# Patient Record
Sex: FEMALE | Race: WHITE | NOT HISPANIC OR LATINO
[De-identification: names, ages, dates, MRNs, and addresses within clinical notes are randomized per-mention and may not be internally consistent; named-entity substitution may affect disease eponyms.]

---

## 2017-01-17 ENCOUNTER — FORM ENCOUNTER (OUTPATIENT)
Age: 82
End: 2017-01-17

## 2017-01-18 ENCOUNTER — OUTPATIENT (OUTPATIENT)
Dept: OUTPATIENT SERVICES | Facility: HOSPITAL | Age: 82
LOS: 1 days | End: 2017-01-18
Payer: MEDICARE

## 2017-01-18 ENCOUNTER — APPOINTMENT (OUTPATIENT)
Dept: PULMONOLOGY | Facility: CLINIC | Age: 82
End: 2017-01-18

## 2017-01-18 ENCOUNTER — APPOINTMENT (OUTPATIENT)
Dept: CT IMAGING | Facility: CLINIC | Age: 82
End: 2017-01-18

## 2017-01-18 VITALS
HEART RATE: 94 BPM | DIASTOLIC BLOOD PRESSURE: 80 MMHG | SYSTOLIC BLOOD PRESSURE: 130 MMHG | HEIGHT: 64 IN | WEIGHT: 172 LBS | BODY MASS INDEX: 29.37 KG/M2

## 2017-01-18 DIAGNOSIS — Z85.3 PERSONAL HISTORY OF MALIGNANT NEOPLASM OF BREAST: ICD-10-CM

## 2017-01-18 DIAGNOSIS — Z85.038 PERSONAL HISTORY OF OTHER MALIGNANT NEOPLASM OF LARGE INTESTINE: ICD-10-CM

## 2017-01-18 DIAGNOSIS — Z87.891 PERSONAL HISTORY OF NICOTINE DEPENDENCE: ICD-10-CM

## 2017-01-18 DIAGNOSIS — R93.8 ABNORMAL FINDINGS ON DIAGNOSTIC IMAGING OF OTHER SPECIFIED BODY STRUCTURES: ICD-10-CM

## 2017-01-18 DIAGNOSIS — Z86.79 PERSONAL HISTORY OF OTHER DISEASES OF THE CIRCULATORY SYSTEM: ICD-10-CM

## 2017-01-18 DIAGNOSIS — Z82.49 FAMILY HISTORY OF ISCHEMIC HEART DISEASE AND OTHER DISEASES OF THE CIRCULATORY SYSTEM: ICD-10-CM

## 2017-01-18 PROCEDURE — 71250 CT THORAX DX C-: CPT

## 2017-01-18 PROCEDURE — 71250 CT THORAX DX C-: CPT | Mod: 26

## 2017-01-18 RX ORDER — CHOLECALCIFEROL (VITAMIN D3) 125 MCG
TABLET ORAL
Refills: 0 | Status: ACTIVE | COMMUNITY

## 2017-01-23 ENCOUNTER — APPOINTMENT (OUTPATIENT)
Dept: THORACIC SURGERY | Facility: CLINIC | Age: 82
End: 2017-01-23

## 2017-01-23 VITALS
WEIGHT: 172 LBS | BODY MASS INDEX: 29.37 KG/M2 | RESPIRATION RATE: 16 BRPM | DIASTOLIC BLOOD PRESSURE: 91 MMHG | HEART RATE: 68 BPM | HEIGHT: 64 IN | SYSTOLIC BLOOD PRESSURE: 162 MMHG | OXYGEN SATURATION: 94 %

## 2017-01-23 DIAGNOSIS — R07.9 CHEST PAIN, UNSPECIFIED: ICD-10-CM

## 2017-01-23 DIAGNOSIS — M79.89 OTHER SPECIFIED SOFT TISSUE DISORDERS: ICD-10-CM

## 2017-01-23 DIAGNOSIS — N18.9 CHRONIC KIDNEY DISEASE, UNSPECIFIED: ICD-10-CM

## 2017-01-31 ENCOUNTER — RESULT REVIEW (OUTPATIENT)
Age: 82
End: 2017-01-31

## 2017-01-31 ENCOUNTER — OUTPATIENT (OUTPATIENT)
Dept: OUTPATIENT SERVICES | Facility: HOSPITAL | Age: 82
LOS: 1 days | End: 2017-01-31
Payer: MEDICARE

## 2017-01-31 VITALS
WEIGHT: 171.08 LBS | SYSTOLIC BLOOD PRESSURE: 165 MMHG | HEIGHT: 66 IN | HEART RATE: 78 BPM | RESPIRATION RATE: 16 BRPM | DIASTOLIC BLOOD PRESSURE: 91 MMHG | TEMPERATURE: 97 F

## 2017-01-31 DIAGNOSIS — Z85.038 PERSONAL HISTORY OF OTHER MALIGNANT NEOPLASM OF LARGE INTESTINE: Chronic | ICD-10-CM

## 2017-01-31 DIAGNOSIS — J90 PLEURAL EFFUSION, NOT ELSEWHERE CLASSIFIED: ICD-10-CM

## 2017-01-31 DIAGNOSIS — Z90.49 ACQUIRED ABSENCE OF OTHER SPECIFIED PARTS OF DIGESTIVE TRACT: Chronic | ICD-10-CM

## 2017-01-31 DIAGNOSIS — Z90.89 ACQUIRED ABSENCE OF OTHER ORGANS: Chronic | ICD-10-CM

## 2017-01-31 DIAGNOSIS — Z01.818 ENCOUNTER FOR OTHER PREPROCEDURAL EXAMINATION: ICD-10-CM

## 2017-01-31 DIAGNOSIS — Z90.11 ACQUIRED ABSENCE OF RIGHT BREAST AND NIPPLE: Chronic | ICD-10-CM

## 2017-01-31 DIAGNOSIS — Z96.642 PRESENCE OF LEFT ARTIFICIAL HIP JOINT: Chronic | ICD-10-CM

## 2017-01-31 LAB
ANION GAP SERPL CALC-SCNC: 10 MMOL/L — SIGNIFICANT CHANGE UP (ref 5–17)
ANISOCYTOSIS BLD QL: SLIGHT — SIGNIFICANT CHANGE UP
APTT BLD: 32.5 SEC — SIGNIFICANT CHANGE UP (ref 27.5–37.4)
BUN SERPL-MCNC: 24 MG/DL — HIGH (ref 8–20)
CALCIUM SERPL-MCNC: 9.2 MG/DL — SIGNIFICANT CHANGE UP (ref 8.6–10.2)
CHLORIDE SERPL-SCNC: 99 MMOL/L — SIGNIFICANT CHANGE UP (ref 98–107)
CO2 SERPL-SCNC: 30 MMOL/L — HIGH (ref 22–29)
CREAT SERPL-MCNC: 1.17 MG/DL — SIGNIFICANT CHANGE UP (ref 0.5–1.3)
ELLIPTOCYTES BLD QL SMEAR: SLIGHT — SIGNIFICANT CHANGE UP
GLUCOSE SERPL-MCNC: 112 MG/DL — SIGNIFICANT CHANGE UP (ref 70–115)
HCT VFR BLD CALC: 40.1 % — SIGNIFICANT CHANGE UP (ref 37–47)
HGB BLD-MCNC: 12.7 G/DL — SIGNIFICANT CHANGE UP (ref 12–16)
HYPOCHROMIA BLD QL: SLIGHT — SIGNIFICANT CHANGE UP
INR BLD: 1.22 RATIO — HIGH (ref 0.88–1.16)
LYMPHOCYTES # BLD AUTO: 18 % — LOW (ref 20–55)
MACROCYTES BLD QL: SLIGHT — SIGNIFICANT CHANGE UP
MCHC RBC-ENTMCNC: 25 PG — LOW (ref 27–31)
MCHC RBC-ENTMCNC: 31.7 G/DL — LOW (ref 32–36)
MCV RBC AUTO: 78.8 FL — LOW (ref 81–99)
MICROCYTES BLD QL: SLIGHT — SIGNIFICANT CHANGE UP
MONOCYTES NFR BLD AUTO: 11 % — HIGH (ref 3–10)
NEUTROPHILS NFR BLD AUTO: 68 % — SIGNIFICANT CHANGE UP (ref 37–73)
OVALOCYTES BLD QL SMEAR: SLIGHT — SIGNIFICANT CHANGE UP
PLAT MORPH BLD: NORMAL — SIGNIFICANT CHANGE UP
PLATELET # BLD AUTO: 90 K/UL — LOW (ref 150–400)
POIKILOCYTOSIS BLD QL AUTO: SLIGHT — SIGNIFICANT CHANGE UP
POTASSIUM SERPL-MCNC: 4.4 MMOL/L — SIGNIFICANT CHANGE UP (ref 3.5–5.3)
POTASSIUM SERPL-SCNC: 4.4 MMOL/L — SIGNIFICANT CHANGE UP (ref 3.5–5.3)
PROTHROM AB SERPL-ACNC: 13.4 SEC — HIGH (ref 10–13.1)
RBC # BLD: 5.09 M/UL — SIGNIFICANT CHANGE UP (ref 4.4–5.2)
RBC # FLD: 17.5 % — HIGH (ref 11–15.6)
RBC BLD AUTO: ABNORMAL
SODIUM SERPL-SCNC: 139 MMOL/L — SIGNIFICANT CHANGE UP (ref 135–145)
VARIANT LYMPHS # BLD: 3 % — SIGNIFICANT CHANGE UP (ref 0–6)
WBC # BLD: 4.87 K/UL — SIGNIFICANT CHANGE UP (ref 4.8–10.8)
WBC # FLD AUTO: 4.87 K/UL — SIGNIFICANT CHANGE UP (ref 4.8–10.8)

## 2017-01-31 PROCEDURE — G0463: CPT

## 2017-01-31 PROCEDURE — 80048 BASIC METABOLIC PNL TOTAL CA: CPT

## 2017-01-31 PROCEDURE — 85027 COMPLETE CBC AUTOMATED: CPT

## 2017-01-31 PROCEDURE — 85610 PROTHROMBIN TIME: CPT

## 2017-01-31 PROCEDURE — 85730 THROMBOPLASTIN TIME PARTIAL: CPT

## 2017-01-31 RX ORDER — SODIUM CHLORIDE 9 MG/ML
3 INJECTION INTRAMUSCULAR; INTRAVENOUS; SUBCUTANEOUS ONCE
Qty: 0 | Refills: 0 | Status: DISCONTINUED | OUTPATIENT
Start: 2017-02-01 | End: 2017-02-16

## 2017-01-31 NOTE — H&P PST ADULT - PSH
History of cholecystectomy    History of colon resection    History of hip replacement, total, left    History of right mastectomy    History of tonsillectomy

## 2017-01-31 NOTE — H&P PST ADULT - ASSESSMENT
81F PMH HTN, Breast Cancer, Colon Cancer and Renal Insufficiency with Left Pleural Effusion for Thoracentesis.

## 2017-01-31 NOTE — H&P PST ADULT - HISTORY OF PRESENT ILLNESS
81F with pneumonia in September 2016, hospitalized at [facility] for 1 week. Discharged home and patient reports, "I haven't felt right since then." Shortness on exertion when walking and climbing steps. Ct Chest showed Left Pleural Effusion, for Thoracentesis.

## 2017-01-31 NOTE — H&P PST ADULT - NSANTHOSAYNRD_GEN_A_CORE
No. MARTHA screening performed.  STOP BANG Legend: 0-2 = LOW Risk; 3-4 = INTERMEDIATE Risk; 5-8 = HIGH Risk

## 2017-01-31 NOTE — H&P PST ADULT - FAMILY HISTORY
Mother  Still living? No  Family history of hypertension, Age at diagnosis: Age Unknown     Child  Still living? Yes, Estimated age: Age Unknown  Family history of hypertension, Age at diagnosis: Age Unknown

## 2017-02-01 ENCOUNTER — OUTPATIENT (OUTPATIENT)
Dept: OUTPATIENT SERVICES | Facility: HOSPITAL | Age: 82
LOS: 1 days | Discharge: HOME | End: 2017-02-01
Payer: MEDICARE

## 2017-02-01 VITALS
DIASTOLIC BLOOD PRESSURE: 63 MMHG | SYSTOLIC BLOOD PRESSURE: 119 MMHG | WEIGHT: 164.91 LBS | RESPIRATION RATE: 16 BRPM | OXYGEN SATURATION: 95 % | HEIGHT: 65 IN | HEART RATE: 64 BPM | TEMPERATURE: 97 F

## 2017-02-01 VITALS
HEART RATE: 65 BPM | DIASTOLIC BLOOD PRESSURE: 70 MMHG | OXYGEN SATURATION: 94 % | SYSTOLIC BLOOD PRESSURE: 122 MMHG | RESPIRATION RATE: 20 BRPM

## 2017-02-01 DIAGNOSIS — Z01.818 ENCOUNTER FOR OTHER PREPROCEDURAL EXAMINATION: ICD-10-CM

## 2017-02-01 DIAGNOSIS — Z90.49 ACQUIRED ABSENCE OF OTHER SPECIFIED PARTS OF DIGESTIVE TRACT: Chronic | ICD-10-CM

## 2017-02-01 DIAGNOSIS — J90 PLEURAL EFFUSION, NOT ELSEWHERE CLASSIFIED: ICD-10-CM

## 2017-02-01 DIAGNOSIS — Z90.89 ACQUIRED ABSENCE OF OTHER ORGANS: Chronic | ICD-10-CM

## 2017-02-01 DIAGNOSIS — Z90.11 ACQUIRED ABSENCE OF RIGHT BREAST AND NIPPLE: Chronic | ICD-10-CM

## 2017-02-01 DIAGNOSIS — Z96.642 PRESENCE OF LEFT ARTIFICIAL HIP JOINT: Chronic | ICD-10-CM

## 2017-02-01 LAB
ALBUMIN FLD-MCNC: 1.6 G/DL — SIGNIFICANT CHANGE UP
B PERT IGG+IGM PNL SER: CLEAR — SIGNIFICANT CHANGE UP
COLOR FLD: YELLOW
FLUID INTAKE SUBSTANCE CLASS: SIGNIFICANT CHANGE UP
FLUID SEGMENTED GRANULOCYTES: 18 % — SIGNIFICANT CHANGE UP
GLUCOSE FLD-MCNC: 118 MG/DL — SIGNIFICANT CHANGE UP
GRAM STN FLD: SIGNIFICANT CHANGE UP
LYMPHOCYTES # FLD: 63 % — SIGNIFICANT CHANGE UP
MESOTHL CELL # FLD: 8 % — SIGNIFICANT CHANGE UP
MONOS+MACROS # FLD: 11 % — SIGNIFICANT CHANGE UP
PH FLD: 8 — SIGNIFICANT CHANGE UP
PROT FLD-MCNC: 2.9 G/DL — SIGNIFICANT CHANGE UP
RCV VOL RI: 17 /UL — HIGH (ref 0–5)
SPECIMEN SOURCE: SIGNIFICANT CHANGE UP
TOTAL NUCLEATED CELL COUNT, BODY FLUID: 265 /UL — HIGH (ref 0–5)
TUBE TYPE: SIGNIFICANT CHANGE UP

## 2017-02-01 PROCEDURE — 83986 ASSAY PH BODY FLUID NOS: CPT

## 2017-02-01 PROCEDURE — 82945 GLUCOSE OTHER FLUID: CPT

## 2017-02-01 PROCEDURE — 87015 SPECIMEN INFECT AGNT CONCNTJ: CPT

## 2017-02-01 PROCEDURE — 82042 OTHER SOURCE ALBUMIN QUAN EA: CPT

## 2017-02-01 PROCEDURE — 76942 ECHO GUIDE FOR BIOPSY: CPT

## 2017-02-01 PROCEDURE — 71045 X-RAY EXAM CHEST 1 VIEW: CPT

## 2017-02-01 PROCEDURE — 87206 SMEAR FLUORESCENT/ACID STAI: CPT

## 2017-02-01 PROCEDURE — 89051 BODY FLUID CELL COUNT: CPT

## 2017-02-01 PROCEDURE — 87075 CULTR BACTERIA EXCEPT BLOOD: CPT

## 2017-02-01 PROCEDURE — 32555 ASPIRATE PLEURA W/ IMAGING: CPT | Mod: LT

## 2017-02-01 PROCEDURE — 87205 SMEAR GRAM STAIN: CPT

## 2017-02-01 PROCEDURE — 87070 CULTURE OTHR SPECIMN AEROBIC: CPT

## 2017-02-01 PROCEDURE — 84157 ASSAY OF PROTEIN OTHER: CPT

## 2017-02-01 PROCEDURE — 83615 LACTATE (LD) (LDH) ENZYME: CPT

## 2017-02-01 PROCEDURE — 71010: CPT | Mod: 26

## 2017-02-01 PROCEDURE — 87102 FUNGUS ISOLATION CULTURE: CPT

## 2017-02-01 PROCEDURE — 87116 MYCOBACTERIA CULTURE: CPT

## 2017-02-01 NOTE — ASU DISCHARGE PLAN (ADULT/PEDIATRIC). - MEDICATION SUMMARY - MEDICATIONS TO TAKE
I will START or STAY ON the medications listed below when I get home from the hospital:    losartan 50 mg oral tablet  -- 1 tab(s) by mouth once a day  -- Indication: For Per PMD    Os-Jaskaran 500 (1250 mg calcium carbonate) oral tablet  -- 2 tab(s) by mouth once a day  -- Indication: For Per PMD    prazosin 5 mg oral capsule  -- 1 cap(s) by mouth 2 times a day  -- Indication: For Per PMD    Metoprolol Succinate ER 50 mg oral tablet, extended release  -- 1 tab(s) by mouth once a day  -- Indication: For Per PMDPer PMD    atenolol 100 mg oral tablet  -- 1 tab(s) by mouth once a day  -- Indication: For Per PMD    hydroCHLOROthiazide 12.5 mg oral capsule  -- 1 cap(s) by mouth once a day  -- Indication: For Per PMD    Klor-Con 10 mEq oral tablet, extended release  -- 1 tab(s) by mouth once a day  -- Indication: For Per PMD

## 2017-02-02 LAB
LDH SERPL L TO P-CCNC: 58 U/L — SIGNIFICANT CHANGE UP
NIGHT BLUE STAIN TISS: SIGNIFICANT CHANGE UP
SPECIMEN SOURCE: SIGNIFICANT CHANGE UP

## 2017-02-06 ENCOUNTER — APPOINTMENT (OUTPATIENT)
Dept: THORACIC SURGERY | Facility: CLINIC | Age: 82
End: 2017-02-06

## 2017-02-06 LAB
CULTURE RESULTS: SIGNIFICANT CHANGE UP
SPECIMEN SOURCE: SIGNIFICANT CHANGE UP

## 2017-02-08 ENCOUNTER — APPOINTMENT (OUTPATIENT)
Dept: THORACIC SURGERY | Facility: CLINIC | Age: 82
End: 2017-02-08

## 2017-02-08 ENCOUNTER — RECORD ABSTRACTING (OUTPATIENT)
Age: 82
End: 2017-02-08

## 2017-02-08 VITALS
OXYGEN SATURATION: 93 % | DIASTOLIC BLOOD PRESSURE: 85 MMHG | RESPIRATION RATE: 18 BRPM | SYSTOLIC BLOOD PRESSURE: 153 MMHG | HEART RATE: 79 BPM | HEIGHT: 64 IN

## 2017-02-14 ENCOUNTER — OUTPATIENT (OUTPATIENT)
Dept: OUTPATIENT SERVICES | Facility: HOSPITAL | Age: 82
LOS: 1 days | End: 2017-02-14
Payer: MEDICARE

## 2017-02-14 VITALS
DIASTOLIC BLOOD PRESSURE: 87 MMHG | RESPIRATION RATE: 22 BRPM | TEMPERATURE: 97 F | HEART RATE: 71 BPM | HEIGHT: 66 IN | SYSTOLIC BLOOD PRESSURE: 152 MMHG | WEIGHT: 165.57 LBS

## 2017-02-14 DIAGNOSIS — I10 ESSENTIAL (PRIMARY) HYPERTENSION: ICD-10-CM

## 2017-02-14 DIAGNOSIS — Z01.818 ENCOUNTER FOR OTHER PREPROCEDURAL EXAMINATION: ICD-10-CM

## 2017-02-14 DIAGNOSIS — Z90.11 ACQUIRED ABSENCE OF RIGHT BREAST AND NIPPLE: Chronic | ICD-10-CM

## 2017-02-14 DIAGNOSIS — Z90.49 ACQUIRED ABSENCE OF OTHER SPECIFIED PARTS OF DIGESTIVE TRACT: Chronic | ICD-10-CM

## 2017-02-14 DIAGNOSIS — Z96.642 PRESENCE OF LEFT ARTIFICIAL HIP JOINT: Chronic | ICD-10-CM

## 2017-02-14 DIAGNOSIS — J86.9 PYOTHORAX WITHOUT FISTULA: ICD-10-CM

## 2017-02-14 DIAGNOSIS — Z90.89 ACQUIRED ABSENCE OF OTHER ORGANS: Chronic | ICD-10-CM

## 2017-02-14 DIAGNOSIS — Z98.890 OTHER SPECIFIED POSTPROCEDURAL STATES: Chronic | ICD-10-CM

## 2017-02-14 LAB
ANION GAP SERPL CALC-SCNC: 11 MMOL/L — SIGNIFICANT CHANGE UP (ref 5–17)
ANISOCYTOSIS BLD QL: SLIGHT — SIGNIFICANT CHANGE UP
APTT BLD: 34 SEC — SIGNIFICANT CHANGE UP (ref 27.5–37.4)
BLD GP AB SCN SERPL QL: SIGNIFICANT CHANGE UP
BUN SERPL-MCNC: 27 MG/DL — HIGH (ref 8–20)
CALCIUM SERPL-MCNC: 9.7 MG/DL — SIGNIFICANT CHANGE UP (ref 8.6–10.2)
CHLORIDE SERPL-SCNC: 94 MMOL/L — LOW (ref 98–107)
CO2 SERPL-SCNC: 31 MMOL/L — HIGH (ref 22–29)
COMMENT - BLOOD BANK: SIGNIFICANT CHANGE UP
CREAT SERPL-MCNC: 1.18 MG/DL — SIGNIFICANT CHANGE UP (ref 0.5–1.3)
EOSINOPHIL NFR BLD AUTO: 1 % — SIGNIFICANT CHANGE UP (ref 0–5)
GLUCOSE SERPL-MCNC: 123 MG/DL — HIGH (ref 70–115)
HCT VFR BLD CALC: 40.8 % — SIGNIFICANT CHANGE UP (ref 37–47)
HGB BLD-MCNC: 13.3 G/DL — SIGNIFICANT CHANGE UP (ref 12–16)
HYPOCHROMIA BLD QL: SLIGHT — SIGNIFICANT CHANGE UP
INR BLD: 1.29 RATIO — HIGH (ref 0.88–1.16)
LYMPHOCYTES # BLD AUTO: 12 % — LOW (ref 20–55)
MACROCYTES BLD QL: SLIGHT — SIGNIFICANT CHANGE UP
MCHC RBC-ENTMCNC: 25.2 PG — LOW (ref 27–31)
MCHC RBC-ENTMCNC: 32.6 G/DL — SIGNIFICANT CHANGE UP (ref 32–36)
MCV RBC AUTO: 77.4 FL — LOW (ref 81–99)
MICROCYTES BLD QL: SLIGHT — SIGNIFICANT CHANGE UP
MONOCYTES NFR BLD AUTO: 8 % — SIGNIFICANT CHANGE UP (ref 3–10)
NEUTROPHILS NFR BLD AUTO: 76 % — HIGH (ref 37–73)
OVALOCYTES BLD QL SMEAR: SLIGHT — SIGNIFICANT CHANGE UP
PLAT MORPH BLD: NORMAL — SIGNIFICANT CHANGE UP
PLATELET # BLD AUTO: 99 K/UL — LOW (ref 150–400)
POIKILOCYTOSIS BLD QL AUTO: SLIGHT — SIGNIFICANT CHANGE UP
POTASSIUM SERPL-MCNC: 4.3 MMOL/L — SIGNIFICANT CHANGE UP (ref 3.5–5.3)
POTASSIUM SERPL-SCNC: 4.3 MMOL/L — SIGNIFICANT CHANGE UP (ref 3.5–5.3)
PROTHROM AB SERPL-ACNC: 14.2 SEC — HIGH (ref 10–13.1)
RBC # BLD: 5.27 M/UL — HIGH (ref 4.4–5.2)
RBC # FLD: 17.3 % — HIGH (ref 11–15.6)
RBC BLD AUTO: ABNORMAL
SODIUM SERPL-SCNC: 136 MMOL/L — SIGNIFICANT CHANGE UP (ref 135–145)
TYPE + AB SCN PNL BLD: SIGNIFICANT CHANGE UP
VARIANT LYMPHS # BLD: 3 % — SIGNIFICANT CHANGE UP (ref 0–6)
WBC # BLD: 5.6 K/UL — SIGNIFICANT CHANGE UP (ref 4.8–10.8)
WBC # FLD AUTO: 5.6 K/UL — SIGNIFICANT CHANGE UP (ref 4.8–10.8)

## 2017-02-14 PROCEDURE — 85610 PROTHROMBIN TIME: CPT

## 2017-02-14 PROCEDURE — 86901 BLOOD TYPING SEROLOGIC RH(D): CPT

## 2017-02-14 PROCEDURE — 80048 BASIC METABOLIC PNL TOTAL CA: CPT

## 2017-02-14 PROCEDURE — 86850 RBC ANTIBODY SCREEN: CPT

## 2017-02-14 PROCEDURE — 85730 THROMBOPLASTIN TIME PARTIAL: CPT

## 2017-02-14 PROCEDURE — 85027 COMPLETE CBC AUTOMATED: CPT

## 2017-02-14 PROCEDURE — 86900 BLOOD TYPING SEROLOGIC ABO: CPT

## 2017-02-14 RX ORDER — CEFAZOLIN SODIUM 1 G
2000 VIAL (EA) INJECTION ONCE
Qty: 0 | Refills: 0 | Status: DISCONTINUED | OUTPATIENT
Start: 2017-02-24 | End: 2017-02-28

## 2017-02-14 RX ORDER — SODIUM CHLORIDE 9 MG/ML
3 INJECTION INTRAMUSCULAR; INTRAVENOUS; SUBCUTANEOUS EVERY 8 HOURS
Qty: 0 | Refills: 0 | Status: DISCONTINUED | OUTPATIENT
Start: 2017-02-24 | End: 2017-02-24

## 2017-02-14 NOTE — H&P PST ADULT - ASSESSMENT
81F 81F PMH HTN, Breast Cancer, Colon Cancer and Renal Insufficiency with Empyema for Flexible Bronchoscopy, Left Video Assisted Thoracic Surgery, Decortication.

## 2017-02-14 NOTE — H&P PST ADULT - HISTORY OF PRESENT ILLNESS
81F with pneumonia in September 2016, hospitalized at [facility] for 1 week. Discharged home and patient reports, "I haven't felt right since then." Shortness on exertion when walking and climbing steps. Ct Chest showed Left Pleural Effusion, for Thoracentesis. Thoracentesis completed 2/1/17. Now scheduled for Bronchoscopy and Left VATS. 81F with pneumonia in September 2016, hospitalized at [facility] for 1 week. Discharged home and patient reports, "I haven't felt right since then." Shortness on exertion when walking and climbing steps. Ct Chest showed Left Pleural Effusion, for Thoracentesis. Thoracentesis completed 2/1/17, 1.5 L per patient. Patient reports some improvement in SOB after the thoracentesis, but gradually back to being SOB on exertion. Now scheduled for Bronchoscopy and Left VATS.

## 2017-02-14 NOTE — H&P PST ADULT - PSH
History of cholecystectomy    History of colon resection    History of hip replacement, total, left    History of right mastectomy    History of tonsillectomy History of cholecystectomy    History of colon resection    History of hip replacement, total, left    History of right mastectomy    History of tonsillectomy    Status post thoracentesis  Left Lung

## 2017-02-15 ENCOUNTER — APPOINTMENT (OUTPATIENT)
Dept: PULMONOLOGY | Facility: CLINIC | Age: 82
End: 2017-02-15

## 2017-02-15 DIAGNOSIS — Z01.818 ENCOUNTER FOR OTHER PREPROCEDURAL EXAMINATION: ICD-10-CM

## 2017-02-15 DIAGNOSIS — J86.9 PYOTHORAX WITHOUT FISTULA: ICD-10-CM

## 2017-02-23 ENCOUNTER — RESULT REVIEW (OUTPATIENT)
Age: 82
End: 2017-02-23

## 2017-02-24 ENCOUNTER — INPATIENT (INPATIENT)
Facility: HOSPITAL | Age: 82
LOS: 3 days | Discharge: HOME HEALTH | DRG: 167 | End: 2017-02-28
Attending: THORACIC SURGERY (CARDIOTHORACIC VASCULAR SURGERY) | Admitting: THORACIC SURGERY (CARDIOTHORACIC VASCULAR SURGERY)
Payer: MEDICARE

## 2017-02-24 ENCOUNTER — APPOINTMENT (OUTPATIENT)
Dept: THORACIC SURGERY | Facility: HOSPITAL | Age: 82
End: 2017-02-24
Payer: MEDICARE

## 2017-02-24 ENCOUNTER — TRANSCRIPTION ENCOUNTER (OUTPATIENT)
Age: 82
End: 2017-02-24

## 2017-02-24 VITALS
TEMPERATURE: 97 F | RESPIRATION RATE: 16 BRPM | SYSTOLIC BLOOD PRESSURE: 107 MMHG | OXYGEN SATURATION: 95 % | HEART RATE: 66 BPM | WEIGHT: 165.57 LBS | HEIGHT: 67 IN | DIASTOLIC BLOOD PRESSURE: 77 MMHG

## 2017-02-24 DIAGNOSIS — Z90.89 ACQUIRED ABSENCE OF OTHER ORGANS: Chronic | ICD-10-CM

## 2017-02-24 DIAGNOSIS — Z90.49 ACQUIRED ABSENCE OF OTHER SPECIFIED PARTS OF DIGESTIVE TRACT: Chronic | ICD-10-CM

## 2017-02-24 DIAGNOSIS — Z98.890 OTHER SPECIFIED POSTPROCEDURAL STATES: Chronic | ICD-10-CM

## 2017-02-24 DIAGNOSIS — Z96.642 PRESENCE OF LEFT ARTIFICIAL HIP JOINT: Chronic | ICD-10-CM

## 2017-02-24 DIAGNOSIS — Z90.11 ACQUIRED ABSENCE OF RIGHT BREAST AND NIPPLE: Chronic | ICD-10-CM

## 2017-02-24 DIAGNOSIS — J86.9 PYOTHORAX WITHOUT FISTULA: ICD-10-CM

## 2017-02-24 LAB
BLD GP AB SCN SERPL QL: SIGNIFICANT CHANGE UP
GRAM STN FLD: SIGNIFICANT CHANGE UP
SPECIMEN SOURCE: SIGNIFICANT CHANGE UP
TYPE + AB SCN PNL BLD: SIGNIFICANT CHANGE UP

## 2017-02-24 PROCEDURE — 32651 THORACOSCOPY REMOVE CORTEX: CPT | Mod: AS

## 2017-02-24 PROCEDURE — 71010: CPT | Mod: 26

## 2017-02-24 PROCEDURE — 32550 INSERT PLEURAL CATH: CPT

## 2017-02-24 PROCEDURE — 21600 PARTIAL REMOVAL OF RIB: CPT

## 2017-02-24 PROCEDURE — 32651 THORACOSCOPY REMOVE CORTEX: CPT

## 2017-02-24 PROCEDURE — 31622 DX BRONCHOSCOPE/WASH: CPT

## 2017-02-24 PROCEDURE — 88305 TISSUE EXAM BY PATHOLOGIST: CPT | Mod: 26

## 2017-02-24 PROCEDURE — 21600 PARTIAL REMOVAL OF RIB: CPT | Mod: AS

## 2017-02-24 PROCEDURE — 32609 THORACOSCOPY W/BX PLEURA: CPT

## 2017-02-24 PROCEDURE — 88311 DECALCIFY TISSUE: CPT | Mod: 26

## 2017-02-24 PROCEDURE — 88302 TISSUE EXAM BY PATHOLOGIST: CPT | Mod: 26

## 2017-02-24 PROCEDURE — 88331 PATH CONSLTJ SURG 1 BLK 1SPC: CPT | Mod: 26

## 2017-02-24 RX ORDER — ONDANSETRON 8 MG/1
4 TABLET, FILM COATED ORAL EVERY 6 HOURS
Qty: 0 | Refills: 0 | Status: DISCONTINUED | OUTPATIENT
Start: 2017-02-24 | End: 2017-02-26

## 2017-02-24 RX ORDER — FENTANYL CITRATE 50 UG/ML
30 INJECTION INTRAVENOUS
Qty: 0 | Refills: 0 | Status: DISCONTINUED | OUTPATIENT
Start: 2017-02-24 | End: 2017-02-26

## 2017-02-24 RX ORDER — ACETAMINOPHEN 500 MG
1000 TABLET ORAL ONCE
Qty: 0 | Refills: 0 | Status: DISCONTINUED | OUTPATIENT
Start: 2017-02-24 | End: 2017-02-27

## 2017-02-24 RX ORDER — DOXAZOSIN MESYLATE 4 MG
4 TABLET ORAL
Qty: 0 | Refills: 0 | Status: DISCONTINUED | OUTPATIENT
Start: 2017-02-24 | End: 2017-02-25

## 2017-02-24 RX ORDER — ATENOLOL 25 MG/1
100 TABLET ORAL DAILY
Qty: 0 | Refills: 0 | Status: DISCONTINUED | OUTPATIENT
Start: 2017-02-24 | End: 2017-02-27

## 2017-02-24 RX ORDER — FENTANYL CITRATE 50 UG/ML
50 INJECTION INTRAVENOUS
Qty: 0 | Refills: 0 | Status: DISCONTINUED | OUTPATIENT
Start: 2017-02-24 | End: 2017-02-24

## 2017-02-24 RX ORDER — SODIUM CHLORIDE 9 MG/ML
1000 INJECTION, SOLUTION INTRAVENOUS
Qty: 0 | Refills: 0 | Status: DISCONTINUED | OUTPATIENT
Start: 2017-02-24 | End: 2017-02-24

## 2017-02-24 RX ORDER — ENOXAPARIN SODIUM 100 MG/ML
30 INJECTION SUBCUTANEOUS EVERY 24 HOURS
Qty: 0 | Refills: 0 | Status: DISCONTINUED | OUTPATIENT
Start: 2017-02-25 | End: 2017-02-28

## 2017-02-24 RX ORDER — DOCUSATE SODIUM 100 MG
100 CAPSULE ORAL THREE TIMES A DAY
Qty: 0 | Refills: 0 | Status: DISCONTINUED | OUTPATIENT
Start: 2017-02-24 | End: 2017-02-28

## 2017-02-24 RX ORDER — ALBUTEROL 90 UG/1
2.5 AEROSOL, METERED ORAL EVERY 6 HOURS
Qty: 0 | Refills: 0 | Status: DISCONTINUED | OUTPATIENT
Start: 2017-02-24 | End: 2017-02-28

## 2017-02-24 RX ORDER — IPRATROPIUM BROMIDE 0.2 MG/ML
500 SOLUTION, NON-ORAL INHALATION EVERY 6 HOURS
Qty: 0 | Refills: 0 | Status: DISCONTINUED | OUTPATIENT
Start: 2017-02-24 | End: 2017-02-28

## 2017-02-24 RX ORDER — POTASSIUM CHLORIDE 20 MEQ
10 PACKET (EA) ORAL DAILY
Qty: 0 | Refills: 0 | Status: DISCONTINUED | OUTPATIENT
Start: 2017-02-24 | End: 2017-02-28

## 2017-02-24 RX ORDER — NALOXONE HYDROCHLORIDE 4 MG/.1ML
0.1 SPRAY NASAL
Qty: 0 | Refills: 0 | Status: DISCONTINUED | OUTPATIENT
Start: 2017-02-24 | End: 2017-02-26

## 2017-02-24 RX ORDER — ONDANSETRON 8 MG/1
4 TABLET, FILM COATED ORAL ONCE
Qty: 0 | Refills: 0 | Status: DISCONTINUED | OUTPATIENT
Start: 2017-02-24 | End: 2017-02-24

## 2017-02-24 RX ORDER — SODIUM CHLORIDE 9 MG/ML
1000 INJECTION, SOLUTION INTRAVENOUS
Qty: 0 | Refills: 0 | Status: DISCONTINUED | OUTPATIENT
Start: 2017-02-24 | End: 2017-02-25

## 2017-02-24 RX ORDER — FUROSEMIDE 40 MG
20 TABLET ORAL ONCE
Qty: 0 | Refills: 0 | Status: COMPLETED | OUTPATIENT
Start: 2017-02-24 | End: 2017-02-24

## 2017-02-24 RX ORDER — LOSARTAN POTASSIUM 100 MG/1
50 TABLET, FILM COATED ORAL DAILY
Qty: 0 | Refills: 0 | Status: DISCONTINUED | OUTPATIENT
Start: 2017-02-24 | End: 2017-02-27

## 2017-02-24 RX ADMIN — FENTANYL CITRATE 30 MILLILITER(S): 50 INJECTION INTRAVENOUS at 21:23

## 2017-02-24 RX ADMIN — Medication 100 MILLIGRAM(S): at 21:36

## 2017-02-24 RX ADMIN — Medication 4 MILLIGRAM(S): at 18:51

## 2017-02-24 RX ADMIN — Medication 20 MILLIGRAM(S): at 13:23

## 2017-02-24 RX ADMIN — FENTANYL CITRATE 30 MILLILITER(S): 50 INJECTION INTRAVENOUS at 12:34

## 2017-02-24 NOTE — BRIEF OPERATIVE NOTE - PROCEDURE
Decortication of left lung with video-assisted thoracoscopic surgery (VATS)  02/24/2017  Flex Bronch, Left VATS, Partial left lung decortication, partial left rib resection, insertion of PleurEx Catheter  Active  [id number] Decortication of left lung with video-assisted thoracoscopic surgery (VATS)  02/24/2017  Flex Bronch, Left VATS, Partial left lung decortication, partial left rib resection, insertion of PleurEx Catheter  Active  Wiliam Rosen

## 2017-02-25 LAB
ANION GAP SERPL CALC-SCNC: 10 MMOL/L — SIGNIFICANT CHANGE UP (ref 5–17)
ANION GAP SERPL CALC-SCNC: 11 MMOL/L — SIGNIFICANT CHANGE UP (ref 5–17)
ANISOCYTOSIS BLD QL: SLIGHT — SIGNIFICANT CHANGE UP
BUN SERPL-MCNC: 31 MG/DL — HIGH (ref 8–20)
BUN SERPL-MCNC: 35 MG/DL — HIGH (ref 8–20)
BURR CELLS BLD QL SMEAR: PRESENT — SIGNIFICANT CHANGE UP
CALCIUM SERPL-MCNC: 8.4 MG/DL — LOW (ref 8.6–10.2)
CALCIUM SERPL-MCNC: 8.9 MG/DL — SIGNIFICANT CHANGE UP (ref 8.6–10.2)
CHLORIDE SERPL-SCNC: 94 MMOL/L — LOW (ref 98–107)
CHLORIDE SERPL-SCNC: 98 MMOL/L — SIGNIFICANT CHANGE UP (ref 98–107)
CO2 SERPL-SCNC: 29 MMOL/L — SIGNIFICANT CHANGE UP (ref 22–29)
CO2 SERPL-SCNC: 29 MMOL/L — SIGNIFICANT CHANGE UP (ref 22–29)
CREAT SERPL-MCNC: 1.3 MG/DL — SIGNIFICANT CHANGE UP (ref 0.5–1.3)
CREAT SERPL-MCNC: 1.64 MG/DL — HIGH (ref 0.5–1.3)
CULTURE RESULTS: SIGNIFICANT CHANGE UP
DACRYOCYTES BLD QL SMEAR: SLIGHT — SIGNIFICANT CHANGE UP
GLUCOSE SERPL-MCNC: 112 MG/DL — SIGNIFICANT CHANGE UP (ref 70–115)
GLUCOSE SERPL-MCNC: 133 MG/DL — HIGH (ref 70–115)
HCT VFR BLD CALC: 37.7 % — SIGNIFICANT CHANGE UP (ref 37–47)
HGB BLD-MCNC: 11.9 G/DL — LOW (ref 12–16)
LYMPHOCYTES # BLD AUTO: 1 K/UL — SIGNIFICANT CHANGE UP (ref 1–4.8)
LYMPHOCYTES # BLD AUTO: 13.7 % — LOW (ref 20–55)
MCHC RBC-ENTMCNC: 24.6 PG — LOW (ref 27–31)
MCHC RBC-ENTMCNC: 31.6 G/DL — LOW (ref 32–36)
MCV RBC AUTO: 77.9 FL — LOW (ref 81–99)
MICROCYTES BLD QL: SLIGHT — SIGNIFICANT CHANGE UP
MONOCYTES # BLD AUTO: 1 K/UL — HIGH (ref 0–0.8)
MONOCYTES NFR BLD AUTO: 12.8 % — HIGH (ref 3–10)
NEUTROPHILS # BLD AUTO: 5.6 K/UL — SIGNIFICANT CHANGE UP (ref 1.8–8)
NEUTROPHILS NFR BLD AUTO: 73.4 % — HIGH (ref 37–73)
NIGHT BLUE STAIN TISS: SIGNIFICANT CHANGE UP
PLAT MORPH BLD: NORMAL — SIGNIFICANT CHANGE UP
PLATELET # BLD AUTO: 85 K/UL — LOW (ref 150–400)
POTASSIUM SERPL-MCNC: 4.8 MMOL/L — SIGNIFICANT CHANGE UP (ref 3.5–5.3)
POTASSIUM SERPL-MCNC: 5.2 MMOL/L — SIGNIFICANT CHANGE UP (ref 3.5–5.3)
POTASSIUM SERPL-SCNC: 4.8 MMOL/L — SIGNIFICANT CHANGE UP (ref 3.5–5.3)
POTASSIUM SERPL-SCNC: 5.2 MMOL/L — SIGNIFICANT CHANGE UP (ref 3.5–5.3)
RBC # BLD: 4.84 M/UL — SIGNIFICANT CHANGE UP (ref 4.4–5.2)
RBC # FLD: 17.3 % — HIGH (ref 11–15.6)
RBC BLD AUTO: ABNORMAL
SODIUM SERPL-SCNC: 133 MMOL/L — LOW (ref 135–145)
SODIUM SERPL-SCNC: 138 MMOL/L — SIGNIFICANT CHANGE UP (ref 135–145)
SPECIMEN SOURCE: SIGNIFICANT CHANGE UP
SPECIMEN SOURCE: SIGNIFICANT CHANGE UP
WBC # BLD: 7.6 K/UL — SIGNIFICANT CHANGE UP (ref 4.8–10.8)
WBC # FLD AUTO: 7.6 K/UL — SIGNIFICANT CHANGE UP (ref 4.8–10.8)

## 2017-02-25 PROCEDURE — 71010: CPT | Mod: 26

## 2017-02-25 RX ORDER — SODIUM CHLORIDE 9 MG/ML
1000 INJECTION INTRAMUSCULAR; INTRAVENOUS; SUBCUTANEOUS
Qty: 0 | Refills: 0 | Status: DISCONTINUED | OUTPATIENT
Start: 2017-02-25 | End: 2017-02-27

## 2017-02-25 RX ORDER — ALBUMIN HUMAN 25 %
250 VIAL (ML) INTRAVENOUS ONCE
Qty: 0 | Refills: 0 | Status: COMPLETED | OUTPATIENT
Start: 2017-02-25 | End: 2017-02-25

## 2017-02-25 RX ADMIN — FENTANYL CITRATE 30 MILLILITER(S): 50 INJECTION INTRAVENOUS at 07:19

## 2017-02-25 RX ADMIN — Medication 100 MILLIGRAM(S): at 13:08

## 2017-02-25 RX ADMIN — ENOXAPARIN SODIUM 30 MILLIGRAM(S): 100 INJECTION SUBCUTANEOUS at 00:01

## 2017-02-25 RX ADMIN — Medication 125 MILLILITER(S): at 14:55

## 2017-02-25 RX ADMIN — FENTANYL CITRATE 30 MILLILITER(S): 50 INJECTION INTRAVENOUS at 19:10

## 2017-02-25 RX ADMIN — Medication 4 MILLIGRAM(S): at 17:29

## 2017-02-25 RX ADMIN — Medication 100 MILLIGRAM(S): at 21:31

## 2017-02-25 RX ADMIN — ENOXAPARIN SODIUM 30 MILLIGRAM(S): 100 INJECTION SUBCUTANEOUS at 22:48

## 2017-02-25 RX ADMIN — Medication 10 MILLIEQUIVALENT(S): at 11:19

## 2017-02-25 RX ADMIN — LOSARTAN POTASSIUM 50 MILLIGRAM(S): 100 TABLET, FILM COATED ORAL at 11:19

## 2017-02-25 RX ADMIN — SODIUM CHLORIDE 50 MILLILITER(S): 9 INJECTION INTRAMUSCULAR; INTRAVENOUS; SUBCUTANEOUS at 19:11

## 2017-02-25 RX ADMIN — Medication 4 MILLIGRAM(S): at 11:17

## 2017-02-25 RX ADMIN — Medication 100 MILLIGRAM(S): at 07:13

## 2017-02-25 NOTE — PROGRESS NOTE ADULT - SUBJECTIVE AND OBJECTIVE BOX
Patient is awake and Responsive to all stimuli  Vital signs are Stable  Sitting High Fowlers in bed @ time of visit  No Anesthesia Complications noted

## 2017-02-25 NOTE — PROGRESS NOTE ADULT - SUBJECTIVE AND OBJECTIVE BOX
Subjective: "I'm having some pain on this side but it helps when I hit the button" nad, sitting in bed. chest tube clamped per dr erazo    Vital Signs:  Vital Signs Last 24 Hrs  T(C): 36.6, Max: 36.6 (02-24 @ 11:40)  T(F): 97.8, Max: 97.9 (02-24 @ 11:40)  HR: 69 (62 - 73)  BP: 104/60 (90/50 - 136/74)  RR: 16 (16 - 35)  SpO2: 96% (95% - 99%) RA    Telemetry/Alarms:    Relevant labs, radiology and Medications reviewed    Pertinent Physical Exam  rhonchi left,   rrr  no edema  ct clamped ( per rn was small air leak earlier)    I & Os for current day (as of 02-25 @ 11:24)  =============================================  IN:    lactated ringers.: 800 ml    lactated ringers.: 700 ml    Oral Fluid: 240 ml    Total IN: 1740 ml  ---------------------------------------------  OUT:    Indwelling Catheter - Urethral: 900 ml    Chest Tube: 730 ml    Total OUT: 1630 ml  ---------------------------------------------  Total NET: 110 ml      Assessment  81y Female admitted with pleural effusion.    On 2/24/17, patient underwent Decortication of left lung with video-assisted thoracoscopic surgery (VATS), partial rib resections, pleurex catheter placement.    Postoperative course/issues: Small air leak    PLAN  Neuro: Pain management, Fentanyl PCA for now  Pulm: Encourage coughing, deep breathing and use of incentive spirometry. Wean off supplemental oxygen as able. Daily CXR. Duonebs  Cardio: Monitor telemetry/alarms. Continue cardiac meds  GI: Tolerating diet. Continue stool softeners.  Renal: Daily BMP, supplement electrolytes as needed. Monitor creatinine (slight elevation)  Vasc: Lovenox/SCDs for DVT prophylaxis  Heme: Stable H/H. Trend CBC daily. Monitor thrombocytopenia  ID: Off antibiotics. Stable.  Therapy: OOB/ambulate  Tubes: Clamped - check chest xray tomorrow and disconnect from pleurivac if no significant ptx and asymptomatic  Disposition: Aim to D/C to home tomorrow  Discussed with Dr Erazo in AM Subjective: "I'm having some pain on this side but it helps when I hit the button" nad, sitting in bed. chest tube clamped per dr erazo    Vital Signs:  Vital Signs Last 24 Hrs  T(C): 36.6, Max: 36.6 (02-24 @ 11:40)  T(F): 97.8, Max: 97.9 (02-24 @ 11:40)  HR: 69 (62 - 73)  BP: 104/60 (90/50 - 136/74)  RR: 16 (16 - 35)  SpO2: 96% (95% - 99%) RA    Telemetry/Alarms:    Relevant labs, radiology and Medications reviewed    Pertinent Physical Exam  rhonchi left, crepitus posterior lateral chest near chest tube  rrr  no edema  ct clamped ( per rn was small air leak earlier)    I & Os for current day (as of 02-25 @ 11:24)  =============================================  IN:    lactated ringers.: 800 ml    lactated ringers.: 700 ml    Oral Fluid: 240 ml    Total IN: 1740 ml  ---------------------------------------------  OUT:    Indwelling Catheter - Urethral: 900 ml    Chest Tube: 730 ml    Total OUT: 1630 ml  ---------------------------------------------  Total NET: 110 ml      Assessment  81y Female admitted with pleural effusion.    On 2/24/17, patient underwent Decortication of left lung with video-assisted thoracoscopic surgery (VATS), partial rib resections, pleurex catheter placement.    Postoperative course/issues: Small air leak    PLAN  Neuro: Pain management, Fentanyl PCA for now  Pulm: Encourage coughing, deep breathing and use of incentive spirometry. Wean off supplemental oxygen as able. Daily CXR. Albinabs  Cardio: Monitor telemetry/alarms. Continue cardiac meds  GI: Tolerating diet. Continue stool softeners.  Renal: Daily BMP, supplement electrolytes as needed. Monitor creatinine (slight elevation)  Vasc: Lovenox/SCDs for DVT prophylaxis  Heme: Stable H/H. Trend CBC daily. Monitor thrombocytopenia  ID: Off antibiotics. Stable.  Therapy: OOB/ambulate  Tubes: Clamped - check chest xray tomorrow and disconnect from pleurivac if no significant ptx and asymptomatic  Disposition: Aim to D/C to home tomorrow  Discussed with Dr Erazo in AM

## 2017-02-26 LAB
ALBUMIN SERPL ELPH-MCNC: 3 G/DL — LOW (ref 3.3–5.2)
ALP SERPL-CCNC: 146 U/L — HIGH (ref 40–120)
ALT FLD-CCNC: 12 U/L — SIGNIFICANT CHANGE UP
ANION GAP SERPL CALC-SCNC: 11 MMOL/L — SIGNIFICANT CHANGE UP (ref 5–17)
APPEARANCE UR: CLEAR — SIGNIFICANT CHANGE UP
AST SERPL-CCNC: 19 U/L — SIGNIFICANT CHANGE UP
BACTERIA # UR AUTO: ABNORMAL
BILIRUB SERPL-MCNC: 0.9 MG/DL — SIGNIFICANT CHANGE UP (ref 0.4–2)
BILIRUB UR-MCNC: NEGATIVE — SIGNIFICANT CHANGE UP
BUN SERPL-MCNC: 41 MG/DL — HIGH (ref 8–20)
CALCIUM SERPL-MCNC: 8 MG/DL — LOW (ref 8.6–10.2)
CHLORIDE SERPL-SCNC: 96 MMOL/L — LOW (ref 98–107)
CO2 SERPL-SCNC: 28 MMOL/L — SIGNIFICANT CHANGE UP (ref 22–29)
COLOR SPEC: ABNORMAL
CREAT ?TM UR-MCNC: 187 MG/DL — SIGNIFICANT CHANGE UP
CREAT SERPL-MCNC: 1.69 MG/DL — HIGH (ref 0.5–1.3)
DIFF PNL FLD: ABNORMAL
EPI CELLS # UR: SIGNIFICANT CHANGE UP
GLUCOSE SERPL-MCNC: 114 MG/DL — SIGNIFICANT CHANGE UP (ref 70–115)
GLUCOSE UR QL: NEGATIVE MG/DL — SIGNIFICANT CHANGE UP
HCT VFR BLD CALC: 36.8 % — LOW (ref 37–47)
HGB BLD-MCNC: 11.9 G/DL — LOW (ref 12–16)
KETONES UR-MCNC: ABNORMAL
LEUKOCYTE ESTERASE UR-ACNC: ABNORMAL
MAGNESIUM SERPL-MCNC: 1.9 MG/DL — SIGNIFICANT CHANGE UP (ref 1.8–2.5)
MCHC RBC-ENTMCNC: 24.6 PG — LOW (ref 27–31)
MCHC RBC-ENTMCNC: 32.3 G/DL — SIGNIFICANT CHANGE UP (ref 32–36)
MCV RBC AUTO: 76 FL — LOW (ref 81–99)
NITRITE UR-MCNC: NEGATIVE — SIGNIFICANT CHANGE UP
OSMOLALITY UR: 581 MOSM/KG — SIGNIFICANT CHANGE UP (ref 300–1000)
PH UR: 5 — SIGNIFICANT CHANGE UP (ref 4.8–8)
PHOSPHATE SERPL-MCNC: 3.4 MG/DL — SIGNIFICANT CHANGE UP (ref 2.4–4.7)
PLATELET # BLD AUTO: 89 K/UL — LOW (ref 150–400)
POTASSIUM SERPL-MCNC: 4.8 MMOL/L — SIGNIFICANT CHANGE UP (ref 3.5–5.3)
POTASSIUM SERPL-SCNC: 4.8 MMOL/L — SIGNIFICANT CHANGE UP (ref 3.5–5.3)
PROT ?TM UR-MCNC: 45 MG/DL — HIGH (ref 0–12)
PROT SERPL-MCNC: 6 G/DL — LOW (ref 6.6–8.7)
PROT UR-MCNC: 30 MG/DL
PROT/CREAT UR-RTO: 0.2 RATIO — SIGNIFICANT CHANGE UP
RBC # BLD: 4.84 M/UL — SIGNIFICANT CHANGE UP (ref 4.4–5.2)
RBC # FLD: 17.1 % — HIGH (ref 11–15.6)
RBC CASTS # UR COMP ASSIST: ABNORMAL /HPF (ref 0–4)
SODIUM SERPL-SCNC: 135 MMOL/L — SIGNIFICANT CHANGE UP (ref 135–145)
SODIUM UR-SCNC: 23 MMOL/L — SIGNIFICANT CHANGE UP
SP GR SPEC: 1.02 — SIGNIFICANT CHANGE UP (ref 1.01–1.02)
UROBILINOGEN FLD QL: 1 MG/DL
WBC # BLD: 7.6 K/UL — SIGNIFICANT CHANGE UP (ref 4.8–10.8)
WBC # FLD AUTO: 7.6 K/UL — SIGNIFICANT CHANGE UP (ref 4.8–10.8)
WBC UR QL: ABNORMAL

## 2017-02-26 PROCEDURE — 71010: CPT | Mod: 26

## 2017-02-26 RX ORDER — ALBUMIN HUMAN 25 %
250 VIAL (ML) INTRAVENOUS ONCE
Qty: 0 | Refills: 0 | Status: COMPLETED | OUTPATIENT
Start: 2017-02-26 | End: 2017-02-26

## 2017-02-26 RX ADMIN — LOSARTAN POTASSIUM 50 MILLIGRAM(S): 100 TABLET, FILM COATED ORAL at 05:05

## 2017-02-26 RX ADMIN — ATENOLOL 100 MILLIGRAM(S): 25 TABLET ORAL at 05:05

## 2017-02-26 RX ADMIN — Medication 100 MILLIGRAM(S): at 13:09

## 2017-02-26 RX ADMIN — FENTANYL CITRATE 30 MILLILITER(S): 50 INJECTION INTRAVENOUS at 07:06

## 2017-02-26 RX ADMIN — Medication 100 MILLIGRAM(S): at 22:05

## 2017-02-26 RX ADMIN — Medication 125 MILLILITER(S): at 18:22

## 2017-02-26 RX ADMIN — Medication 100 MILLIGRAM(S): at 05:05

## 2017-02-26 RX ADMIN — ENOXAPARIN SODIUM 30 MILLIGRAM(S): 100 INJECTION SUBCUTANEOUS at 22:06

## 2017-02-26 RX ADMIN — Medication 1 TABLET(S): at 12:50

## 2017-02-26 RX ADMIN — Medication 10 MILLIEQUIVALENT(S): at 12:50

## 2017-02-26 NOTE — PROGRESS NOTE ADULT - SUBJECTIVE AND OBJECTIVE BOX
Subjective: "I'm ok" Pt seated in chair combing hair, NAD    Vital Signs:  Vital Signs Last 24 Hrs  T(C): 36.9, Max: 37 (02-25 @ 21:26)  T(F): 98.4, Max: 98.6 (02-25 @ 21:26)  HR: 77 (70 - 82)  BP: 106/50 (86/50 - 106/50)  RR: 17 (16 - 20)  SpO2: 94% (94% - 97%) 2L    Telemetry/Alarms: SVT x 1, otherwise SR 70s    Relevant labs, radiology and Medications reviewed    26 Feb 2017 05:07    135    |  96     |  41.0   ----------------------------<  114    4.8     |  28.0   |  1.69     Ca    8.0        26 Feb 2017 05:07  Phos  3.4       26 Feb 2017 05:07  Mg     1.9       26 Feb 2017 05:07    TPro  6.0    /  Alb  3.0    /  TBili  0.9    /  DBili  x      /  AST  19     /  ALT  12     /  AlkPhos  146    26 Feb 2017 05:07      Pertinent Physical Exam  diminished left, basilar crackles, right clr  rrr  soft + BS  no edema, wwp  left pleurex, serous     I & Os for 24h ending 02-26 @ 07:00  =============================================  IN:    Oral Fluid: 1130 ml    sodium chloride 0.9%.: 700 ml    lactated ringers.: 450 ml    IV PiggyBack: 250 ml    Total IN: 2530 ml  ---------------------------------------------  OUT:    Indwelling Catheter - Urethral: 350 ml    Chest Tube: 70 ml    Total OUT: 420 ml  ---------------------------------------------  Total NET: 2110 ml    I & Os for current day (as of 02-26 @ 11:36)  =============================================  IN:    Oral Fluid: 240 ml    sodium chloride 0.9%.: 150 ml    Total IN: 390 ml  ---------------------------------------------  OUT:    Indwelling Catheter - Urethral: 75 ml    Total OUT: 75 ml  ---------------------------------------------  Total NET: 315 ml      Assessment  81y Female admitted with pleural effusion.    On 2/24/17, patient underwent Decortication of left lung with video-assisted thoracoscopic surgery (VATS), partial rib resections, pleurex catheter placement.    Postoperative course/issues: Small air leak, clamped, ptx stable. 2/25 EDILBERTO, oliguria, renal consult called 2/26 unclamped ct per Dr Erazo    PLAN  Neuro: Pain management, d/c fentanyl pca  Pulm: Encourage coughing, deep breathing and use of incentive spirometry. Wean off supplemental oxygen as able. Daily CXR. Duonebs  Cardio: Monitor telemetry/alarms. Continue cardiac meds. Echo ordered per renal.  GI: Tolerating diet. Continue stool softeners.  Renal: Daily BMP, supplement electrolytes as needed. Monitor creatinine - continues to rise. appreciate renal consult. Gentle IVF NS @ 50  Vasc: Lovenox/SCDs for DVT prophylaxis  Heme: Stable H/H. Trend CBC daily. Monitor thrombocytopenia - stable  ID: Off antibiotics. Stable.  Therapy: OOB/ambulate  Tubes: Clamped - check chest xray tomorrow and disconnect from pleurivac if no significant ptx and asymptomatic  Disposition: Aim to D/C to home when Cr stable  Discussed with Dr Erazo in AM

## 2017-02-26 NOTE — CONSULT NOTE ADULT - SUBJECTIVE AND OBJECTIVE BOX
Patient is a 81y old  Female who presents with a chief complaint of pleural effusion (2017 10:13)   Acute  renal failure.    HPI:  81F with pneumonia in 2016, hospitalized at [facility] for 1 week. Discharged home and patient reports, "I haven't felt right since then." Shortness on exertion when walking and climbing steps. Ct Chest showed Left Pleural Effusion, for Thoracentesis. Thoracentesis completed 17, 1.5 L per patient. Patient reports some improvement in SOB after the thoracentesis, but gradually back to being SOB on exertion. Now scheduled for Bronchoscopy and Left VATS. (2017 10:01)   Hx renal stones x1 not aware of type, no other renal  problems; elevated creatinine on admission followed by Dr. Valadez for CRF as op.    PAST MEDICAL & SURGICAL HISTORY:  Renal insufficiency  Colon cancer  Breast cancer  Hypertension  Status post thoracentesis: Left Lung  History of tonsillectomy  History of right mastectomy  History of cholecystectomy  History of colon resection  History of colon cancer  History of hip replacement, total, left  history Cholecystectomy    FAMILY HISTORY:  Family history of hypertension (Mother, Child)  [place]    Social History:Non smoker    MEDICATIONS  (STANDING):  ceFAZolin   IVPB 2000milliGRAM(s) IV Intermittent once  fentaNYL PCA (50 MICROgram(s)/mL) 30milliLiter(s) PCA Continuous PCA Continuous  enoxaparin Injectable 30milliGRAM(s) SubCutaneous every 24 hours  docusate sodium 100milliGRAM(s) Oral three times a day  losartan 50milliGRAM(s) Oral daily  ATENolol  Tablet 100milliGRAM(s) Oral daily  potassium chloride    Tablet ER 10milliEquivalent(s) Oral daily  acetaminophen  IVPB. 1000milliGRAM(s) IV Intermittent once  sodium chloride 0.9%. 1000milliLiter(s) IV Continuous <Continuous>    MEDICATIONS  (PRN):  naloxone Injectable 0.1milliGRAM(s) IV Push every 3 minutes PRN For ANY of the following changes in patient status:  A. RR LESS THAN 10 breaths per minute, B. Oxygen saturation LESS THAN 90%, C. Sedation score of 6  ondansetron Injectable 4milliGRAM(s) IV Push every 6 hours PRN Nausea  ALBUTerol    0.083% 2.5milliGRAM(s) Nebulizer every 6 hours PRN Shortness of Breath and/or Wheezing  ipratropium    for Nebulization 500MICROGram(s) Nebulizer every 6 hours PRN Shortness of Breath and/or Wheezing   Meds reviewed    Allergies    No Known Allergies    Intolerances         REVIEW OF SYSTEMS:    CONSTITUTIONAL:  sob  EYES: No eye pain, visual disturbances, or discharge  ENMT:  No difficulty hearing, tinnitus, vertigo; No sinus or throat pain  NECK: No pain or stiffness  BREASTS: prior right mastectomy  RESPIRATORY: hx pneumonia  CARDIOVASCULAR: No chest pain, palpitations, dizziness,   GASTROINTESTINAL: No abdominal or epigastric pain. No nausea, vomiting, or hematemesis; No diarrhea or constipation.   GENITOURINARY: No dysuria, frequency, hematuria, or incontinence  NEUROLOGICAL: No headaches, memory loss, loss of strength, numbness, or tremors  SKIN: Diffuse erythema, no blisters  LYMPH NODES: No enlarged glands  ENDOCRINE: No heat or cold intolerance; No hair loss  MUSCULOSKELETAL: OA  PSYCHIATRIC: No depression, anxiety, mood swings, or difficulty sleeping  HEME/LYMPH: No easy bruising, or bleeding gums  ALLERY AND IMMUNOLOGIC: No hives or eczema      Vital Signs Last 24 Hrs  T(C): 36.9, Max: 37 ( @ 21:26)  T(F): 98.4, Max: 98.6 ( @ 21:26)  HR: 77 (69 - 82)  BP: 106/50 (86/50 - 106/50)  BP(mean): --  RR: 17 (16 - 20)  SpO2: 94% (94% - 97%)  Daily     Daily Weight in k (2017 05:01)    PHYSICAL EXAM:    GENERAL: appears chronically ill, oriented.  HEAD:  Atraumatic, Normocephalic  EYES: EOMI, PERRLA, conjunctiva and sclera clear  ENMT: No tonsillar erythema, exudates, or enlargement; Moist mucous membranes,  No lesions  NECK: Supple, neck  veins full  NERVOUS SYSTEM:  Alert & Oriented X3, Good concentration; Motor Strength wnl upper and lower extremities;  CHEST/LUNG: Clear right, left chest tube noted  HEART: Regular rate and rhythm; No  rubs, or gallops  ABDOMEN: Soft, Nontender, Nondistended; Bowel sounds present,multiple scars  EXTREMITIES:  compression devices legs  LYMPH: No lymphadenopathy noted  SKIN: No rashes or lesions, pale   tafoya with clear urine    LABS:                        11.9   7.6   )-----------( 89       ( 2017 05:07 )             36.8     2017 05:07    135    |  96     |  41.0   ----------------------------<  114    4.8     |  28.0   |  1.69     Ca    8.0        2017 05:07  Phos  3.4       2017 05:07  Mg     1.9       2017 05:07    TPro  6.0    /  Alb  3.0    /  TBili  0.9    /  DBili  x      /  AST  19     /  ALT  12     /  AlkPhos  146    2017 05:07      Urinalysis Basic - ( 2017 08:33 )    Color: Leslie / Appearance: Clear / S.020 / pH: x  Gluc: x / Ketone: Trace  / Bili: Negative / Urobili: 1 mg/dL   Blood: x / Protein: 30 mg/dL / Nitrite: Negative   Leuk Esterase: Small / RBC: 25-50 /HPF / WBC 11-25   Sq Epi: x / Non Sq Epi: Occasional / Bacteria: Occasional      Magnesium, Serum: 1.9 mg/dL ( @ 05:07)  Phosphorus Level, Serum: 3.4 mg/dL ( @ 05:07)          RADIOLOGY & ADDITIONAL TESTS:

## 2017-02-27 LAB
24R-OH-CALCIDIOL SERPL-MCNC: 46.6 NG/ML — SIGNIFICANT CHANGE UP (ref 30–100)
ALBUMIN SERPL ELPH-MCNC: 3.1 G/DL — LOW (ref 3.3–5.2)
ALP SERPL-CCNC: 131 U/L — HIGH (ref 40–120)
ALT FLD-CCNC: 10 U/L — SIGNIFICANT CHANGE UP
ANION GAP SERPL CALC-SCNC: 10 MMOL/L — SIGNIFICANT CHANGE UP (ref 5–17)
AST SERPL-CCNC: 17 U/L — SIGNIFICANT CHANGE UP
BILIRUB SERPL-MCNC: 1 MG/DL — SIGNIFICANT CHANGE UP (ref 0.4–2)
BODY SURFACE AREA CALCULATION: 2 M2 — SIGNIFICANT CHANGE UP
BUN SERPL-MCNC: 40 MG/DL — HIGH (ref 8–20)
CALCIUM SERPL-MCNC: 8.2 MG/DL — LOW (ref 8.6–10.2)
CHLORIDE SERPL-SCNC: 98 MMOL/L — SIGNIFICANT CHANGE UP (ref 98–107)
CO2 SERPL-SCNC: 26 MMOL/L — SIGNIFICANT CHANGE UP (ref 22–29)
COLLECT DURATION TIME UR: 24 HR — SIGNIFICANT CHANGE UP
COLLECT DURATION TIME UR: 24 HR — SIGNIFICANT CHANGE UP
CREAT CL ?TM UR+SERPL-VRATE: 45 ML/MIN — LOW (ref 75–115)
CREAT SERPL-MCNC: 1.26 MG/DL — SIGNIFICANT CHANGE UP (ref 0.5–1.3)
CREAT SERPL-MCNC: 1.26 MG/DL — SIGNIFICANT CHANGE UP (ref 0.5–1.3)
CULTURE RESULTS: SIGNIFICANT CHANGE UP
ERYTHROCYTE [SEDIMENTATION RATE] IN BLOOD: 15 MM/HR — SIGNIFICANT CHANGE UP (ref 0–20)
GLUCOSE SERPL-MCNC: 109 MG/DL — SIGNIFICANT CHANGE UP (ref 70–115)
HCT VFR BLD CALC: 36 % — LOW (ref 37–47)
HGB BLD-MCNC: 11.5 G/DL — LOW (ref 12–16)
MAGNESIUM SERPL-MCNC: 2 MG/DL — SIGNIFICANT CHANGE UP (ref 1.8–2.5)
MCHC RBC-ENTMCNC: 24.7 PG — LOW (ref 27–31)
MCHC RBC-ENTMCNC: 31.9 G/DL — LOW (ref 32–36)
MCV RBC AUTO: 77.4 FL — LOW (ref 81–99)
PHOSPHATE SERPL-MCNC: 2.9 MG/DL — SIGNIFICANT CHANGE UP (ref 2.4–4.7)
PLATELET # BLD AUTO: 87 K/UL — LOW (ref 150–400)
POTASSIUM SERPL-MCNC: 4.5 MMOL/L — SIGNIFICANT CHANGE UP (ref 3.5–5.3)
POTASSIUM SERPL-SCNC: 4.5 MMOL/L — SIGNIFICANT CHANGE UP (ref 3.5–5.3)
PROT 24H UR-MRATE: 399 MG/24HR — HIGH (ref 50–100)
PROT SERPL-MCNC: 6.2 G/DL — LOW (ref 6.6–8.7)
RBC # BLD: 4.65 M/UL — SIGNIFICANT CHANGE UP (ref 4.4–5.2)
RBC # FLD: 17.2 % — HIGH (ref 11–15.6)
SODIUM SERPL-SCNC: 134 MMOL/L — LOW (ref 135–145)
SPECIMEN SOURCE: SIGNIFICANT CHANGE UP
TOTAL VOLUME - 24 HOUR: 700 ML — SIGNIFICANT CHANGE UP
TOTAL VOLUME - 24 HOUR: 700 ML — SIGNIFICANT CHANGE UP
URINE CREATININE CALCULATION: 0.9 G/24 HR — SIGNIFICANT CHANGE UP (ref 0.8–1.8)
URINE CREATININE CALCULATION: 0.9 G/24 HR — SIGNIFICANT CHANGE UP (ref 0.8–1.8)
WBC # BLD: 6 K/UL — SIGNIFICANT CHANGE UP (ref 4.8–10.8)
WBC # FLD AUTO: 6 K/UL — SIGNIFICANT CHANGE UP (ref 4.8–10.8)

## 2017-02-27 PROCEDURE — 71010: CPT | Mod: 26

## 2017-02-27 PROCEDURE — 76775 US EXAM ABDO BACK WALL LIM: CPT | Mod: 26

## 2017-02-27 RX ORDER — CIPROFLOXACIN LACTATE 400MG/40ML
250 VIAL (ML) INTRAVENOUS
Qty: 0 | Refills: 0 | Status: DISCONTINUED | OUTPATIENT
Start: 2017-02-27 | End: 2017-02-28

## 2017-02-27 RX ADMIN — Medication 100 MILLIGRAM(S): at 13:39

## 2017-02-27 RX ADMIN — Medication 1 TABLET(S): at 12:31

## 2017-02-27 RX ADMIN — Medication 250 MILLIGRAM(S): at 21:16

## 2017-02-27 RX ADMIN — Medication 100 MILLIGRAM(S): at 06:28

## 2017-02-27 RX ADMIN — Medication 500 MICROGRAM(S): at 09:28

## 2017-02-27 RX ADMIN — ALBUTEROL 2.5 MILLIGRAM(S): 90 AEROSOL, METERED ORAL at 09:28

## 2017-02-27 RX ADMIN — Medication 100 MILLIGRAM(S): at 21:20

## 2017-02-27 RX ADMIN — Medication 10 MILLIEQUIVALENT(S): at 12:31

## 2017-02-27 RX ADMIN — ENOXAPARIN SODIUM 30 MILLIGRAM(S): 100 INJECTION SUBCUTANEOUS at 21:16

## 2017-02-27 NOTE — PROGRESS NOTE ADULT - SUBJECTIVE AND OBJECTIVE BOX
NEPHROLOGY INTERVAL HPI/OVERNIGHT EVENTS:    MEDICATIONS  (STANDING):  ceFAZolin   IVPB 2000milliGRAM(s) IV Intermittent once  enoxaparin Injectable 30milliGRAM(s) SubCutaneous every 24 hours  docusate sodium 100milliGRAM(s) Oral three times a day  potassium chloride    Tablet ER 10milliEquivalent(s) Oral daily  Nephro-gustavo 1Tablet(s) Oral daily  ciprofloxacin     Tablet 250milliGRAM(s) Oral two times a day    MEDICATIONS  (PRN):  ALBUTerol    0.083% 2.5milliGRAM(s) Nebulizer every 6 hours PRN Shortness of Breath and/or Wheezing  ipratropium    for Nebulization 500MICROGram(s) Nebulizer every 6 hours PRN Shortness of Breath and/or Wheezing  oxyCODONE  5 mG/acetaminophen 325 mG 1Tablet(s) Oral every 6 hours PRN Moderate Pain (4 - 6)      Allergies    No Known Allergies    Intolerances        Vital Signs Last 24 Hrs  T(C): 36.8, Max: 37.4 ( @ 15:00)  T(F): 98.3, Max: 99.3 ( @ 15:00)  HR: 88 (78 - 88)  BP: 108/78 (100/50 - 114/68)  BP(mean): --  RR: 18 (16 - 18)  SpO2: 96% (96% - 98%)  Daily     Daily Weight in k.8 (2017 05:44)    PHYSICAL EXAM:    GENERAL: stable oob in chair, not sob  HEAD:    EYES: wnl  ENMT:   NECK: veins increased  NERVOUS SYSTEM:  same  CHEST/LUNG: left chest tube, base crackles same  HEART: no gallop or rub noted  ABDOMEN: not tender  EXTREMITIES:  no edema  LYMPH:   SKIN: no rash    LABS:                        11.5   6.0   )-----------( 87       ( 2017 05:09 )             36.0     2017 05:09    134    |  98     |  40.0   ----------------------------<  109    4.5     |  26.0   |  1.26     Ca    8.2        2017 05:09  Phos  2.9       2017 05:09  Mg     2.0       2017 05:09    TPro  6.2    /  Alb  3.1    /  TBili  1.0    /  DBili  x      /  AST  17     /  ALT  10     /  AlkPhos  131    2017 05:09      Urinalysis Basic - ( 2017 08:33 )    Color: Leslie / Appearance: Clear / S.020 / pH: x  Gluc: x / Ketone: Trace  / Bili: Negative / Urobili: 1 mg/dL   Blood: x / Protein: 30 mg/dL / Nitrite: Negative   Leuk Esterase: Small / RBC: 25-50 /HPF / WBC 11-25   Sq Epi: x / Non Sq Epi: Occasional / Bacteria: Occasional      Phosphorus Level, Serum: 2.9 mg/dL ( @ 05:09)  Magnesium, Serum: 2.0 mg/dL ( @ 05:09)          RADIOLOGY & ADDITIONAL TESTS:

## 2017-02-27 NOTE — PROGRESS NOTE ADULT - ASSESSMENT
81y Female admitted with pleural effusion.    On 2/24/17, patient underwent Decortication of left lung with video-assisted thoracoscopic surgery (VATS), partial rib resections, pleurex catheter placement.    Postoperative course/issues: Small air leak, clamped, ptx stable. 2/25 EDILBERTO, oliguria, renal consult called 2/26 unclamped ct per Dr Erazo    PLAN  Neuro: Percocet as needed for pain   Pulm: cont aggressive IS use and chest PT, maintain pleurex to pleurovac on waterseal, will cap on day of discharge   Cardio: Monitor telemetry/alarms, TTE with mild-mod MR, nl E, cardiac meds on hold due to hypotension over weekend, will cont to hold BP meds until BP improved;  GI: Tolerating diet. Continue stool softeners, needs BM;  Renal: postop EDILBERTO, renal following, cr improved, IVF d/c’d, 24 hour urine collection to complete today, d/c tafoya, consider diuretics for pulm edema if ok with Renal;  Heme: stable H/H, stable thrombocytopenia (chronic), cont lovenox for DVT ppx   ID: Off antibiotics. Stable.  Therapy: OOB/ambulate  Disposition: d/c home pending stable renal function and stable off O2, possibly tomorrow. 81y Female admitted with pleural effusion.    On 2/24/17, patient underwent Decortication of left lung with video-assisted thoracoscopic surgery (VATS), partial rib resections, pleurex catheter placement.    Postoperative course/issues: Small air leak, clamped, ptx stable. 2/25 EDILBERTO, oliguria, renal consult called 2/26 unclamped ct per Dr Erazo    PLAN  Neuro: Percocet as needed for pain   Pulm: cont aggressive IS use and chest PT, maintain pleurex to pleurovac on waterseal, will cap on day of discharge   Cardio: Monitor telemetry/alarms, TTE with mild-mod MR, nl E, cardiac meds on hold due to hypotension over weekend, will cont to hold BP meds until BP improved;  GI: Tolerating diet. Continue stool softeners, needs BM;  Renal: postop EDILBERTO, renal following, cr improved, IVF d/c’d, 24 hour urine collection to complete today, d/c tafoya, consider diuretics for pulm edema if ok with Renal;  Heme: stable H/H, stable thrombocytopenia (chronic), cont lovenox for DVT ppx   ID: cipro x 3 days for + UA  Therapy: OOB/ambulate  Disposition: d/c home pending stable renal function and stable off O2, possibly tomorrow.

## 2017-02-27 NOTE — PROGRESS NOTE ADULT - SUBJECTIVE AND OBJECTIVE BOX
Subjective: no complaints, no issues overnight, still requiring O2    T(C): 36.8, Max: 37.4 (02-26 @ 15:00)  HR: 88 (78 - 88)  BP: 108/78 (100/50 - 114/68)  RR: 18 (16 - 18)  SpO2: 96% (96% - 98%)      27 Feb 2017 05:09    134    |  98     |  40.0   ----------------------------<  109    4.5     |  26.0   |  1.26     Ca    8.2        27 Feb 2017 05:09  Phos  2.9       27 Feb 2017 05:09  Mg     2.0       27 Feb 2017 05:09    TPro  6.2    /  Alb  3.1    /  TBili  1.0    /  DBili  x      /  AST  17     /  ALT  10     /  AlkPhos  131    27 Feb 2017 05:09                               11.5   6.0   )-----------( 87       ( 27 Feb 2017 05:09 )             36.0     CXR: No evidence of pneumothorax and the current study. Persistent bilateral  pleural effusions and left basilar atelectasis. The heart is not enlarged..    I&O's Detail  I & Os for 24h ending 27 Feb 2017 07:00  =============================================  IN:    sodium chloride 0.9%: 1050 ml    Oral Fluid: 675 ml    Solution: 187.5 ml    Total IN: 1912.5 ml  ---------------------------------------------  OUT:    Indwelling Catheter - Urethral: 765 ml    Chest Tube: 460 ml    Total OUT: 1225 ml  ---------------------------------------------  Total NET: 687.5 ml    I & Os for current day (as of 27 Feb 2017 10:41)  =============================================  IN:    Oral Fluid: 100 ml    sodium chloride 0.9%: 100 ml    Total IN: 200 ml  ---------------------------------------------  OUT:    Indwelling Catheter - Urethral: 145 ml    Chest Tube: 110 ml    Total OUT: 255 ml  ---------------------------------------------  Total NET: -55 ml    Drug Dosing Weight  Height (cm): 170.2 (24 Feb 2017 08:28)  Weight (kg): 75.1 (24 Feb 2017 08:28)  BMI (kg/m2): 25.9 (24 Feb 2017 08:28)  BSA (m2): 1.87 (24 Feb 2017 08:28)    MEDICATIONS  (STANDING):  ceFAZolin   IVPB 2000milliGRAM(s) IV Intermittent once  enoxaparin Injectable 30milliGRAM(s) SubCutaneous every 24 hours  docusate sodium 100milliGRAM(s) Oral three times a day  potassium chloride    Tablet ER 10milliEquivalent(s) Oral daily  acetaminophen  IVPB. 1000milliGRAM(s) IV Intermittent once  Nephro-gustavo 1Tablet(s) Oral daily  ciprofloxacin     Tablet 250milliGRAM(s) Oral two times a day    MEDICATIONS  (PRN):  ALBUTerol    0.083% 2.5milliGRAM(s) Nebulizer every 6 hours PRN Shortness of Breath and/or Wheezing  ipratropium    for Nebulization 500MICROGram(s) Nebulizer every 6 hours PRN Shortness of Breath and/or Wheezing  oxyCODONE  5 mG/acetaminophen 325 mG 1Tablet(s) Oral every 6 hours PRN Moderate Pain (4 - 6)      Physical Exam  Neuro: A&Ox3  Pulm: b/l expiratory wheeze with crackles  CV: S1S2 RRR  Abd: + BS soft NT  ND  Extrem/MS: + LE edema, venous stasis  L pleurex in place to pelurovac- serous drainage  + crepitus L chest

## 2017-02-28 ENCOUNTER — TRANSCRIPTION ENCOUNTER (OUTPATIENT)
Age: 82
End: 2017-02-28

## 2017-02-28 VITALS — HEART RATE: 72 BPM | SYSTOLIC BLOOD PRESSURE: 120 MMHG | DIASTOLIC BLOOD PRESSURE: 68 MMHG

## 2017-02-28 LAB
ANION GAP SERPL CALC-SCNC: 12 MMOL/L — SIGNIFICANT CHANGE UP (ref 5–17)
BUN SERPL-MCNC: 32 MG/DL — HIGH (ref 8–20)
CALCIUM SERPL-MCNC: 8.8 MG/DL — SIGNIFICANT CHANGE UP (ref 8.6–10.2)
CHLORIDE SERPL-SCNC: 98 MMOL/L — SIGNIFICANT CHANGE UP (ref 98–107)
CO2 SERPL-SCNC: 26 MMOL/L — SIGNIFICANT CHANGE UP (ref 22–29)
CREAT SERPL-MCNC: 0.91 MG/DL — SIGNIFICANT CHANGE UP (ref 0.5–1.3)
GLUCOSE SERPL-MCNC: 117 MG/DL — HIGH (ref 70–115)
MAGNESIUM SERPL-MCNC: 2.1 MG/DL — SIGNIFICANT CHANGE UP (ref 1.8–2.5)
POTASSIUM SERPL-MCNC: 4.3 MMOL/L — SIGNIFICANT CHANGE UP (ref 3.5–5.3)
POTASSIUM SERPL-SCNC: 4.3 MMOL/L — SIGNIFICANT CHANGE UP (ref 3.5–5.3)
SODIUM SERPL-SCNC: 136 MMOL/L — SIGNIFICANT CHANGE UP (ref 135–145)
SURGICAL PATHOLOGY FINAL REPORT - CH: SIGNIFICANT CHANGE UP
URATE SERPL-MCNC: 5.7 MG/DL — SIGNIFICANT CHANGE UP (ref 2.4–5.7)

## 2017-02-28 PROCEDURE — 86900 BLOOD TYPING SEROLOGIC ABO: CPT

## 2017-02-28 PROCEDURE — 88305 TISSUE EXAM BY PATHOLOGIST: CPT

## 2017-02-28 PROCEDURE — 83935 ASSAY OF URINE OSMOLALITY: CPT

## 2017-02-28 PROCEDURE — 86850 RBC ANTIBODY SCREEN: CPT

## 2017-02-28 PROCEDURE — 87205 SMEAR GRAM STAIN: CPT

## 2017-02-28 PROCEDURE — 71045 X-RAY EXAM CHEST 1 VIEW: CPT

## 2017-02-28 PROCEDURE — 81001 URINALYSIS AUTO W/SCOPE: CPT

## 2017-02-28 PROCEDURE — 87015 SPECIMEN INFECT AGNT CONCNTJ: CPT

## 2017-02-28 PROCEDURE — 86922 COMPATIBILITY TEST ANTIGLOB: CPT

## 2017-02-28 PROCEDURE — 87102 FUNGUS ISOLATION CULTURE: CPT

## 2017-02-28 PROCEDURE — 86901 BLOOD TYPING SEROLOGIC RH(D): CPT

## 2017-02-28 PROCEDURE — 87116 MYCOBACTERIA CULTURE: CPT

## 2017-02-28 PROCEDURE — 84550 ASSAY OF BLOOD/URIC ACID: CPT

## 2017-02-28 PROCEDURE — 80048 BASIC METABOLIC PNL TOTAL CA: CPT

## 2017-02-28 PROCEDURE — 84100 ASSAY OF PHOSPHORUS: CPT

## 2017-02-28 PROCEDURE — P9045: CPT

## 2017-02-28 PROCEDURE — 87086 URINE CULTURE/COLONY COUNT: CPT

## 2017-02-28 PROCEDURE — 83735 ASSAY OF MAGNESIUM: CPT

## 2017-02-28 PROCEDURE — 84300 ASSAY OF URINE SODIUM: CPT

## 2017-02-28 PROCEDURE — 88311 DECALCIFY TISSUE: CPT

## 2017-02-28 PROCEDURE — 94640 AIRWAY INHALATION TREATMENT: CPT

## 2017-02-28 PROCEDURE — 88302 TISSUE EXAM BY PATHOLOGIST: CPT

## 2017-02-28 PROCEDURE — 87070 CULTURE OTHR SPECIMN AEROBIC: CPT

## 2017-02-28 PROCEDURE — 82575 CREATININE CLEARANCE TEST: CPT

## 2017-02-28 PROCEDURE — 86902 BLOOD TYPE ANTIGEN DONOR EA: CPT

## 2017-02-28 PROCEDURE — 88331 PATH CONSLTJ SURG 1 BLK 1SPC: CPT

## 2017-02-28 PROCEDURE — 71010: CPT | Mod: 26

## 2017-02-28 PROCEDURE — 87206 SMEAR FLUORESCENT/ACID STAI: CPT

## 2017-02-28 PROCEDURE — 76775 US EXAM ABDO BACK WALL LIM: CPT

## 2017-02-28 PROCEDURE — 36415 COLL VENOUS BLD VENIPUNCTURE: CPT

## 2017-02-28 PROCEDURE — 82306 VITAMIN D 25 HYDROXY: CPT

## 2017-02-28 PROCEDURE — 80053 COMPREHEN METABOLIC PANEL: CPT

## 2017-02-28 PROCEDURE — 87075 CULTR BACTERIA EXCEPT BLOOD: CPT

## 2017-02-28 PROCEDURE — 85652 RBC SED RATE AUTOMATED: CPT

## 2017-02-28 PROCEDURE — 84156 ASSAY OF PROTEIN URINE: CPT

## 2017-02-28 PROCEDURE — 85027 COMPLETE CBC AUTOMATED: CPT

## 2017-02-28 PROCEDURE — 93306 TTE W/DOPPLER COMPLETE: CPT

## 2017-02-28 RX ORDER — PRAZOSIN HCL 2 MG
1 CAPSULE ORAL
Qty: 0 | Refills: 0 | COMMUNITY

## 2017-02-28 RX ORDER — ATENOLOL 25 MG/1
1 TABLET ORAL
Qty: 0 | Refills: 0 | COMMUNITY

## 2017-02-28 RX ORDER — METOPROLOL TARTRATE 50 MG
1 TABLET ORAL
Qty: 0 | Refills: 0 | COMMUNITY

## 2017-02-28 RX ORDER — ATENOLOL 25 MG/1
1 TABLET ORAL
Qty: 30 | Refills: 1 | OUTPATIENT
Start: 2017-02-28 | End: 2017-04-28

## 2017-02-28 RX ORDER — LOSARTAN POTASSIUM 100 MG/1
1 TABLET, FILM COATED ORAL
Qty: 0 | Refills: 0 | COMMUNITY

## 2017-02-28 RX ORDER — ATENOLOL 25 MG/1
50 TABLET ORAL DAILY
Qty: 0 | Refills: 0 | Status: DISCONTINUED | OUTPATIENT
Start: 2017-02-28 | End: 2017-02-28

## 2017-02-28 RX ADMIN — Medication 1 TABLET(S): at 13:02

## 2017-02-28 RX ADMIN — Medication 100 MILLIGRAM(S): at 06:33

## 2017-02-28 RX ADMIN — Medication 10 MILLIEQUIVALENT(S): at 13:02

## 2017-02-28 RX ADMIN — Medication 250 MILLIGRAM(S): at 09:43

## 2017-02-28 RX ADMIN — Medication 250 MILLIGRAM(S): at 09:42

## 2017-02-28 RX ADMIN — Medication 100 MILLIGRAM(S): at 13:02

## 2017-02-28 RX ADMIN — ATENOLOL 50 MILLIGRAM(S): 25 TABLET ORAL at 17:17

## 2017-02-28 NOTE — DISCHARGE NOTE ADULT - MEDICATION SUMMARY - MEDICATIONS TO TAKE
I will START or STAY ON the medications listed below when I get home from the hospital:    Os-Jaskaran 500 (1250 mg calcium carbonate) oral tablet  -- 2 tab(s) by mouth once a day  -- Indication: For calcium    atenolol 50 mg oral tablet  -- 1 tab(s) by mouth once a day  -- Indication: For blood pressure    hydroCHLOROthiazide 12.5 mg oral capsule  -- 1 cap(s) by mouth once a day  -- Indication: For Edema    Klor-Con 10 mEq oral tablet, extended release  -- 1 tab(s) by mouth once a day  -- Indication: For potassium with diuretci

## 2017-02-28 NOTE — DISCHARGE NOTE ADULT - PLAN OF CARE
symptomatic improvement Leave dressing intact. Home care will start tomorrow to drain fluid twice weekly from Pleurex Catheter. Call Dr. Erazo's office at [phone] tomorrow or the next business day to make a followup appointment. Call the office if you experience any fevers, shortness of breath, chest pain or excessive drainage from the incision, day or night. Go to the emergency room if any of these symptoms are severe. Take your medications as ordered. Follow up with your cardiologist and/or primary care doctor for evenutal restart cardiac meds.

## 2017-02-28 NOTE — DISCHARGE NOTE ADULT - HOSPITAL COURSE
81y Female admitted with pleural effusion.    On 2/24/17, patient underwent Decortication of left lung with video-assisted thoracoscopic surgery (VATS), partial rib resections, pleurex catheter placement.    Postoperative course/issues: Small air leak, clamped, ptx stable. 2/25 EDILBERTO, oliguria, renal consult called 2/26 unclamped ct per Dr Erazo.

## 2017-02-28 NOTE — DISCHARGE NOTE ADULT - CARE PROVIDER_API CALL
Darwin Erazo), Surgery; Thoracic Surgery  [address] [place] [id number]  Phone: [phone]  Fax: [phone]

## 2017-02-28 NOTE — DISCHARGE NOTE ADULT - MEDICATION SUMMARY - MEDICATIONS TO STOP TAKING
I will STOP taking the medications listed below when I get home from the hospital:    Metoprolol Succinate ER 50 mg oral tablet, extended release  -- 1 tab(s) by mouth once a day    prazosin 5 mg oral capsule  -- 1 cap(s) by mouth 2 times a day    losartan 50 mg oral tablet  -- 1 tab(s) by mouth once a day

## 2017-02-28 NOTE — DISCHARGE NOTE ADULT - MEDICATION SUMMARY - MEDICATIONS TO CHANGE
I will SWITCH the dose or number of times a day I take the medications listed below when I get home from the hospital:    atenolol 100 mg oral tablet  -- 1 tab(s) by mouth once a day

## 2017-02-28 NOTE — PROGRESS NOTE ADULT - SUBJECTIVE AND OBJECTIVE BOX
Subjective: "I would like to go home. I don't want oxygen." Second trial today of ambulating on room air, pt stayed about 90% and can be discharged without oxygen. NAD, OOB to chair    Vital Signs:  Vital Signs Last 24 Hrs  T(C): 36.7, Max: 37.1 (02-27 @ 21:21)  T(F): 98.1, Max: 98.7 (02-27 @ 21:21)  HR: 75 (72 - 78)  BP: 122/70 (110/60 - 128/70)  RR: 20 (18 - 20)  SpO2: 93% (90% - 93%)RA    Telemetry/Alarms:    Relevant labs, radiology reviewed    MEDICATIONS  (STANDING):  enoxaparin Injectable 30milliGRAM(s) SubCutaneous every 24 hours  docusate sodium 100milliGRAM(s) Oral three times a day  potassium chloride    Tablet ER 10milliEquivalent(s) Oral daily  Nephro-gustavo 1Tablet(s) Oral daily  hydrochlorothiazide    Capsule 12.5milliGRAM(s) Oral daily    MEDICATIONS  (PRN):  ALBUTerol    0.083% 2.5milliGRAM(s) Nebulizer every 6 hours PRN Shortness of Breath and/or Wheezing  ipratropium    for Nebulization 500MICROGram(s) Nebulizer every 6 hours PRN Shortness of Breath and/or Wheezing  oxyCODONE  5 mG/acetaminophen 325 mG 1Tablet(s) Oral every 6 hours PRN Moderate Pain (4 - 6)      Pertinent Physical Exam  AAOx3  crackles L base  RRR  abd soft NT + BS  ext +1 edema, wwp    I & Os for current day (as of 02-28 @ 15:08)  =============================================  IN:    Oral Fluid: 460 ml    sodium chloride 0.9%: 100 ml    Total IN: 560 ml  ---------------------------------------------  OUT:    Voided: 1100 ml    Chest Tube: 310 ml    Indwelling Catheter - Urethral: 145 ml    Total OUT: 1555 ml  ---------------------------------------------  Total NET: -995 ml      Assessment  81y Female admitted with pleural effusion.    On 2/24/17, patient underwent Decortication of left lung with video-assisted thoracoscopic surgery (VATS), partial rib resections, pleurex catheter placement.    Postoperative course/issues: Small air leak, clamped, ptx stable. 2/25 EDILBERTO, oliguria, renal consult called 2/26 unclamped ct per Dr Erazo.     PLAN  Neuro: Pain managemen tylenol  Pulm: Encourage coughing, deep breathing and use of incentive spirometry. Daily CXR. Duonebs > not required at home  Cardio: Monitor telemetry/alarms. Restart lower dose of atenolol. Continue to hold other blood pressure medications. Echo: mild to mod MR, normal EF  GI: Tolerating diet. Continue stool softeners.   Renal: Daily BMP, supplement electrolytes as needed. Creatinine improved, appreciate renal consult.  Vasc: Lovenox/SCDs for DVT prophylaxis  Heme: Stable H/H. Trend CBC daily. Monitor thrombocytopenia - stable  ID: Off antibiotics. Stable. Urine culture insignificant  Therapy: OOB/ambulate  Tubes: Cap off pleurex today  Disposition: Aim to D/C to home today  Discussed with Dr Erazo in AM Subjective: "I would like to go home. I don't want oxygen." Second trial today of ambulating on room air, pt stayed about 90% and can be discharged without oxygen. NAD, OOB to chair    Vital Signs:  Vital Signs Last 24 Hrs  T(C): 36.7, Max: 37.1 (02-27 @ 21:21)  T(F): 98.1, Max: 98.7 (02-27 @ 21:21)  HR: 75 (72 - 78)  BP: 122/70 (110/60 - 128/70)  RR: 20 (18 - 20)  SpO2: 93% (90% - 93%)RA    Tele: SR 70s    Relevant labs, radiology reviewed    MEDICATIONS  (STANDING):  enoxaparin Injectable 30milliGRAM(s) SubCutaneous every 24 hours  docusate sodium 100milliGRAM(s) Oral three times a day  potassium chloride    Tablet ER 10milliEquivalent(s) Oral daily  Nephro-gustavo 1Tablet(s) Oral daily  hydrochlorothiazide    Capsule 12.5milliGRAM(s) Oral daily    MEDICATIONS  (PRN):  ALBUTerol    0.083% 2.5milliGRAM(s) Nebulizer every 6 hours PRN Shortness of Breath and/or Wheezing  ipratropium    for Nebulization 500MICROGram(s) Nebulizer every 6 hours PRN Shortness of Breath and/or Wheezing  oxyCODONE  5 mG/acetaminophen 325 mG 1Tablet(s) Oral every 6 hours PRN Moderate Pain (4 - 6)      Pertinent Physical Exam  AAOx3  crackles L base  RRR  abd soft NT + BS  ext +1 edema, wwp  L pleurex, mild surrounding crepitus    I & Os for current day (as of 02-28 @ 15:08)  =============================================  IN:    Oral Fluid: 460 ml    sodium chloride 0.9%: 100 ml    Total IN: 560 ml  ---------------------------------------------  OUT:    Voided: 1100 ml    Chest Tube: 310 ml    Indwelling Catheter - Urethral: 145 ml    Total OUT: 1555 ml  ---------------------------------------------  Total NET: -995 ml      Assessment  81y Female admitted with pleural effusion.    On 2/24/17, patient underwent Decortication of left lung with video-assisted thoracoscopic surgery (VATS), partial rib resections, pleurex catheter placement.    Postoperative course/issues: Small air leak, clamped, ptx stable. 2/25 EDILBERTO, oliguria, renal consult called 2/26 unclamped ct per Dr Erazo.     PLAN  Neuro: Pain managemen tylenol  Pulm: Encourage coughing, deep breathing and use of incentive spirometry. Daily CXR. Duonebs > not required at home  Cardio: Monitor telemetry/alarms. Restart lower dose of atenolol. Continue to hold other blood pressure medications. Echo: mild to mod MR, normal EF  GI: Tolerating diet. Continue stool softeners.   Renal: Daily BMP, supplement electrolytes as needed. Creatinine improved, appreciate renal consult.  Vasc: Lovenox/SCDs for DVT prophylaxis  Heme: Stable H/H. Trend CBC daily. Monitor thrombocytopenia - stable  ID: Off antibiotics. Stable. Urine culture insignificant  Therapy: OOB/ambulate  Tubes: Cap off pleurex today  Disposition: Aim to D/C to home today  Discussed with Dr Erazo in AM

## 2017-02-28 NOTE — DISCHARGE NOTE ADULT - ADDITIONAL INSTRUCTIONS
Please call the Cardiothoracic Surgery office at [phone] if you are experiencing any shortness of breath, chest pain, fevers or chills, drainage from the incisions, persistent nausea, vomiting or if you have any questions about your medications. If the symptoms are severe, call 911 and go to the nearest hospital.

## 2017-02-28 NOTE — DISCHARGE NOTE ADULT - CARE PLAN
Principal Discharge DX:	Pleural effusion associated with pulmonary infection  Goal:	symptomatic improvement  Instructions for follow-up, activity and diet:	Leave dressing intact. Home care will start tomorrow to drain fluid twice weekly from Pleurex Catheter. Call Dr. Erazo's office at [phone] tomorrow or the next business day to make a followup appointment. Call the office if you experience any fevers, shortness of breath, chest pain or excessive drainage from the incision, day or night. Go to the emergency room if any of these symptoms are severe. Take your medications as ordered. Follow up with your cardiologist and/or primary care doctor for evenutal restart cardiac meds.

## 2017-03-01 LAB
CULTURE RESULTS: SIGNIFICANT CHANGE UP
SPECIMEN SOURCE: SIGNIFICANT CHANGE UP

## 2017-03-06 ENCOUNTER — OUTPATIENT (OUTPATIENT)
Dept: OUTPATIENT SERVICES | Facility: HOSPITAL | Age: 82
LOS: 1 days | End: 2017-03-06
Payer: MEDICARE

## 2017-03-06 ENCOUNTER — APPOINTMENT (OUTPATIENT)
Dept: THORACIC SURGERY | Facility: CLINIC | Age: 82
End: 2017-03-06

## 2017-03-06 VITALS
SYSTOLIC BLOOD PRESSURE: 208 MMHG | WEIGHT: 167 LBS | HEIGHT: 64 IN | RESPIRATION RATE: 16 BRPM | BODY MASS INDEX: 28.51 KG/M2 | DIASTOLIC BLOOD PRESSURE: 101 MMHG | OXYGEN SATURATION: 93 %

## 2017-03-06 DIAGNOSIS — Z90.89 ACQUIRED ABSENCE OF OTHER ORGANS: Chronic | ICD-10-CM

## 2017-03-06 DIAGNOSIS — Z90.49 ACQUIRED ABSENCE OF OTHER SPECIFIED PARTS OF DIGESTIVE TRACT: Chronic | ICD-10-CM

## 2017-03-06 DIAGNOSIS — Z98.890 OTHER SPECIFIED POSTPROCEDURAL STATES: Chronic | ICD-10-CM

## 2017-03-06 DIAGNOSIS — Z96.642 PRESENCE OF LEFT ARTIFICIAL HIP JOINT: Chronic | ICD-10-CM

## 2017-03-06 DIAGNOSIS — J86.9 PYOTHORAX WITHOUT FISTULA: ICD-10-CM

## 2017-03-06 DIAGNOSIS — Z90.11 ACQUIRED ABSENCE OF RIGHT BREAST AND NIPPLE: Chronic | ICD-10-CM

## 2017-03-06 PROCEDURE — 71046 X-RAY EXAM CHEST 2 VIEWS: CPT

## 2017-03-06 PROCEDURE — 71020: CPT | Mod: 26

## 2017-03-06 RX ORDER — AMLODIPINE BESYLATE 10 MG/1
10 TABLET ORAL
Refills: 0 | Status: DISCONTINUED | COMMUNITY
End: 2017-03-06

## 2017-03-06 RX ORDER — METOPROLOL TARTRATE 50 MG/1
50 TABLET, FILM COATED ORAL
Refills: 0 | Status: DISCONTINUED | COMMUNITY
End: 2017-03-06

## 2017-03-18 LAB
CULTURE RESULTS: SIGNIFICANT CHANGE UP
SPECIMEN SOURCE: SIGNIFICANT CHANGE UP

## 2017-03-20 LAB
CULTURE RESULTS: SIGNIFICANT CHANGE UP
SPECIMEN SOURCE: SIGNIFICANT CHANGE UP

## 2017-04-08 LAB
CULTURE RESULTS: SIGNIFICANT CHANGE UP
SPECIMEN SOURCE: SIGNIFICANT CHANGE UP

## 2017-04-13 ENCOUNTER — APPOINTMENT (OUTPATIENT)
Dept: THORACIC SURGERY | Facility: CLINIC | Age: 82
End: 2017-04-13

## 2017-04-14 ENCOUNTER — APPOINTMENT (OUTPATIENT)
Dept: RADIOLOGY | Facility: CLINIC | Age: 82
End: 2017-04-14

## 2017-04-14 ENCOUNTER — OUTPATIENT (OUTPATIENT)
Dept: OUTPATIENT SERVICES | Facility: HOSPITAL | Age: 82
LOS: 1 days | End: 2017-04-14
Payer: MEDICARE

## 2017-04-14 DIAGNOSIS — Z90.11 ACQUIRED ABSENCE OF RIGHT BREAST AND NIPPLE: Chronic | ICD-10-CM

## 2017-04-14 DIAGNOSIS — Z90.49 ACQUIRED ABSENCE OF OTHER SPECIFIED PARTS OF DIGESTIVE TRACT: Chronic | ICD-10-CM

## 2017-04-14 DIAGNOSIS — Z96.642 PRESENCE OF LEFT ARTIFICIAL HIP JOINT: Chronic | ICD-10-CM

## 2017-04-14 DIAGNOSIS — J90 PLEURAL EFFUSION, NOT ELSEWHERE CLASSIFIED: ICD-10-CM

## 2017-04-14 DIAGNOSIS — Z90.89 ACQUIRED ABSENCE OF OTHER ORGANS: Chronic | ICD-10-CM

## 2017-04-14 DIAGNOSIS — Z98.890 OTHER SPECIFIED POSTPROCEDURAL STATES: Chronic | ICD-10-CM

## 2017-04-14 PROCEDURE — 71046 X-RAY EXAM CHEST 2 VIEWS: CPT

## 2017-04-14 PROCEDURE — 71020: CPT | Mod: 26

## 2017-04-17 NOTE — ASU PREOP CHECKLIST - IV STARTED
I personally performed the services described in the documentation, reviewed the documentation recorded by the scribe in my presence and it accurately and completely records my words and action.
yes

## 2017-04-26 ENCOUNTER — OUTPATIENT (OUTPATIENT)
Dept: OUTPATIENT SERVICES | Facility: HOSPITAL | Age: 82
LOS: 1 days | End: 2017-04-26
Payer: MEDICARE

## 2017-04-26 VITALS
TEMPERATURE: 97 F | HEIGHT: 65 IN | HEART RATE: 88 BPM | RESPIRATION RATE: 18 BRPM | SYSTOLIC BLOOD PRESSURE: 135 MMHG | DIASTOLIC BLOOD PRESSURE: 78 MMHG | WEIGHT: 147.71 LBS

## 2017-04-26 DIAGNOSIS — Z90.49 ACQUIRED ABSENCE OF OTHER SPECIFIED PARTS OF DIGESTIVE TRACT: Chronic | ICD-10-CM

## 2017-04-26 DIAGNOSIS — Z90.11 ACQUIRED ABSENCE OF RIGHT BREAST AND NIPPLE: Chronic | ICD-10-CM

## 2017-04-26 DIAGNOSIS — Z96.642 PRESENCE OF LEFT ARTIFICIAL HIP JOINT: Chronic | ICD-10-CM

## 2017-04-26 DIAGNOSIS — J90 PLEURAL EFFUSION, NOT ELSEWHERE CLASSIFIED: ICD-10-CM

## 2017-04-26 DIAGNOSIS — Z01.818 ENCOUNTER FOR OTHER PREPROCEDURAL EXAMINATION: ICD-10-CM

## 2017-04-26 DIAGNOSIS — C50.919 MALIGNANT NEOPLASM OF UNSPECIFIED SITE OF UNSPECIFIED FEMALE BREAST: ICD-10-CM

## 2017-04-26 DIAGNOSIS — Z98.890 OTHER SPECIFIED POSTPROCEDURAL STATES: Chronic | ICD-10-CM

## 2017-04-26 DIAGNOSIS — Z90.89 ACQUIRED ABSENCE OF OTHER ORGANS: Chronic | ICD-10-CM

## 2017-04-26 DIAGNOSIS — I10 ESSENTIAL (PRIMARY) HYPERTENSION: ICD-10-CM

## 2017-04-26 LAB
ANION GAP SERPL CALC-SCNC: 15 MMOL/L — SIGNIFICANT CHANGE UP (ref 5–17)
APTT BLD: 34.2 SEC — SIGNIFICANT CHANGE UP (ref 27.5–37.4)
BLD GP AB SCN SERPL QL: SIGNIFICANT CHANGE UP
BUN SERPL-MCNC: 27 MG/DL — HIGH (ref 8–20)
CALCIUM SERPL-MCNC: 9.8 MG/DL — SIGNIFICANT CHANGE UP (ref 8.6–10.2)
CHLORIDE SERPL-SCNC: 97 MMOL/L — LOW (ref 98–107)
CO2 SERPL-SCNC: 30 MMOL/L — HIGH (ref 22–29)
COMMENT - BLOOD BANK: SIGNIFICANT CHANGE UP
CREAT SERPL-MCNC: 1.08 MG/DL — SIGNIFICANT CHANGE UP (ref 0.5–1.3)
GLUCOSE SERPL-MCNC: 121 MG/DL — HIGH (ref 70–115)
HCT VFR BLD CALC: 36.6 % — LOW (ref 37–47)
HGB BLD-MCNC: 11.6 G/DL — LOW (ref 12–16)
INR BLD: 1.22 RATIO — HIGH (ref 0.88–1.16)
MCHC RBC-ENTMCNC: 24.6 PG — LOW (ref 27–31)
MCHC RBC-ENTMCNC: 31.7 G/DL — LOW (ref 32–36)
MCV RBC AUTO: 77.7 FL — LOW (ref 81–99)
PLATELET # BLD AUTO: 95 K/UL — LOW (ref 150–400)
POTASSIUM SERPL-MCNC: 4.3 MMOL/L — SIGNIFICANT CHANGE UP (ref 3.5–5.3)
POTASSIUM SERPL-SCNC: 4.3 MMOL/L — SIGNIFICANT CHANGE UP (ref 3.5–5.3)
PROTHROM AB SERPL-ACNC: 13.5 SEC — HIGH (ref 9.8–12.7)
RBC # BLD: 4.71 M/UL — SIGNIFICANT CHANGE UP (ref 4.4–5.2)
RBC # FLD: 18.6 % — HIGH (ref 11–15.6)
SODIUM SERPL-SCNC: 142 MMOL/L — SIGNIFICANT CHANGE UP (ref 135–145)
TYPE + AB SCN PNL BLD: SIGNIFICANT CHANGE UP
WBC # BLD: 4.6 K/UL — LOW (ref 4.8–10.8)
WBC # FLD AUTO: 4.6 K/UL — LOW (ref 4.8–10.8)

## 2017-04-26 PROCEDURE — 85610 PROTHROMBIN TIME: CPT

## 2017-04-26 PROCEDURE — 86900 BLOOD TYPING SEROLOGIC ABO: CPT

## 2017-04-26 PROCEDURE — 93005 ELECTROCARDIOGRAM TRACING: CPT

## 2017-04-26 PROCEDURE — 86850 RBC ANTIBODY SCREEN: CPT

## 2017-04-26 PROCEDURE — G0463: CPT

## 2017-04-26 PROCEDURE — 85730 THROMBOPLASTIN TIME PARTIAL: CPT

## 2017-04-26 PROCEDURE — 85027 COMPLETE CBC AUTOMATED: CPT

## 2017-04-26 PROCEDURE — 93010 ELECTROCARDIOGRAM REPORT: CPT

## 2017-04-26 PROCEDURE — 86901 BLOOD TYPING SEROLOGIC RH(D): CPT

## 2017-04-26 PROCEDURE — 80048 BASIC METABOLIC PNL TOTAL CA: CPT

## 2017-04-26 RX ORDER — SODIUM CHLORIDE 9 MG/ML
3 INJECTION INTRAMUSCULAR; INTRAVENOUS; SUBCUTANEOUS ONCE
Qty: 0 | Refills: 0 | Status: DISCONTINUED | OUTPATIENT
Start: 2017-05-05 | End: 2017-05-20

## 2017-04-26 NOTE — H&P PST ADULT - HISTORY OF PRESENT ILLNESS
This is an 81 y.o female who presents to [facility] today. The pt had a prior pleural effusion and had a left pleurx catheter in place.  She is scheduled to have it removed in the near future.

## 2017-04-26 NOTE — H&P PST ADULT - PSH
History of cholecystectomy    History of colon resection    History of hip replacement, total, left    History of right mastectomy    History of tonsillectomy    Status post thoracentesis  Left Lung

## 2017-04-26 NOTE — ASU PATIENT PROFILE, ADULT - LEARNING ASSESSMENT (PATIENT) ADDITIONAL COMMENTS
Tips for safer surgery andpre op instructions given.  Demonstrates understanding. Tips for safer surgery and pre op instructions given.  Demonstrates understanding.

## 2017-04-27 DIAGNOSIS — J90 PLEURAL EFFUSION, NOT ELSEWHERE CLASSIFIED: ICD-10-CM

## 2017-04-27 DIAGNOSIS — Z01.818 ENCOUNTER FOR OTHER PREPROCEDURAL EXAMINATION: ICD-10-CM

## 2017-05-05 ENCOUNTER — OUTPATIENT (OUTPATIENT)
Dept: OUTPATIENT SERVICES | Facility: HOSPITAL | Age: 82
LOS: 1 days | End: 2017-05-05
Payer: MEDICARE

## 2017-05-05 ENCOUNTER — TRANSCRIPTION ENCOUNTER (OUTPATIENT)
Age: 82
End: 2017-05-05

## 2017-05-05 ENCOUNTER — APPOINTMENT (OUTPATIENT)
Dept: THORACIC SURGERY | Facility: HOSPITAL | Age: 82
End: 2017-05-05

## 2017-05-05 ENCOUNTER — RESULT REVIEW (OUTPATIENT)
Age: 82
End: 2017-05-05

## 2017-05-05 VITALS
DIASTOLIC BLOOD PRESSURE: 64 MMHG | TEMPERATURE: 97 F | OXYGEN SATURATION: 96 % | SYSTOLIC BLOOD PRESSURE: 104 MMHG | RESPIRATION RATE: 22 BRPM | HEART RATE: 72 BPM

## 2017-05-05 VITALS
SYSTOLIC BLOOD PRESSURE: 112 MMHG | HEIGHT: 65 IN | RESPIRATION RATE: 16 BRPM | OXYGEN SATURATION: 97 % | TEMPERATURE: 97 F | WEIGHT: 147.05 LBS | HEART RATE: 79 BPM | DIASTOLIC BLOOD PRESSURE: 59 MMHG

## 2017-05-05 DIAGNOSIS — Z96.642 PRESENCE OF LEFT ARTIFICIAL HIP JOINT: Chronic | ICD-10-CM

## 2017-05-05 DIAGNOSIS — Z90.49 ACQUIRED ABSENCE OF OTHER SPECIFIED PARTS OF DIGESTIVE TRACT: Chronic | ICD-10-CM

## 2017-05-05 DIAGNOSIS — Z98.890 OTHER SPECIFIED POSTPROCEDURAL STATES: Chronic | ICD-10-CM

## 2017-05-05 DIAGNOSIS — Z90.11 ACQUIRED ABSENCE OF RIGHT BREAST AND NIPPLE: Chronic | ICD-10-CM

## 2017-05-05 DIAGNOSIS — J90 PLEURAL EFFUSION, NOT ELSEWHERE CLASSIFIED: ICD-10-CM

## 2017-05-05 DIAGNOSIS — Z90.89 ACQUIRED ABSENCE OF OTHER ORGANS: Chronic | ICD-10-CM

## 2017-05-05 PROCEDURE — 71010: CPT | Mod: 26

## 2017-05-05 PROCEDURE — 88300 SURGICAL PATH GROSS: CPT

## 2017-05-05 PROCEDURE — 32552 REMOVE LUNG CATHETER: CPT | Mod: 78,GC

## 2017-05-05 PROCEDURE — 32552 REMOVE LUNG CATHETER: CPT

## 2017-05-05 PROCEDURE — 71045 X-RAY EXAM CHEST 1 VIEW: CPT

## 2017-05-05 PROCEDURE — 88300 SURGICAL PATH GROSS: CPT | Mod: 26

## 2017-05-05 RX ORDER — FENTANYL CITRATE 50 UG/ML
50 INJECTION INTRAVENOUS
Qty: 0 | Refills: 0 | Status: DISCONTINUED | OUTPATIENT
Start: 2017-05-05 | End: 2017-05-05

## 2017-05-05 RX ORDER — SODIUM CHLORIDE 9 MG/ML
1000 INJECTION INTRAMUSCULAR; INTRAVENOUS; SUBCUTANEOUS
Qty: 0 | Refills: 0 | Status: DISCONTINUED | OUTPATIENT
Start: 2017-05-05 | End: 2017-05-05

## 2017-05-05 RX ORDER — ONDANSETRON 8 MG/1
4 TABLET, FILM COATED ORAL ONCE
Qty: 0 | Refills: 0 | Status: DISCONTINUED | OUTPATIENT
Start: 2017-05-05 | End: 2017-05-05

## 2017-05-05 NOTE — ASU DISCHARGE PLAN (ADULT/PEDIATRIC). - MEDICATION SUMMARY - MEDICATIONS TO TAKE
I will START or STAY ON the medications listed below when I get home from the hospital:    vitamin d  --   once a day  -- Indication: For Pleural effusion, not elsewhere classified    Os-Jaskaran 500 (1250 mg calcium carbonate) oral tablet  -- 2 tab(s) by mouth once a day  -- Indication: For Pleural effusion, not elsewhere classified    Minipress 5 mg oral capsule  --  by mouth 2 times a day  -- Indication: For Pleural effusion, not elsewhere classified    metoprolol succinate 50 mg oral tablet, extended release  -- 1 tab(s) by mouth once a day  -- Indication: For Pleural effusion, not elsewhere classified    amLODIPine  -- 1  by mouth once a day  -- Indication: For Pleural effusion, not elsewhere classified    hydroCHLOROthiazide 12.5 mg oral capsule  -- 1 cap(s) by mouth once a day  -- Indication: For Pleural effusion, not elsewhere classified    Klor-Con 10 mEq oral tablet, extended release  -- 1 tab(s) by mouth once a day  -- Indication: For Pleural effusion, not elsewhere classified

## 2017-05-12 LAB — SURGICAL PATHOLOGY FINAL REPORT - CH: SIGNIFICANT CHANGE UP

## 2017-05-19 ENCOUNTER — APPOINTMENT (OUTPATIENT)
Dept: PULMONOLOGY | Facility: CLINIC | Age: 82
End: 2017-05-19

## 2017-05-19 VITALS
BODY MASS INDEX: 25.78 KG/M2 | HEIGHT: 64 IN | OXYGEN SATURATION: 91 % | HEART RATE: 55 BPM | WEIGHT: 151 LBS | SYSTOLIC BLOOD PRESSURE: 142 MMHG | DIASTOLIC BLOOD PRESSURE: 68 MMHG

## 2017-08-23 ENCOUNTER — APPOINTMENT (OUTPATIENT)
Dept: PULMONOLOGY | Facility: CLINIC | Age: 82
End: 2017-08-23
Payer: MEDICARE

## 2017-08-23 VITALS
DIASTOLIC BLOOD PRESSURE: 80 MMHG | OXYGEN SATURATION: 94 % | SYSTOLIC BLOOD PRESSURE: 128 MMHG | HEART RATE: 68 BPM | RESPIRATION RATE: 16 BRPM

## 2017-08-23 PROCEDURE — 94060 EVALUATION OF WHEEZING: CPT

## 2017-08-23 PROCEDURE — 94727 GAS DIL/WSHOT DETER LNG VOL: CPT

## 2017-08-23 PROCEDURE — 85018 HEMOGLOBIN: CPT | Mod: QW

## 2017-08-23 PROCEDURE — 99214 OFFICE O/P EST MOD 30 MIN: CPT | Mod: 25

## 2017-08-23 PROCEDURE — 94729 DIFFUSING CAPACITY: CPT

## 2017-08-23 PROCEDURE — 94664 DEMO&/EVAL PT USE INHALER: CPT | Mod: 59

## 2017-08-23 RX ORDER — ATENOLOL 100 MG/1
100 TABLET ORAL
Qty: 90 | Refills: 0 | Status: DISCONTINUED | COMMUNITY
Start: 2016-11-29 | End: 2017-08-23

## 2017-08-23 RX ORDER — OXYCODONE AND ACETAMINOPHEN 5; 325 MG/1; MG/1
5-325 TABLET ORAL
Qty: 20 | Refills: 0 | Status: DISCONTINUED | COMMUNITY
Start: 2017-05-05 | End: 2017-08-23

## 2017-08-23 RX ORDER — IPRATROPIUM BROMIDE 21 UG/1
0.03 SPRAY NASAL
Qty: 30 | Refills: 0 | Status: DISCONTINUED | COMMUNITY
Start: 2016-12-30 | End: 2017-08-23

## 2017-08-23 RX ORDER — HYDROCHLOROTHIAZIDE 25 MG/1
25 TABLET ORAL
Refills: 0 | Status: DISCONTINUED | COMMUNITY
End: 2017-08-23

## 2017-08-30 ENCOUNTER — APPOINTMENT (OUTPATIENT)
Dept: CT IMAGING | Facility: CLINIC | Age: 82
End: 2017-08-30
Payer: MEDICARE

## 2017-08-30 ENCOUNTER — FORM ENCOUNTER (OUTPATIENT)
Age: 82
End: 2017-08-30

## 2017-08-31 ENCOUNTER — APPOINTMENT (OUTPATIENT)
Dept: CT IMAGING | Facility: CLINIC | Age: 82
End: 2017-08-31
Payer: MEDICARE

## 2017-08-31 ENCOUNTER — OUTPATIENT (OUTPATIENT)
Dept: OUTPATIENT SERVICES | Facility: HOSPITAL | Age: 82
LOS: 1 days | End: 2017-08-31
Payer: MEDICARE

## 2017-08-31 DIAGNOSIS — Z90.49 ACQUIRED ABSENCE OF OTHER SPECIFIED PARTS OF DIGESTIVE TRACT: Chronic | ICD-10-CM

## 2017-08-31 DIAGNOSIS — Z90.89 ACQUIRED ABSENCE OF OTHER ORGANS: Chronic | ICD-10-CM

## 2017-08-31 DIAGNOSIS — J90 PLEURAL EFFUSION, NOT ELSEWHERE CLASSIFIED: ICD-10-CM

## 2017-08-31 DIAGNOSIS — Z98.890 OTHER SPECIFIED POSTPROCEDURAL STATES: Chronic | ICD-10-CM

## 2017-08-31 DIAGNOSIS — Z90.11 ACQUIRED ABSENCE OF RIGHT BREAST AND NIPPLE: Chronic | ICD-10-CM

## 2017-08-31 DIAGNOSIS — Z96.642 PRESENCE OF LEFT ARTIFICIAL HIP JOINT: Chronic | ICD-10-CM

## 2017-08-31 PROCEDURE — 71250 CT THORAX DX C-: CPT

## 2017-08-31 PROCEDURE — 71250 CT THORAX DX C-: CPT | Mod: 26

## 2018-01-03 ENCOUNTER — INPATIENT (INPATIENT)
Facility: HOSPITAL | Age: 83
LOS: 12 days | Discharge: HOME HEALTH | DRG: 164 | End: 2018-01-16
Attending: INTERNAL MEDICINE | Admitting: HOSPITALIST
Payer: MEDICARE

## 2018-01-03 VITALS
WEIGHT: 149.91 LBS | HEART RATE: 89 BPM | TEMPERATURE: 98 F | DIASTOLIC BLOOD PRESSURE: 78 MMHG | HEIGHT: 66 IN | RESPIRATION RATE: 18 BRPM | OXYGEN SATURATION: 97 % | SYSTOLIC BLOOD PRESSURE: 134 MMHG

## 2018-01-03 DIAGNOSIS — Z96.642 PRESENCE OF LEFT ARTIFICIAL HIP JOINT: Chronic | ICD-10-CM

## 2018-01-03 DIAGNOSIS — Z90.49 ACQUIRED ABSENCE OF OTHER SPECIFIED PARTS OF DIGESTIVE TRACT: Chronic | ICD-10-CM

## 2018-01-03 DIAGNOSIS — Z90.11 ACQUIRED ABSENCE OF RIGHT BREAST AND NIPPLE: Chronic | ICD-10-CM

## 2018-01-03 DIAGNOSIS — Z98.890 OTHER SPECIFIED POSTPROCEDURAL STATES: Chronic | ICD-10-CM

## 2018-01-03 DIAGNOSIS — Z90.89 ACQUIRED ABSENCE OF OTHER ORGANS: Chronic | ICD-10-CM

## 2018-01-03 LAB
BASOPHILS # BLD AUTO: 0 K/UL — SIGNIFICANT CHANGE UP (ref 0–0.2)
BASOPHILS NFR BLD AUTO: 0.4 % — SIGNIFICANT CHANGE UP (ref 0–2)
EOSINOPHIL # BLD AUTO: 0 K/UL — SIGNIFICANT CHANGE UP (ref 0–0.5)
EOSINOPHIL NFR BLD AUTO: 0.5 % — SIGNIFICANT CHANGE UP (ref 0–6)
HCT VFR BLD CALC: 38.1 % — SIGNIFICANT CHANGE UP (ref 37–47)
HGB BLD-MCNC: 12.1 G/DL — SIGNIFICANT CHANGE UP (ref 12–16)
LYMPHOCYTES # BLD AUTO: 0.8 K/UL — LOW (ref 1–4.8)
LYMPHOCYTES # BLD AUTO: 15.1 % — LOW (ref 20–55)
MCHC RBC-ENTMCNC: 25.7 PG — LOW (ref 27–31)
MCHC RBC-ENTMCNC: 31.8 G/DL — LOW (ref 32–36)
MCV RBC AUTO: 81.1 FL — SIGNIFICANT CHANGE UP (ref 81–99)
MONOCYTES # BLD AUTO: 0.6 K/UL — SIGNIFICANT CHANGE UP (ref 0–0.8)
MONOCYTES NFR BLD AUTO: 10.5 % — HIGH (ref 3–10)
NEUTROPHILS # BLD AUTO: 4 K/UL — SIGNIFICANT CHANGE UP (ref 1.8–8)
NEUTROPHILS NFR BLD AUTO: 73.3 % — HIGH (ref 37–73)
PLATELET # BLD AUTO: 124 K/UL — LOW (ref 150–400)
RBC # BLD: 4.7 M/UL — SIGNIFICANT CHANGE UP (ref 4.4–5.2)
RBC # FLD: 18.4 % — HIGH (ref 11–15.6)
WBC # BLD: 5.5 K/UL — SIGNIFICANT CHANGE UP (ref 4.8–10.8)
WBC # FLD AUTO: 5.5 K/UL — SIGNIFICANT CHANGE UP (ref 4.8–10.8)

## 2018-01-03 PROCEDURE — 93010 ELECTROCARDIOGRAM REPORT: CPT

## 2018-01-03 NOTE — ED STATDOCS - PROGRESS NOTE DETAILS
81 y/o F pt with hx of PNA, empyema, and pleural effusion presents to ED complaining of chest tightness and shortness of breath. She is having difficulty breathing especially when she walks. She had an x-ray 2 hours ago. Pt transferred to main ED for further evaluation.  decreased breath sounds on right lower space. Speaking in full sentences

## 2018-01-04 DIAGNOSIS — J90 PLEURAL EFFUSION, NOT ELSEWHERE CLASSIFIED: ICD-10-CM

## 2018-01-04 DIAGNOSIS — N28.9 DISORDER OF KIDNEY AND URETER, UNSPECIFIED: ICD-10-CM

## 2018-01-04 LAB
ALBUMIN SERPL ELPH-MCNC: 3.7 G/DL — SIGNIFICANT CHANGE UP (ref 3.3–5.2)
ALP SERPL-CCNC: 186 U/L — HIGH (ref 40–120)
ALT FLD-CCNC: 11 U/L — SIGNIFICANT CHANGE UP
ANION GAP SERPL CALC-SCNC: 11 MMOL/L — SIGNIFICANT CHANGE UP (ref 5–17)
APTT BLD: 33.5 SEC — SIGNIFICANT CHANGE UP (ref 27.5–37.4)
AST SERPL-CCNC: 25 U/L — SIGNIFICANT CHANGE UP
BASOPHILS # BLD AUTO: 0 K/UL — SIGNIFICANT CHANGE UP (ref 0–0.2)
BASOPHILS NFR BLD AUTO: 0.3 % — SIGNIFICANT CHANGE UP (ref 0–2)
BILIRUB SERPL-MCNC: 1.5 MG/DL — SIGNIFICANT CHANGE UP (ref 0.4–2)
BUN SERPL-MCNC: 27 MG/DL — HIGH (ref 8–20)
CALCIUM SERPL-MCNC: 10.1 MG/DL — SIGNIFICANT CHANGE UP (ref 8.6–10.2)
CHLORIDE SERPL-SCNC: 105 MMOL/L — SIGNIFICANT CHANGE UP (ref 98–107)
CK SERPL-CCNC: 156 U/L — SIGNIFICANT CHANGE UP (ref 25–170)
CO2 SERPL-SCNC: 27 MMOL/L — SIGNIFICANT CHANGE UP (ref 22–29)
CREAT SERPL-MCNC: 1.05 MG/DL — SIGNIFICANT CHANGE UP (ref 0.5–1.3)
EOSINOPHIL # BLD AUTO: 0 K/UL — SIGNIFICANT CHANGE UP (ref 0–0.5)
EOSINOPHIL NFR BLD AUTO: 0.7 % — SIGNIFICANT CHANGE UP (ref 0–6)
GLUCOSE SERPL-MCNC: 162 MG/DL — HIGH (ref 70–115)
HCT VFR BLD CALC: 37 % — SIGNIFICANT CHANGE UP (ref 37–47)
HGB BLD-MCNC: 11.5 G/DL — LOW (ref 12–16)
INR BLD: 1.29 RATIO — HIGH (ref 0.88–1.16)
LYMPHOCYTES # BLD AUTO: 0.8 K/UL — LOW (ref 1–4.8)
LYMPHOCYTES # BLD AUTO: 13.1 % — LOW (ref 20–55)
MCHC RBC-ENTMCNC: 25.5 PG — LOW (ref 27–31)
MCHC RBC-ENTMCNC: 31.1 G/DL — LOW (ref 32–36)
MCV RBC AUTO: 82 FL — SIGNIFICANT CHANGE UP (ref 81–99)
MONOCYTES # BLD AUTO: 0.6 K/UL — SIGNIFICANT CHANGE UP (ref 0–0.8)
MONOCYTES NFR BLD AUTO: 10 % — SIGNIFICANT CHANGE UP (ref 3–10)
NEUTROPHILS # BLD AUTO: 4.4 K/UL — SIGNIFICANT CHANGE UP (ref 1.8–8)
NEUTROPHILS NFR BLD AUTO: 75.7 % — HIGH (ref 37–73)
NT-PROBNP SERPL-SCNC: 2026 PG/ML — HIGH (ref 0–300)
PLATELET # BLD AUTO: 110 K/UL — LOW (ref 150–400)
POTASSIUM SERPL-MCNC: 4.2 MMOL/L — SIGNIFICANT CHANGE UP (ref 3.5–5.3)
POTASSIUM SERPL-SCNC: 4.2 MMOL/L — SIGNIFICANT CHANGE UP (ref 3.5–5.3)
PROT SERPL-MCNC: 7.1 G/DL — SIGNIFICANT CHANGE UP (ref 6.6–8.7)
PROTHROM AB SERPL-ACNC: 14.3 SEC — HIGH (ref 9.8–12.7)
RBC # BLD: 4.51 M/UL — SIGNIFICANT CHANGE UP (ref 4.4–5.2)
RBC # FLD: 18.1 % — HIGH (ref 11–15.6)
SODIUM SERPL-SCNC: 143 MMOL/L — SIGNIFICANT CHANGE UP (ref 135–145)
TROPONIN T SERPL-MCNC: <0.01 NG/ML — SIGNIFICANT CHANGE UP (ref 0–0.06)
WBC # BLD: 5.8 K/UL — SIGNIFICANT CHANGE UP (ref 4.8–10.8)
WBC # FLD AUTO: 5.8 K/UL — SIGNIFICANT CHANGE UP (ref 4.8–10.8)

## 2018-01-04 PROCEDURE — 99285 EMERGENCY DEPT VISIT HI MDM: CPT

## 2018-01-04 PROCEDURE — 32556 INSERT CATH PLEURA W/O IMAGE: CPT

## 2018-01-04 PROCEDURE — 99223 1ST HOSP IP/OBS HIGH 75: CPT

## 2018-01-04 PROCEDURE — 12345: CPT | Mod: NC

## 2018-01-04 PROCEDURE — 93010 ELECTROCARDIOGRAM REPORT: CPT

## 2018-01-04 PROCEDURE — 71045 X-RAY EXAM CHEST 1 VIEW: CPT | Mod: 26

## 2018-01-04 RX ORDER — ENOXAPARIN SODIUM 100 MG/ML
40 INJECTION SUBCUTANEOUS DAILY
Qty: 0 | Refills: 0 | Status: DISCONTINUED | OUTPATIENT
Start: 2018-01-04 | End: 2018-01-11

## 2018-01-04 RX ORDER — ALBUTEROL 90 UG/1
1 AEROSOL, METERED ORAL EVERY 4 HOURS
Qty: 0 | Refills: 0 | Status: DISCONTINUED | OUTPATIENT
Start: 2018-01-04 | End: 2018-01-16

## 2018-01-04 RX ORDER — IPRATROPIUM/ALBUTEROL SULFATE 18-103MCG
3 AEROSOL WITH ADAPTER (GRAM) INHALATION EVERY 6 HOURS
Qty: 0 | Refills: 0 | Status: DISCONTINUED | OUTPATIENT
Start: 2018-01-04 | End: 2018-01-16

## 2018-01-04 RX ORDER — TIOTROPIUM BROMIDE 18 UG/1
1 CAPSULE ORAL; RESPIRATORY (INHALATION) DAILY
Qty: 0 | Refills: 0 | Status: DISCONTINUED | OUTPATIENT
Start: 2018-01-04 | End: 2018-01-16

## 2018-01-04 RX ORDER — METOPROLOL TARTRATE 50 MG
25 TABLET ORAL DAILY
Qty: 0 | Refills: 0 | Status: DISCONTINUED | OUTPATIENT
Start: 2018-01-04 | End: 2018-01-16

## 2018-01-04 RX ORDER — DOXAZOSIN MESYLATE 4 MG
4 TABLET ORAL AT BEDTIME
Qty: 0 | Refills: 0 | Status: DISCONTINUED | OUTPATIENT
Start: 2018-01-04 | End: 2018-01-13

## 2018-01-04 RX ORDER — AMLODIPINE BESYLATE 2.5 MG/1
5 TABLET ORAL DAILY
Qty: 0 | Refills: 0 | Status: DISCONTINUED | OUTPATIENT
Start: 2018-01-04 | End: 2018-01-16

## 2018-01-04 RX ORDER — FUROSEMIDE 40 MG
20 TABLET ORAL DAILY
Qty: 0 | Refills: 0 | Status: DISCONTINUED | OUTPATIENT
Start: 2018-01-04 | End: 2018-01-06

## 2018-01-04 RX ADMIN — Medication 25 MILLIGRAM(S): at 16:25

## 2018-01-04 RX ADMIN — AMLODIPINE BESYLATE 5 MILLIGRAM(S): 2.5 TABLET ORAL at 16:26

## 2018-01-04 RX ADMIN — Medication 4 MILLIGRAM(S): at 21:58

## 2018-01-04 RX ADMIN — ENOXAPARIN SODIUM 40 MILLIGRAM(S): 100 INJECTION SUBCUTANEOUS at 16:25

## 2018-01-04 NOTE — H&P ADULT - RS GEN PE MLT RESP DETAILS PC
somewhat diminished BS more on rt. no sig. wheeze./respirations non-labored/no intercostal retractions

## 2018-01-04 NOTE — H&P ADULT - ASSESSMENT
pt. is admitted for large rt. pleural effusion, chest pig tail cath inserted. pt. feeling much better. bnp is elevated likely related to rt. pl. effusion. will add lasix 2o mg po daily.  ct surgery to follow.     Essential htn, will be on toprol, norvasc and doxazosin.     h/o colon cancer. no current complaints.

## 2018-01-04 NOTE — CHART NOTE - NSCHARTNOTEFT_GEN_A_CORE
Patient seen and evaluated bedside in ED; Chart reviewed     ICU Vital Signs Last 24 Hrs  T(C): 36.5 (04 Jan 2018 11:12), Max: 36.8 (04 Jan 2018 07:56)  T(F): 97.7 (04 Jan 2018 11:12), Max: 98.3 (04 Jan 2018 07:56)  HR: 71 (04 Jan 2018 11:12) (71 - 89)  BP: 111/59 (04 Jan 2018 11:12) (109/56 - 146/72)  RR: 26 (04 Jan 2018 11:12) (18 - 26)  SpO2: 97% (04 Jan 2018 11:12) (96% - 98%)      CHEST TUBE  < from: Xray Chest 1 View AP/PA. (01.04.18 @ 06:36) >      INTERPRETATION:  History: Chest tube placement    Technique:  AP portable    Comparisons:  Chest x-ray dated 5/5/2017    Findings: Pigtail catheter is coiledin the right lower lung field. Right   pleural effusion. Airspace disease in right lower lobe increased from   prior exam. Small left pleural effusion. The pulmonary vasculature and   aorta are normal for age. Heart size is unremarkable. The thorax is   normal for age.    Impression: Bilateral pleural effusions. Airspace disease right lower lobe< from: Xray Chest 1 View AP/PA. (01.04.18 @ 06:36) >      INTERPRETATION:  History: Chest tube placement    Technique:  AP portable    Comparisons:  Chest x-ray dated 5/5/2017    Findings: Pigtail catheter is coiledin the right lower lung field. Right   pleural effusion. Airspace disease in right lower lobe increased from   prior exam. Small left pleural effusion. The pulmonary vasculature and   aorta are normal for age. Heart size is unremarkable. The thorax is   normal for age.    Impression: Bilateral pleural effusions. Airspace disease right lower lobe    < end of copied text >        < end of copied text >      Chest tube currently noted to be clamped; unclamped and placed to suction at this time; will maintain on suction at this time; will continue to monitor and maintain chest tube

## 2018-01-04 NOTE — CONSULT NOTE ADULT - SUBJECTIVE AND OBJECTIVE BOX
Surgeon:  Dr. Erazo    Consult requesting by: Dr. Gonsales    83 yo F PMH Breast cancer , Colon cancer, Hypertension, Macular degeneration, Renal insufficiency, s/p left sided vats decortication with pleurex cathater placemnt 2/24/17 with removal of pleurex catheter 5/5/17. Pt reports she was at home felt SOB with chest tightness wchich prompted her to come in to [facility] ED. CT chest showed moderate to large right sided pleural effusion. Pt not complaining of any chest pain, difficulty breathing, SOB, syncope or light headedness at this itme and is answering in full snetences. Pt presently is ont requiring any supplemental oxygen. Given the size of the pleaural effusion and after case discussion with Dr. Erazo a pigtal catheter will be placed at the bedside for drainage of pleural effusion. Pt hemodynamically stable at this time awaiting placement of catheter.             PAST MEDICAL & SURGICAL HISTORY:  Macular degeneration  Renal insufficiency  Colon cancer  Breast cancer  Hypertension  Status post thoracentesis: Left Lung  History of tonsillectomy  History of right mastectomy  History of cholecystectomy  History of colon resection  History of hip replacement, total, left      MEDICATIONS  (STANDING):    MEDICATIONS  (PRN):    Antiplatelet therapy:      none as per pt                     Last dose/amt:    Allergies    No Known Allergies    Intolerances        SOCIAL HISTORY:  Smoker: [ ] Yes  [ x] No        PACK YEARS:                         WHEN QUIT? 1990's  ETOH use: [ ] Yes  [ x ] No              FREQUENCY / QUANTITY: 2packs/day over 25 years  Ilicit Drug use:  [ ] Yes  [ x ] No  Occupation: works in an office  Live with: self  Assisted device use:  none    FAMILY HISTORY:  Family history of hypertension (Mother, Child)      Review of Systems  CONSTITUTIONAL:  Fevers[ ] chills[ ] sweats[ ] fatigue[ ] weight loss[ ] weight gain [ ]                                     NEGATIVE [ x ]   NEURO:  parathesias[ ] seizures [ ]  syncope [ ]  confusion [ ]                                                                                NEGATIVE[  x ]   EYES: glasses[ ]  blurry vision[ x ]  discharge[ ] pain[ ] glaucoma [  x ]                                                                          NEGATIVE[ ]   ENMT:  difficulty hearing [ ]  vertigo[ ]  dysphagia[ ] epistaxis[ ] recent dental work [ ]                                    NEGATIVE[ x ]   CV:  chest pain[ ] palpitations[ ] SCHMID [ x ] diaphoresis [ ]                                                                                           NEGATIVE[ ]   RESPIRATORY:  wheezing[ ] SOB[ x  ] cough [ ] sputum[ ] hemoptysis[ ]                                                                  NEGATIVE[ ]   GI:  nausea[ ]  vommiting [ ]  diarrhea[ ] constipation [ ] melena [ ]                                                                      NEGATIVE[ x ]   : hematuria[ ]  dysuria[ ] urgency[ ] incontinence[ ]                                                                                            NEGATIVE[ x ]   MUSKULOSKELETAL:  arthritis[ ]  joint swelling [ ] muscle weakness [ ]                                                                NEGATIVE[ x ]   SKIN/BREAST:  rash[ ] itching [ ]  hair loss[ ] masses[ ]                                                                                              NEGATIVE[ x ]   PSYCH:  dementia [ ] depresion [ ] anxiety[ ]                                                                                                               NEGATIVE[ x ]   HEME/LYMPH:  bruises easily[ ] enlarged lymph nodes[ ] tender lymph nodes[ ]                                               NEGATIVE[  x ]   ENDOCRINE:  cold intolerance[ ] heat intolerance[ ] polydipsia[ ]                                                                          NEGATIVE[ x  ]     PHYSICAL EXAM  Vital Signs Last 24 Hrs  T(C): 36.6 (03 Jan 2018 19:31), Max: 36.6 (03 Jan 2018 19:31)  T(F): 97.9 (03 Jan 2018 19:31), Max: 97.9 (03 Jan 2018 19:31)  HR: 89 (03 Jan 2018 19:31) (89 - 89)  BP: 134/78 (03 Jan 2018 19:31) (134/78 - 134/78)  BP(mean): --  RR: 18 (03 Jan 2018 19:31) (18 - 18)  SpO2: 97% (03 Jan 2018 19:31) (97% - 97%)    CONSTITUTIONAL:                                                                            Neuro: AAOx3 in NAD  Pulm: Diminished breath sounds on right, positive breath sounds b/l   CV: RRR, Positive S1/S2  GI: NT/ND, positive bowel sounds  Ext: DP 2 +, UE pulses 2+, GREEN, Lower extremeties with venous insufficiency changes                 LABS:                        12.1   5.5   )-----------( 124      ( 03 Jan 2018 22:40 )             38.1     01-04    143  |  105  |  27.0<H>  ----------------------------<  162<H>  4.2   |  27.0  |  1.05    Ca    10.1      04 Jan 2018 00:48    TPro  7.1  /  Alb  3.7  /  TBili  1.5  /  DBili  x   /  AST  25  /  ALT  11  /  AlkPhos  186<H>  01-04        CARDIAC MARKERS ( 04 Jan 2018 00:48 )  x     / <0.01 ng/mL / 156 U/L / x     / 1.8 ng/mL        Serum Pro-Brain Natriuretic Peptide: 2026 pg/mL (01-04 @ 00:52)      < from: CT Chest No Cont (01.03.18 @ 22:45) >  IMPRESSION:    Moderate to large right and small left pleural effusions, enlarged from   prior exam. Associated bibasilar opacities most likely reflect   atelectasis, although it would be difficult toentirely exclude the   possibility of infection.    Small volume upper abdominal ascites, minimally increased from prior exam.                WILFREDO BENSON M.D., ATTENDING RADIOLOGIST  This document has been electronically signed. James  3 2018 11:08PM        < end of copied text >

## 2018-01-04 NOTE — CONSULT NOTE ADULT - ASSESSMENT
81 yo F PMH Breast cancer , Colon cancer, Hypertension, Macular degeneration, Renal insufficiency, s/p left sided vats decortication with pleurex catheter placemnt 2/24/17 with removal of pleurex catheter 5/5/17. Pt reports she was at home felt SOB with chest tightness which prompted her to come in to [facility] ED. CT chest showed moderate to large right sided pleural effusion. Pt not complaining of any chest pain, difficulty breathing, SOB, syncope or light headedness at this time and is answering in full sentences Pt presently is not requiring any supplemental oxygen. Given the size of the pleural effusion and after case discussion with Dr. Erazo a pigtail catheter will be placed at the bedside for drainage of pleural effusion. Pt hemodynamically stable at this time awaiting placement of catheter.

## 2018-01-04 NOTE — H&P ADULT - FAMILY HISTORY
Child  Still living? Yes, Estimated age: Age Unknown  Family history of hypertension, Age at diagnosis: Age Unknown

## 2018-01-04 NOTE — CONSULT NOTE ADULT - PROBLEM SELECTOR RECOMMENDATION 9
awaiting placement of pigtail catheter on right side  daily cxr  case discussed with Dr. Erazo  will continue to follow pt, please call CT-surgery with any questions.   Please admit pt to [facility] service.

## 2018-01-04 NOTE — PROCEDURE NOTE - NSPROCDETAILS_GEN_ALL_CORE
location identified, draped/prepped, sterile technique used, needle inserted/introduced/Seldinger technique/connection to syringe/ultrasound assessment of effusion (localization)/catheter inserted over needle/1L - 2L Seldinger technique/sterile dressing applied/thoracostomy tube placed percutaneously/ultrasound assessment of fluid (location)/dressing applied/secured in place/percutaneous/1L - 2L

## 2018-01-04 NOTE — H&P ADULT - HISTORY OF PRESENT ILLNESS
83 yo F PMH Breast cancer , Colon cancer, Hypertension, Macular degeneration, Renal insufficiency, s/p left sided vats decortication with pleurex cathater placemnt 2/24/17 with removal of pleurex catheter 5/5/17 was seen by her pcp for difficulty breathing for past 2 days. Pt. was sent for cxr as an out-pt and that showed large rt. pl. effusion . pt. was advised to go to ER. pt. reports no fever, some cough. no cp. no abd. pain. no n/v/d. pt. was seen by ct surgery and rt. pig tail cath is placed , pt. feels much better and reports that her breathing is at her baseline. no other complaints at this point.

## 2018-01-04 NOTE — ED PROVIDER NOTE - OBJECTIVE STATEMENT
82 year old female with PMH breast and colon CA and CKD presents with with 82 year old female with PMH breast and colon CA and CKD presents with chest tightness and SOB. Sx have been present for 1 week, constant, no radiation, worse with minimal exertion. Pt states she has a Hx of prior pleural effusion requirign drainage on the left. No cough, fever, chills.

## 2018-01-04 NOTE — H&P ADULT - GASTROINTESTINAL DETAILS
distended but non tender. old healed abd. scar. rectal deferred at this point./bowel sounds normal/soft

## 2018-01-05 DIAGNOSIS — Z29.9 ENCOUNTER FOR PROPHYLACTIC MEASURES, UNSPECIFIED: ICD-10-CM

## 2018-01-05 DIAGNOSIS — I51.9 HEART DISEASE, UNSPECIFIED: ICD-10-CM

## 2018-01-05 DIAGNOSIS — I10 ESSENTIAL (PRIMARY) HYPERTENSION: ICD-10-CM

## 2018-01-05 PROCEDURE — 99232 SBSQ HOSP IP/OBS MODERATE 35: CPT

## 2018-01-05 PROCEDURE — 99233 SBSQ HOSP IP/OBS HIGH 50: CPT

## 2018-01-05 PROCEDURE — 71045 X-RAY EXAM CHEST 1 VIEW: CPT | Mod: 26

## 2018-01-05 PROCEDURE — 93306 TTE W/DOPPLER COMPLETE: CPT | Mod: 26

## 2018-01-05 PROCEDURE — 71045 X-RAY EXAM CHEST 1 VIEW: CPT | Mod: 26,77

## 2018-01-05 RX ADMIN — Medication 4 MILLIGRAM(S): at 22:07

## 2018-01-05 RX ADMIN — AMLODIPINE BESYLATE 5 MILLIGRAM(S): 2.5 TABLET ORAL at 15:36

## 2018-01-05 RX ADMIN — ENOXAPARIN SODIUM 40 MILLIGRAM(S): 100 INJECTION SUBCUTANEOUS at 15:36

## 2018-01-05 RX ADMIN — Medication 20 MILLIGRAM(S): at 05:42

## 2018-01-05 RX ADMIN — Medication 25 MILLIGRAM(S): at 15:36

## 2018-01-05 NOTE — PROGRESS NOTE ADULT - ASSESSMENT
82 year old female with PMH Breast CA (in remission), Colon CA (in remission), HTN presented with dyspnea and cough and Chest X-Ray reported as having large pleural effusion.  Thoracostomy tube placed by CTS in ER draining sero-sanguineous fluid (700ml/24hr). Unclear etiology of pleural effusion - prior history of left pleural effusion requiring drainage, VATS with decortication and Pleurx which was eventually removed. BNP elevated 2026. TTE shows normal LVSF, EF 60-65%, grade I diastolic dysfunction, trivial pericardial effusion, mild to moderate MR.  Symptomatically improved after thoracocentesis; though Chest X-Ray show small apical pneumothorax which is stable on repeat imagning.

## 2018-01-05 NOTE — PROGRESS NOTE ADULT - ASSESSMENT
81 yo F PMH Breast cancer , Colon cancer, Hypertension, Macular degeneration, Renal insufficiency, s/p left sided vats decortication with pleurex catheter placemnt 2/24/17 with removal of pleurex catheter 5/5/17. Pt reports she was at home felt SOB with chest tightness which prompted her to come in to [facility] ED. CT chest showed moderate to large right sided pleural effusion now s/p right pigtail; significant for continued drainage; and small right apical PTX

## 2018-01-05 NOTE — PROGRESS NOTE ADULT - PROBLEM SELECTOR PLAN 1
maintain chest tube at this time to water seal   reapeat chest xray   if respiratory distress ensues please repeat chest xray and put back to suction

## 2018-01-05 NOTE — PROGRESS NOTE ADULT - SUBJECTIVE AND OBJECTIVE BOX
HOSPITALIST PROGRESS NOTE    ROYER RICHARD  [id number]  82yFemale    Patient is a 82y old  Female who presents with a chief complaint of difficulty breathing. (2018 06:36)      SUBJECTIVE:   Chart reviewed since last visit.  Patient seen and examined at bedside in ER for pleural effusio.n.  Dyspnea and cough better, still with chest pain at tube site.   Denies any fever, chills, nausea, vomiting, palpitations or dizziness      OBJECTIVE:  Vital Signs Last 24 Hrs  T(C): 37.1 (2018 10:10), Max: 37.3 (2018 21:54)  T(F): 98.7 (2018 10:10), Max: 99.1 (2018 21:54)  HR: 72 (2018 10:10) (72 - 82)  BP: 118/56 (2018 10:10) (105/57 - 125/60)  RR: 22 (2018 10:10) (20 - 23)  SpO2: 98% (2018 10:10) (96% - 98%)    PHYSICAL EXAMINATION  General: NAD[+]   nontoxic[+]  lean elderly female lying in bed  HEENT: AT/NC[+]  Left esotropia  NECK: Supple[+]  JVD[+] Carotid bruit[]  CVS: RRR[+]  Irregular[]  S1+S2[+]   Murmur[] Right mastectomy scar[+]  RESP: Fair air entry bilaterally[+]   Clear sounds[+]  Right posterior chest tube draining serosanguineous fluid approximately 700ml p   GI: Soft[+] ventral hernia[+]  Nontender[+]   Bowel Sounds[+]     : suprapubic tenderness[-]   CVA Tenderness[]   Latham[-]  MS: FROM[+]   Edema[-]  CNS: AAOx3[+]  grossly intact  INTEG: Skin is Warm[+]    PSYCH:  Fair Mood, Affect    MONITOR: SR    I&O's Summary    2018 07:01  -  2018 07:00  --------------------------------------------------------  IN: 720 mL / OUT: 700 mL / NET: 20 mL    2018 07:01  -  2018 14:13  --------------------------------------------------------  IN: 0 mL / OUT: 730 mL / NET: -730 mL                            11.5   5.8   )-----------( 110      ( 2018 11:45 )             37.0     PT/INR - ( 2018 03:41 )   PT: 14.3 sec;   INR: 1.29 ratio         PTT - ( 2018 03:41 )  PTT:33.5 sec      143  |  105  |  27.0<H>  ----------------------------<  162<H>  4.2   |  27.0  |  1.05    Ca    10.1      2018 00:48    TPro  7.1  /  Alb  3.7  /  TBili  1.5  /  DBili  x   /  AST  25  /  ALT  11  /  AlkPhos  186<H>  01-04    CARDIAC MARKERS ( 2018 00:48 )  x     / <0.01 ng/mL / 156 U/L / x     / 1.8 ng/mL            TTE:  < from: TTE Echo Complete w/Doppler (18 @ 09:14) >    EXAM:  ECHO TRANSTHORACIC COMP W DOPP      PROCEDURE DATE:  2018   .      INTERPRETATION:  REPORT:    TRANSTHORACIC ECHOCARDIOGRAM REPORT           Patient Name:   ROYER RAMOS Patient Location: [facility] Rec #:  [id number]           Accession #:      [id number]  Account #:                            Height:           65.7 in 167.0 cm  YOB: 1935            Weight:           149.9 lb 68.00 kg  Patient Age:    82 years              BSA:              1.76 m²  Patient Gender: F                     BP:               109/56 mmHg        Date of Exam:        2018 9:14:09 AM  Sonographer:         Royer Mcconnell  Referring Physician: Kassie Pedroza MD     Procedure:     2D Echo/Doppler/Color Doppler Complete.  Indications:   Heart failure, unspecified - I50.9  Diagnosis:     Heart failure, unspecified - I50.9  Study Details: Technically difficult study. Study quality was adversely   affected                 due to body habitus.           2D AND M-MODE MEASUREMENTS (normal ranges within parentheses):  Left                 Normal   Aorta/Left            Normal  Ventricle:                    Atrium:  IVSd (2D):    0.74  (0.7-1.1) Aortic Root    2.50  (2.4-3.7)                 cm             (Mmode):     cm  LVPWd (2D):   0.78  (0.7-1.1) AoV Cusp       1.90  (1.5-2.6)                 cm             Separation:     cm  LVIDd (2D):   3.65  (3.4-5.7) Left Atrium    4.20  (1.9-4.0)                 cm             (2D):           cm  LVIDs (2D):   2.61            LA Volume      32.5                 cm             Index         ml/m²  LV FS (2D):   28.5   (>25%)   Right Ventricle:                  %             TAPSE:           1.43 cm  Relative Wall 0.42   (<0.42)  Thickness     LV SYSTOLIC FUNCTION BY 2D PLANIMETRY (MOD):  EF-A4C View: 58.7 % EF-A2C View: 82.8 % EF-Biplane: 74.2 %     LV DIASTOLIC FUNCTION:  MV Peak E: 1.00 m/s E/e' Ratio: 10.20  MV Peak A: 0.82 m/s Decel Time: 165 msec  E/A Ratio: 1.22     SPECTRAL DOPPLER ANALYSIS (where applicable):  Mitral Valve:  MV P1/2 Time: 47.85 msec  MV Area, PHT: 4.60 cm²     Aortic Valve: AoV Max Filippo:  AoV Peak PG:  AoV Mean P.0 mmHg     LVOT Vmax:  LVOT VTI: 0.163 m LVOT Diameter: 1.91 cm     AoV Area, Vmax:  AoV Area, VTI: 1.60 cm² AoV Area, Vmn: 1.65 cm²  Ao VTI: 0.292  Tricuspid Valve and PA/RV Systolic Pressure: TR Max Velocity: 1.57 m/s RA   Pressure: 15 mmHg RVSP/PASP: 24.9 mmHg        PHYSICIAN INTERPRETATION:  Left Ventricle: The left ventricular internal cavity size is normal. Left   ventricular wall thickness is normal.  Global LV systolic function was normal. Left ventricular ejection   fraction, by visual estimation, is 60 to 65%. Spectral Doppler shows   impaired relaxation pattern of left ventricular myocardial filling (Grade   I diastolic dysfunction).  Right Ventricle: Normal right ventricular size and function. TV S' 1.1   m/s.  Left Atrium: Normal left atrial size. Intra-atrial septal aneurysm.  Right Atrium: The right atrium is normal in size.  Pericardium: Trivial pericardial effusion is present. The pericardial   effusion is surrounding the apex and anterior to the right ventricle.  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 Mild to moderate mitral   valve regurgitation is seen. The MR jet is posteriorly-directed.  Tricuspid Valve: The tricuspid valve is normal in structure. Trivial   tricuspid regurgitation is visualized.  Aortic Valve: The aortic valve is trileaflet. Sclerotic aortic valve with   normal opening. No evidence of aortic valve regurgitation is seen.  Pulmonic Valve: The pulmonic valve was not well visualized. Trace   pulmonic valve regurgitation.  Aorta: The aortic root and ascending aorta are structurally normal, with   no evidence of dilitation.  Pulmonary Artery: The pulmonary artery is not well seen.  Venous: A normal flow pattern is recorded from the right upper pulmonary   vein. The inferior vena cava was normal sized, with respiratory size   variation greater than 50%.        Summary:   1. Technically difficult study.   2. Normal left ventricular internal cavity size.   3. Normal global left ventricular systolic function.   4. Left ventricular ejection fraction, by visual estimation, is 60 to   65%.   5. Spectral Doppler shows impaired relaxation pattern of left   ventricular myocardial filling (Grade I diastolic dysfunction).   6. Rheumatic mitral valve.   7. Mild to moderate mitral valve regurgitation.   8. Sclerotic aortic valve with normal opening.   9. Intra-atrial septal aneurysm.  10. Trivial pericardial effusion.     MD Ravindra Electronically signed on 2018 at 11:26:30 AM                *** Final ***                  BLANCA FONTENOT   This document has been electronically signed. 2018  9:14AM    < end of copied text >    RADIOLOGY        MEDICATIONS  (STANDING):  ALBUTerol    90 MICROgram(s) HFA Inhaler 1 Puff(s) Inhalation every 4 hours  amLODIPine   Tablet 5 milliGRAM(s) Oral daily  doxazosin 4 milliGRAM(s) Oral at bedtime  enoxaparin Injectable 40 milliGRAM(s) SubCutaneous daily  furosemide    Tablet 20 milliGRAM(s) Oral daily  metoprolol succinate ER 25 milliGRAM(s) Oral daily  tiotropium 18 MICROgram(s) Capsule 1 Capsule(s) Inhalation daily      MEDICATIONS  (PRN):  ALBUTerol/ipratropium for Nebulization 3 milliLiter(s) Nebulizer every 6 hours PRN Shortness of Breath and/or Wheezing

## 2018-01-05 NOTE — PATIENT PROFILE ADULT. - NS TRANSFER PATIENT BELONGINGS
Wrist Watch/Clothing/purse, cloths- pants, shoes, socks, sweater, coat., cross/Cell Phone/PDA (specify)/Jewelry/Money (specify)

## 2018-01-05 NOTE — PROGRESS NOTE ADULT - SUBJECTIVE AND OBJECTIVE BOX
Subjective: "Can i get a room"   patient seen and examined bedside   chart reviewed   patient found enjoying lunch in ED sitting in stretcher with daughter at bedside   patient denying any acute complaints at this time; denies chest pain, shortness of breath or palpitations       T(C): 37.1 (01-05-18 @ 15:35), Max: 37.3 (01-04-18 @ 21:54)  HR: 75 (01-05-18 @ 15:35) (72 - 80)  BP: 122/60 (01-05-18 @ 15:35) (105/57 - 125/60)  RR: 18 (01-05-18 @ 15:35) (18 - 23)  SpO2: 96% (01-05-18 @ 15:35) (96% - 98%)      01-04    143  |  105  |  27.0<H>  ----------------------------<  162<H>  4.2   |  27.0  |  1.05    Ca    10.1      04 Jan 2018 00:48    TPro  7.1  /  Alb  3.7  /  TBili  1.5  /  DBili  x   /  AST  25  /  ALT  11  /  AlkPhos  186<H>  01-04                        11.5   5.8   )-----------( 110      ( 04 Jan 2018 11:45 )             37.0     PT/INR - ( 04 Jan 2018 03:41 )   PT: 14.3 sec;   INR: 1.29 ratio         PTT - ( 04 Jan 2018 03:41 )  PTT:33.5 sec        CAPILLARY BLOOD GLUCOSE      CXR:      < from: Xray Chest 1 View AP/PA. (01.05.18 @ 07:20) >    Findings:  Again noted is a right pigtail chest tube unchanged in position since the   prior study. There is a small right apical pneumothorax, unchanged. The   right lung is otherwise clear. The left lungis clear..    Impression:  Persistent right apical pneumothorax, unchanged..    < end of copied text >    I&O's Detail    04 Jan 2018 07:01  -  05 Jan 2018 07:00  --------------------------------------------------------  IN:    Oral Fluid: 720 mL  Total IN: 720 mL    OUT:    Chest Tube: 700 mL  Total OUT: 700 mL    Total NET: 20 mL      05 Jan 2018 07:01  -  05 Jan 2018 16:16  --------------------------------------------------------  IN:  Total IN: 0 mL    OUT:    Chest Tube: 730 mL  Total OUT: 730 mL    Total NET: -730 mL        Drug Dosing Weight  Height (cm): 167.64 (03 Jan 2018 19:31)  Weight (kg): 68 (03 Jan 2018 19:31)  BMI (kg/m2): 24.2 (03 Jan 2018 19:31)  BSA (m2): 1.77 (03 Jan 2018 19:31)    MEDICATIONS  (STANDING):  ALBUTerol    90 MICROgram(s) HFA Inhaler 1 Puff(s) Inhalation every 4 hours  amLODIPine   Tablet 5 milliGRAM(s) Oral daily  doxazosin 4 milliGRAM(s) Oral at bedtime  enoxaparin Injectable 40 milliGRAM(s) SubCutaneous daily  furosemide    Tablet 20 milliGRAM(s) Oral daily  metoprolol succinate ER 25 milliGRAM(s) Oral daily  tiotropium 18 MICROgram(s) Capsule 1 Capsule(s) Inhalation daily    MEDICATIONS  (PRN):  ALBUTerol/ipratropium for Nebulization 3 milliLiter(s) Nebulizer every 6 hours PRN Shortness of Breath and/or Wheezing      PAST MEDICAL & SURGICAL HISTORY:  Macular degeneration  Renal insufficiency  Colon cancer  Breast cancer  Hypertension  Status post thoracentesis: Left Lung  History of tonsillectomy  History of right mastectomy  History of cholecystectomy  History of colon resection  History of hip replacement, total, left

## 2018-01-06 LAB
ANION GAP SERPL CALC-SCNC: 8 MMOL/L — SIGNIFICANT CHANGE UP (ref 5–17)
BUN SERPL-MCNC: 33 MG/DL — HIGH (ref 8–20)
CALCIUM SERPL-MCNC: 8.6 MG/DL — SIGNIFICANT CHANGE UP (ref 8.6–10.2)
CHLORIDE SERPL-SCNC: 99 MMOL/L — SIGNIFICANT CHANGE UP (ref 98–107)
CO2 SERPL-SCNC: 34 MMOL/L — HIGH (ref 22–29)
CREAT SERPL-MCNC: 1.23 MG/DL — SIGNIFICANT CHANGE UP (ref 0.5–1.3)
GLUCOSE SERPL-MCNC: 95 MG/DL — SIGNIFICANT CHANGE UP (ref 70–115)
POTASSIUM SERPL-MCNC: 4.3 MMOL/L — SIGNIFICANT CHANGE UP (ref 3.5–5.3)
POTASSIUM SERPL-SCNC: 4.3 MMOL/L — SIGNIFICANT CHANGE UP (ref 3.5–5.3)
SODIUM SERPL-SCNC: 141 MMOL/L — SIGNIFICANT CHANGE UP (ref 135–145)

## 2018-01-06 PROCEDURE — 71045 X-RAY EXAM CHEST 1 VIEW: CPT | Mod: 26

## 2018-01-06 PROCEDURE — 99233 SBSQ HOSP IP/OBS HIGH 50: CPT

## 2018-01-06 RX ORDER — ACETAMINOPHEN 500 MG
650 TABLET ORAL EVERY 6 HOURS
Qty: 0 | Refills: 0 | Status: DISCONTINUED | OUTPATIENT
Start: 2018-01-06 | End: 2018-01-16

## 2018-01-06 RX ORDER — FUROSEMIDE 40 MG
20 TABLET ORAL DAILY
Qty: 0 | Refills: 0 | Status: DISCONTINUED | OUTPATIENT
Start: 2018-01-06 | End: 2018-01-11

## 2018-01-06 RX ADMIN — ENOXAPARIN SODIUM 40 MILLIGRAM(S): 100 INJECTION SUBCUTANEOUS at 21:49

## 2018-01-06 RX ADMIN — Medication 4 MILLIGRAM(S): at 21:49

## 2018-01-06 RX ADMIN — Medication 650 MILLIGRAM(S): at 18:11

## 2018-01-06 RX ADMIN — Medication 650 MILLIGRAM(S): at 19:15

## 2018-01-06 NOTE — PROGRESS NOTE ADULT - SUBJECTIVE AND OBJECTIVE BOX
Subjective: "Is it coming out yet?" Pt concerned about tube drainage. Complains of continued chest soreness from tube site and extending around lateral chest and right shoulder. Denies SOB at rest.     T(C): 36.4 (01-06-18 @ 15:13), Max: 37.2 (01-05-18 @ 22:04)  HR: 79 (01-06-18 @ 15:13) (69 - 86)  BP: 118/55 (01-06-18 @ 15:13) (90/61 - 118/55)  RR: 18 (01-06-18 @ 15:13) (16 - 18)  SpO2: 95% (01-06-18 @ 05:50) (95% - 96%) on O2    General: WN/WD NAD  Neurology: A&Ox3, nonfocal, GREEN x 4  Respiratory: crackles and diminished right base  CV: RRR, S1S2, no murmurs, rubs or gallops  Abdominal: Soft, NT, ND +BS,   Extremities: No edema, + peripheral pulses  Tubes: R chest tube draining copious pink tinged serous fluid. Approx 300 cc today.    Radiology, Labs and Medications Reviewed

## 2018-01-06 NOTE — PROGRESS NOTE ADULT - ASSESSMENT
81 yo F PMH Breast cancer , Colon cancer, Hypertension, Macular degeneration, Renal insufficiency, s/p left sided vats decortication with pleurex catheter placemnt 2/24/17 with removal of pleurex catheter 5/5/17. Pt reports she was at home felt SOB with chest tightness which prompted her to come in to [facility] ED. CT chest showed moderate to large right sided pleural effusion. Pt not complaining of any chest pain, difficulty breathing, SOB, syncope or light headedness at this time and is answering in full sentences. Pigtail placed 1/3/18.    Currently continues to drain. Diuresis for CHF limited by hypotension.     Plan  Plan per to medicine for optimization and diuresis when able to tolerate.  Pain medication with tylenol  Continue drain for now  D/W Dr Erazo

## 2018-01-06 NOTE — PROGRESS NOTE ADULT - ASSESSMENT
82 year old female with PMH Breast CA (in remission), Colon CA (in remission), HTN presented with dyspnea and cough and Chest X-Ray reported as having large pleural effusion.  Thoracostomy tube placed by CTS in ER draining sero-sanguineous fluid (700ml/24hr). Unclear etiology of pleural effusion - prior history of left pleural effusion requiring drainage, VATS with decortication and Pleurx which was eventually removed. BNP elevated 2026. TTE shows normal LVSF, EF 60-65%, grade I diastolic dysfunction, trivial pericardial effusion, mild to moderate MR.  Symptomatically improved after thoracocentesis; though Chest X-Ray show small apical pneumothorax which is stable on repeat imaging.  Continues to have drainage.

## 2018-01-06 NOTE — PROGRESS NOTE ADULT - SUBJECTIVE AND OBJECTIVE BOX
HOSPITALIST PROGRESS NOTE    ROYER RICHARD  [id number]  82yFemale    Patient is a 82y old  Female who presents with a chief complaint of difficulty breathing. (04 Jan 2018 06:36)      SUBJECTIVE:   Chart reviewed since last visit.  Patient seen and examined at bedside for pleural effusion  Complaining of chest pain at tube insertion site.  Denies dyspnea or cough.  Denies fever or chills      OBJECTIVE:  Vital Signs Last 24 Hrs  T(C): 36.5 (06 Jan 2018 10:04), Max: 37.2 (05 Jan 2018 22:04)  T(F): 97.7 (06 Jan 2018 10:04), Max: 99 (05 Jan 2018 22:04)  HR: 73 (06 Jan 2018 11:15) (69 - 86)  BP: 92/60 (06 Jan 2018 11:15) (90/61 - 122/60)  RR: 17 (06 Jan 2018 10:04) (16 - 18)  SpO2: 95% (06 Jan 2018 05:50) (94% - 96%)    PHYSICAL EXAMINATION  General: NAD[+]   nontoxic[+]  lean elderly female lying in bed  HEENT: AT/NC[+]  Left esotropia  NECK: Supple[+]  JVD[+] Carotid bruit[]  CVS: RRR[+]  Irregular[]  S1+S2[+]   Murmur[] Right mastectomy scar[+]  RESP: Fair air entry bilaterally[+]   Clear sounds[+]  Right posterior chest tube draining serosanguineous fluid approximately 500ml   GI: Soft[+] ventral hernia[+]  Nontender[+]   Bowel Sounds[+]     : suprapubic tenderness[-]   CVA Tenderness[]   Latham[-]  MS: FROM[+]   Edema[-]  CNS: AAOx3[+]  grossly intact  INTEG: Skin is Warm[+]    PSYCH:  Fair Mood, Affect  MONITOR:  CAPILLARY BLOOD GLUCOSE            I&O's Summary    05 Jan 2018 07:01  -  06 Jan 2018 07:00  --------------------------------------------------------  IN: 600 mL / OUT: 1170 mL / NET: -570 mL    06 Jan 2018 07:01 - 06 Jan 2018 14:52  --------------------------------------------------------  IN: 480 mL / OUT: 0 mL / NET: 480 mL          01-06    141  |  99  |  33.0<H>  ----------------------------<  95  4.3   |  34.0<H>  |  1.23    Ca    8.6      06 Jan 2018 07:43         RADIOLOGY  < from: Xray Chest 1 View AP/PA. (01.06.18 @ 04:50) >     EXAM:  XR CHEST AP OR PA 1V                         EXAM:  XR CHEST PORTABLE URGENT 1V                          PROCEDURE DATE:  01/05/2018          INTERPRETATION:  CLINICAL INFORMATION: Shortness of breath, right pleural   effusion.  TECHNIQUE: Serial single view chest radiographs were obtained.  COMPARISON: January 5, 2018 at 7:11 AM.    EXAMINATION DATE: January 5, 2018 at 3:22 PM.  FINDINGS:  LINES/TUBES: Unchanged right basilar pigtail catheter.  LUNGS/PLEURA: Unchanged small right pneumothorax. Small bilateral pleural   effusions with basilar atelectasis or consolidation.  MEDIASTINUM: Cardiac silhouette is enlarged.  OTHER: None.  IMPRESSION:   Unchanged small right pneumothorax.  Small bilateral pleural effusions with basilar atelectasis or   consolidation.    EXAMINATION DATE: January 6, 2018 at 4:08 AM.  FINDINGS:  LINES/TUBES: Unchanged right basilar pigtail catheter.  LUNGS/PLEURA: Unchanged small right pneumothorax. Small bilateral pleural   effusions with basilar atelectasis or consolidation.  MEDIASTINUM: Cardiac silhouette is enlarged.  OTHER: None.  IMPRESSION:   Unchanged small right pneumothorax.   Small bilateral pleural effusions with basilar atelectasis or   consolidation.                 BOUCHRA CARPIO M.D., ATTENDING RADIOLOGIST  This document has been electronically signed. Jan 6 2018 10:11AM    < end of copied text >        MEDICATIONS  (STANDING):  ALBUTerol    90 MICROgram(s) HFA Inhaler 1 Puff(s) Inhalation every 4 hours  amLODIPine   Tablet 5 milliGRAM(s) Oral daily  doxazosin 4 milliGRAM(s) Oral at bedtime  enoxaparin Injectable 40 milliGRAM(s) SubCutaneous daily  furosemide    Tablet 20 milliGRAM(s) Oral daily  metoprolol succinate ER 25 milliGRAM(s) Oral daily  tiotropium 18 MICROgram(s) Capsule 1 Capsule(s) Inhalation daily      MEDICATIONS  (PRN):  ALBUTerol/ipratropium for Nebulization 3 milliLiter(s) Nebulizer every 6 hours PRN Shortness of Breath and/or Wheezing

## 2018-01-07 PROCEDURE — 71045 X-RAY EXAM CHEST 1 VIEW: CPT | Mod: 26

## 2018-01-07 PROCEDURE — 99232 SBSQ HOSP IP/OBS MODERATE 35: CPT

## 2018-01-07 RX ADMIN — Medication 20 MILLIGRAM(S): at 05:50

## 2018-01-07 RX ADMIN — ENOXAPARIN SODIUM 40 MILLIGRAM(S): 100 INJECTION SUBCUTANEOUS at 22:40

## 2018-01-07 RX ADMIN — Medication 4 MILLIGRAM(S): at 22:40

## 2018-01-07 RX ADMIN — AMLODIPINE BESYLATE 5 MILLIGRAM(S): 2.5 TABLET ORAL at 05:50

## 2018-01-07 RX ADMIN — Medication 25 MILLIGRAM(S): at 05:50

## 2018-01-07 NOTE — PROGRESS NOTE ADULT - SUBJECTIVE AND OBJECTIVE BOX
Subjective: Sitting up in chair.  Denies SOB or CP.  NAD noted.                          T(F): 97.8 (01-07-18 @ 15:18), Max: 98 (01-06-18 @ 20:58)  HR: 82 (01-07-18 @ 15:18) (79 - 82)  BP: 105/48 (01-07-18 @ 15:18) (100/50 - 118/60)  RR: 18 (01-07-18 @ 15:18) (16 - 18)  SpO2: 98% (01-07-18 @ 10:00) (96% - 98%)        No Known Allergies      01-06    141  |  99  |  33.0<H>  ----------------------------<  95  4.3   |  34.0<H>  |  1.23    Ca    8.6      06 Jan 2018 07:43                         CXR: < from: Xray Chest 1 View AP -PORTABLE-Routine (01.07.18 @ 04:46) >  EXAM:  XR CHEST PORTABLE  ROUTINE 1V                          PROCEDURE DATE:  01/07/2018          INTERPRETATION:  CLINICAL INFORMATION: Pleural effusion.    TECHNIQUE: Frontal view of the chest   COMPARISON: January 6, 2018.    FINDINGS:    LINES/TUBES: Unchanged right pigtail catheter.  LUNGS/PLEURA: Unchanged small right pneumothorax. Unchanged small   bilateral pleural effusions with bibasilar atelectasis or consolidation.  MEDIASTINUM: Cardiac silhouette is enlarged.  OTHER: None.    IMPRESSION:     Unchanged small right pneumothorax.  Unchanged small bilateral pleural effusions with bibasilar atelectasis or   consolidation.    BOUCHRA CARPIO M.D., ATTENDING RADIOLOGIST  This document has been electronically signed. Jan 7 2018  3:36PM    < end of copied text >      I&O's Detail    06 Jan 2018 07:01  -  07 Jan 2018 07:00  --------------------------------------------------------  IN:    Oral Fluid: 480 mL  Total IN: 480 mL    OUT:    Chest Tube: 420 mL  Total OUT: 420 mL    Total NET: 60 mL      07 Jan 2018 07:01  -  07 Jan 2018 17:48  --------------------------------------------------------  IN:    Oral Fluid: 240 mL  Total IN: 240 mL    OUT:  Total OUT: 0 mL    Total NET: 240 mL          CHEST TUBE:  [x ] YES [ ] NO  OUTPUT: 120ml overnight and 420ml for the last 24 hours          Active Medications:  acetaminophen   Tablet. 650 milliGRAM(s) Oral every 6 hours PRN  ALBUTerol    90 MICROgram(s) HFA Inhaler 1 Puff(s) Inhalation every 4 hours  ALBUTerol/ipratropium for Nebulization 3 milliLiter(s) Nebulizer every 6 hours PRN  amLODIPine   Tablet 5 milliGRAM(s) Oral daily  doxazosin 4 milliGRAM(s) Oral at bedtime  enoxaparin Injectable 40 milliGRAM(s) SubCutaneous daily  furosemide    Tablet 20 milliGRAM(s) Oral daily  metoprolol succinate ER 25 milliGRAM(s) Oral daily  tiotropium 18 MICROgram(s) Capsule 1 Capsule(s) Inhalation daily      Physical Exam:    Neuro: AAOX3.  No focal deficits.    Pulm: Diminished BLL.  Fine base crackles on right.    CV: RRR.  +S1+S2.    Abd: Soft/NT/ND.  +BS.    Extremities: No edema.  +pp.                  PAST MEDICAL & SURGICAL HISTORY:  Macular degeneration  Renal insufficiency  Colon cancer  Breast cancer  Hypertension  Status post thoracentesis: Left Lung  History of tonsillectomy  History of right mastectomy  History of cholecystectomy  History of colon resection  History of hip replacement, total, left

## 2018-01-07 NOTE — PROGRESS NOTE ADULT - ASSESSMENT
83 yo F PMH Breast cancer , Colon cancer, Hypertension, Macular degeneration, Renal insufficiency, s/p left sided vats decortication with pleurex catheter placemnt 2/24/17 with removal of pleurex catheter 5/5/17. Pt reports she was at home felt SOB with chest tightness which prompted her to come in to [facility] ED. CT chest showed moderate to large right sided pleural effusion. Pt not complaining of any chest pain, difficulty breathing, SOB, syncope or light headedness at this time and is answering in full sentences. Pigtail placed 1/3/18.    Currently continues to drain. Diuresis for CHF limited by hypotension.

## 2018-01-07 NOTE — PROGRESS NOTE ADULT - SUBJECTIVE AND OBJECTIVE BOX
HOSPITALIST PROGRESS NOTE    ROYER RICHARD  [id number]  82yFemale    Patient is a 82y old  Female who presents with a chief complaint of difficulty breathing. (04 Jan 2018 06:36)      SUBJECTIVE:   Chart reviewed since last visit.  Patient seen and examined at bedside for pleural effusion.  Pain at tube site, improved with Tylenol; denies dyspnea, cough, fever, chills      OBJECTIVE:  Vital Signs Last 24 Hrs  T(C): 36.6 (07 Jan 2018 15:18), Max: 36.7 (06 Jan 2018 20:58)  T(F): 97.8 (07 Jan 2018 15:18), Max: 98 (06 Jan 2018 20:58)  HR: 82 (07 Jan 2018 15:18) (79 - 82)  BP: 105/48 (07 Jan 2018 15:18) (100/50 - 118/60)  RR: 18 (07 Jan 2018 15:18) (16 - 18)  SpO2: 98% (07 Jan 2018 10:00) (96% - 98%)    PHYSICAL EXAMINATION  General: NAD[+]   nontoxic[+]  lean elderly female lying in bed  HEENT: AT/NC[+]  Left esotropia  NECK: Supple[+]  JVD[+] Carotid bruit[]  CVS: RRR[+]  Irregular[]  S1+S2[+]   Murmur[] Right mastectomy scar[+]  RESP: Fair air entry bilaterally[+]   Clear sounds[+]  Right posterior chest tube draining serosanguineous fluid approximately 400ml   GI: Soft[+] ventral hernia[+]  Nontender[+]   Bowel Sounds[+]     : suprapubic tenderness[-]   CVA Tenderness[]   Latham[-]  MS: FROM[+]   Edema[-]  CNS: AAOx3[+]  grossly intact  INTEG: Skin is Warm[+]    PSYCH:  Fair Mood, Affect            I&O's Summary    06 Jan 2018 07:01  -  07 Jan 2018 07:00  --------------------------------------------------------  IN: 480 mL / OUT: 420 mL / NET: 60 mL    07 Jan 2018 07:01  -  07 Jan 2018 15:21  --------------------------------------------------------  IN: 240 mL / OUT: 0 mL / NET: 240 mL          01-06    141  |  99  |  33.0<H>  ----------------------------<  95  4.3   |  34.0<H>  |  1.23    Ca    8.6      06 Jan 2018 07:43        RADIOLOGY        MEDICATIONS  (STANDING):  ALBUTerol    90 MICROgram(s) HFA Inhaler 1 Puff(s) Inhalation every 4 hours  amLODIPine   Tablet 5 milliGRAM(s) Oral daily  doxazosin 4 milliGRAM(s) Oral at bedtime  enoxaparin Injectable 40 milliGRAM(s) SubCutaneous daily  furosemide    Tablet 20 milliGRAM(s) Oral daily  metoprolol succinate ER 25 milliGRAM(s) Oral daily  tiotropium 18 MICROgram(s) Capsule 1 Capsule(s) Inhalation daily      MEDICATIONS  (PRN):  acetaminophen   Tablet. 650 milliGRAM(s) Oral every 6 hours PRN Moderate Pain (4 - 6)  ALBUTerol/ipratropium for Nebulization 3 milliLiter(s) Nebulizer every 6 hours PRN Shortness of Breath and/or Wheezing

## 2018-01-07 NOTE — PROGRESS NOTE ADULT - PROBLEM SELECTOR PLAN 1
Maintain chest tube today for output of 420ml over the last 24 hours.  CXR in AM.  Tylenol for pain PRN.  Discussed with Dr. Erazo

## 2018-01-08 PROCEDURE — 99232 SBSQ HOSP IP/OBS MODERATE 35: CPT

## 2018-01-08 PROCEDURE — 71045 X-RAY EXAM CHEST 1 VIEW: CPT | Mod: 26

## 2018-01-08 RX ADMIN — ENOXAPARIN SODIUM 40 MILLIGRAM(S): 100 INJECTION SUBCUTANEOUS at 21:39

## 2018-01-08 RX ADMIN — Medication 4 MILLIGRAM(S): at 21:39

## 2018-01-08 NOTE — PROGRESS NOTE ADULT - SUBJECTIVE AND OBJECTIVE BOX
HOSPITALIST PROGRESS NOTE    ROYER LONGLO  [id number]  82yFemale    Patient is a 82y old  Female who presents with a chief complaint of difficulty breathing. (04 Jan 2018 06:36)      SUBJECTIVE:   Chart reviewed since last visit.  Patient seen and examined at bedside for pleural effusion.  chest pain at chest tube site and epigastrium with deep inspiration.  Denies any coughing, dyspnea, chills or fever.      OBJECTIVE:  Vital Signs Last 24 Hrs  T(C): 36.8 (08 Jan 2018 10:00), Max: 37 (07 Jan 2018 22:36)  T(F): 98.2 (08 Jan 2018 10:00), Max: 98.6 (07 Jan 2018 22:36)  HR: 81 (08 Jan 2018 10:00) (81 - 88)  BP: 113/56 (08 Jan 2018 10:00) (96/54 - 113/56)  RR: 18 (08 Jan 2018 10:00) (18 - 18)  SpO2: 97% (08 Jan 2018 10:00) (95% - 98%)    PHYSICAL EXAMINATION  General: NAD[+]   nontoxic[+]  lean elderly female lying in bed  HEENT: AT/NC[+]  Left esotropia  NECK: Supple[+]  JVD[+] Carotid bruit[]  CVS: RRR[+]  Irregular[]  S1+S2[+]   Murmur[] Right mastectomy scar[+]  RESP: Fair air entry bilaterally[+]   Clear sounds[+]  Right posterior chest tube draining serosanguineous fluid approximately 300ml   GI: Soft[+] ventral hernia[+]  Nontender[+]   Bowel Sounds[+]     : suprapubic tenderness[-]   CVA Tenderness[]   Latham[-]  MS: FROM[+]   Edema[-]  CNS: AAOx3[+]  grossly intact  INTEG: Skin is Warm[+]    PSYCH:  Fair Mood, Affect          I&O's Summary    07 Jan 2018 07:01  -  08 Jan 2018 07:00  --------------------------------------------------------  IN: 310 mL / OUT: 310 mL / NET: 0 mL        MEDICATIONS  (STANDING):  ALBUTerol    90 MICROgram(s) HFA Inhaler 1 Puff(s) Inhalation every 4 hours  amLODIPine   Tablet 5 milliGRAM(s) Oral daily  doxazosin 4 milliGRAM(s) Oral at bedtime  enoxaparin Injectable 40 milliGRAM(s) SubCutaneous daily  furosemide    Tablet 20 milliGRAM(s) Oral daily  metoprolol succinate ER 25 milliGRAM(s) Oral daily  tiotropium 18 MICROgram(s) Capsule 1 Capsule(s) Inhalation daily      MEDICATIONS  (PRN):  acetaminophen   Tablet. 650 milliGRAM(s) Oral every 6 hours PRN Moderate Pain (4 - 6)  ALBUTerol/ipratropium for Nebulization 3 milliLiter(s) Nebulizer every 6 hours PRN Shortness of Breath and/or Wheezing

## 2018-01-08 NOTE — CHART NOTE - NSCHARTNOTEFT_GEN_A_CORE
82 year old female with a PMH of breast cancer , Colon cancer, Hypertension, Macular degeneration, Renal insufficiency, s/p left sided vats decortication with pleurex cathater placement on 2/24/17 with removal of pleurex catheter on 5/5/17. Pt felt SOB with chest tightness at home which prompted her to come to [facility] ED. CT chest indicated moderate to large right sided pleural effusion. Right  pigtail catheter inserted on 1/3/18 for drainage of pleural effusion.  As per Dr. Erazo right pigtail to be removed tomorrow 1/9/18.

## 2018-01-09 ENCOUNTER — RESULT REVIEW (OUTPATIENT)
Age: 83
End: 2018-01-09

## 2018-01-09 LAB
GRAM STN FLD: SIGNIFICANT CHANGE UP
PH FLD: 8 — SIGNIFICANT CHANGE UP
SPECIMEN SOURCE: SIGNIFICANT CHANGE UP

## 2018-01-09 PROCEDURE — 88112 CYTOPATH CELL ENHANCE TECH: CPT | Mod: 26

## 2018-01-09 PROCEDURE — 99232 SBSQ HOSP IP/OBS MODERATE 35: CPT

## 2018-01-09 RX ADMIN — Medication 20 MILLIGRAM(S): at 05:20

## 2018-01-09 RX ADMIN — ENOXAPARIN SODIUM 40 MILLIGRAM(S): 100 INJECTION SUBCUTANEOUS at 21:24

## 2018-01-09 RX ADMIN — Medication 4 MILLIGRAM(S): at 21:24

## 2018-01-09 RX ADMIN — Medication 25 MILLIGRAM(S): at 05:20

## 2018-01-09 RX ADMIN — AMLODIPINE BESYLATE 5 MILLIGRAM(S): 2.5 TABLET ORAL at 05:20

## 2018-01-09 NOTE — CHART NOTE - NSCHARTNOTEFT_GEN_A_CORE
Thoracic Surgery    Patient continues to drain from right side chest tube. Fluid re-sent for evaluation. (sent the other day and specimen lost apparently). Will continue to follow chest tube output. D/W Dr Erazo

## 2018-01-09 NOTE — PROGRESS NOTE ADULT - PROBLEM SELECTOR PLAN 1
Maintain chest tube - management per CTS  oxygen, nebs PRN  Fluid analysis - ordered  Cultures  (orders cancelled by lab - discussed with EVANGELINA Marie - asked to resend fluid analysis

## 2018-01-09 NOTE — PROGRESS NOTE ADULT - SUBJECTIVE AND OBJECTIVE BOX
HOSPITALIST PROGRESS NOTE    ROYER LONGLO  [id number]  82yFemale    Patient is a 82y old  Female who presents with a chief complaint of difficulty breathing. (04 Jan 2018 06:36)      SUBJECTIVE:   Chart reviewed since last visit.  Patient seen and examined at bedside for pleural effusion  pain at tube site and epigastrium  No dyspnea, chills, palpitations, abdominal pain, nausea or vomiting      OBJECTIVE:  Vital Signs Last 24 Hrs  T(C): 36.6 (09 Jan 2018 15:20), Max: 36.6 (09 Jan 2018 05:16)  T(F): 97.8 (09 Jan 2018 15:20), Max: 97.9 (09 Jan 2018 05:16)  HR: 83 (09 Jan 2018 15:20) (80 - 90)  BP: 118/66 (09 Jan 2018 15:20) (111/58 - 128/54)  RR: 18 (09 Jan 2018 15:20) (18 - 18)  SpO2: 94% (09 Jan 2018 05:16) (93% - 94%)    PHYSICAL EXAMINATION  General: NAD[+]   nontoxic[+]  lean elderly female lying in bed  HEENT: AT/NC[+]  Left esotropia  NECK: Supple[+]  JVD[+] Carotid bruit[]  CVS: RRR[+]  Irregular[]  S1+S2[+]   Murmur[] Right mastectomy scar[+]  RESP: Fair air entry bilaterally[+]   Clear sounds[+]  Right posterior chest tube draining serosanguineous fluid  GI: Soft[+] ventral hernia[+]  Nontender[+]   Bowel Sounds[+]     : suprapubic tenderness[-]   CVA Tenderness[]   Latham[-]  MS: FROM[+]   Edema[-]  CNS: AAOx3[+]  grossly intact        I&O's Summary    08 Jan 2018 07:01  -  09 Jan 2018 07:00  --------------------------------------------------------  IN: 720 mL / OUT: 575 mL / NET: 145 mL    09 Jan 2018 07:01  -  09 Jan 2018 17:53  --------------------------------------------------------  IN: 240 mL / OUT: 180 mL / NET: 60 mL              MEDICATIONS  (STANDING):  ALBUTerol    90 MICROgram(s) HFA Inhaler 1 Puff(s) Inhalation every 4 hours  amLODIPine   Tablet 5 milliGRAM(s) Oral daily  doxazosin 4 milliGRAM(s) Oral at bedtime  enoxaparin Injectable 40 milliGRAM(s) SubCutaneous daily  furosemide    Tablet 20 milliGRAM(s) Oral daily  metoprolol succinate ER 25 milliGRAM(s) Oral daily  tiotropium 18 MICROgram(s) Capsule 1 Capsule(s) Inhalation daily      MEDICATIONS  (PRN):  acetaminophen   Tablet. 650 milliGRAM(s) Oral every 6 hours PRN Moderate Pain (4 - 6)  ALBUTerol/ipratropium for Nebulization 3 milliLiter(s) Nebulizer every 6 hours PRN Shortness of Breath and/or Wheezing

## 2018-01-10 LAB
ALBUMIN FLD-MCNC: <0.5 G/DL — SIGNIFICANT CHANGE UP
GLUCOSE FLD-MCNC: 145 MG/DL — SIGNIFICANT CHANGE UP
LDH SERPL L TO P-CCNC: 176 U/L — SIGNIFICANT CHANGE UP
PROT FLD-MCNC: 2.2 G/DL — SIGNIFICANT CHANGE UP

## 2018-01-10 PROCEDURE — 99232 SBSQ HOSP IP/OBS MODERATE 35: CPT

## 2018-01-10 PROCEDURE — 99233 SBSQ HOSP IP/OBS HIGH 50: CPT

## 2018-01-10 RX ADMIN — Medication 25 MILLIGRAM(S): at 04:59

## 2018-01-10 RX ADMIN — Medication 4 MILLIGRAM(S): at 22:22

## 2018-01-10 RX ADMIN — AMLODIPINE BESYLATE 5 MILLIGRAM(S): 2.5 TABLET ORAL at 04:58

## 2018-01-10 RX ADMIN — Medication 20 MILLIGRAM(S): at 04:58

## 2018-01-10 RX ADMIN — ENOXAPARIN SODIUM 40 MILLIGRAM(S): 100 INJECTION SUBCUTANEOUS at 22:22

## 2018-01-10 NOTE — PROGRESS NOTE ADULT - SUBJECTIVE AND OBJECTIVE BOX
HOSPITALIST PROGRESS NOTE    ROYER LONGLO  [id number]  82yFemale    Patient is a 82y old  Female who presents with a chief complaint of difficulty breathing. (04 Jan 2018 06:36)      SUBJECTIVE:   Chart reviewed since last visit.  Patient seen and examined at bedside.      OBJECTIVE:  Vital Signs Last 24 Hrs  T(C): 36.9 (10 James 2018 10:00), Max: 37.1 (09 Jan 2018 21:25)  T(F): 98.4 (10 James 2018 10:00), Max: 98.7 (09 Jan 2018 21:25)  HR: 80 (10 James 2018 15:11) (80 - 89)  BP: 115/52 (10 James 2018 15:11) (102/49 - 128/58)  BP(mean): --  RR: 18 (10 James 2018 15:11) (18 - 18)  SpO2: 95% (10 James 2018 10:00) (95% - 97%)    PHYSICAL EXAMINATION  General: NAD[+]   nontoxic[+]  lean elderly female lying in bed  HEENT: AT/NC[+]  Left esotropia  NECK: Supple[+]  JVD[+] Carotid bruit[]  CVS: RRR[+]  Irregular[]  S1+S2[+]   Murmur[] Right mastectomy scar[+]  RESP: Fair air entry bilaterally[+]   Clear sounds[+]  Right posterior chest tube draining serosanguineous fluid  GI: Soft[+] ventral hernia[+]  Nontender[+]   Bowel Sounds[+]     : suprapubic tenderness[-]   CVA Tenderness[]   Latham[-]  MS: FROM[+]   Edema[-]  CNS: AAOx3[+]  grossly intact    MONITOR:  CAPILLARY BLOOD GLUCOSE            I&O's Summary    09 Jan 2018 07:01  -  10 James 2018 07:00  --------------------------------------------------------  IN: 240 mL / OUT: 275 mL / NET: -35 mL    10 James 2018 07:01  -  10 James 2018 15:56  --------------------------------------------------------  IN: 480 mL / OUT: 400 mL / NET: 80 mL                        Culture:    TTE:    RADIOLOGY        MEDICATIONS  (STANDING):  ALBUTerol    90 MICROgram(s) HFA Inhaler 1 Puff(s) Inhalation every 4 hours  amLODIPine   Tablet 5 milliGRAM(s) Oral daily  doxazosin 4 milliGRAM(s) Oral at bedtime  enoxaparin Injectable 40 milliGRAM(s) SubCutaneous daily  furosemide    Tablet 20 milliGRAM(s) Oral daily  metoprolol succinate ER 25 milliGRAM(s) Oral daily  tiotropium 18 MICROgram(s) Capsule 1 Capsule(s) Inhalation daily      MEDICATIONS  (PRN):  acetaminophen   Tablet. 650 milliGRAM(s) Oral every 6 hours PRN Moderate Pain (4 - 6)  ALBUTerol/ipratropium for Nebulization 3 milliLiter(s) Nebulizer every 6 hours PRN Shortness of Breath and/or Wheezing HOSPITALIST PROGRESS NOTE    ROYER RICHARD  [id number]  82yFemale    Patient is a 82y old  Female who presents with a chief complaint of difficulty breathing. (04 Jan 2018 06:36)      SUBJECTIVE:   Chart reviewed since last visit.  Patient seen and examined at bedside earlier today for pleural effusion.  Denies dyspnea or cough.  Mild pain at chest insertion site and epigastrium controlled with Tylenol      OBJECTIVE:  Vital Signs Last 24 Hrs  T(C): 36.9 (10 James 2018 10:00), Max: 37.1 (09 Jan 2018 21:25)  T(F): 98.4 (10 James 2018 10:00), Max: 98.7 (09 Jan 2018 21:25)  HR: 80 (10 James 2018 15:11) (80 - 89)  BP: 115/52 (10 James 2018 15:11) (102/49 - 128/58)  RR: 18 (10 James 2018 15:11) (18 - 18)  SpO2: 95% (10 James 2018 10:00) (95% - 97%)    PHYSICAL EXAMINATION  General: NAD[+]   nontoxic[+]  lean elderly female lying in bed  HEENT: AT/NC[+]  Left esotropia  NECK: Supple[+]  JVD[+] Carotid bruit[]  CVS: RRR[+]  Irregular[]  S1+S2[+]   Murmur[] Right mastectomy scar[+]  RESP: Fair air entry bilaterally[+]   Clear sounds[+]  Right posterior chest tube draining serosanguineous fluid  GI: Soft[+] ventral hernia[+]  Nontender[+]   Bowel Sounds[+]     : suprapubic tenderness[-]   CVA Tenderness[]   Latham[-]  MS: FROM[+]   Edema[-]  CNS: AAOx3[+]  grossly intact    MONITOR:  CAPILLARY BLOOD GLUCOSE            I&O's Summary    09 Jan 2018 07:01  -  10 James 2018 07:00  --------------------------------------------------------  IN: 240 mL / OUT: 275 mL / NET: -35 mL    10 James 2018 07:01  -  10 James 2018 15:56  --------------------------------------------------------  IN: 480 mL / OUT: 400 mL / NET: 80 mL                MEDICATIONS  (STANDING):  ALBUTerol    90 MICROgram(s) HFA Inhaler 1 Puff(s) Inhalation every 4 hours  amLODIPine   Tablet 5 milliGRAM(s) Oral daily  doxazosin 4 milliGRAM(s) Oral at bedtime  enoxaparin Injectable 40 milliGRAM(s) SubCutaneous daily  furosemide    Tablet 20 milliGRAM(s) Oral daily  metoprolol succinate ER 25 milliGRAM(s) Oral daily  tiotropium 18 MICROgram(s) Capsule 1 Capsule(s) Inhalation daily      MEDICATIONS  (PRN):  acetaminophen   Tablet. 650 milliGRAM(s) Oral every 6 hours PRN Moderate Pain (4 - 6)  ALBUTerol/ipratropium for Nebulization 3 milliLiter(s) Nebulizer every 6 hours PRN Shortness of Breath and/or Wheezing

## 2018-01-10 NOTE — PROGRESS NOTE ADULT - PROBLEM SELECTOR PLAN 1
Maintain chest tube today   plan for possible VATS pleurodesis Friday with Dr Erazo  CXR in AM.  Tylenol for pain PRN.  DW Patient and daughter and they agree with plan   Discussed with Dr. Erazo

## 2018-01-10 NOTE — DIETITIAN INITIAL EVALUATION ADULT. - OTHER INFO
Pt reports good po intake at meals.  Pt does not follow DASH/TLC meal plan at home and declined education at this time.

## 2018-01-10 NOTE — PROGRESS NOTE ADULT - ASSESSMENT
81 yo F PMH Breast cancer , Colon cancer, Hypertension, Macular degeneration, Renal insufficiency, s/p left sided vats decortication with pleurex catheter placemnt 2/24/17 with removal of pleurex catheter 5/5/17. Pt reports she was at home felt SOB with chest tightness which prompted her to come in to [facility] ED. CT chest showed moderate to large right sided pleural effusion. Pt not complaining of any chest pain, difficulty breathing, SOB, syncope or light headedness at this time and is answering in full sentences. Pigtail placed 1/3/18.    Currently continues to drain. Diuresis for CHF limited by hypotension.

## 2018-01-10 NOTE — PROGRESS NOTE ADULT - SUBJECTIVE AND OBJECTIVE BOX
Subjective: " I think I want the surgery that Dr Erazo spoke to me about"      T(C): 36.9 (01-10-18 @ 10:00), Max: 37.1 (01-09-18 @ 21:25)  HR: 80 (01-10-18 @ 15:11) (80 - 89)  BP: 115/52 (01-10-18 @ 15:11) (102/49 - 128/58)    RR: 18 (01-10-18 @ 15:11) (18 - 18)  SpO2: 95% (01-10-18 @ 10:00) (95% - 97%)  Tele: SR                         CXR: no CT today         Assessment  Neuro: alert awake NAD   Pulm: decreased at base b/l   CV: RRR S1 S2   Abd:  soft NT ND + BS   Extremities:  trace edema b/l LE

## 2018-01-10 NOTE — PROGRESS NOTE ADULT - PROBLEM SELECTOR PLAN 1
Maintain chest tube - management per CTS  oxygen, nebs PRN  Fluid analysis - ordered  Cultures  (orders cancelled by lab - discussed with EVANGELINA Marie - asked to resend fluid analysis Maintain chest tube - management per CTS  oxygen, nebs PRN  Fluid analysis - resent; pending results  Cultures  (orders cancelled by lab - discussed with EVANGELINA Marie - asked to resend fluid analysis

## 2018-01-11 LAB
BLD GP AB SCN SERPL QL: SIGNIFICANT CHANGE UP
TYPE + AB SCN PNL BLD: SIGNIFICANT CHANGE UP

## 2018-01-11 PROCEDURE — 99232 SBSQ HOSP IP/OBS MODERATE 35: CPT

## 2018-01-11 PROCEDURE — 71045 X-RAY EXAM CHEST 1 VIEW: CPT | Mod: 26

## 2018-01-11 RX ORDER — CEFAZOLIN SODIUM 1 G
2000 VIAL (EA) INJECTION ONCE
Qty: 0 | Refills: 0 | Status: COMPLETED | OUTPATIENT
Start: 2018-01-12 | End: 2018-01-12

## 2018-01-11 RX ORDER — FUROSEMIDE 40 MG
40 TABLET ORAL DAILY
Qty: 0 | Refills: 0 | Status: DISCONTINUED | OUTPATIENT
Start: 2018-01-11 | End: 2018-01-14

## 2018-01-11 RX ADMIN — Medication 25 MILLIGRAM(S): at 04:56

## 2018-01-11 RX ADMIN — Medication 4 MILLIGRAM(S): at 21:31

## 2018-01-11 RX ADMIN — AMLODIPINE BESYLATE 5 MILLIGRAM(S): 2.5 TABLET ORAL at 04:56

## 2018-01-11 RX ADMIN — Medication 20 MILLIGRAM(S): at 04:56

## 2018-01-11 NOTE — PROGRESS NOTE ADULT - ASSESSMENT
83 yo F PMH Breast cancer , Colon cancer, Hypertension, Macular degeneration, Renal insufficiency, s/p left sided vats decortication with pleurex catheter placemnt 2/24/17 with removal of pleurex catheter 5/5/17. Pt reports she was at home felt SOB with chest tightness which prompted her to come in to [facility] ED. CT chest showed moderate to large right sided pleural effusion. Pt not complaining of any chest pain, difficulty breathing, SOB, syncope or light headedness at this time and is answering in full sentences. Pigtail placed 1/3/18.    Currently continues to drain. Diuresis for CHF limited by hypotension.

## 2018-01-11 NOTE — PROGRESS NOTE ADULT - SUBJECTIVE AND OBJECTIVE BOX
Subjective: Pt resting in chair comfortable No acute events overnight or today Pt denies any syncope, headache, SOB, difficulty breathing, difficulty swallowing, denies chest pain, nausea, vomiting belly pain, denies any difficulty urinating or going to the bathroom.       Tele:                         T(F): 98.1 (01-11-18 @ 10:00), Max: 98.1 (01-11-18 @ 10:00)  HR: 77 (01-11-18 @ 10:00) (77 - 82)  BP: 105/52 (01-11-18 @ 10:00) (105/52 - 116/62)  RR: 18 (01-11-18 @ 10:00) (18 - 18)  SpO2: 98% (01-11-18 @ 10:00) (97% - 98%)  Wt(kg): --    LV EF:                           CAPILLARY BLOOD GLUCOSE               CXR:    I&O's Detail    10 James 2018 07:01  -  11 Jan 2018 07:00  --------------------------------------------------------  IN:    Oral Fluid: 480 mL  Total IN: 480 mL    OUT:    Chest Tube: 425 mL    Voided: 375 mL  Total OUT: 800 mL    Total NET: -320 mL          CHEST TUBE:  [ x] YES [ ] NO  OUTPUT:     per 24 hours    AIR LEAKS:  [ ] YES [x ] NO      QUINYC DRAIN:   [ ] YES [x ] NO  OUTPUT:     per 24 hours    EPICARDIAL WIRES:  [ ] YES [x ] NO      BOWEL MOVEMENT:  [x ] YES [ ] NO          acetaminophen   Tablet. 650 milliGRAM(s) Oral every 6 hours PRN  ALBUTerol    90 MICROgram(s) HFA Inhaler 1 Puff(s) Inhalation every 4 hours  ALBUTerol/ipratropium for Nebulization 3 milliLiter(s) Nebulizer every 6 hours PRN  amLODIPine   Tablet 5 milliGRAM(s) Oral daily  doxazosin 4 milliGRAM(s) Oral at bedtime  furosemide    Tablet 40 milliGRAM(s) Oral daily  metoprolol succinate ER 25 milliGRAM(s) Oral daily  tiotropium 18 MICROgram(s) Capsule 1 Capsule(s) Inhalation daily    Physical Exam:  Neuro: AAOx3 in NAD  Pulm: Positive breath sounds b/l  CV: RRR, positive S1/S2  Abd: NT/ND  Extremities: DP 2+, GREEN, UE pulses 2+  Incision(s): right chest tube dressing C/D/I    History of hip replacement, total, left

## 2018-01-11 NOTE — PROGRESS NOTE ADULT - PROBLEM SELECTOR PLAN 1
Maintain chest tube - management per CTS  oxygen, nebs PRN  Increase Lasix from 20mg to 40mg PO daily - titrate upwards as BP tolerates.  Fluid Cultures

## 2018-01-11 NOTE — PROGRESS NOTE ADULT - SUBJECTIVE AND OBJECTIVE BOX
The patient is a 82y old female who presents with a chief complaint of difficulty breathing.   She is scheduled to have a FLEXIBLE BRONCHOSCOPY , RIGHT VIDEO ASSISTED  THORACOSCOPIC SURGERY, LUNG RESECTION.  (04 Jan 2018 06:36)    PAST MEDICAL HISTORY:  L.V.E.F. = 60 to 65%  Macular degeneration  Renal insufficiency  Colon cancer - no R.T. & no Chemo  Breast cancer - no R.T. & no Chemo  Hypertension      PAST SURGICAL HISTORY:  Status post thoracentesis  Tonsillectomy  Right mastectomy for right breast cancer 1980  Cholecystectomy - 1985  Colon resection  for colon cancer 2010  Total Left hip replacement in 2012      MEDICATIONS  (STANDING):  ALBUTerol    90 MICROgram(s) HFA Inhaler 1 Puff(s) Inhalation every 4 hours  amLODIPine   Tablet 5 milliGRAM(s) Oral daily  artificial  tears Solution 1 Drop(s) Both EYES daily  doxazosin 4 milliGRAM(s) Oral at bedtime  furosemide    Tablet 40 milliGRAM(s) Oral daily  metoprolol succinate ER 25 milliGRAM(s) Oral daily  tiotropium 18 MICROgram(s) Capsule 1 Capsule(s) Inhalation daily    MEDICATIONS  (PRN):  acetaminophen   Tablet. 650 milliGRAM(s) Oral every 6 hours PRN Moderate Pain (4 - 6)  ALBUTerol/ipratropium for Nebulization 3 milliLiter(s) Nebulizer every 6 hours PRN Shortness of Breath and/or Wheezing      Allergies:     No Known Drug Allergies      SOCIAL HISTORY:     The patient occasionally will drink alcohol.  She quit smoking 30 years ago                                  after smoking 1.5 ppd x 25 years.  She never used illicit drugs.      PT/INR - ( 04 Jan 2018 03:41 )   PT: 14.3 sec;   INR: 1.29 ratio       PTT - ( 04 Jan 2018 03:41 )  PTT:33.5 sec    EKG:  -   1/7/2018    Normal sinus rhythm  Possible Left atrial enlargement  Cannot rule out Anterior infarct , age undetermined  Abnormal ECG    TT ECHO:   -  1/5/2018   Summary:   2. Normal left ventricular internal cavity size.   3. Normal global left ventricular systolic function.   4. Left ventricular ejection fraction, by visual estimation, is 60 to        65%.   5. Spectral Doppler shows impaired relaxation pattern of left         ventricular myocardial filling (Grade I diastolic dysfunction).   6. Rheumatic mitral valve.   7. Mild to moderate mitral valve regurgitation.   8. Sclerotic aortic valve with normal opening.   9. Intra-atrial septal aneurysm    CHEST X-RAY   -   1/11/2018  FINDINGS:  Single frontalview of the chest demonstrates small right apical   pneumothorax. Right-sided pigtail catheter. Small bilateral effusions,   unchanged. Right lower lobe airspace. The cardiomediastinal silhouette is   normal. No acute osseous abnormalities. OverlyingEKG leads and wires are   noted.    ASA # = 3  Mallampati # = 3 (Full set of dentures)

## 2018-01-11 NOTE — PROGRESS NOTE ADULT - PROBLEM SELECTOR PLAN 1
Maintain chest tube today   plan for VATS pleurodesis Friday with Dr Erazo  CXR in AM.  Tylenol for pain PRN.  DW Patient and daughter and they agree with plan   Discussed with Dr. Erazo

## 2018-01-11 NOTE — PROGRESS NOTE ADULT - ASSESSMENT
82 year old female with PMH Breast CA (in remission), Colon CA (in remission), HTN presented with dyspnea and cough and Chest X-Ray reported as having large pleural effusion.  Thoracostomy tube placed by CTS in ER draining sero-sanguineous fluid (700ml/24hr). Unclear etiology of pleural effusion - prior history of left pleural effusion requiring drainage, VATS with decortication and Pleurx which was eventually removed. BNP elevated 2026. TTE shows normal LVSF, EF 60-65%, grade I diastolic dysfunction, trivial pericardial effusion, mild to moderate MR.  Symptomatically improved after thoracocentesis; though Chest X-Ray show small apical pneumothorax which is stable on repeat imaging.  Fluid analysis suggestive of transudate.  Continues to have drainage.

## 2018-01-11 NOTE — PROGRESS NOTE ADULT - SUBJECTIVE AND OBJECTIVE BOX
HOSPITALIST PROGRESS NOTE    ROYER LONGLO  [id number]  82yFemale    Patient is a 82y old  Female who presents with a chief complaint of difficulty breathing. (04 Jan 2018 06:36)      SUBJECTIVE:   Chart reviewed since last visit.  Patient seen and examined at bedside for pleural effusion.  Denies any fever, chills, dyspnea or palpitations.      OBJECTIVE:  Vital Signs Last 24 Hrs  T(C): 36.7 (11 Jan 2018 10:00), Max: 36.7 (11 Jan 2018 10:00)  T(F): 98.1 (11 Jan 2018 10:00), Max: 98.1 (11 Jan 2018 10:00)  HR: 77 (11 Jan 2018 10:00) (77 - 82)  BP: 105/52 (11 Jan 2018 10:00) (105/52 - 116/62)  RR: 18 (11 Jan 2018 10:00) (18 - 18)  SpO2: 98% (11 Jan 2018 10:00) (97% - 98%)    PHYSICAL EXAMINATION  General: NAD[+]   nontoxic[+]  lean elderly female lying in bed  HEENT: AT/NC[+]  Left esotropia  NECK: Supple[+]  JVD[+] Carotid bruit[]  CVS: RRR[+]  Irregular[]  S1+S2[+]   Murmur[] Right mastectomy scar[+]  RESP: Fair air entry bilaterally[+]   Clear sounds[+]  Right posterior chest tube draining serosanguineous fluid  GI: Soft[+] ventral hernia[+]  Nontender[+]   Bowel Sounds[+]     : suprapubic tenderness[-]   CVA Tenderness[]   Latham[-]  MS: FROM[+]   Edema[-]  CNS: AAOx3[+]  grossly intact         I&O's Summary    10 James 2018 07:01  -  11 Jan 2018 07:00  --------------------------------------------------------  IN: 480 mL / OUT: 800 mL / NET: -320 mL    11 Jan 2018 07:01  -  11 Jan 2018 15:08  --------------------------------------------------------  IN: 240 mL / OUT: 225 mL / NET: 15 mL    Glucose, Fluid (01.10.18 @ 18:10)    Glucose, Fluid: 145: Reference Ranges have NOT  been established for analytes in body fluids  because of variability in body fluid composition.  The  has not determined the efficacy of this test when  performed on fluid specimens. The performance characteristics of this  test were determined by [employer]. mg/dL        Protein Total, Fluid (01.10.18 @ 18:10)    Protein Total, Fluid: 2.2: Test Repeated  Reference Ranges have NOT been established for analytes in body fluids  because of variability in body fluid composition.  The  has not determined the efficacy of this test when  performed on fluid specimens. The performance characteristics of this  test were determined by [employer]. g/dL    Lactate Dehydrogenase, Fluid (01.10.18 @ 18:10)    Lactate Dehydrogenase, Fluid: 176: Reference Ranges have NOT been established for analytes in body fluids  because of variability in body fluid composition.  The  has not determined the efficacy of this test when  performed on fluid specimens. The performance characteristics of this  test were determined by [employer]. U/L    Culture - Acid Fast - Body Fluid w/Smear (02.25.17 @ 00:29)    Specimen Source: .Body Fluid Left Pleural Fluid    Acid Fast Bacilli Smear:   No acid fast bacilli seen by fluorochrome stain    Culture Results:   No acid fast bacilli isolated after 6 weeks.      Culture - Body Fluid with Gram Stain (01.09.18 @ 18:01)    Gram Stain:   Moderate White blood cells  No organisms seen    Specimen Source: .Body Fluid Pleural Fluid    Culture Results:   No growth at 1 day.  Culture in progress      RADIOLOGY  < from: Xray Chest 1 View AP/PA. (01.11.18 @ 06:11) >   EXAM:  XR CHEST AP OR PA 1V                          PROCEDURE DATE:  01/11/2018          INTERPRETATION:  TECHNIQUE: Single portable view of the chest.    COMPARISON: 1/8/2018    CLINICAL HISTORY: Pigtail catheter.     FINDINGS:    Single frontalview of the chest demonstrates small right apical   pneumothorax. Right-sided pigtail catheter. Small bilateral effusions,   unchanged. Right lower lobe airspace. The cardiomediastinal silhouette is   normal. No acute osseous abnormalities. OverlyingEKG leads and wires are   noted.    IMPRESSION: No interval change.                JOSE JENNINGS M.D., ATTENDING RADIOLOGIST  This document has been electronically signed. Jan 11 2018  9:37AM        < end of copied text >        MEDICATIONS  (STANDING):  ALBUTerol    90 MICROgram(s) HFA Inhaler 1 Puff(s) Inhalation every 4 hours   amLODIPine   Tablet 5 milliGRAM(s) Oral daily  doxazosin 4 milliGRAM(s) Oral at bedtime  furosemide    Tablet 40 milliGRAM(s) Oral daily  metoprolol succinate ER 25 milliGRAM(s) Oral daily  tiotropium 18 MICROgram(s) Capsule 1 Capsule(s) Inhalation daily      MEDICATIONS  (PRN):  acetaminophen   Tablet. 650 milliGRAM(s) Oral every 6 hours PRN Moderate Pain (4 - 6)  ALBUTerol/ipratropium for Nebulization 3 milliLiter(s) Nebulizer every 6 hours PRN Shortness of Breath and/or Wheezing

## 2018-01-12 ENCOUNTER — TRANSCRIPTION ENCOUNTER (OUTPATIENT)
Age: 83
End: 2018-01-12

## 2018-01-12 ENCOUNTER — APPOINTMENT (OUTPATIENT)
Dept: THORACIC SURGERY | Facility: HOSPITAL | Age: 83
End: 2018-01-12

## 2018-01-12 ENCOUNTER — RESULT REVIEW (OUTPATIENT)
Age: 83
End: 2018-01-12

## 2018-01-12 LAB
ANION GAP SERPL CALC-SCNC: 8 MMOL/L — SIGNIFICANT CHANGE UP (ref 5–17)
APTT BLD: 32.8 SEC — SIGNIFICANT CHANGE UP (ref 27.5–37.4)
BUN SERPL-MCNC: 38 MG/DL — HIGH (ref 8–20)
CALCIUM SERPL-MCNC: 8.5 MG/DL — LOW (ref 8.6–10.2)
CHLORIDE SERPL-SCNC: 97 MMOL/L — LOW (ref 98–107)
CO2 SERPL-SCNC: 35 MMOL/L — HIGH (ref 22–29)
CREAT SERPL-MCNC: 1.11 MG/DL — SIGNIFICANT CHANGE UP (ref 0.5–1.3)
GLUCOSE SERPL-MCNC: 98 MG/DL — SIGNIFICANT CHANGE UP (ref 70–115)
HCT VFR BLD CALC: 35.6 % — LOW (ref 37–47)
HGB BLD-MCNC: 11 G/DL — LOW (ref 12–16)
INR BLD: 1.14 RATIO — SIGNIFICANT CHANGE UP (ref 0.88–1.16)
MCHC RBC-ENTMCNC: 25.6 PG — LOW (ref 27–31)
MCHC RBC-ENTMCNC: 30.9 G/DL — LOW (ref 32–36)
MCV RBC AUTO: 82.8 FL — SIGNIFICANT CHANGE UP (ref 81–99)
PLATELET # BLD AUTO: 100 K/UL — LOW (ref 150–400)
POTASSIUM SERPL-MCNC: 4.7 MMOL/L — SIGNIFICANT CHANGE UP (ref 3.5–5.3)
POTASSIUM SERPL-SCNC: 4.7 MMOL/L — SIGNIFICANT CHANGE UP (ref 3.5–5.3)
PROTHROM AB SERPL-ACNC: 12.6 SEC — SIGNIFICANT CHANGE UP (ref 9.8–12.7)
RBC # BLD: 4.3 M/UL — LOW (ref 4.4–5.2)
RBC # FLD: 16.9 % — HIGH (ref 11–15.6)
SODIUM SERPL-SCNC: 140 MMOL/L — SIGNIFICANT CHANGE UP (ref 135–145)
WBC # BLD: 4.2 K/UL — LOW (ref 4.8–10.8)
WBC # FLD AUTO: 4.2 K/UL — LOW (ref 4.8–10.8)

## 2018-01-12 PROCEDURE — 93010 ELECTROCARDIOGRAM REPORT: CPT

## 2018-01-12 PROCEDURE — 71045 X-RAY EXAM CHEST 1 VIEW: CPT | Mod: 26

## 2018-01-12 PROCEDURE — 99232 SBSQ HOSP IP/OBS MODERATE 35: CPT

## 2018-01-12 PROCEDURE — 88312 SPECIAL STAINS GROUP 1: CPT | Mod: 26

## 2018-01-12 PROCEDURE — 32650 THORACOSCOPY W/PLEURODESIS: CPT | Mod: GC

## 2018-01-12 PROCEDURE — 32652 THORACOSCOPY REM TOTL CORTEX: CPT | Mod: GC

## 2018-01-12 PROCEDURE — 71045 X-RAY EXAM CHEST 1 VIEW: CPT | Mod: 26,77

## 2018-01-12 PROCEDURE — 88305 TISSUE EXAM BY PATHOLOGIST: CPT | Mod: 26

## 2018-01-12 RX ORDER — HYDROMORPHONE HYDROCHLORIDE 2 MG/ML
0.5 INJECTION INTRAMUSCULAR; INTRAVENOUS; SUBCUTANEOUS
Qty: 0 | Refills: 0 | Status: DISCONTINUED | OUTPATIENT
Start: 2018-01-12 | End: 2018-01-12

## 2018-01-12 RX ORDER — SODIUM CHLORIDE 9 MG/ML
1000 INJECTION, SOLUTION INTRAVENOUS
Qty: 0 | Refills: 0 | Status: DISCONTINUED | OUTPATIENT
Start: 2018-01-12 | End: 2018-01-12

## 2018-01-12 RX ORDER — FENTANYL CITRATE 50 UG/ML
30 INJECTION INTRAVENOUS
Qty: 0 | Refills: 0 | Status: DISCONTINUED | OUTPATIENT
Start: 2018-01-12 | End: 2018-01-14

## 2018-01-12 RX ORDER — ONDANSETRON 8 MG/1
4 TABLET, FILM COATED ORAL ONCE
Qty: 0 | Refills: 0 | Status: DISCONTINUED | OUTPATIENT
Start: 2018-01-12 | End: 2018-01-12

## 2018-01-12 RX ORDER — FENTANYL CITRATE 50 UG/ML
25 INJECTION INTRAVENOUS
Qty: 0 | Refills: 0 | Status: DISCONTINUED | OUTPATIENT
Start: 2018-01-12 | End: 2018-01-12

## 2018-01-12 RX ORDER — DOCUSATE SODIUM 100 MG
100 CAPSULE ORAL THREE TIMES A DAY
Qty: 0 | Refills: 0 | Status: DISCONTINUED | OUTPATIENT
Start: 2018-01-12 | End: 2018-01-16

## 2018-01-12 RX ORDER — ENOXAPARIN SODIUM 100 MG/ML
40 INJECTION SUBCUTANEOUS EVERY 24 HOURS
Qty: 0 | Refills: 0 | Status: DISCONTINUED | OUTPATIENT
Start: 2018-01-13 | End: 2018-01-16

## 2018-01-12 RX ORDER — CEFAZOLIN SODIUM 1 G
1000 VIAL (EA) INJECTION EVERY 8 HOURS
Qty: 0 | Refills: 0 | Status: COMPLETED | OUTPATIENT
Start: 2018-01-12 | End: 2018-01-13

## 2018-01-12 RX ADMIN — Medication 4 MILLIGRAM(S): at 21:31

## 2018-01-12 RX ADMIN — Medication 100 MILLIGRAM(S): at 11:00

## 2018-01-12 RX ADMIN — Medication 25 MILLIGRAM(S): at 05:41

## 2018-01-12 RX ADMIN — FENTANYL CITRATE 25 MICROGRAM(S): 50 INJECTION INTRAVENOUS at 13:35

## 2018-01-12 RX ADMIN — Medication 40 MILLIGRAM(S): at 05:42

## 2018-01-12 RX ADMIN — Medication 100 MILLIGRAM(S): at 18:04

## 2018-01-12 RX ADMIN — Medication 1 DROP(S): at 18:04

## 2018-01-12 RX ADMIN — FENTANYL CITRATE 25 MICROGRAM(S): 50 INJECTION INTRAVENOUS at 13:20

## 2018-01-12 RX ADMIN — AMLODIPINE BESYLATE 5 MILLIGRAM(S): 2.5 TABLET ORAL at 05:41

## 2018-01-12 RX ADMIN — Medication 100 MILLIGRAM(S): at 21:31

## 2018-01-12 RX ADMIN — FENTANYL CITRATE 30 MILLILITER(S): 50 INJECTION INTRAVENOUS at 13:42

## 2018-01-12 NOTE — BRIEF OPERATIVE NOTE - PROCEDURE
<<-----Click on this checkbox to enter Procedure Bronchoscopy  01/12/2018    Active  SCRUZ7  Decortication, lung, total, using VATS  01/12/2018    Active  SCRUZ7  Pleurodesis, chemical  01/12/2018  Doxycycline  Active  SCRUZ7  VATS (video-assisted thoracoscopic surgery)  01/12/2018    Active  SCRUZ7

## 2018-01-12 NOTE — PROGRESS NOTE ADULT - PROBLEM SELECTOR PLAN 1
Maintain chest tube - management per CTS  oxygen, nebs PRN  Increase Lasix from 20mg to 40mg PO daily - titrate upwards as BP tolerates.  Follow up fluid Cultures, cytology    Plan for VATS, pleurodesis today

## 2018-01-12 NOTE — PROGRESS NOTE ADULT - SUBJECTIVE AND OBJECTIVE BOX
HOSPITALIST PROGRESS NOTE    ROYER RICHARD  [id number]  82yFemale    Patient is a 82y old  Female who presents with a chief complaint of difficulty breathing. (04 Jan 2018 06:36)      SUBJECTIVE:   Chart reviewed since last visit.  Patient seen and examined at bedside for persistent pleural effusion.  Denies any dyspnea, cough, palpitations, dizziness.  Chest pain at tube site and epigastrium controlled with Tylenol      OBJECTIVE:  Vital Signs Last 24 Hrs  T(C): 36.6 (12 Jan 2018 05:37), Max: 36.7 (11 Jan 2018 10:00)  T(F): 97.9 (12 Jan 2018 05:37), Max: 98.1 (11 Jan 2018 10:00)  HR: 80 (12 Jan 2018 05:37) (77 - 80)  BP: 116/64 (12 Jan 2018 05:37) (105/52 - 116/64)  RR: 18 (12 Jan 2018 05:37) (18 - 18)  SpO2: 93% (12 Jan 2018 05:37) (93% - 99%)    PHYSICAL EXAMINATION  General: NAD[+]   nontoxic[+]  lean elderly female lying in bed  HEENT: AT/NC[+]  Left esotropia  NECK: Supple[+]  JVD[+] Carotid bruit[]  CVS: RRR[+]  Irregular[]  S1+S2[+]   Murmur[] Right mastectomy scar[+]  RESP: Fair air entry bilaterally[+]   Clear sounds[+]  Right posterior chest tube draining serosanguineous fluid  GI: Soft[+] ventral hernia[+]  Nontender[+]   Bowel Sounds[+]     : suprapubic tenderness[-]   CVA Tenderness[]   Latham[-]  MS: FROM[+]   Edema[-]  CNS: AAOx3[+]  grossly intact    PSYCH: Mood, Affect    MONITOR:  CAPILLARY BLOOD GLUCOSE            I&O's Summary    11 Jan 2018 07:01  -  12 Jan 2018 07:00  --------------------------------------------------------  IN: 240 mL / OUT: 705 mL / NET: -465 mL                            11.0   4.2   )-----------( 100      ( 12 Jan 2018 07:38 )             35.6     PT/INR - ( 12 Jan 2018 07:38 )   PT: 12.6 sec;   INR: 1.14 ratio         PTT - ( 12 Jan 2018 07:38 )  PTT:32.8 sec  01-12    140  |  97<L>  |  38.0<H>  ----------------------------<  98  4.7   |  35.0<H>  |  1.11    Ca    8.5<L>      12 Jan 2018 07:38      < from: Xray Chest 1 View AP/PA. (01.12.18 @ 05:49) >  EXAM:  XR CHEST AP OR PA 1V                          PROCEDURE DATE:  01/12/2018          INTERPRETATION:  CHEST AP PORTABLE:    History: preop for right vats.     Date and time of exam: 1/12/2018 4:19 AM.    Technique: A single AP view of the chest was obtained.    Comparison exam: 1/11/2018 5:53 AM.    Findings:  Again noted is a right pigtail chest tube. There is a right apical   pneumothorax, unchanged since the prior study. The heart is not enlarged.   Small bilateral pleural effusions noted..    Impression:  Right chest tube. Small bilateral pleural effusions. Right apical   pneumothorax. No change since the prior day..                SHIRA HU M.D., ATTENDING RADIOLOGIST  This document has been electronically signed. Jan 12 2018  8:26AM    < end of copied text >           MEDICATIONS  (STANDING):  ALBUTerol    90 MICROgram(s) HFA Inhaler 1 Puff(s) Inhalation every 4 hours  amLODIPine   Tablet 5 milliGRAM(s) Oral daily  artificial  tears Solution 1 Drop(s) Both EYES daily  ceFAZolin   IVPB 2000 milliGRAM(s) IV Intermittent once  doxazosin 4 milliGRAM(s) Oral at bedtime  doxycycline Intrapleural Syringe 500 milliGRAM(s) IntraPleural. once  furosemide    Tablet 40 milliGRAM(s) Oral daily  metoprolol succinate ER 25 milliGRAM(s) Oral daily  tiotropium 18 MICROgram(s) Capsule 1 Capsule(s) Inhalation daily      MEDICATIONS  (PRN):  acetaminophen   Tablet. 650 milliGRAM(s) Oral every 6 hours PRN Moderate Pain (4 - 6)  ALBUTerol/ipratropium for Nebulization 3 milliLiter(s) Nebulizer every 6 hours PRN Shortness of Breath and/or Wheezing

## 2018-01-12 NOTE — PROGRESS NOTE ADULT - ASSESSMENT
82 year old female with PMH Breast CA (in remission), Colon CA (in remission), HTN presented with dyspnea and cough and Chest X-Ray reported as having large pleural effusion.  Thoracostomy tube placed by CTS in ER draining sero-sanguineous fluid (700ml/24hr). Unclear etiology of pleural effusion - prior history of left pleural effusion requiring drainage, VATS with decortication and Pleurx which was eventually removed. BNP elevated 2026. TTE shows normal LVSF, EF 60-65%, grade I diastolic dysfunction, trivial pericardial effusion, mild to moderate MR.  Symptomatically improved after thoracocentesis; though Chest X-Ray show small apical pneumothorax which is stable on repeat imaging.  Fluid analysis suggestive of transudate.    Continues to have drainage. Plan for VATS, pleurodesis.    No absolute medical contraindication for anesthesia, surgery. May proceed at low to intermediate risk. Monitor and manage fluid balance perioperatively

## 2018-01-13 LAB
ANION GAP SERPL CALC-SCNC: 10 MMOL/L — SIGNIFICANT CHANGE UP (ref 5–17)
BUN SERPL-MCNC: 38 MG/DL — HIGH (ref 8–20)
CALCIUM SERPL-MCNC: 8.2 MG/DL — LOW (ref 8.6–10.2)
CHLORIDE SERPL-SCNC: 97 MMOL/L — LOW (ref 98–107)
CO2 SERPL-SCNC: 29 MMOL/L — SIGNIFICANT CHANGE UP (ref 22–29)
CREAT SERPL-MCNC: 1.31 MG/DL — HIGH (ref 0.5–1.3)
GLUCOSE SERPL-MCNC: 117 MG/DL — HIGH (ref 70–115)
HCT VFR BLD CALC: 37.3 % — SIGNIFICANT CHANGE UP (ref 37–47)
HGB BLD-MCNC: 11.6 G/DL — LOW (ref 12–16)
MCHC RBC-ENTMCNC: 26.1 PG — LOW (ref 27–31)
MCHC RBC-ENTMCNC: 31.1 G/DL — LOW (ref 32–36)
MCV RBC AUTO: 83.8 FL — SIGNIFICANT CHANGE UP (ref 81–99)
PLATELET # BLD AUTO: 100 K/UL — LOW (ref 150–400)
POTASSIUM SERPL-MCNC: 4.8 MMOL/L — SIGNIFICANT CHANGE UP (ref 3.5–5.3)
POTASSIUM SERPL-SCNC: 4.8 MMOL/L — SIGNIFICANT CHANGE UP (ref 3.5–5.3)
RBC # BLD: 4.45 M/UL — SIGNIFICANT CHANGE UP (ref 4.4–5.2)
RBC # FLD: 17.1 % — HIGH (ref 11–15.6)
SODIUM SERPL-SCNC: 136 MMOL/L — SIGNIFICANT CHANGE UP (ref 135–145)
WBC # BLD: 6.9 K/UL — SIGNIFICANT CHANGE UP (ref 4.8–10.8)
WBC # FLD AUTO: 6.9 K/UL — SIGNIFICANT CHANGE UP (ref 4.8–10.8)

## 2018-01-13 PROCEDURE — 99233 SBSQ HOSP IP/OBS HIGH 50: CPT

## 2018-01-13 PROCEDURE — 71045 X-RAY EXAM CHEST 1 VIEW: CPT | Mod: 26

## 2018-01-13 PROCEDURE — 99232 SBSQ HOSP IP/OBS MODERATE 35: CPT

## 2018-01-13 RX ORDER — BENZOCAINE AND MENTHOL 5; 1 G/100ML; G/100ML
1 LIQUID ORAL
Qty: 0 | Refills: 0 | Status: DISCONTINUED | OUTPATIENT
Start: 2018-01-13 | End: 2018-01-16

## 2018-01-13 RX ORDER — POLYETHYLENE GLYCOL 3350 17 G/17G
17 POWDER, FOR SOLUTION ORAL DAILY
Qty: 0 | Refills: 0 | Status: DISCONTINUED | OUTPATIENT
Start: 2018-01-13 | End: 2018-01-16

## 2018-01-13 RX ADMIN — BENZOCAINE AND MENTHOL 1 LOZENGE: 5; 1 LIQUID ORAL at 18:59

## 2018-01-13 RX ADMIN — Medication 100 MILLIGRAM(S): at 21:14

## 2018-01-13 RX ADMIN — FENTANYL CITRATE 30 MILLILITER(S): 50 INJECTION INTRAVENOUS at 07:59

## 2018-01-13 RX ADMIN — Medication 100 MILLIGRAM(S): at 05:09

## 2018-01-13 RX ADMIN — Medication 100 MILLIGRAM(S): at 14:12

## 2018-01-13 RX ADMIN — Medication 100 MILLIGRAM(S): at 02:08

## 2018-01-13 RX ADMIN — Medication 1 DROP(S): at 12:34

## 2018-01-13 RX ADMIN — ENOXAPARIN SODIUM 40 MILLIGRAM(S): 100 INJECTION SUBCUTANEOUS at 21:14

## 2018-01-13 RX ADMIN — Medication 40 MILLIGRAM(S): at 05:10

## 2018-01-13 NOTE — PROGRESS NOTE ADULT - SUBJECTIVE AND OBJECTIVE BOX
82y Female s/p  VATS, post-op day 1     T(C): 36.6 (01-13-18 @ 05:07), Max: 36.7 (01-12-18 @ 15:25)  HR: 76 (01-13-18 @ 05:07) (64 - 77)  BP: 108/53 (01-13-18 @ 05:07) (80/49 - 120/60)  RR: 18 (01-13-18 @ 05:07) (16 - 25)  SpO2: 95% (01-13-18 @ 05:07) (92% - 100%)  Wt(kg): --    Pt seen, doing well, no anesthesia complications or complaints noted or reported.   No Nausea  Pain well controlled

## 2018-01-13 NOTE — PROGRESS NOTE ADULT - SUBJECTIVE AND OBJECTIVE BOX
HOSPITALIST PROGRESS NOTE    ROYER RICHARD  [id number]  82yFemale    Patient is a 82y old  Female who presents with a chief complaint of difficulty breathing. (04 Jan 2018 06:36)      SUBJECTIVE:   Chart reviewed since last visit.  Patient seen and examined at bedside for pleural effusion.  Underwent VATS, pleurodesis yesterday>  Feel fine denies any dyspnea, cough, palpitations, chills or fevers.      OBJECTIVE:  Vital Signs Last 24 Hrs  T(C): 36.6 (13 Jan 2018 10:32), Max: 36.7 (12 Jan 2018 15:25)  T(F): 97.9 (13 Jan 2018 10:32), Max: 98 (12 Jan 2018 15:25)  HR: 88 (13 Jan 2018 10:32) (75 - 88)  BP: 119/56 (13 Jan 2018 10:32) (100/56 - 120/60)  RR: 18 (13 Jan 2018 10:32) (18 - 18)  SpO2: 96% (13 Jan 2018 10:32) (94% - 96%)    PHYSICAL EXAMINATION  General: NAD[+]   nontoxic[+]  lean elderly female lying in bed  HEENT: AT/NC[+]  Left esotropia  NECK: Supple[+]  JVD[+] Carotid bruit[]  CVS: RRR[+]  Irregular[]  S1+S2[+]   Murmur[] Right mastectomy scar[+]  RESP: Fair air entry bilaterally[+]   Clear sounds[+]  Right posterior chest tube draining serosanguineous fluid  GI: Soft[+] ventral hernia[+]  Nontender[+]   Bowel Sounds[+]     : suprapubic tenderness[-]   CVA Tenderness[]   Latham[-]  MS: FROM[+]   Edema[-]  CNS: AAOx3[+]  grossly intact           I&O's Summary    12 Jan 2018 07:01  -  13 Jan 2018 07:00  --------------------------------------------------------  IN: 200 mL / OUT: 680 mL / NET: -480 mL                            11.6   6.9   )-----------( 100      ( 13 Jan 2018 06:17 )             37.3     PT/INR - ( 12 Jan 2018 07:38 )   PT: 12.6 sec;   INR: 1.14 ratio         PTT - ( 12 Jan 2018 07:38 )  PTT:32.8 sec  01-13    136  |  97<L>  |  38.0<H>  ----------------------------<  117<H>  4.8   |  29.0  |  1.31<H>    Ca    8.2<L>      13 Jan 2018 06:21               MEDICATIONS  (STANDING):  ALBUTerol    90 MICROgram(s) HFA Inhaler 1 Puff(s) Inhalation every 4 hours  amLODIPine   Tablet 5 milliGRAM(s) Oral daily  artificial  tears Solution 1 Drop(s) Both EYES daily  docusate sodium 100 milliGRAM(s) Oral three times a day  doxycycline Intrapleural Syringe 500 milliGRAM(s) IntraPleural. once  enoxaparin Injectable 40 milliGRAM(s) SubCutaneous every 24 hours  fentaNYL PCA (50 MICROgram(s)/mL) 30 milliLiter(s) PCA Continuous PCA Continuous  furosemide    Tablet 40 milliGRAM(s) Oral daily  metoprolol succinate ER 25 milliGRAM(s) Oral daily  tiotropium 18 MICROgram(s) Capsule 1 Capsule(s) Inhalation daily      MEDICATIONS  (PRN):  acetaminophen   Tablet. 650 milliGRAM(s) Oral every 6 hours PRN Moderate Pain (4 - 6)  ALBUTerol/ipratropium for Nebulization 3 milliLiter(s) Nebulizer every 6 hours PRN Shortness of Breath and/or Wheezing  benzocaine 15 mG/menthol 3.6 mG Lozenge 1 Lozenge Oral four times a day PRN Sore Throat  polyethylene glycol 3350 17 Gram(s) Oral daily PRN Constipation

## 2018-01-13 NOTE — PROGRESS NOTE ADULT - SUBJECTIVE AND OBJECTIVE BOX
Subjective: " My throat hurts "  Pt in bed resting NAD     VITAL SIGNS  T(C): 36.8 (18 @ 15:03), Max: 36.8 (18 @ 15:03)  HR: 91 (18 @ 15:03) (76 - 91)  BP: 125/58 (18 @ 15:03) (100/56 - 125/58)  RR: 18 (18 @ 15:03) (18 - 18)  SpO2: 96% (18 @ 10:32) (94% - 96%)2 L NC   Daily     Daily Weight in k.8 (2018 04:33)  Admit Wt: Drug Dosing Weight  Height (cm): 167.64 (2018 19:31)  Weight (kg): 68 (2018 19:31)    Telemetry:  SR      MEDICATIONS  acetaminophen   Tablet. 650 milliGRAM(s) Oral every 6 hours PRN  ALBUTerol    90 MICROgram(s) HFA Inhaler 1 Puff(s) Inhalation every 4 hours  ALBUTerol/ipratropium for Nebulization 3 milliLiter(s) Nebulizer every 6 hours PRN  amLODIPine   Tablet 5 milliGRAM(s) Oral daily  artificial  tears Solution 1 Drop(s) Both EYES daily  benzocaine 15 mG/menthol 3.6 mG Lozenge 1 Lozenge Oral four times a day PRN  docusate sodium 100 milliGRAM(s) Oral three times a day  enoxaparin Injectable 40 milliGRAM(s) SubCutaneous every 24 hours  fentaNYL PCA (50 MICROgram(s)/mL) 30 milliLiter(s) PCA Continuous PCA Continuous  furosemide    Tablet 40 milliGRAM(s) Oral daily  metoprolol succinate ER 25 milliGRAM(s) Oral daily  polyethylene glycol 3350 17 Gram(s) Oral daily PRN  tiotropium 18 MICROgram(s) Capsule 1 Capsule(s) Inhalation daily    MEDICATIONS  (PRN):  acetaminophen   Tablet. 650 milliGRAM(s) Oral every 6 hours PRN Moderate Pain (4 - 6)  ALBUTerol/ipratropium for Nebulization 3 milliLiter(s) Nebulizer every 6 hours PRN Shortness of Breath and/or Wheezing  benzocaine 15 mG/menthol 3.6 mG Lozenge 1 Lozenge Oral four times a day PRN Sore Throat  polyethylene glycol 3350 17 Gram(s) Oral daily PRN Constipation        PHYSICAL EXAM  neuro:  A & O x 2  card: RRR S1 S2  resp: decreased RT base + CT x 2 on suction with minimal drainage   Abd:  Soft NT ND + BS       I&O's Detail    2018 07:01  -  2018 07:00  --------------------------------------------------------  IN:    lactated ringers.: 150 mL    Oral Fluid: 50 mL  Total IN: 200 mL    OUT:    Chest Tube: 190 mL    Chest Tube: 490 mL  Total OUT: 680 mL    Total NET: -480 mL      2018 07:01  -  2018 19:08  --------------------------------------------------------  IN:  Total IN: 0 mL    OUT:    Chest Tube: 160 mL  Total OUT: 160 mL    Total NET: -160 mL          LABS      136  |  97<L>  |  38.0<H>  ----------------------------<  117<H>  4.8   |  29.0  |  1.31<H>    Ca    8.2<L>      2018 06:21                                   11.6   6.9   )-----------( 100      ( 2018 06:17 )             37.3          PT/INR - ( 2018 07:38 )   PT: 12.6 sec;   INR: 1.14 ratio         PTT - ( 2018 07:38 )  PTT:32.8 sec           CAPILLARY BLOOD GLUCOSE               Today's CXR: < from: Xray Chest 1 View AP/PA. (18 @ 14:32) >  AP radiograph of the chest demonstrates unchanged small RIGHT pleural   effusion and RIGHT-sided chest tubes. Subsegmental atelectasis in the   LEFT lower lobe. The cardiac silhouette is normal in size. Osseous   structures are intact.    Impression:No interval change.    < end of copied text >      PAST MEDICAL & SURGICAL HISTORY:  Macular degeneration  Renal insufficiency  Colon cancer  Breast cancer  Hypertension  Status post thoracentesis: Left Lung  History of tonsillectomy  History of right mastectomy  History of cholecystectomy  History of colon resection  History of hip replacement, total, left

## 2018-01-13 NOTE — PROGRESS NOTE ADULT - PROBLEM SELECTOR PLAN 1
Maintain chest tube - management per CTS  oxygen, nebs PRN  Lasix  40mg PO daily - titrate upwards as BP tolerates.  Follow up fluid Cultures, cytology    Plan for VATS, pleurodesis today Maintain chest tube - management per CTS  oxygen, nebs PRN  Lasix  40mg PO daily - titrate upwards as BP tolerates.  Follow up fluid Cultures, cytology

## 2018-01-13 NOTE — PROGRESS NOTE ADULT - ASSESSMENT
82 year old female with PMH Breast CA (in remission), Colon CA (in remission), HTN presented with dyspnea and cough and Chest X-Ray reported as having large pleural effusion.  Thoracostomy tube placed by CTS in ER draining sero-sanguineous fluid (700ml/24hr). Unclear etiology of pleural effusion - prior history of left pleural effusion requiring drainage, VATS with decortication and Pleurx which was eventually removed. BNP elevated 2026. TTE shows normal LVSF, EF 60-65%, grade I diastolic dysfunction, trivial pericardial effusion, mild to moderate MR.  Symptomatically improved after thoracocentesis; though Chest X-Ray show small apical pneumothorax which is stable on repeat imaging.  Fluid analysis suggestive of transudate.    Underwent VATS, decortication right lung with pleurodesis on 01/12/18 82 year old female with PMH Breast CA (in remission), Colon CA (in remission), HTN presented with dyspnea and cough and Chest X-Ray reported as having large pleural effusion.  Thoracostomy tube placed by CTS in ER draining sero-sanguineous fluid (700ml/24hr). Unclear etiology of pleural effusion - prior history of left pleural effusion requiring drainage, VATS with decortication and Pleurx which was eventually removed. BNP elevated 2026. TTE shows normal LVSF, EF 60-65%, grade I diastolic dysfunction, trivial pericardial effusion, mild to moderate MR.  Symptomatically improved after thoracocentesis; though Chest X-Ray show small apical pneumothorax which is stable on repeat imaging.  Fluid analysis suggestive of transudate.    Underwent VATS, decortication right lung with pleurodesis on 01/12/18; right sided chest tube still in place.

## 2018-01-13 NOTE — PROGRESS NOTE ADULT - ASSESSMENT
83 yo F PMH Breast cancer , Colon cancer, Hypertension, Macular degeneration, Renal insufficiency, s/p left sided vats decortication with pleurex catheter placemnt 2/24/17 with removal of pleurex catheter 5/5/17. Pt reports she was at home felt SOB with chest tightness which prompted her to come in to [facility] ED. CT chest showed moderate to large right sided pleural effusion.  Pigtial was placed    1/12 s/p Right VATS decortication .

## 2018-01-14 LAB
ANION GAP SERPL CALC-SCNC: 13 MMOL/L — SIGNIFICANT CHANGE UP (ref 5–17)
BUN SERPL-MCNC: 37 MG/DL — HIGH (ref 8–20)
CALCIUM SERPL-MCNC: 8.7 MG/DL — SIGNIFICANT CHANGE UP (ref 8.6–10.2)
CHLORIDE SERPL-SCNC: 94 MMOL/L — LOW (ref 98–107)
CO2 SERPL-SCNC: 30 MMOL/L — HIGH (ref 22–29)
CREAT SERPL-MCNC: 1.17 MG/DL — SIGNIFICANT CHANGE UP (ref 0.5–1.3)
CULTURE RESULTS: SIGNIFICANT CHANGE UP
GLUCOSE SERPL-MCNC: 91 MG/DL — SIGNIFICANT CHANGE UP (ref 70–115)
POTASSIUM SERPL-MCNC: 4.1 MMOL/L — SIGNIFICANT CHANGE UP (ref 3.5–5.3)
POTASSIUM SERPL-SCNC: 4.1 MMOL/L — SIGNIFICANT CHANGE UP (ref 3.5–5.3)
SODIUM SERPL-SCNC: 137 MMOL/L — SIGNIFICANT CHANGE UP (ref 135–145)
SPECIMEN SOURCE: SIGNIFICANT CHANGE UP

## 2018-01-14 PROCEDURE — 99232 SBSQ HOSP IP/OBS MODERATE 35: CPT

## 2018-01-14 PROCEDURE — 71045 X-RAY EXAM CHEST 1 VIEW: CPT | Mod: 26

## 2018-01-14 PROCEDURE — 99233 SBSQ HOSP IP/OBS HIGH 50: CPT

## 2018-01-14 RX ORDER — OXYCODONE AND ACETAMINOPHEN 5; 325 MG/1; MG/1
1 TABLET ORAL EVERY 6 HOURS
Qty: 0 | Refills: 0 | Status: DISCONTINUED | OUTPATIENT
Start: 2018-01-14 | End: 2018-01-16

## 2018-01-14 RX ORDER — FUROSEMIDE 40 MG
40 TABLET ORAL
Qty: 0 | Refills: 0 | Status: DISCONTINUED | OUTPATIENT
Start: 2018-01-14 | End: 2018-01-16

## 2018-01-14 RX ADMIN — BENZOCAINE AND MENTHOL 1 LOZENGE: 5; 1 LIQUID ORAL at 10:12

## 2018-01-14 RX ADMIN — Medication 1 DROP(S): at 12:34

## 2018-01-14 RX ADMIN — BENZOCAINE AND MENTHOL 1 LOZENGE: 5; 1 LIQUID ORAL at 17:28

## 2018-01-14 RX ADMIN — Medication 40 MILLIGRAM(S): at 17:26

## 2018-01-14 RX ADMIN — POLYETHYLENE GLYCOL 3350 17 GRAM(S): 17 POWDER, FOR SOLUTION ORAL at 10:12

## 2018-01-14 RX ADMIN — AMLODIPINE BESYLATE 5 MILLIGRAM(S): 2.5 TABLET ORAL at 05:18

## 2018-01-14 RX ADMIN — FENTANYL CITRATE 30 MILLILITER(S): 50 INJECTION INTRAVENOUS at 07:57

## 2018-01-14 RX ADMIN — ENOXAPARIN SODIUM 40 MILLIGRAM(S): 100 INJECTION SUBCUTANEOUS at 22:14

## 2018-01-14 RX ADMIN — Medication 100 MILLIGRAM(S): at 22:14

## 2018-01-14 RX ADMIN — Medication 40 MILLIGRAM(S): at 05:17

## 2018-01-14 RX ADMIN — Medication 100 MILLIGRAM(S): at 05:18

## 2018-01-14 RX ADMIN — Medication 25 MILLIGRAM(S): at 05:18

## 2018-01-14 RX ADMIN — Medication 100 MILLIGRAM(S): at 14:26

## 2018-01-14 NOTE — PROGRESS NOTE ADULT - ASSESSMENT
81 yo F PMH Breast cancer , Colon cancer, Hypertension, Macular degeneration, Renal insufficiency, s/p left sided vats decortication with pleurex catheter placemnt 2/24/17 with removal of pleurex catheter 5/5/17. Pt reports she was at home felt SOB with chest tightness which prompted her to come in to [facility] ED. CT chest showed moderate to large right sided pleural effusion.  Pigtial was placed    1/12 s/p Right VATS decortication .

## 2018-01-14 NOTE — PROGRESS NOTE ADULT - ASSESSMENT
82 year old female with PMH Breast CA (in remission), Colon CA (in remission), HTN presented with dyspnea and cough and Chest X-Ray reported as having large pleural effusion.  Thoracostomy tube placed by CTS in ER draining sero-sanguineous fluid (700ml/24hr). Unclear etiology of pleural effusion - prior history of left pleural effusion requiring drainage, VATS with decortication and Pleurx which was eventually removed. BNP elevated 2026. TTE shows normal LVSF, EF 60-65%, grade I diastolic dysfunction, trivial pericardial effusion, mild to moderate MR.  Symptomatically improved after thoracocentesis; though Chest X-Ray show small apical pneumothorax which is stable on repeat imaging.  Fluid analysis suggestive of transudate.    Underwent VATS, decortication right lung with pleurodesis on 01/12/18; right sided chest tubes still in place.

## 2018-01-14 NOTE — PROGRESS NOTE ADULT - PROBLEM SELECTOR PLAN 1
Maintain chest tube - management per CTS  oxygen, nebs PRN  Increase Lasix  40mg PO BID - monitor SCr  Follow up fluid Cultures, cytology

## 2018-01-14 NOTE — PROGRESS NOTE ADULT - SUBJECTIVE AND OBJECTIVE BOX
Subjective:  Pt in chair NAD "  I feel better"     T(C): 36.9 (01-14-18 @ 15:07), Max: 36.9 (01-14-18 @ 10:00)  HR: 90 (01-14-18 @ 15:07) (81 - 93)  BP: 130/60 (01-14-18 @ 15:07) (130/60 - 161/72)  -  RR: 18 (01-14-18 @ 15:07) (16 - 18)  SpO2: 97% (01-14-18 @ 15:07) (95% - 97%) 2 L NC    Tele:    CHEST TUBE:  #1     6 cc x 24 hours    #2  180cc x 24 hours no air leak in either                                  01-14    137  |  94<L>  |  37.0<H>  ----------------------------<  91  4.1   |  30.0<H>  |  1.17    Ca    8.7      14 Jan 2018 05:48                                 11.6   6.9   )-----------( 100      ( 13 Jan 2018 06:17 )             37.3                 CAPILLARY BLOOD GLUCOSE               CXR:  < from: Xray Chest 1 View AP/PA. (01.14.18 @ 05:34) >  Findings:  Again noted are 2 right chest tubes. No evidence of pneumothorax or   pleural effusion. The left lung appears clear. The heart is not enlarged.   No hilar or mediastinal abnormality..    Impression:  Stable exam without significant change since the previous study..    < end of copied text >          Assessment  neuro:  A & O x 2  card: RRR S1 S2  resp: decreased RT base + CT x 2 on suction no air leaks   Abd:  Soft NT ND + BS          Assessment:  82yFemale    with PAST MEDICAL & SURGICAL HISTORY:  Macular degeneration  Renal insufficiency  Colon cancer  Breast cancer  Hypertension  Status post thoracentesis: Left Lung  History of tonsillectomy  History of right mastectomy  History of cholecystectomy  History of colon resection  History of hip replacement, total, left        Plan:

## 2018-01-14 NOTE — PROGRESS NOTE ADULT - SUBJECTIVE AND OBJECTIVE BOX
HOSPITALIST PROGRESS NOTE    ROYER RICHARD  [id number]  82yFemale    Patient is a 82y old  Female who presents with a chief complaint of difficulty breathing. (04 Jan 2018 06:36)      SUBJECTIVE:   Chart reviewed since last visit.  Patient seen and examined at bedside for pleural effusion.  Denies dyspnea, cough, chest pain or palpitations    OBJECTIVE:  Vital Signs Last 24 Hrs  T(C): 36.9 (14 Jan 2018 10:00), Max: 36.9 (14 Jan 2018 10:00)  T(F): 98.4 (14 Jan 2018 10:00), Max: 98.4 (14 Jan 2018 10:00)  HR: 83 (14 Jan 2018 10:00) (81 - 93)  BP: 150/52 (14 Jan 2018 10:00) (125/58 - 161/72)  RR: 18 (14 Jan 2018 10:00) (16 - 18)  SpO2: 95% (14 Jan 2018 05:16) (95% - 96%)    PHYSICAL EXAMINATION  General: NAD[+]   nontoxic[+]  lean elderly female lying in bed  HEENT: AT/NC[+]  Left esotropia  NECK: Supple[+]  JVD[+] Carotid bruit[]  CVS: RRR[+]  Irregular[]  S1+S2[+]   Murmur[] Right mastectomy scar[+]  RESP: Fair air entry bilaterally[+]   Right basilar crackles[+]  Right posterior chest tube x 2  draining dark fluid  GI: Soft[+] ventral hernia[+]  Nontender[+]   Bowel Sounds[+]     : suprapubic tenderness[-]   CVA Tenderness[]   [place][-]  MS: FROM[+]   Edema[-]  CNS: AAOx3[+]  grossly intact             I&O's Summary    13 Jan 2018 07:01  -  14 Jan 2018 07:00  --------------------------------------------------------  IN: 0 mL / OUT: 186 mL / NET: -186 mL                            11.6   6.9   )-----------( 100      ( 13 Jan 2018 06:17 )             37.3       01-14    137  |  94<L>  |  37.0<H>  ----------------------------<  91  4.1   |  30.0<H>  |  1.17    Ca    8.7      14 Jan 2018 05:48               MEDICATIONS  (STANDING):  ALBUTerol    90 MICROgram(s) HFA Inhaler 1 Puff(s) Inhalation every 4 hours  amLODIPine   Tablet 5 milliGRAM(s) Oral daily  artificial  tears Solution 1 Drop(s) Both EYES daily  docusate sodium 100 milliGRAM(s) Oral three times a day  enoxaparin Injectable 40 milliGRAM(s) SubCutaneous every 24 hours  furosemide    Tablet 40 milliGRAM(s) Oral daily  metoprolol succinate ER 25 milliGRAM(s) Oral daily  tiotropium 18 MICROgram(s) Capsule 1 Capsule(s) Inhalation daily      MEDICATIONS  (PRN):  acetaminophen   Tablet. 650 milliGRAM(s) Oral every 6 hours PRN Moderate Pain (4 - 6)  ALBUTerol/ipratropium for Nebulization 3 milliLiter(s) Nebulizer every 6 hours PRN Shortness of Breath and/or Wheezing  benzocaine 15 mG/menthol 3.6 mG Lozenge 1 Lozenge Oral four times a day PRN Sore Throat  oxyCODONE    5 mG/acetaminophen 325 mG 1 Tablet(s) Oral every 6 hours PRN Moderate Pain (4 - 6)  polyethylene glycol 3350 17 Gram(s) Oral daily PRN Constipation

## 2018-01-15 LAB
ANION GAP SERPL CALC-SCNC: 8 MMOL/L — SIGNIFICANT CHANGE UP (ref 5–17)
BUN SERPL-MCNC: 35 MG/DL — HIGH (ref 8–20)
CALCIUM SERPL-MCNC: 8.8 MG/DL — SIGNIFICANT CHANGE UP (ref 8.6–10.2)
CHLORIDE SERPL-SCNC: 93 MMOL/L — LOW (ref 98–107)
CO2 SERPL-SCNC: 37 MMOL/L — HIGH (ref 22–29)
CREAT SERPL-MCNC: 0.96 MG/DL — SIGNIFICANT CHANGE UP (ref 0.5–1.3)
GLUCOSE SERPL-MCNC: 91 MG/DL — SIGNIFICANT CHANGE UP (ref 70–115)
POTASSIUM SERPL-MCNC: 3.6 MMOL/L — SIGNIFICANT CHANGE UP (ref 3.5–5.3)
POTASSIUM SERPL-SCNC: 3.6 MMOL/L — SIGNIFICANT CHANGE UP (ref 3.5–5.3)
SODIUM SERPL-SCNC: 138 MMOL/L — SIGNIFICANT CHANGE UP (ref 135–145)

## 2018-01-15 PROCEDURE — 99233 SBSQ HOSP IP/OBS HIGH 50: CPT

## 2018-01-15 PROCEDURE — 71045 X-RAY EXAM CHEST 1 VIEW: CPT | Mod: 26

## 2018-01-15 RX ORDER — POTASSIUM CHLORIDE 20 MEQ
20 PACKET (EA) ORAL
Qty: 0 | Refills: 0 | Status: COMPLETED | OUTPATIENT
Start: 2018-01-15 | End: 2018-01-15

## 2018-01-15 RX ORDER — SORBITOL SOLUTION 70 %
30 SOLUTION, ORAL MISCELLANEOUS ONCE
Qty: 0 | Refills: 0 | Status: COMPLETED | OUTPATIENT
Start: 2018-01-15 | End: 2018-01-15

## 2018-01-15 RX ADMIN — Medication 100 MILLIGRAM(S): at 05:27

## 2018-01-15 RX ADMIN — ENOXAPARIN SODIUM 40 MILLIGRAM(S): 100 INJECTION SUBCUTANEOUS at 21:02

## 2018-01-15 RX ADMIN — Medication 40 MILLIGRAM(S): at 18:01

## 2018-01-15 RX ADMIN — Medication 20 MILLIEQUIVALENT(S): at 12:37

## 2018-01-15 RX ADMIN — Medication 20 MILLIEQUIVALENT(S): at 10:33

## 2018-01-15 RX ADMIN — Medication 100 MILLIGRAM(S): at 21:02

## 2018-01-15 RX ADMIN — Medication 100 MILLIGRAM(S): at 15:23

## 2018-01-15 RX ADMIN — Medication 40 MILLIGRAM(S): at 05:27

## 2018-01-15 RX ADMIN — Medication 1 DROP(S): at 12:39

## 2018-01-15 RX ADMIN — Medication 25 MILLIGRAM(S): at 05:27

## 2018-01-15 RX ADMIN — Medication 30 MILLILITER(S): at 10:33

## 2018-01-15 RX ADMIN — AMLODIPINE BESYLATE 5 MILLIGRAM(S): 2.5 TABLET ORAL at 05:27

## 2018-01-15 NOTE — PHYSICAL THERAPY INITIAL EVALUATION ADULT - GENERAL OBSERVATIONS, REHAB EVAL
pt received sitting in chair at bedside, NAD, chest tube, cardiac monitor, 3L of O2, RN present and wants pt ambulating on O2 at this time

## 2018-01-15 NOTE — PHYSICAL THERAPY INITIAL EVALUATION ADULT - PERTINENT HX OF CURRENT PROBLEM, REHAB EVAL
pt presents to [facility] due to SOB, chest tightness, pleural effusion, s/p right VATS decortication 1/12

## 2018-01-15 NOTE — PROGRESS NOTE ADULT - ASSESSMENT
81 yo F PMH Breast cancer , Colon cancer, Hypertension, Macular degeneration, Renal insufficiency, s/p left sided vats decortication with pleurex catheter placemnt 2/24/17 with removal of pleurex catheter 5/5/17. Pt reports she was at home felt SOB with chest tightness which prompted her to come in to [facility] ED. CT chest showed moderate to large right sided pleural effusion.  Pigtial was placed    1/12 s/p Right VATS decortication .

## 2018-01-15 NOTE — PROGRESS NOTE ADULT - ASSESSMENT
82 year old female with PMH Breast CA (in remission), Colon CA (in remission), HTN presented with dyspnea and cough and Chest X-Ray reported as having large pleural effusion.      Rt Sided Pleural Effusion: in setting of hx of Lt sided effusion requiring drainage with VATS with decortication + pleurx which was removed prior to admission. fluid analysis suggestive of transudative. CT sx appreciated, sp thoracostomy tube with sero-sanguinous fluid (700ml/24hr) while in ER. TTE without signs of HF, only mild to mod MR. Now also sp VATS with decortication of Rt lung with pleurodesis 1/12. Chest tubes x2 placed in Rt lung post procedure, sp removal of 1 tube 1/15. Cont supplemental o2, will assess RA sat off o2 after more fluid drained from lung. cont lasix, tolerating po bid dosing well.     HTN: goal BP <150/90, well controlled. sp discontinuation of doxazosin and inc of lasix, tolerating well. will fu renal function.

## 2018-01-15 NOTE — PROGRESS NOTE ADULT - SUBJECTIVE AND OBJECTIVE BOX
CHIEF COMPLAINT: pleural effusion    Patient seen and examined at the bedside. No acute overnight events. sp removal of 1/2 rt sided chest tubes this am, well tolerated. Complaining of some sob with intermittent pain @ side of removed tube this AM. Denies fever/chills, headache, lightheadedness, dizziness, palpitations, admits to occ cough with white plegm. denies abd pain, nausea/vomiting/diarrhea, muscle pain.      =========================================================================================  T(C): 36.9 (01-15-18 @ 15:16), Max: 37.1 (01-15-18 @ 05:26)  HR: 82 (01-15-18 @ 15:16) (69 - 82)  BP: 127/76 (01-15-18 @ 15:16) (121/54 - 127/76)  RR: 18 (01-15-18 @ 15:16) (18 - 18)  SpO2: 98% (01-15-18 @ 15:16) (98% - 98%)    PHYSICAL EXAM.    GEN - appears age appropriate. pleasant. no distress.   HEENT - NCAT, EOMI, YOUNG  RESP - CTA BL, no wheeze/stridor/rhonchi/crackles. on supplemental O2. able to speak in full sentences without distress. rt sided posterior chest tube attached to drain  CARDIO - NS1S2, RRR. No murmurs/rubs/gallops.  ABD - Soft/Non tender/Non distended. Normal BS x4 quadrants. no guarding/rebound tenderness.  MSK - BL 5/5 strength on upper and lower extremities.   Neuro - AAOx3. cn 2-12 grossly intact  Psych - normal affect  Skin - c/d/i. no rashes/lesions      I&O's Summary    2018 07:01  -  15 James 2018 07:00  --------------------------------------------------------  IN: 240 mL / OUT: 10 mL / NET: 230 mL    15 James 2018 07:01  -  15 James 2018 16:30  --------------------------------------------------------  IN: 480 mL / OUT: 700 mL / NET: -220 mL      Daily     Daily Weight in k (15 James 2018 06:01)    =========================================================================================  LABS.    01-15    138  |  93<L>  |  35.0<H>  ----------------------------<  91  3.6   |  37.0<H>  |  0.96    Ca    8.8      15 James 2018 06:09        =========================================================================================  IMAGING.     < from: Xray Chest 1 View AP/PA. (01.15.18 @ 04:18) >  INTERPRETATION:  Clinical information: Status post VATS.    Portable AP radiograph of the chest was performed.    Comparison: 2018 at 4:26 AM    There are 2 right chest tubes. There is a small right pleural effusion   and trace left pleural effusion. Heart is normal. There is subcutaneous   emphysema right lateral chest wall.      Impression:    No interval change.    < end of copied text >    =========================================================================================    MEDICATIONS  (STANDING):  ALBUTerol    90 MICROgram(s) HFA Inhaler 1 Puff(s) Inhalation every 4 hours  amLODIPine   Tablet 5 milliGRAM(s) Oral daily  artificial  tears Solution 1 Drop(s) Both EYES daily  docusate sodium 100 milliGRAM(s) Oral three times a day  enoxaparin Injectable 40 milliGRAM(s) SubCutaneous every 24 hours  furosemide    Tablet 40 milliGRAM(s) Oral two times a day  metoprolol succinate ER 25 milliGRAM(s) Oral daily  tiotropium 18 MICROgram(s) Capsule 1 Capsule(s) Inhalation daily    MEDICATIONS  (PRN):  acetaminophen   Tablet. 650 milliGRAM(s) Oral every 6 hours PRN Moderate Pain (4 - 6)  ALBUTerol/ipratropium for Nebulization 3 milliLiter(s) Nebulizer every 6 hours PRN Shortness of Breath and/or Wheezing  benzocaine 15 mG/menthol 3.6 mG Lozenge 1 Lozenge Oral four times a day PRN Sore Throat  oxyCODONE    5 mG/acetaminophen 325 mG 1 Tablet(s) Oral every 6 hours PRN Moderate Pain (4 - 6)  polyethylene glycol 3350 17 Gram(s) Oral daily PRN Constipation

## 2018-01-15 NOTE — PHYSICAL THERAPY INITIAL EVALUATION ADULT - MANUAL MUSCLE TESTING RESULTS, REHAB EVAL
except mike LE: 4-/5 throughout/no strength deficits were identified
no strength deficits were identified/except mike LE: 4-/5, hips, 4-/5, knees, 3+/5 ankles

## 2018-01-15 NOTE — PHYSICAL THERAPY INITIAL EVALUATION ADULT - ADDITIONAL COMMENTS
has 3 steps 1 rail to enter home, 4 steps within home c rail to bedroom, owns a RW but does not use it
owns a RW and SAC but does not use them, 3 steps 1 rail to enter home, 10 steps 1 rail to bedroom

## 2018-01-15 NOTE — PROGRESS NOTE ADULT - SUBJECTIVE AND OBJECTIVE BOX
Subjective:  "I feel ok   Does it hurt to have that tube taken out?"  OOB  chair      V/S  T(C): 37.1 (01-15-18 @ 05:26), Max: 37.1 (01-15-18 @ 05:26)  HR: 80 (01-15-18 @ 05:26) (78 - 90)  BP: 124/60 (01-15-18 @ 05:26) (124/60 - 150/52)  RR: 18 (01-15-18 @ 05:26) (18 - 18)  SpO2: 98% (01-15-18 @ 05:26) (97% - 98%)                                               Tele:  SR   80s    CHEST TUBE:    R  posterior CT  x 2                       OUTPUT:   # 1>  0  ml    #2  > 10  ml          per 24 hours     Drainage:  serosang  AIR LEAKS:  [ ] YES [x ] NO      MEDICATIONS  (STANDING):  ALBUTerol    90 MICROgram(s) HFA Inhaler 1 Puff(s) Inhalation every 4 hours  amLODIPine   Tablet 5 milliGRAM(s) Oral daily  artificial  tears Solution 1 Drop(s) Both EYES daily  docusate sodium 100 milliGRAM(s) Oral three times a day  enoxaparin Injectable 40 milliGRAM(s) SubCutaneous every 24 hours  furosemide    Tablet 40 milliGRAM(s) Oral two times a day  metoprolol succinate ER 25 milliGRAM(s) Oral daily  potassium chloride    Tablet ER 20 milliEquivalent(s) Oral every 2 hours  sorbitol 70% Solution 30 milliLiter(s) Oral once  tiotropium 18 MICROgram(s) Capsule 1 Capsule(s) Inhalation daily      01-15    138  |  93<L>  |  35.0<H>  ----------------------------<  91  3.6   |  37.0<H>  |  0.96    Ca    8.8      15 James 2018 06:09                 CXR: There are 2 right chest tubes. There is a small right pleural effusion   and trace left pleural effusion. Heart is normal. There is subcutaneous   emphysema right lateral chest wall.          Physical Exam:    Neuro: alert, no apparent deficits    Pulm: moist non prod cough noted  R post pleural tubes x 2  no air leak to waterseal, minimal drainage    CV: S1 S2  RRR    Abd: soft  +  BS     Extremities: no edema  no calf pain    Incision: DSD R post VATS site minimal serosang drainage  noted on dsg              PAST MEDICAL & SURGICAL HISTORY:  Macular degeneration  Renal insufficiency  Colon cancer  Breast cancer  Hypertension  Status post thoracentesis: Left Lung  History of tonsillectomy  History of right mastectomy  History of cholecystectomy  History of colon resection  History of hip replacement, total, left

## 2018-01-15 NOTE — PHYSICAL THERAPY INITIAL EVALUATION ADULT - CRITERIA FOR SKILLED THERAPEUTIC INTERVENTIONS
rehab potential/predicted duration of therapy intervention/impairments found/therapy frequency/anticipated discharge recommendation/functional limitations in following categories

## 2018-01-15 NOTE — PHYSICAL THERAPY INITIAL EVALUATION ADULT - GAIT PATTERN USED, PT EVAL
improved activity tolerance c RW, intermittent standing rest breaks due to fatigue, O2 sat on 3L of O2 92%
decreased gait velocity and activity tolerance, incidental ankle strategy noted during obstacle negotiation c self recovery, pt recommended a RW for safety c pt refusal without rationale despite encouragement

## 2018-01-16 ENCOUNTER — TRANSCRIPTION ENCOUNTER (OUTPATIENT)
Age: 83
End: 2018-01-16

## 2018-01-16 VITALS
HEART RATE: 80 BPM | RESPIRATION RATE: 18 BRPM | OXYGEN SATURATION: 98 % | DIASTOLIC BLOOD PRESSURE: 70 MMHG | SYSTOLIC BLOOD PRESSURE: 124 MMHG

## 2018-01-16 LAB
ALBUMIN SERPL ELPH-MCNC: 2.9 G/DL — LOW (ref 3.3–5.2)
ALP SERPL-CCNC: 174 U/L — HIGH (ref 40–120)
ALT FLD-CCNC: 6 U/L — SIGNIFICANT CHANGE UP
ANION GAP SERPL CALC-SCNC: 8 MMOL/L — SIGNIFICANT CHANGE UP (ref 5–17)
AST SERPL-CCNC: 20 U/L — SIGNIFICANT CHANGE UP
BILIRUB SERPL-MCNC: 1.1 MG/DL — SIGNIFICANT CHANGE UP (ref 0.4–2)
BUN SERPL-MCNC: 26 MG/DL — HIGH (ref 8–20)
CALCIUM SERPL-MCNC: 8.9 MG/DL — SIGNIFICANT CHANGE UP (ref 8.6–10.2)
CHLORIDE SERPL-SCNC: 90 MMOL/L — LOW (ref 98–107)
CO2 SERPL-SCNC: 41 MMOL/L — HIGH (ref 22–29)
CREAT SERPL-MCNC: 0.85 MG/DL — SIGNIFICANT CHANGE UP (ref 0.5–1.3)
GLUCOSE SERPL-MCNC: 95 MG/DL — SIGNIFICANT CHANGE UP (ref 70–115)
HCT VFR BLD CALC: 34.4 % — LOW (ref 37–47)
HGB BLD-MCNC: 10.8 G/DL — LOW (ref 12–16)
MAGNESIUM SERPL-MCNC: 1.7 MG/DL — SIGNIFICANT CHANGE UP (ref 1.6–2.6)
MCHC RBC-ENTMCNC: 26.1 PG — LOW (ref 27–31)
MCHC RBC-ENTMCNC: 31.4 G/DL — LOW (ref 32–36)
MCV RBC AUTO: 83.1 FL — SIGNIFICANT CHANGE UP (ref 81–99)
PHOSPHATE SERPL-MCNC: 2.3 MG/DL — LOW (ref 2.4–4.7)
PLATELET # BLD AUTO: 122 K/UL — LOW (ref 150–400)
POTASSIUM SERPL-MCNC: 3.5 MMOL/L — SIGNIFICANT CHANGE UP (ref 3.5–5.3)
POTASSIUM SERPL-SCNC: 3.5 MMOL/L — SIGNIFICANT CHANGE UP (ref 3.5–5.3)
PROT SERPL-MCNC: 6.1 G/DL — LOW (ref 6.6–8.7)
RBC # BLD: 4.14 M/UL — LOW (ref 4.4–5.2)
RBC # FLD: 16.8 % — HIGH (ref 11–15.6)
SODIUM SERPL-SCNC: 139 MMOL/L — SIGNIFICANT CHANGE UP (ref 135–145)
WBC # BLD: 5.2 K/UL — SIGNIFICANT CHANGE UP (ref 4.8–10.8)
WBC # FLD AUTO: 5.2 K/UL — SIGNIFICANT CHANGE UP (ref 4.8–10.8)

## 2018-01-16 PROCEDURE — 86850 RBC ANTIBODY SCREEN: CPT

## 2018-01-16 PROCEDURE — 93306 TTE W/DOPPLER COMPLETE: CPT

## 2018-01-16 PROCEDURE — 84157 ASSAY OF PROTEIN OTHER: CPT

## 2018-01-16 PROCEDURE — 83735 ASSAY OF MAGNESIUM: CPT

## 2018-01-16 PROCEDURE — 88112 CYTOPATH CELL ENHANCE TECH: CPT

## 2018-01-16 PROCEDURE — 97530 THERAPEUTIC ACTIVITIES: CPT

## 2018-01-16 PROCEDURE — 84100 ASSAY OF PHOSPHORUS: CPT

## 2018-01-16 PROCEDURE — 97116 GAIT TRAINING THERAPY: CPT

## 2018-01-16 PROCEDURE — 84484 ASSAY OF TROPONIN QUANT: CPT

## 2018-01-16 PROCEDURE — 88305 TISSUE EXAM BY PATHOLOGIST: CPT

## 2018-01-16 PROCEDURE — 86902 BLOOD TYPE ANTIGEN DONOR EA: CPT

## 2018-01-16 PROCEDURE — 86901 BLOOD TYPING SEROLOGIC RH(D): CPT

## 2018-01-16 PROCEDURE — 82042 OTHER SOURCE ALBUMIN QUAN EA: CPT

## 2018-01-16 PROCEDURE — 85610 PROTHROMBIN TIME: CPT

## 2018-01-16 PROCEDURE — 82945 GLUCOSE OTHER FLUID: CPT

## 2018-01-16 PROCEDURE — 80048 BASIC METABOLIC PNL TOTAL CA: CPT

## 2018-01-16 PROCEDURE — 85730 THROMBOPLASTIN TIME PARTIAL: CPT

## 2018-01-16 PROCEDURE — 85027 COMPLETE CBC AUTOMATED: CPT

## 2018-01-16 PROCEDURE — 83880 ASSAY OF NATRIURETIC PEPTIDE: CPT

## 2018-01-16 PROCEDURE — 82553 CREATINE MB FRACTION: CPT

## 2018-01-16 PROCEDURE — 86922 COMPATIBILITY TEST ANTIGLOB: CPT

## 2018-01-16 PROCEDURE — 97163 PT EVAL HIGH COMPLEX 45 MIN: CPT

## 2018-01-16 PROCEDURE — 99239 HOSP IP/OBS DSCHRG MGMT >30: CPT

## 2018-01-16 PROCEDURE — 87075 CULTR BACTERIA EXCEPT BLOOD: CPT

## 2018-01-16 PROCEDURE — 87205 SMEAR GRAM STAIN: CPT

## 2018-01-16 PROCEDURE — 71045 X-RAY EXAM CHEST 1 VIEW: CPT | Mod: 26,77

## 2018-01-16 PROCEDURE — 80053 COMPREHEN METABOLIC PANEL: CPT

## 2018-01-16 PROCEDURE — 88312 SPECIAL STAINS GROUP 1: CPT

## 2018-01-16 PROCEDURE — 71045 X-RAY EXAM CHEST 1 VIEW: CPT | Mod: 26

## 2018-01-16 PROCEDURE — 86900 BLOOD TYPING SEROLOGIC ABO: CPT

## 2018-01-16 PROCEDURE — 99285 EMERGENCY DEPT VISIT HI MDM: CPT | Mod: 25

## 2018-01-16 PROCEDURE — 93005 ELECTROCARDIOGRAM TRACING: CPT

## 2018-01-16 PROCEDURE — 71045 X-RAY EXAM CHEST 1 VIEW: CPT

## 2018-01-16 PROCEDURE — 36415 COLL VENOUS BLD VENIPUNCTURE: CPT

## 2018-01-16 PROCEDURE — 83615 LACTATE (LD) (LDH) ENZYME: CPT

## 2018-01-16 PROCEDURE — 83986 ASSAY PH BODY FLUID NOS: CPT

## 2018-01-16 PROCEDURE — 82550 ASSAY OF CK (CPK): CPT

## 2018-01-16 PROCEDURE — 87070 CULTURE OTHR SPECIMN AEROBIC: CPT

## 2018-01-16 PROCEDURE — 71250 CT THORAX DX C-: CPT

## 2018-01-16 RX ORDER — ACETAMINOPHEN 500 MG
2 TABLET ORAL
Qty: 0 | Refills: 0 | DISCHARGE
Start: 2018-01-16

## 2018-01-16 RX ORDER — AMLODIPINE BESYLATE 2.5 MG/1
1 TABLET ORAL
Qty: 0 | Refills: 0 | COMMUNITY

## 2018-01-16 RX ORDER — SODIUM,POTASSIUM PHOSPHATES 278-250MG
2 POWDER IN PACKET (EA) ORAL
Qty: 16 | Refills: 0
Start: 2018-01-16 | End: 2018-01-17

## 2018-01-16 RX ORDER — AMLODIPINE BESYLATE 2.5 MG/1
1 TABLET ORAL
Qty: 0 | Refills: 0 | DISCHARGE
Start: 2018-01-16

## 2018-01-16 RX ORDER — METOPROLOL TARTRATE 50 MG
1 TABLET ORAL
Qty: 0 | Refills: 0 | COMMUNITY

## 2018-01-16 RX ORDER — POLYETHYLENE GLYCOL 3350 17 G/17G
17 POWDER, FOR SOLUTION ORAL
Qty: 0 | Refills: 0 | DISCHARGE
Start: 2018-01-16

## 2018-01-16 RX ORDER — SODIUM,POTASSIUM PHOSPHATES 278-250MG
2 POWDER IN PACKET (EA) ORAL
Qty: 0 | Refills: 0 | Status: DISCONTINUED | OUTPATIENT
Start: 2018-01-16 | End: 2018-01-16

## 2018-01-16 RX ORDER — POTASSIUM CHLORIDE 20 MEQ
40 PACKET (EA) ORAL ONCE
Qty: 0 | Refills: 0 | Status: COMPLETED | OUTPATIENT
Start: 2018-01-16 | End: 2018-01-16

## 2018-01-16 RX ORDER — METOPROLOL TARTRATE 50 MG
1 TABLET ORAL
Qty: 0 | Refills: 0 | DISCHARGE
Start: 2018-01-16

## 2018-01-16 RX ORDER — FUROSEMIDE 40 MG
1 TABLET ORAL
Qty: 30 | Refills: 0
Start: 2018-01-16 | End: 2018-02-14

## 2018-01-16 RX ADMIN — Medication 40 MILLIGRAM(S): at 05:33

## 2018-01-16 RX ADMIN — Medication 40 MILLIEQUIVALENT(S): at 15:06

## 2018-01-16 RX ADMIN — OXYCODONE AND ACETAMINOPHEN 1 TABLET(S): 5; 325 TABLET ORAL at 07:24

## 2018-01-16 RX ADMIN — Medication 40 MILLIGRAM(S): at 17:21

## 2018-01-16 RX ADMIN — Medication 1 DROP(S): at 11:24

## 2018-01-16 RX ADMIN — Medication 100 MILLIGRAM(S): at 05:33

## 2018-01-16 RX ADMIN — OXYCODONE AND ACETAMINOPHEN 1 TABLET(S): 5; 325 TABLET ORAL at 05:33

## 2018-01-16 RX ADMIN — Medication 2 TABLET(S): at 17:22

## 2018-01-16 NOTE — DISCHARGE NOTE ADULT - MEDICATION SUMMARY - MEDICATIONS TO TAKE
I will START or STAY ON the medications listed below when I get home from the hospital:    vitamin d  -- 1000 unit(s) by mouth once a day  -- Indication: For vitamin    acetaminophen 325 mg oral tablet  -- 2 tab(s) by mouth every 6 hours, As needed, Moderate Pain (4 - 6)  -- Indication: For Pain    oxyCODONE-acetaminophen 5 mg-325 mg oral tablet  -- 1 tab(s) by mouth every 6 hours, As needed, severe pain MDD:4 tabs  -- Indication: For Pain    Os-Jaskaran 500 (1250 mg calcium carbonate) oral tablet  -- 2 tab(s) by mouth once a day  -- Indication: For calcium    Minipress 5 mg oral capsule  --  by mouth 2 times a day  -- Indication: For blood pressure    metoprolol succinate 25 mg oral tablet, extended release  -- 1 tab(s) by mouth once a day  -- Indication: For blood pressure    amLODIPine 5 mg oral tablet  -- 1 tab(s) by mouth once a day  -- Indication: For blood pressure    Lasix 40 mg oral tablet  -- 1 tab(s) by mouth once a day   -- Indication: For blood pressure    polyethylene glycol 3350 oral powder for reconstitution  -- 17 gram(s) by mouth once a day, As needed, Constipation  -- Indication: For constipation    PHOS-NaK oral powder for reconstitution  -- 2 packet(s) by mouth 4 times a day  -- Indication: For low phosphorus    Klor-Con 10 mEq oral tablet, extended release  -- 1 tab(s) by mouth once a day  -- Indication: For Potassium I will START or STAY ON the medications listed below when I get home from the hospital:    vitamin d  -- 1000 unit(s) by mouth once a day  -- Indication: For vitamin    acetaminophen 325 mg oral tablet  -- 2 tab(s) by mouth every 6 hours, As needed, Moderate Pain (4 - 6)  -- Indication: For Pain    oxyCODONE-acetaminophen 5 mg-325 mg oral tablet  -- 1 tab(s) by mouth every 6 hours, As needed, severe pain MDD:4 tabs  -- Indication: For Pain    Os-Jaksaran 500 (1250 mg calcium carbonate) oral tablet  -- 2 tab(s) by mouth once a day  -- Indication: For calcium    Minipress 5 mg oral capsule  --  by mouth 2 times a day  -- Indication: For blood pressure    metoprolol succinate 25 mg oral tablet, extended release  -- 1 tab(s) by mouth once a day  -- Indication: For blood pressure    amLODIPine 5 mg oral tablet  -- 1 tab(s) by mouth once a day  -- Indication: For blood pressure    Lasix 40 mg oral tablet  -- 1 tab(s) by mouth once a day   -- Indication: For blood pressure    polyethylene glycol 3350 oral powder for reconstitution  -- 17 gram(s) by mouth once a day, As needed, Constipation  -- Indication: For constipation    PHOS-NaK oral powder for reconstitution  -- 2 packet(s) by mouth 4 times a day  -- Indication: For low phosphorus    Klor-Con 10 mEq oral tablet, extended release  -- 1 tab(s) by mouth once a day  -- Indication: For Potassium

## 2018-01-16 NOTE — PROGRESS NOTE ADULT - SUBJECTIVE AND OBJECTIVE BOX
Subjective: "I feel good. I want to go home tomorrow though, there is no one home today."    Vital Signs:  Vital Signs Last 24 Hrs  T(C): 36.6 (01-16-18 @ 09:38), Max: 36.9 (01-15-18 @ 15:16)  T(F): 97.8 (01-16-18 @ 09:38), Max: 98.5 (01-15-18 @ 15:16)  HR: 65 (01-16-18 @ 09:38) (65 - 88)  BP: 105/53 (01-16-18 @ 09:38) (102/60 - 134/66)  RR: 17 (01-16-18 @ 09:38) (17 - 18)  SpO2: 100% (01-16-18 @ 05:25) (98% - 100%) on O2 NC    Telemetry/Alarms:SR 70-80 with PVCs and few desats overnight.    Relevant labs, radiology and Medications reviewed                          10.8   5.2   )-----------( 122      ( 16 Jan 2018 06:26 )             34.4       01-16    139  |  90<L>  |  26.0<H>  ----------------------------<  95  3.5   |  41.0<H>  |  0.85    Ca    8.9      16 Jan 2018 06:32  Phos  2.3     01-16  Mg     1.7     01-16    TPro  6.1<L>  /  Alb  2.9<L>  /  TBili  1.1  /  DBili  x   /  AST  20  /  ALT  6   /  AlkPhos  174<H>  01-16      Pertinent Physical Exam  General: WN/WD NAD  Neurology: A&Ox3, nonfocal, GREEN x 4  Respiratory: diminished right base with scattered rhonchi, otherwise clear  CV: RRR, S1S2, no murmurs, rubs or gallops  Abdominal: Soft, NT, ND +BS, BM 1/16  Extremities: No edema, + peripheral pulses  Incisions: right lateral chest wall, 2 small port sites/chest tube sites + prolenes, mild drainage serosang, chest tube scant serosang drainage, no air leak >removed without issue    CXR no Ptx    01-15 @ 07:01 - 01-16 @ 07:00  --------------------------------------------------------  IN:    Oral Fluid: 600 mL  Total IN: 600 mL    OUT:    Voided: 1100 mL  Total OUT: 1100 mL    Total NET: -500 mL      01-16 @ 07:01 - 01-16 @ 13:36  --------------------------------------------------------  IN:    Oral Fluid: 240 mL  Total IN: 240 mL    OUT:    Voided: 350 mL  Total OUT: 350 mL    Total NET: -110 mL

## 2018-01-16 NOTE — DISCHARGE NOTE ADULT - PROVIDER TOKENS
FREE:[LAST:[Dr Bates],PHONE:[(   )    -],FAX:[(   )    -],ADDRESS:[Primary Care Doctor]],TOKEN:'[id number]'

## 2018-01-16 NOTE — PROGRESS NOTE ADULT - PROBLEM SELECTOR PROBLEM 4
Prophylactic measure
Essential hypertension
Prophylactic measure

## 2018-01-16 NOTE — PROGRESS NOTE ADULT - NSHPATTENDINGPLANDISCUSS_GEN_ALL_CORE
the patient.  All imaging and results of lab/other studies reviewed by me. All questions answered to the satisfaction of the patient. At this time they agree with the current plan of therapy.
the patient.  All imaging and results of lab/other studies reviewed by me. All questions answered to the satisfaction of the patient. At this time they agree with the current plan of therapy.
Dr Uriarte

## 2018-01-16 NOTE — PROGRESS NOTE ADULT - SUBJECTIVE AND OBJECTIVE BOX
CHIEF COMPLAINT: pleural effusion    Patient seen and examined at the bedside. No acute overnight events. sp removal of 2/2 rt sided chest tubes today, well tolerated. Complaining of some intermittent chest pain @ side of removed tube this AM. Denies fever/chills, headache, lightheadedness, dizziness, palpitations, admits to occ cough with white plegm. denies abd pain, nausea/vomiting/diarrhea, muscle pain.      =========================================================================================    PHYSICAL EXAM.    GEN - appears age appropriate. pleasant. no distress.   HEENT - NCAT, EOMI, YOUNG  RESP - CTA BL, no wheeze/stridor/rhonchi/crackles. on supplemental O2. able to speak in full sentences without distress. rt sided posterior chest dressing over site of removed tube  CARDIO - NS1S2, RRR. No murmurs/rubs/gallops.  ABD - Soft/Non tender/Non distended. Normal BS x4 quadrants. no guarding/rebound tenderness.  MSK - BL 5/5 strength on upper and lower extremities.   Neuro - AAOx3. cn 2-12 grossly intact  Psych - normal affect  Skin - c/d/i. no rashes/lesions      VITAL SIGNS.    Vital Signs Last 24 Hrs  T(C): 36.6 (16 Jan 2018 09:38), Max: 36.9 (15 James 2018 15:16)  T(F): 97.8 (16 Jan 2018 09:38), Max: 98.5 (15 James 2018 15:16)  HR: 65 (16 Jan 2018 09:38) (65 - 88)  BP: 105/53 (16 Jan 2018 09:38) (102/60 - 134/66)  BP(mean): --  RR: 17 (16 Jan 2018 09:38) (17 - 18)  SpO2: 100% (16 Jan 2018 05:25) (98% - 100%)    =================================================    LABS.                          10.8   5.2   )-----------( 122      ( 16 Jan 2018 06:26 )             34.4     01-16    139  |  90<L>  |  26.0<H>  ----------------------------<  95  3.5   |  41.0<H>  |  0.85    Ca    8.9      16 Jan 2018 06:32  Phos  2.3     01-16  Mg     1.7     01-16    TPro  6.1<L>  /  Alb  2.9<L>  /  TBili  1.1  /  DBili  x   /  AST  20  /  ALT  6   /  AlkPhos  174<H>  01-16    LIVER FUNCTIONS - ( 16 Jan 2018 06:32 )  Alb: 2.9 g/dL / Pro: 6.1 g/dL / ALK PHOS: 174 U/L / ALT: 6 U/L / AST: 20 U/L / GGT: x               ================================================    IMAGING.    < from: Xray Chest 1 View AP -PORTABLE-Routine (01.16.18 @ 05:17) >  FINDINGS:    Single frontal view of the chest demonstrates small left apical   pneumothorax. Right lower lobe chest tube. Mild by basilar   atelectasis/tiny effusions, unchanged. The cardiomediastinal silhouette   is normal. No acute osseous abnormalities. Overlying EKG leads andwires   are noted. Status post removal of a right upper lobe chest tube.    IMPRESSION: Status post removal of a right upper lobe chest tube.    < end of copied text >    ================================================    MEDICATIONS  (STANDING):  ALBUTerol    90 MICROgram(s) HFA Inhaler 1 Puff(s) Inhalation every 4 hours  amLODIPine   Tablet 5 milliGRAM(s) Oral daily  artificial  tears Solution 1 Drop(s) Both EYES daily  docusate sodium 100 milliGRAM(s) Oral three times a day  enoxaparin Injectable 40 milliGRAM(s) SubCutaneous every 24 hours  furosemide    Tablet 40 milliGRAM(s) Oral two times a day  metoprolol succinate ER 25 milliGRAM(s) Oral daily  potassium chloride   Powder 40 milliEquivalent(s) Oral once  potassium phosphate / sodium phosphate powder 2 Packet(s) Oral four times a day  tiotropium 18 MICROgram(s) Capsule 1 Capsule(s) Inhalation daily    MEDICATIONS  (PRN):  acetaminophen   Tablet. 650 milliGRAM(s) Oral every 6 hours PRN Moderate Pain (4 - 6)  ALBUTerol/ipratropium for Nebulization 3 milliLiter(s) Nebulizer every 6 hours PRN Shortness of Breath and/or Wheezing  benzocaine 15 mG/menthol 3.6 mG Lozenge 1 Lozenge Oral four times a day PRN Sore Throat  oxyCODONE    5 mG/acetaminophen 325 mG 1 Tablet(s) Oral every 6 hours PRN Moderate Pain (4 - 6)  polyethylene glycol 3350 17 Gram(s) Oral daily PRN Constipation

## 2018-01-16 NOTE — PROGRESS NOTE ADULT - PROBLEM SELECTOR PROBLEM 1
Pleural effusion

## 2018-01-16 NOTE — DISCHARGE NOTE ADULT - PLAN OF CARE
resolution Be sure to follow up with Dr. Erazo in 1 week. If you feel that your symptoms have returned or are worsening, please return to the ER. Blood pressure <150/90 Be sure to follow a low salt diet. If you have been prescribed antihypertensive medications to control your blood pressure, be sure to take them every day as prescribed and do not miss any doses, the medications do not work if they are not taken consistently. Read your medication reconciliation carefully as some of your medications have changed. Follow up with your Primary Care Doctor and have your Blood Pressure checked. Continue all medications as prescribed, this include the supplements you have been prescribed.

## 2018-01-16 NOTE — DISCHARGE NOTE ADULT - HOSPITAL COURSE
82 year old female with PMH Breast CA (in remission), Colon CA (in remission), HTN presented with dyspnea and cough and Chest X-Ray reported as having large pleural effusion.    Admitted for Rt Sided Pleural Effusion in setting of hx of Lt sided effusion requiring drainage with VATS with decortication + pleurx which was removed prior to admission. CT sx appreciated, sp thoracostomy tube with sero-sanguinous fluid (700ml/24hr) while in ER, fluid analysis suggestive of transudative. TTE without signs of HF, only mild to mod MR. SP VATS with decortication of Rt lung with pleurodesis 1/12. Chest tubes x2 placed in Rt lung post procedure, sp removal of 1 tube 1/15, 2nd tube removed 1/16 without complication. Assessed for the need for supplemental o2 prior to discharge as she was maintained on it while in house. Stable for dc home today. Will also give small amount of percocet for pain, outpt fu with Dr. Erazo in 1wk.     Of note, HTN treatment with some changes this admission. Sp discontinuation of doxazosin and inc of lasix to BID. Tolerated well but BP actually trended on lower side, reduced back to daily dosing. Outpt fu with PMD for bp check and titration of meds. Continue 10meq KCL daily while on lasix to avoid hyperKalemia.     Course complicated by Hypophosphatemia. Mild, asymptomatic, likely secondary to increase of lasix. Decreased back down to daily dosing. PO supplementation to improve phos levels prescribed, to be taken as outpt.     All electrolyte abnormalities were monitored carefully and repleted as necessary during this hospitalization. At the time of discharge patient was hemodynamically stable and amenable to all terms of discharge. The patient has received verbal instructions from myself regarding discharge plans.     Length of Discharge: 45MIN

## 2018-01-16 NOTE — PROGRESS NOTE ADULT - PROBLEM SELECTOR PLAN 3
DASH TLC  Continue antihypertensive
DASH TLC  Discontinue Doxazosin
DASH TLC  Discontinued Doxazosin in order to increase Lasix
reanaly dose all meds  trend BUN/Cr  replace electrolytes prn
renal dose all meds  trend BUN/Cr  replace electrolytes prn
renal dose all meds  trend BUN/Cr  replace electrolytes prn

## 2018-01-16 NOTE — DISCHARGE NOTE ADULT - MEDICATION SUMMARY - MEDICATIONS TO STOP TAKING
I will STOP taking the medications listed below when I get home from the hospital:    hydroCHLOROthiazide 12.5 mg oral capsule  -- 1 cap(s) by mouth once a day    metoprolol succinate 50 mg oral tablet, extended release  -- 1 tab(s) by mouth once a day

## 2018-01-16 NOTE — DISCHARGE NOTE ADULT - PATIENT PORTAL LINK FT
“You can access the [employer] Patient Portal, offered by [facility], by registering with the following website: [email or web address]”

## 2018-01-16 NOTE — DISCHARGE NOTE ADULT - OTHER SIGNIFICANT FINDINGS
01-16    139  |  90<L>  |  26.0<H>  ----------------------------<  95  3.5   |  41.0<H>  |  0.85    Ca    8.9      16 Jan 2018 06:32  Phos  2.3     01-16  Mg     1.7     01-16    TPro  6.1<L>  /  Alb  2.9<L>  /  TBili  1.1  /  DBili  x   /  AST  20  /  ALT  6   /  AlkPhos  174<H>  01-16                          10.8   5.2   )-----------( 122      ( 16 Jan 2018 06:26 )             34.4     LIVER FUNCTIONS - ( 16 Jan 2018 06:32 )  Alb: 2.9 g/dL / Pro: 6.1 g/dL / ALK PHOS: 174 U/L / ALT: 6 U/L / AST: 20 U/L / GGT: x           Lactate Dehydrogenase, Fluid: 176: Reference Ranges have NOT been established for analytes in body fluids  because of variability in body fluid composition.  The  has not determined the efficacy of this test when  performed on fluid specimens. The performance characteristics of this  test were determined by [employer]. U/L (01.10.18 @ 18:10)    Protein Total, Fluid: 2.2: Test Repeated  Reference Ranges have NOT been established for analytes in body fluids  because of variability in body fluid composition.  The  has not determined the efficacy of this test when  performed on fluid specimens. The performance characteristics of this  test were determined by [employer]. g/dL (01.10.18 @ 18:10)    Culture - Body Fluid with Gram Stain (01.09.18 @ 18:01)    Gram Stain:   Moderate White blood cells  No organisms seen    Specimen Source: .Body Fluid Pleural Fluid    Culture Results:   No growth at 5 days.    < from: CT Chest No Cont (01.03.18 @ 22:45) >  IMPRESSION:    Moderate to large right and small left pleural effusions, enlarged from   prior exam. Associated bibasilar opacities most likely reflect   atelectasis, although it would be difficult toentirely exclude the   possibility of infection.    Small volume upper abdominal ascites, minimally increased from prior exam.    < end of copied text >    < from: Xray Chest 1 View AP- PORTABLE-Urgent (01.16.18 @ 12:59) >  Findings:  A right chest tube has been removed since the prior examination. No   evidence of pneumothorax. Persistent pleural thickening or pleural   effusion at the right lung base. Theleft lung remains clear..    Impression:  Right chest tube removal since the prior study. No evidence of   pneumothorax..    < end of copied text >    < from: TTE Echo Complete w/Doppler (01.05.18 @ 09:14) >   Summary:   1. Technically difficult study.   2. Normal left ventricular internal cavity size.   3. Normal global left ventricular systolic function.   4. Left ventricular ejection fraction, by visual estimation, is 60 to   65%.   5. Spectral Doppler shows impaired relaxation pattern of left   ventricular myocardial filling (Grade I diastolic dysfunction).   6. Rheumatic mitral valve.   7. Mild to moderate mitral valve regurgitation.   8. Sclerotic aortic valve with normal opening.   9. Intra-atrial septal aneurysm.  10. Trivial pericardial effusion.    < end of copied text >

## 2018-01-16 NOTE — DISCHARGE NOTE ADULT - CARE PLAN
Principal Discharge DX:	Pleural effusion  Goal:	resolution  Assessment and plan of treatment:	Be sure to follow up with Dr. Erazo in 1 week. If you feel that your symptoms have returned or are worsening, please return to the ER.  Secondary Diagnosis:	Essential hypertension  Goal:	Blood pressure <150/90  Assessment and plan of treatment:	Be sure to follow a low salt diet. If you have been prescribed antihypertensive medications to control your blood pressure, be sure to take them every day as prescribed and do not miss any doses, the medications do not work if they are not taken consistently. Read your medication reconciliation carefully as some of your medications have changed. Follow up with your Primary Care Doctor and have your Blood Pressure checked.  Secondary Diagnosis:	Hypophosphatemia  Goal:	resolution  Assessment and plan of treatment:	Continue all medications as prescribed, this include the supplements you have been prescribed.

## 2018-01-16 NOTE — DISCHARGE NOTE ADULT - CARE PROVIDERS DIRECT ADDRESSES
,DirectAddress_Unknown,[email or web address]

## 2018-01-16 NOTE — PROGRESS NOTE ADULT - ASSESSMENT
82 year old female with PMH Breast CA (in remission), Colon CA (in remission), HTN presented with dyspnea and cough and Chest X-Ray reported as having large pleural effusion.      Rt Sided Pleural Effusion: in setting of hx of Lt sided effusion requiring drainage with VATS with decortication + pleurx which was removed prior to admission. fluid analysis suggestive of transudative. CT sx appreciated, sp thoracostomy tube with sero-sanguinous fluid (700ml/24hr) while in ER. TTE without signs of HF, only mild to mod MR. Now also sp VATS with decortication of Rt lung with pleurodesis 1/12. Chest tubes x2 placed in Rt lung post procedure, sp removal of 1 tube 1/15, 2nd tube removed 1/16 without complication. Cont supplemental o2, will assess RA sat, to see if she needs home o2, otherwise stable for dc home today. will also give small amount of percocet for pain, outpt fu with Dr. Erazo in 1wk.     HTN: goal BP <150/90, well controlled. sp discontinuation of doxazosin and inc of lasix, tolerating well but BP actually trending on lower side, will reduce back to daily dosing. outpt fu with PMD for bp check and titration of meds. will also add 10meq KCL daily while on lasix to avoid hyperK    Hypophosphatemia: mild, asymptomatic, likely secondary to inc of lasix. will dec back down to daily dosing. po supplementation to improve phos levels, will prescribe to be taken as outpt. 82 year old female with PMH Breast CA (in remission), Colon CA (in remission), HTN presented with dyspnea and cough and Chest X-Ray reported as having large pleural effusion.      Rt Sided Pleural Effusion: in setting of hx of Lt sided effusion requiring drainage with VATS with decortication + pleurx which was removed prior to admission. CT sx appreciated, sp thoracostomy tube with sero-sanguinous fluid (700ml/24hr) while in ER. TTE without signs of HF, only mild to mod MR. Now also sp VATS with decortication of Rt lung with pleurodesis 1/12. Chest tubes x2 placed in Rt lung post procedure, sp removal of 1 tube 1/15, 2nd tube removed 1/16 without complication. Cont supplemental o2 @ 3L, pt noted to have RA sat @ rest of 86% improving to 98% on 3L, will require long term due to persistent recurrence of BL pleural effusion in setting of known hx of malignancy, also due to diastolic dysfunction which is not exacerbated, rather @ baseline and chronic. once o2 is received she will be stable for dc home today. will also give small amount of percocet for pain, outpt fu with Dr. Erazo in 1wk.     HTN: goal BP <150/90, well controlled. sp discontinuation of doxazosin and inc of lasix, tolerating well but BP actually trending on lower side, will reduce back to daily dosing. outpt fu with PMD for bp check and titration of meds. will also add 10meq KCL daily while on lasix to avoid hyperK    Hypophosphatemia: mild, asymptomatic, likely secondary to inc of lasix. will dec back down to daily dosing. po supplementation to improve phos levels, will prescribe to be taken as outpt. 82 year old female with PMH Breast CA (in remission), Colon CA (in remission), HTN presented with dyspnea and cough and Chest X-Ray reported as having large pleural effusion.      Rt Sided Pleural Effusion: in setting of hx of Lt sided effusion requiring drainage with VATS with decortication + pleurx which was removed prior to admission. CT sx appreciated, sp thoracostomy tube with sero-sanguinous fluid (700ml/24hr) while in ER. TTE with some diastolic dysfunction, mild to mod MR. Now also sp VATS with decortication of Rt lung with pleurodesis 1/12. Chest tubes x2 placed in Rt lung post procedure, sp removal of 1 tube 1/15, 2nd tube removed 1/16 without complication. Cont supplemental o2 @ 3L, pt noted to have RA sat @ rest of 86% improving to 98% on 3L, will require long term due to persistent recurrence of BL pleural effusion in setting of known hx of malignancy, also due to diastolic dysfunction which is not exacerbated, rather @ baseline and chronic. once o2 is received she will be stable for dc home today. will also give small amount of percocet for pain, outpt fu with Dr. Erazo in 1wk.     HTN: goal BP <150/90, well controlled. sp discontinuation of doxazosin and inc of lasix, tolerating well but BP actually trending on lower side, will reduce back to daily dosing. outpt fu with PMD for bp check and titration of meds. will also add 10meq KCL daily while on lasix to avoid hyperK    Hypophosphatemia: mild, asymptomatic, likely secondary to inc of lasix. will dec back down to daily dosing. po supplementation to improve phos levels, will prescribe to be taken as outpt.

## 2018-01-16 NOTE — DISCHARGE NOTE ADULT - CARE PROVIDER_API CALL
Dr Bates,   Primary Care Doctor  Phone: (   )    -  Fax: [phone]   )    -    Darwin Erazo (MD), Surgery; Thoracic Surgery  [address] [place] [address]  Phone: [phone]  Fax: [phone]

## 2018-01-16 NOTE — PROGRESS NOTE ADULT - PROVIDER SPECIALTY LIST ADULT
Anesthesia
Anesthesia
CT Surgery
Hospitalist
Thoracic Surgery
Hospitalist
Thoracic Surgery
Hospitalist
CT Surgery

## 2018-01-16 NOTE — PROGRESS NOTE ADULT - PROBLEM SELECTOR PLAN 4
VTE ppx - Lovenox
continue lopressor

## 2018-01-16 NOTE — PROGRESS NOTE ADULT - PROBLEM SELECTOR PROBLEM 2
Renal insufficiency
Diastolic dysfunction
Renal insufficiency

## 2018-01-16 NOTE — PROGRESS NOTE ADULT - ASSESSMENT
83 yo F PMH Breast cancer , Colon cancer, Hypertension, Macular degeneration, Renal insufficiency, s/p left sided vats decortication with pleurex catheter placemnt 2/24/17 with removal of pleurex catheter 5/5/17. Pt reports she was at home felt SOB with chest tightness which prompted her to come in to [facility] ED. CT chest showed moderate to large right sided pleural effusion.  Pigtial was placed    1/12 s/p Right VATS decortication .    1/15 CT 1 removed  1/16 CT #2 removed    Plan  Patient cleared per thoracic surgery for discharge  Patient to followup with Dr Erazo in his office in 1 week.

## 2018-01-16 NOTE — PROGRESS NOTE ADULT - PROBLEM SELECTOR PLAN 2
DASH TLC diet  Continue low dose diuretic - increase as tolerated  Monitor daily weights, I/O
DASH TLC diet  Continue low dose diuretic  Monitor daily weights, I/O
DASH TLC diet  Continue low dose diuretic - increase as tolerated  Monitor daily weights, I/O
TTE performed 1/5/18  lasix prn for diuresis  CXR prn for fluid overload and any evidence of pulmonary edema  continue lopressor
TTE performed 1/5/18  lasix prn for diuresis  CXR prn for fluid overload and any evidence of pulmonary edema  continue lopressor
TTE performed 1/5/18  lasix prn for diuresis  continue lopressor

## 2018-01-18 LAB — SURGICAL PATHOLOGY FINAL REPORT - CH: SIGNIFICANT CHANGE UP

## 2018-01-19 LAB — NON-GYN CYTOLOGY SPEC: SIGNIFICANT CHANGE UP

## 2018-01-29 ENCOUNTER — OUTPATIENT (OUTPATIENT)
Dept: OUTPATIENT SERVICES | Facility: HOSPITAL | Age: 83
LOS: 1 days | End: 2018-01-29
Payer: MEDICARE

## 2018-01-29 ENCOUNTER — APPOINTMENT (OUTPATIENT)
Dept: THORACIC SURGERY | Facility: CLINIC | Age: 83
End: 2018-01-29
Payer: MEDICARE

## 2018-01-29 VITALS
BODY MASS INDEX: 24.07 KG/M2 | WEIGHT: 141 LBS | DIASTOLIC BLOOD PRESSURE: 76 MMHG | SYSTOLIC BLOOD PRESSURE: 148 MMHG | RESPIRATION RATE: 16 BRPM | HEART RATE: 90 BPM | OXYGEN SATURATION: 88 % | HEIGHT: 64 IN

## 2018-01-29 DIAGNOSIS — Z90.49 ACQUIRED ABSENCE OF OTHER SPECIFIED PARTS OF DIGESTIVE TRACT: Chronic | ICD-10-CM

## 2018-01-29 DIAGNOSIS — Z96.642 PRESENCE OF LEFT ARTIFICIAL HIP JOINT: Chronic | ICD-10-CM

## 2018-01-29 DIAGNOSIS — J90 PLEURAL EFFUSION, NOT ELSEWHERE CLASSIFIED: ICD-10-CM

## 2018-01-29 DIAGNOSIS — Z90.11 ACQUIRED ABSENCE OF RIGHT BREAST AND NIPPLE: Chronic | ICD-10-CM

## 2018-01-29 DIAGNOSIS — Z90.89 ACQUIRED ABSENCE OF OTHER ORGANS: Chronic | ICD-10-CM

## 2018-01-29 DIAGNOSIS — Z98.890 OTHER SPECIFIED POSTPROCEDURAL STATES: Chronic | ICD-10-CM

## 2018-01-29 PROCEDURE — 71046 X-RAY EXAM CHEST 2 VIEWS: CPT | Mod: 26

## 2018-01-29 PROCEDURE — 99024 POSTOP FOLLOW-UP VISIT: CPT

## 2018-01-29 PROCEDURE — 71046 X-RAY EXAM CHEST 2 VIEWS: CPT

## 2018-01-29 RX ORDER — LORATADINE 10 MG/1
10 TABLET ORAL
Qty: 30 | Refills: 0 | Status: DISCONTINUED | COMMUNITY
Start: 2016-12-30 | End: 2018-01-29

## 2018-01-29 RX ORDER — HYDROCHLOROTHIAZIDE 12.5 MG/1
12.5 CAPSULE ORAL
Qty: 90 | Refills: 0 | Status: DISCONTINUED | COMMUNITY
Start: 2017-03-16 | End: 2018-01-29

## 2018-01-29 RX ORDER — PRAZOSIN HYDROCHLORIDE 5 MG/1
5 CAPSULE ORAL TWICE DAILY
Refills: 0 | Status: ACTIVE | COMMUNITY

## 2018-01-29 RX ORDER — ATENOLOL 50 MG/1
50 TABLET ORAL DAILY
Refills: 0 | Status: DISCONTINUED | COMMUNITY
End: 2018-01-29

## 2018-01-29 RX ORDER — METOPROLOL SUCCINATE 50 MG/1
50 TABLET, EXTENDED RELEASE ORAL DAILY
Refills: 0 | Status: ACTIVE | COMMUNITY
Start: 2017-05-04

## 2018-01-29 RX ORDER — CALCIUM CARBONATE/VITAMIN D3 500 MG-600
500-500 TABLET,CHEWABLE ORAL
Refills: 0 | Status: ACTIVE | COMMUNITY

## 2018-01-29 RX ORDER — FUROSEMIDE 40 MG/1
40 TABLET ORAL
Refills: 0 | Status: ACTIVE | COMMUNITY

## 2018-01-29 RX ORDER — LOSARTAN POTASSIUM 50 MG/1
50 TABLET, FILM COATED ORAL
Qty: 90 | Refills: 0 | Status: DISCONTINUED | COMMUNITY
Start: 2017-03-09 | End: 2018-01-29

## 2018-02-07 ENCOUNTER — APPOINTMENT (OUTPATIENT)
Dept: PULMONOLOGY | Facility: CLINIC | Age: 83
End: 2018-02-07
Payer: MEDICARE

## 2018-02-07 VITALS
WEIGHT: 145 LBS | BODY MASS INDEX: 24.75 KG/M2 | HEART RATE: 94 BPM | DIASTOLIC BLOOD PRESSURE: 70 MMHG | SYSTOLIC BLOOD PRESSURE: 130 MMHG | OXYGEN SATURATION: 84 % | HEIGHT: 64 IN

## 2018-02-07 PROCEDURE — 99214 OFFICE O/P EST MOD 30 MIN: CPT

## 2018-03-07 ENCOUNTER — RX RENEWAL (OUTPATIENT)
Age: 83
End: 2018-03-07

## 2018-03-07 ENCOUNTER — APPOINTMENT (OUTPATIENT)
Dept: PULMONOLOGY | Facility: CLINIC | Age: 83
End: 2018-03-07
Payer: MEDICARE

## 2018-03-07 VITALS — WEIGHT: 128 LBS | BODY MASS INDEX: 21.97 KG/M2

## 2018-03-07 VITALS — SYSTOLIC BLOOD PRESSURE: 148 MMHG | DIASTOLIC BLOOD PRESSURE: 78 MMHG

## 2018-03-07 VITALS — HEART RATE: 100 BPM | OXYGEN SATURATION: 91 %

## 2018-03-07 PROCEDURE — 99214 OFFICE O/P EST MOD 30 MIN: CPT | Mod: 25

## 2018-03-07 PROCEDURE — 94010 BREATHING CAPACITY TEST: CPT

## 2018-03-07 RX ORDER — TIOTROPIUM BROMIDE 18 UG/1
18 CAPSULE ORAL; RESPIRATORY (INHALATION) DAILY
Qty: 1 | Refills: 5 | Status: DISCONTINUED | COMMUNITY
Start: 2018-03-07 | End: 2018-03-07

## 2018-07-05 NOTE — H&P PST ADULT - AIRWAY
"CC: Left breast pain    HPI:  Geni Walter is a 15 y.o. female patient  who presents today for evaluation of left breast pain and possible cysts.  Patient also reports irregular menses since menarche (2017). Patient has difficulty wearing brassiere due to pain and discomfort.  Patient reports family history of fibrocystic breast changes and breast cancer (maternal grand aunts).      Patient's last menstrual period was 2018.    History reviewed. No pertinent past medical history.    Past Surgical History:   Procedure Laterality Date    adnoids      TYMPANOSTOMY TUBE PLACEMENT      TYMPANOSTOMY TUBE PLACEMENT           ROS:  GENERAL: Feeling well overall.   SKIN: Denies rash or lesions.   HEAD: Denies head injury or headache.   NODES: Denies enlarged lymph nodes.   CHEST: Denies chest pain or shortness of breath.   CARDIOVASCULAR: Denies palpitations or left sided chest pain.   ABDOMEN: No abdominal pain, nausea, vomiting or rectal bleeding.   URINARY: No dysuria, hematuria, or burning on urination.  REPRODUCTIVE: See HPI.   BREASTS: Denies pain, lumps, or nipple discharge.   HEMATOLOGIC: No easy bruisability or excessive bleeding.   MUSCULOSKELETAL: Denies joint pain or swelling.   NEUROLOGIC: Denies syncope or weakness.   PSYCHIATRIC: Denies depression.    PE:   APPEARANCE: Well nourished, well developed, in no acute distress.  ABDOMEN: Soft. No tenderness or masses. No hernias. No CVA tenderness.  BREASTS:  Bilateral cystic changes.  2-3 cm cyst below areola at 5 o'clock position.  Cyst is mobile with mild discomfort to palpation.  Pelvic exam deferred    Diagnosis:  1. Breast cyst, left    2. Abnormal uterine bleeding (AUB)      PLAN:    Patient was counseled today on pituitary/ovarian/uterus hormonal axis    Patient to cease wire rimmed brassiere due to left inner, lower quadrant 2-3 cm cyst.  "Sports type" support brassiere recommended.    Breast sonogram ordered to evaluate " cyst    Patient and mother counseled on HPV vaccine series recommendation.  Patient's mother to consider.    Follow-up:  PRN/after sonogram    Billing based on counseling time - 25 minutes.    Rip Brewster IV, MD  Answers for HPI/ROS submitted by the patient on 7/3/2018   Gynecologic exam  genital itching: No  genital lesions: No  genital odor: No  genital rash: No  missed menses: No  pelvic pain: No  vaginal bleeding: No  vaginal discharge: No  Chronicity: new  Onset: more than 1 month ago  Frequency: constantly  Progression since onset: gradually worsening  Pain severity: moderate  Pregnant now?: No  abdominal pain: No  anorexia: Yes  back pain: No  chills: No  constipation: No  diarrhea: No  discolored urine: No  dysuria: No  fever: No  flank pain: No  frequency: Yes  headaches: Yes  hematuria: No  nausea: No  painful intercourse: No  rash: No  urgency: No  vomiting: No  Please select the characteristics of your discharge: : normal  Vaginal bleeding: typical of menses  Passing clots?: No  Passing tissue?: No  Aggravated by: nothing  treatments tried: nothing  Improvement on treatment: no relief  Sexual activity: not sexually active  Partner with STD symptoms: no  Birth control: nothing  Menstrual history: regular  STD: No  abdominal surgery: No   section: No  Ectopic pregnancy: No  Endometriosis: No  herpes simplex: No  gynecological surgery: No  menorrhagia: Yes  metrorrhagia: Yes  miscarriage: No  ovarian cysts: No  perineal abscess: No  PID: No  terminated pregnancy: No  vaginosis: No     normal

## 2018-08-01 ENCOUNTER — APPOINTMENT (OUTPATIENT)
Dept: PULMONOLOGY | Facility: CLINIC | Age: 83
End: 2018-08-01

## 2018-08-07 NOTE — ED ADULT NURSE NOTE - NEURO WDL
Alert and oriented to person, place and time, memory intact, behavior appropriate to situation, PERRL. moist

## 2018-08-30 PROBLEM — C50.919 MALIGNANT NEOPLASM OF UNSPECIFIED SITE OF UNSPECIFIED FEMALE BREAST: Chronic | Status: ACTIVE | Noted: 2017-01-31

## 2018-08-30 PROBLEM — H35.30 UNSPECIFIED MACULAR DEGENERATION: Chronic | Status: ACTIVE | Noted: 2017-04-26

## 2018-08-30 PROBLEM — I10 ESSENTIAL (PRIMARY) HYPERTENSION: Chronic | Status: ACTIVE | Noted: 2017-01-31

## 2018-08-30 PROBLEM — C18.9 MALIGNANT NEOPLASM OF COLON, UNSPECIFIED: Chronic | Status: ACTIVE | Noted: 2017-01-31

## 2018-09-26 ENCOUNTER — APPOINTMENT (OUTPATIENT)
Dept: PULMONOLOGY | Facility: CLINIC | Age: 83
End: 2018-09-26
Payer: MEDICARE

## 2018-09-26 VITALS — HEIGHT: 64 IN | WEIGHT: 140 LBS | BODY MASS INDEX: 23.9 KG/M2

## 2018-09-26 VITALS — SYSTOLIC BLOOD PRESSURE: 128 MMHG | DIASTOLIC BLOOD PRESSURE: 80 MMHG

## 2018-09-26 VITALS — HEART RATE: 86 BPM | OXYGEN SATURATION: 92 %

## 2018-09-26 PROCEDURE — 99214 OFFICE O/P EST MOD 30 MIN: CPT

## 2018-09-26 RX ORDER — POTASSIUM CHLORIDE 750 MG/1
10 TABLET, FILM COATED, EXTENDED RELEASE ORAL
Qty: 90 | Refills: 0 | Status: DISCONTINUED | COMMUNITY
Start: 2017-04-25 | End: 2018-09-26

## 2018-09-26 RX ORDER — PRAZOSIN HYDROCHLORIDE 5 MG/1
5 CAPSULE ORAL
Qty: 180 | Refills: 0 | Status: DISCONTINUED | COMMUNITY
Start: 2016-11-29 | End: 2018-09-26

## 2018-10-29 ENCOUNTER — RX RENEWAL (OUTPATIENT)
Age: 83
End: 2018-10-29

## 2019-02-27 ENCOUNTER — APPOINTMENT (OUTPATIENT)
Dept: PULMONOLOGY | Facility: CLINIC | Age: 84
End: 2019-02-27
Payer: MEDICARE

## 2019-02-27 VITALS — BODY MASS INDEX: 25.23 KG/M2 | WEIGHT: 147 LBS | HEART RATE: 84 BPM | OXYGEN SATURATION: 88 %

## 2019-02-27 DIAGNOSIS — R93.89 ABNORMAL FINDINGS ON DIAGNOSTIC IMAGING OF OTHER SPECIFIED BODY STRUCTURES: ICD-10-CM

## 2019-02-27 PROCEDURE — 94010 BREATHING CAPACITY TEST: CPT

## 2019-02-27 PROCEDURE — 99214 OFFICE O/P EST MOD 30 MIN: CPT | Mod: 25

## 2019-02-27 RX ORDER — AMLODIPINE BESYLATE 5 MG/1
5 TABLET ORAL
Qty: 180 | Refills: 0 | Status: DISCONTINUED | COMMUNITY
Start: 2017-04-25 | End: 2019-02-27

## 2019-02-27 RX ORDER — ERGOCALCIFEROL 1.25 MG/1
1.25 MG CAPSULE, LIQUID FILLED ORAL
Qty: 12 | Refills: 0 | Status: DISCONTINUED | COMMUNITY
Start: 2017-01-13 | End: 2019-02-27

## 2019-02-27 RX ORDER — LORATADINE 5 MG
17 TABLET,CHEWABLE ORAL
Refills: 0 | Status: DISCONTINUED | COMMUNITY
End: 2019-02-27

## 2019-02-27 NOTE — CONSULT LETTER
[Dear  ___] : Dear  [unfilled], [Consult Letter:] : I had the pleasure of evaluating your patient, [unfilled]. [Please see my note below.] : Please see my note below. [Consult Closing:] : Thank you very much for allowing me to participate in the care of this patient.  If you have any questions, please do not hesitate to contact me. [Sincerely,] : Sincerely, [Nicola Hoyt MD] : Nicola Hoyt MD

## 2019-02-27 NOTE — PHYSICAL EXAM
[General Appearance - Well Developed] : well developed [General Appearance - Well Nourished] : well nourished [Normal Conjunctiva] : the conjunctiva exhibited no abnormalities [Normal Oropharynx] : normal oropharynx [Neck Appearance] : the appearance of the neck was normal [Jugular Venous Distention Increased] : there was no jugular-venous distention [Thyroid Diffuse Enlargement] : the thyroid was not enlarged [Heart Sounds] : normal S1 and S2 [Arterial Pulses Normal] : the arterial pulses were normal [Edema] : no peripheral edema present [Respiration, Rhythm And Depth] : normal respiratory rhythm and effort [Auscultation Breath Sounds / Voice Sounds] : lungs were clear to auscultation bilaterally [Bowel Sounds] : normal bowel sounds [Abdomen Soft] : soft [Abdomen Tenderness] : non-tender [Abnormal Walk] : normal gait [Nail Clubbing] : no clubbing of the fingernails [Cyanosis, Localized] : no localized cyanosis [No Focal Deficits] : no focal deficits [Oriented To Time, Place, And Person] : oriented to person, place, and time [Impaired Insight] : insight and judgment were intact [Affect] : the affect was normal [Memory Recent] : recent memory was not impaired [Skin Turgor] : normal skin turgor [] : no rash [FreeTextEntry1] : Diminished at L base

## 2019-02-27 NOTE — ASSESSMENT
[FreeTextEntry1] : Right and left pleural effusion - surgically resolved\par Mostly restrictive pulmonary impairment\par COPD - No benefit to inhaled beta agonists - better on LAMA\par Dyspnea - Exertional desat\par \par

## 2019-02-27 NOTE — HISTORY OF PRESENT ILLNESS
[FreeTextEntry1] : 80 yo female distant smoker with H/O Breast CA (mastectomy in 1985 - no adjuvant Rx) and colon cancer resected by Dr Caceres in 2010 seen with abnormal chest CT and SCHMID post "pneumonia" in September of 2016. \par  No fever, cough, sputum, sweats, weight loss, orthopnea or edema.\par Moderate, 2 block SCHMID relieved with 5 minutes of rest for several months. \par S/P right VATS pleurectomy and drainage of effusion in February of 2017 by Dr Erazo - Not malignant.\par Possibly complicated parapneumonic effusion\par S/P VATS pleurectomy , drainage, pleurodesis on other side of Chest in January of 2018\par Again - non malignant\par Negative path and cyto\par \par 2/7/18\par SCHMID\par Desat with mild exertion\par CXR post with basilar pleural reaction\par \par 9/26/18\par Doing well\par Finished all but last one week of rehab at [facility]\par Had arrhythmia\par Sent to cardio \par Cleared

## 2019-04-30 ENCOUNTER — RX RENEWAL (OUTPATIENT)
Age: 84
End: 2019-04-30

## 2019-05-23 ENCOUNTER — APPOINTMENT (OUTPATIENT)
Dept: PULMONOLOGY | Facility: CLINIC | Age: 84
End: 2019-05-23
Payer: MEDICARE

## 2019-05-23 VITALS — DIASTOLIC BLOOD PRESSURE: 60 MMHG | SYSTOLIC BLOOD PRESSURE: 120 MMHG

## 2019-05-23 VITALS — WEIGHT: 150 LBS | BODY MASS INDEX: 25.75 KG/M2

## 2019-05-23 VITALS — OXYGEN SATURATION: 95 % | HEART RATE: 70 BPM

## 2019-05-23 DIAGNOSIS — Z01.818 ENCOUNTER FOR OTHER PREPROCEDURAL EXAMINATION: ICD-10-CM

## 2019-05-23 PROCEDURE — 99214 OFFICE O/P EST MOD 30 MIN: CPT | Mod: 25

## 2019-05-23 PROCEDURE — 94010 BREATHING CAPACITY TEST: CPT

## 2019-05-23 NOTE — CONSULT LETTER
[Consult Letter:] : I had the pleasure of evaluating your patient, [unfilled]. [Please see my note below.] : Please see my note below. [Consult Closing:] : Thank you very much for allowing me to participate in the care of this patient.  If you have any questions, please do not hesitate to contact me. [Sincerely,] : Sincerely, [Nicola Hoyt MD] : Nicola Hoyt MD [Dear  ___] : Dear ~CHIOMA,

## 2019-05-23 NOTE — HISTORY OF PRESENT ILLNESS
[FreeTextEntry1] : 80 yo female distant smoker with H/O Breast CA (mastectomy in 1985 - no adjuvant Rx) and colon cancer resected by Dr Caceres in 2010 seen with abnormal chest CT and SCHMID post "pneumonia" in September of 2016. \par  No fever, cough, sputum, sweats, weight loss, orthopnea or edema.\par Moderate, 2 block SCHMID relieved with 5 minutes of rest for several months. \par S/P right VATS pleurectomy and drainage of effusion in February of 2017 by Dr Erazo - Not malignant.\par Possibly complicated parapneumonic effusion\par S/P VATS pleurectomy , drainage, pleurodesis on other side of Chest in January of 2018\par Again - non malignant\par Negative path and cyto\par \par 2/7/18\par SCHMID\par Desat with mild exertion\par CXR post with basilar pleural reaction\par \par 9/26/18\par Doing well\par Finished all but last one week of rehab at [facility]\par Had arrhythmia\par Sent to cardio \par Cleared\par \par 5/23/19\par See prior to colonoscopy\par Doing well.

## 2019-05-23 NOTE — ASSESSMENT
[FreeTextEntry1] : Right and left pleural effusion - surgically resolved\par Mostly mild restrictive pulmonary impairment\par Mild COPD - No benefit to inhaled beta agonists - better on LAMA\par Dyspnea - Exertional desat\par No significant pulmonary or sleep medicine related contraindication to upper or lower endoscopy under conscious sedation or under general anesthesia\par \par \par \par

## 2019-08-28 ENCOUNTER — APPOINTMENT (OUTPATIENT)
Dept: PULMONOLOGY | Facility: CLINIC | Age: 84
End: 2019-08-28
Payer: MEDICARE

## 2019-08-28 VITALS — OXYGEN SATURATION: 95 % | SYSTOLIC BLOOD PRESSURE: 132 MMHG | DIASTOLIC BLOOD PRESSURE: 80 MMHG

## 2019-08-28 VITALS — BODY MASS INDEX: 25.95 KG/M2 | HEIGHT: 64 IN | WEIGHT: 152 LBS

## 2019-08-28 PROCEDURE — 99214 OFFICE O/P EST MOD 30 MIN: CPT | Mod: 25

## 2019-08-28 NOTE — PHYSICAL EXAM
[General Appearance - Well Developed] : well developed [General Appearance - Well Nourished] : well nourished [Normal Oropharynx] : normal oropharynx [Normal Conjunctiva] : the conjunctiva exhibited no abnormalities [Neck Appearance] : the appearance of the neck was normal [Jugular Venous Distention Increased] : there was no jugular-venous distention [Heart Sounds] : normal S1 and S2 [Thyroid Diffuse Enlargement] : the thyroid was not enlarged [Arterial Pulses Normal] : the arterial pulses were normal [Respiration, Rhythm And Depth] : normal respiratory rhythm and effort [Edema] : no peripheral edema present [Auscultation Breath Sounds / Voice Sounds] : lungs were clear to auscultation bilaterally [Abdomen Soft] : soft [Bowel Sounds] : normal bowel sounds [Abdomen Tenderness] : non-tender [Abnormal Walk] : normal gait [Cyanosis, Localized] : no localized cyanosis [Nail Clubbing] : no clubbing of the fingernails [No Focal Deficits] : no focal deficits [Oriented To Time, Place, And Person] : oriented to person, place, and time [Affect] : the affect was normal [Impaired Insight] : insight and judgment were intact [Memory Recent] : recent memory was not impaired [Skin Turgor] : normal skin turgor [] : no rash [FreeTextEntry1] : Diminished at L base

## 2019-08-28 NOTE — CONSULT LETTER
[Consult Letter:] : I had the pleasure of evaluating your patient, [unfilled]. [Dear  ___] : Dear ~CHIOMA, [Please see my note below.] : Please see my note below. [Consult Closing:] : Thank you very much for allowing me to participate in the care of this patient.  If you have any questions, please do not hesitate to contact me. [Sincerely,] : Sincerely, [Nicola Hoyt MD] : Nicola Hoyt MD

## 2019-08-28 NOTE — ASSESSMENT
[FreeTextEntry1] : Right and left pleural effusion - surgically resolved\par Mostly mild restrictive pulmonary impairment\par Mild COPD - No benefit to inhaled beta agonists - better on LAMA\par Dyspnea - Exertional desat\par \par \par \par \par

## 2019-08-28 NOTE — HISTORY OF PRESENT ILLNESS
[FreeTextEntry1] : 80 yo female distant smoker with H/O Breast CA (mastectomy in 1985 - no adjuvant Rx) and colon cancer resected by Dr Caceres in 2010 seen with abnormal chest CT and SCHMID post "pneumonia" in September of 2016. \par  No fever, cough, sputum, sweats, weight loss, orthopnea or edema.\par Moderate, 2 block SCHMID relieved with 5 minutes of rest for several months. \par S/P right VATS pleurectomy and drainage of effusion in February of 2017 by Dr Erazo - Not malignant.\par Possibly complicated parapneumonic effusion\par S/P VATS pleurectomy , drainage, pleurodesis on other side of Chest in January of 2018\par Again - non malignant\par Negative path and cyto\par \par 2/7/18\par SCHMID\par Desat with mild exertion\par CXR post with basilar pleural reaction\par \par 9/26/18\par Doing well\par Finished all but last one week of rehab at [facility]\par Had arrhythmia\par Sent to cardio \par Cleared\par \par 5/23/19\par See prior to colonoscopy\par Doing well.

## 2019-08-28 NOTE — REASON FOR VISIT
[Follow-Up] : a follow-up visit [Shortness of Breath] : shortness of Breath [FreeTextEntry2] : Hypoxia

## 2019-10-22 ENCOUNTER — RX RENEWAL (OUTPATIENT)
Age: 84
End: 2019-10-22

## 2019-12-04 ENCOUNTER — APPOINTMENT (OUTPATIENT)
Dept: PULMONOLOGY | Facility: CLINIC | Age: 84
End: 2019-12-04
Payer: MEDICARE

## 2019-12-04 VITALS
SYSTOLIC BLOOD PRESSURE: 118 MMHG | HEIGHT: 64.25 IN | OXYGEN SATURATION: 91 % | HEART RATE: 80 BPM | WEIGHT: 151 LBS | DIASTOLIC BLOOD PRESSURE: 62 MMHG | BODY MASS INDEX: 25.78 KG/M2

## 2019-12-04 PROCEDURE — 99214 OFFICE O/P EST MOD 30 MIN: CPT

## 2019-12-04 NOTE — CONSULT LETTER
[Dear  ___] : Dear ~CHIOMA, [Consult Letter:] : I had the pleasure of evaluating your patient, [unfilled]. [Please see my note below.] : Please see my note below. [Consult Closing:] : Thank you very much for allowing me to participate in the care of this patient.  If you have any questions, please do not hesitate to contact me. [Sincerely,] : Sincerely, [Nicola Hoyt MD] : Nicola Hoyt MD

## 2019-12-04 NOTE — HISTORY OF PRESENT ILLNESS
[FreeTextEntry1] : 80 yo female distant smoker with H/O Breast CA (mastectomy in 1985 - no adjuvant Rx) and colon cancer resected by Dr Caceres in 2010 seen with abnormal chest CT and SCHMID post "pneumonia" in September of 2016. \par  No fever, cough, sputum, sweats, weight loss, orthopnea or edema.\par Moderate, 2 block SCHMID relieved with 5 minutes of rest for several months. \par S/P right VATS pleurectomy and drainage of effusion in February of 2017 by Dr Erazo - Not malignant.\par Possibly complicated parapneumonic effusion\par S/P VATS pleurectomy , drainage, pleurodesis on other side of Chest in January of 2018\par Again - non malignant\par Negative path and cyto\par \par 2/7/18\par SCHMID\par Desat with mild exertion\par CXR post with basilar pleural reaction\par \par 9/26/18\par Doing well\par Finished all but last one week of rehab at [facility]\par Had arrhythmia\par Sent to cardio \par Cleared\par \par 5/23/19\par See prior to colonoscopy\par Doing well.

## 2019-12-04 NOTE — PHYSICAL EXAM
[General Appearance - Well Developed] : well developed [General Appearance - Well Nourished] : well nourished [Normal Conjunctiva] : the conjunctiva exhibited no abnormalities [Normal Oropharynx] : normal oropharynx [Neck Appearance] : the appearance of the neck was normal [Jugular Venous Distention Increased] : there was no jugular-venous distention [Heart Sounds] : normal S1 and S2 [Thyroid Diffuse Enlargement] : the thyroid was not enlarged [Arterial Pulses Normal] : the arterial pulses were normal [Edema] : no peripheral edema present [Respiration, Rhythm And Depth] : normal respiratory rhythm and effort [Auscultation Breath Sounds / Voice Sounds] : lungs were clear to auscultation bilaterally [Bowel Sounds] : normal bowel sounds [Abdomen Soft] : soft [Abdomen Tenderness] : non-tender [Abnormal Walk] : normal gait [Nail Clubbing] : no clubbing of the fingernails [Cyanosis, Localized] : no localized cyanosis [No Focal Deficits] : no focal deficits [Oriented To Time, Place, And Person] : oriented to person, place, and time [Impaired Insight] : insight and judgment were intact [Memory Recent] : recent memory was not impaired [Affect] : the affect was normal [Skin Turgor] : normal skin turgor [] : no rash [FreeTextEntry1] : Diminished at L base

## 2020-01-22 ENCOUNTER — MESSAGE (OUTPATIENT)
Age: 85
End: 2020-01-22

## 2020-05-18 NOTE — BRIEF OPERATIVE NOTE - PRIMARY SURGEON
I saw her this week. I offered her potential PFO closure assuming no AF on event recorder. Rope score is low so potentially residual stroke risk will still be high. She is working on BP, DM control and has stopped smoking. I have set her up for KRIS after COVID test and will proceed after assessing anatomy.     [address] MD Kacey

## 2020-05-22 ENCOUNTER — RX RENEWAL (OUTPATIENT)
Age: 85
End: 2020-05-22

## 2020-06-25 ENCOUNTER — APPOINTMENT (OUTPATIENT)
Dept: PULMONOLOGY | Facility: CLINIC | Age: 85
End: 2020-06-25
Payer: MEDICARE

## 2020-06-25 VITALS — OXYGEN SATURATION: 90 % | HEART RATE: 88 BPM

## 2020-06-25 VITALS — DIASTOLIC BLOOD PRESSURE: 70 MMHG | SYSTOLIC BLOOD PRESSURE: 120 MMHG | WEIGHT: 157 LBS | BODY MASS INDEX: 26.74 KG/M2

## 2020-06-25 DIAGNOSIS — R06.00 DYSPNEA, UNSPECIFIED: ICD-10-CM

## 2020-06-25 DIAGNOSIS — J90 PLEURAL EFFUSION, NOT ELSEWHERE CLASSIFIED: ICD-10-CM

## 2020-06-25 DIAGNOSIS — J98.4 OTHER DISORDERS OF LUNG: ICD-10-CM

## 2020-06-25 DIAGNOSIS — R09.02 HYPOXEMIA: ICD-10-CM

## 2020-06-25 DIAGNOSIS — J44.9 CHRONIC OBSTRUCTIVE PULMONARY DISEASE, UNSPECIFIED: ICD-10-CM

## 2020-06-25 DIAGNOSIS — Z99.81 HYPOXEMIA: ICD-10-CM

## 2020-06-25 PROCEDURE — 99214 OFFICE O/P EST MOD 30 MIN: CPT

## 2020-06-25 RX ORDER — VIT C/E/CUPERIC/ZINC/LUTEIN 226-90-0.8
CAPSULE ORAL
Refills: 0 | Status: ACTIVE | COMMUNITY

## 2020-06-25 RX ORDER — UMECLIDINIUM 62.5 UG/1
62.5 AEROSOL, POWDER ORAL DAILY
Qty: 1 | Refills: 5 | Status: DISCONTINUED | COMMUNITY
Start: 2018-03-07 | End: 2020-06-25

## 2020-06-25 RX ORDER — OXYCODONE HYDROCHLORIDE AND ACETAMINOPHEN 5; 325 MG/1; MG/1
5-325 TABLET ORAL
Refills: 0 | Status: DISCONTINUED | COMMUNITY
End: 2020-06-25

## 2020-06-25 RX ORDER — POTASSIUM & SODIUM PHOSPHATES POWDER PACK 280-160-250 MG 280-160-250 MG
280-160-250 PACK ORAL
Refills: 0 | Status: DISCONTINUED | COMMUNITY
End: 2020-06-25

## 2020-06-25 NOTE — PHYSICAL EXAM
[General Appearance - Well Nourished] : well nourished [General Appearance - Well Developed] : well developed [Normal Conjunctiva] : the conjunctiva exhibited no abnormalities [Normal Oropharynx] : normal oropharynx [Neck Appearance] : the appearance of the neck was normal [Jugular Venous Distention Increased] : there was no jugular-venous distention [Thyroid Diffuse Enlargement] : the thyroid was not enlarged [Heart Sounds] : normal S1 and S2 [Arterial Pulses Normal] : the arterial pulses were normal [Auscultation Breath Sounds / Voice Sounds] : lungs were clear to auscultation bilaterally [Respiration, Rhythm And Depth] : normal respiratory rhythm and effort [Edema] : no peripheral edema present [Bowel Sounds] : normal bowel sounds [Abdomen Soft] : soft [Abdomen Tenderness] : non-tender [Nail Clubbing] : no clubbing of the fingernails [Abnormal Walk] : normal gait [Cyanosis, Localized] : no localized cyanosis [Oriented To Time, Place, And Person] : oriented to person, place, and time [No Focal Deficits] : no focal deficits [Impaired Insight] : insight and judgment were intact [Affect] : the affect was normal [Memory Recent] : recent memory was not impaired [Skin Turgor] : normal skin turgor [] : no rash [FreeTextEntry1] : Diminished at L base

## 2020-06-25 NOTE — CONSULT LETTER
[Consult Letter:] : I had the pleasure of evaluating your patient, [unfilled]. [Dear  ___] : Dear ~CHIOMA, [Sincerely,] : Sincerely, [Consult Closing:] : Thank you very much for allowing me to participate in the care of this patient.  If you have any questions, please do not hesitate to contact me. [Please see my note below.] : Please see my note below. [Nicola Hoyt MD] : Nicola Hoyt MD

## 2020-06-25 NOTE — HISTORY OF PRESENT ILLNESS
[FreeTextEntry1] : 80 yo female distant smoker with H/O Breast CA (mastectomy in 1985 - no adjuvant Rx) and colon cancer resected by Dr Caceres in 2010 seen with abnormal chest CT and SCHMID post "pneumonia" in September of 2016. \par  No fever, cough, sputum, sweats, weight loss, orthopnea or edema.\par Moderate, 2 block SCHMID relieved with 5 minutes of rest for several months. \par S/P right VATS pleurectomy and drainage of effusion in February of 2017 by Dr Erazo - Not malignant.\par Possibly complicated parapneumonic effusion\par S/P VATS pleurectomy , drainage, pleurodesis on other side of Chest in January of 2018\par Again - non malignant\par Negative path and cyto\par \par 2/7/18\par SCHMID\par Desat with mild exertion\par CXR post with basilar pleural reaction\par \par 9/26/18\par Doing well\par Finished all but last one week of rehab at [facility]\par Had arrhythmia\par Sent to cardio \par Cleared\par \par 5/23/19\par See prior to colonoscopy\par Doing well.

## 2020-06-25 NOTE — ASSESSMENT
[FreeTextEntry1] : Right and left pleural effusion - surgically resolved\par Mostly mild restrictive pulmonary impairment\par Mild COPD - No benefit to inhaled beta agonists - better on LAMA\par Dyspnea - Exertional desat\par Fine at rest and with mild exertion\par No edema or weight loss\par \par \par \par \par

## 2020-07-13 NOTE — ASU PATIENT PROFILE, ADULT - PMH
Breast cancer    Colon cancer    Hypertension    Macular degeneration    Renal insufficiency Tonia Moon from [id number] called requesting a verbal order for Cimzia Maintenance Dose. Please give a call back to [phone].

## 2020-12-20 ENCOUNTER — RX RENEWAL (OUTPATIENT)
Age: 85
End: 2020-12-20

## 2021-05-25 NOTE — PROGRESS NOTE ADULT - PROBLEM SELECTOR PROBLEM 3
Essential hypertension
Renal insufficiency
74 y/o female, PMH of CAD, hyperthyroidism, HTN c/o of left knee pain on and off for 3 yrs. Had orthopedic consult- s/p MRI  revealed  torn medial meniscus left knee . Seen by Dr Chi, scheduled for left knee arthroscopy on 6/3/21. Pt denies any recent travel or Covid 19 related symptoms  **Surgery was cancelled on 11/19/20 due to anemia(Hb 8.5). Pt was treated with Fe supplement, Repeat Hb 11.5, had hematology eval with  (4/2021)  **Had 2 doses of Covid Vaccine

## 2021-06-25 ENCOUNTER — RX RENEWAL (OUTPATIENT)
Age: 86
End: 2021-06-25

## 2021-10-08 NOTE — H&P PST ADULT - TRANSFUSION HX COMMENT, PROFILE
Addended by: Monika Alves on: 10/8/2021 10:25 AM     Modules accepted: Orders
2010 with "bleeding ulcers"

## 2021-10-28 NOTE — ASU PREOP CHECKLIST - NOTHING BY MOUTH SINCE
04-May-2017 19:00 [place] Counseling: I discussed with the patient the risks of ixekizumab including but not limited to immunosuppression, serious infections, worsening of inflammatory bowel disease and drug reactions.  The patient understands that monitoring is required including a PPD at baseline and must alert us or the primary physician if symptoms of infection or other concerning signs are noted.

## 2021-11-15 NOTE — H&P PST ADULT - ALCOHOL USE HISTORY SINGLE SELECT
[General Appearance - Alert] : alert [General Appearance - In No Acute Distress] : in no acute distress [Auscultation Breath Sounds / Voice Sounds] : lungs were clear to auscultation bilaterally [Heart Rate And Rhythm] : heart rate was normal and rhythm regular [Heart Sounds] : normal S1 and S2 [Heart Sounds Gallop] : no gallops [Murmurs] : no murmurs [Heart Sounds Pericardial Friction Rub] : no pericardial rub [Musculoskeletal - Swelling] : no joint swelling [Nail Clubbing] : no clubbing  or cyanosis of the fingernails [Motor Tone] : muscle strength and tone were normal [Skin Color & Pigmentation] : normal skin color and pigmentation yes... [] : no rash [Deep Tendon Reflexes (DTR)] : deep tendon reflexes were 2+ and symmetric [Sensation] : the sensory exam was normal to light touch and pinprick [No Focal Deficits] : no focal deficits [FreeTextEntry1] : intermittently forgetful [Affect] : the affect was normal [Oriented To Time, Place, And Person] : oriented to person, place, and time

## 2021-11-22 ENCOUNTER — RESULT REVIEW (OUTPATIENT)
Age: 86
End: 2021-11-22

## 2021-12-01 ENCOUNTER — APPOINTMENT (OUTPATIENT)
Dept: PULMONOLOGY | Facility: CLINIC | Age: 86
End: 2021-12-01

## 2021-12-09 ENCOUNTER — APPOINTMENT (OUTPATIENT)
Dept: PULMONOLOGY | Facility: CLINIC | Age: 86
End: 2021-12-09

## 2021-12-13 ENCOUNTER — RX RENEWAL (OUTPATIENT)
Age: 86
End: 2021-12-13

## 2021-12-13 RX ORDER — UMECLIDINIUM 62.5 UG/1
62.5 AEROSOL, POWDER ORAL DAILY
Qty: 90 | Refills: 1 | Status: ACTIVE | COMMUNITY
Start: 2019-10-22 | End: 1900-01-01

## 2022-01-01 ENCOUNTER — INPATIENT (INPATIENT)
Facility: HOSPITAL | Age: 87
LOS: 23 days | Discharge: HOME | DRG: 853 | End: 2022-01-25
Attending: HOSPITALIST | Admitting: HOSPITALIST
Payer: MEDICARE

## 2022-01-01 VITALS
RESPIRATION RATE: 20 BRPM | TEMPERATURE: 97 F | HEIGHT: 66 IN | HEART RATE: 142 BPM | DIASTOLIC BLOOD PRESSURE: 66 MMHG | OXYGEN SATURATION: 93 % | SYSTOLIC BLOOD PRESSURE: 112 MMHG | WEIGHT: 149.91 LBS

## 2022-01-01 DIAGNOSIS — Z90.89 ACQUIRED ABSENCE OF OTHER ORGANS: Chronic | ICD-10-CM

## 2022-01-01 DIAGNOSIS — Z90.11 ACQUIRED ABSENCE OF RIGHT BREAST AND NIPPLE: Chronic | ICD-10-CM

## 2022-01-01 DIAGNOSIS — Z90.49 ACQUIRED ABSENCE OF OTHER SPECIFIED PARTS OF DIGESTIVE TRACT: Chronic | ICD-10-CM

## 2022-01-01 DIAGNOSIS — I50.23 ACUTE ON CHRONIC SYSTOLIC (CONGESTIVE) HEART FAILURE: ICD-10-CM

## 2022-01-01 DIAGNOSIS — Z98.890 OTHER SPECIFIED POSTPROCEDURAL STATES: Chronic | ICD-10-CM

## 2022-01-01 DIAGNOSIS — Z96.642 PRESENCE OF LEFT ARTIFICIAL HIP JOINT: Chronic | ICD-10-CM

## 2022-01-01 LAB
ALBUMIN SERPL ELPH-MCNC: 3 G/DL — LOW (ref 3.3–5.2)
ALP SERPL-CCNC: 172 U/L — HIGH (ref 40–120)
ALT FLD-CCNC: 22 U/L — SIGNIFICANT CHANGE UP
ANION GAP SERPL CALC-SCNC: 15 MMOL/L — SIGNIFICANT CHANGE UP (ref 5–17)
APPEARANCE UR: CLEAR — SIGNIFICANT CHANGE UP
APTT BLD: 26.3 SEC — LOW (ref 27.5–35.5)
AST SERPL-CCNC: 33 U/L — HIGH
BACTERIA # UR AUTO: ABNORMAL
BASOPHILS # BLD AUTO: 0.01 K/UL — SIGNIFICANT CHANGE UP (ref 0–0.2)
BASOPHILS NFR BLD AUTO: 0.1 % — SIGNIFICANT CHANGE UP (ref 0–2)
BILIRUB SERPL-MCNC: 1.5 MG/DL — SIGNIFICANT CHANGE UP (ref 0.4–2)
BILIRUB UR-MCNC: NEGATIVE — SIGNIFICANT CHANGE UP
BUN SERPL-MCNC: 97.7 MG/DL — HIGH (ref 8–20)
CALCIUM SERPL-MCNC: 8.6 MG/DL — SIGNIFICANT CHANGE UP (ref 8.6–10.2)
CHLORIDE SERPL-SCNC: 93 MMOL/L — LOW (ref 98–107)
CO2 SERPL-SCNC: 24 MMOL/L — SIGNIFICANT CHANGE UP (ref 22–29)
COLOR SPEC: YELLOW — SIGNIFICANT CHANGE UP
CREAT SERPL-MCNC: 2.68 MG/DL — HIGH (ref 0.5–1.3)
DIFF PNL FLD: NEGATIVE — SIGNIFICANT CHANGE UP
EOSINOPHIL # BLD AUTO: 0.02 K/UL — SIGNIFICANT CHANGE UP (ref 0–0.5)
EOSINOPHIL NFR BLD AUTO: 0.2 % — SIGNIFICANT CHANGE UP (ref 0–6)
EPI CELLS # UR: SIGNIFICANT CHANGE UP
GLUCOSE SERPL-MCNC: 163 MG/DL — HIGH (ref 70–99)
GLUCOSE UR QL: NEGATIVE MG/DL — SIGNIFICANT CHANGE UP
HCT VFR BLD CALC: 45.3 % — HIGH (ref 34.5–45)
HGB BLD-MCNC: 14.5 G/DL — SIGNIFICANT CHANGE UP (ref 11.5–15.5)
HYALINE CASTS # UR AUTO: ABNORMAL /LPF
IMM GRANULOCYTES NFR BLD AUTO: 0.4 % — SIGNIFICANT CHANGE UP (ref 0–1.5)
INR BLD: 1.31 RATIO — HIGH (ref 0.88–1.16)
KETONES UR-MCNC: NEGATIVE — SIGNIFICANT CHANGE UP
LACTATE BLDV-MCNC: 2.1 MMOL/L — HIGH (ref 0.5–2)
LACTATE BLDV-MCNC: 2.3 MMOL/L — HIGH (ref 0.5–2)
LACTATE BLDV-MCNC: 2.6 MMOL/L — HIGH (ref 0.5–2)
LEUKOCYTE ESTERASE UR-ACNC: NEGATIVE — SIGNIFICANT CHANGE UP
LYMPHOCYTES # BLD AUTO: 0.48 K/UL — LOW (ref 1–3.3)
LYMPHOCYTES # BLD AUTO: 5.1 % — LOW (ref 13–44)
MAGNESIUM SERPL-MCNC: 2.9 MG/DL — HIGH (ref 1.8–2.6)
MCHC RBC-ENTMCNC: 25.5 PG — LOW (ref 27–34)
MCHC RBC-ENTMCNC: 32 GM/DL — SIGNIFICANT CHANGE UP (ref 32–36)
MCV RBC AUTO: 79.6 FL — LOW (ref 80–100)
MONOCYTES # BLD AUTO: 0.59 K/UL — SIGNIFICANT CHANGE UP (ref 0–0.9)
MONOCYTES NFR BLD AUTO: 6.3 % — SIGNIFICANT CHANGE UP (ref 2–14)
NEUTROPHILS # BLD AUTO: 8.2 K/UL — HIGH (ref 1.8–7.4)
NEUTROPHILS NFR BLD AUTO: 87.9 % — HIGH (ref 43–77)
NITRITE UR-MCNC: NEGATIVE — SIGNIFICANT CHANGE UP
NT-PROBNP SERPL-SCNC: 5458 PG/ML — HIGH (ref 0–300)
PH UR: 6 — SIGNIFICANT CHANGE UP (ref 5–8)
PLATELET # BLD AUTO: 114 K/UL — LOW (ref 150–400)
POTASSIUM SERPL-MCNC: 5.2 MMOL/L — SIGNIFICANT CHANGE UP (ref 3.5–5.3)
POTASSIUM SERPL-SCNC: 5.2 MMOL/L — SIGNIFICANT CHANGE UP (ref 3.5–5.3)
PROT SERPL-MCNC: 5.7 G/DL — LOW (ref 6.6–8.7)
PROT UR-MCNC: 15
PROTHROM AB SERPL-ACNC: 15 SEC — HIGH (ref 10.6–13.6)
RAPID RVP RESULT: SIGNIFICANT CHANGE UP
RBC # BLD: 5.69 M/UL — HIGH (ref 3.8–5.2)
RBC # FLD: 18.7 % — HIGH (ref 10.3–14.5)
RBC CASTS # UR COMP ASSIST: SIGNIFICANT CHANGE UP /HPF (ref 0–4)
SARS-COV-2 RNA SPEC QL NAA+PROBE: SIGNIFICANT CHANGE UP
SODIUM SERPL-SCNC: 132 MMOL/L — LOW (ref 135–145)
SP GR SPEC: 1.02 — SIGNIFICANT CHANGE UP (ref 1.01–1.02)
TROPONIN T SERPL-MCNC: 0.04 NG/ML — SIGNIFICANT CHANGE UP (ref 0–0.06)
UROBILINOGEN FLD QL: NEGATIVE MG/DL — SIGNIFICANT CHANGE UP
WBC # BLD: 9.34 K/UL — SIGNIFICANT CHANGE UP (ref 3.8–10.5)
WBC # FLD AUTO: 9.34 K/UL — SIGNIFICANT CHANGE UP (ref 3.8–10.5)
WBC UR QL: SIGNIFICANT CHANGE UP

## 2022-01-01 PROCEDURE — 71250 CT THORAX DX C-: CPT | Mod: 26,MA

## 2022-01-01 PROCEDURE — 99223 1ST HOSP IP/OBS HIGH 75: CPT

## 2022-01-01 PROCEDURE — 76705 ECHO EXAM OF ABDOMEN: CPT | Mod: 26

## 2022-01-01 PROCEDURE — 93010 ELECTROCARDIOGRAM REPORT: CPT

## 2022-01-01 PROCEDURE — 71045 X-RAY EXAM CHEST 1 VIEW: CPT | Mod: 26

## 2022-01-01 PROCEDURE — 99291 CRITICAL CARE FIRST HOUR: CPT | Mod: GC

## 2022-01-01 PROCEDURE — 99497 ADVNCD CARE PLAN 30 MIN: CPT | Mod: GC,25

## 2022-01-01 PROCEDURE — 74176 CT ABD & PELVIS W/O CONTRAST: CPT | Mod: 26,MA

## 2022-01-01 RX ORDER — FUROSEMIDE 40 MG
80 TABLET ORAL ONCE
Refills: 0 | Status: COMPLETED | OUTPATIENT
Start: 2022-01-01 | End: 2022-01-01

## 2022-01-01 RX ORDER — METOPROLOL TARTRATE 50 MG
5 TABLET ORAL ONCE
Refills: 0 | Status: COMPLETED | OUTPATIENT
Start: 2022-01-01 | End: 2022-01-01

## 2022-01-01 RX ORDER — DILTIAZEM HCL 120 MG
10 CAPSULE, EXT RELEASE 24 HR ORAL ONCE
Refills: 0 | Status: COMPLETED | OUTPATIENT
Start: 2022-01-01 | End: 2022-01-01

## 2022-01-01 RX ORDER — METOPROLOL TARTRATE 50 MG
25 TABLET ORAL THREE TIMES A DAY
Refills: 0 | Status: DISCONTINUED | OUTPATIENT
Start: 2022-01-01 | End: 2022-01-06

## 2022-01-01 RX ORDER — DILTIAZEM HCL 120 MG
5 CAPSULE, EXT RELEASE 24 HR ORAL
Qty: 125 | Refills: 0 | Status: DISCONTINUED | OUTPATIENT
Start: 2022-01-01 | End: 2022-01-02

## 2022-01-01 RX ORDER — HEPARIN SODIUM 5000 [USP'U]/ML
5500 INJECTION INTRAVENOUS; SUBCUTANEOUS EVERY 6 HOURS
Refills: 0 | Status: DISCONTINUED | OUTPATIENT
Start: 2022-01-01 | End: 2022-01-02

## 2022-01-01 RX ORDER — HEPARIN SODIUM 5000 [USP'U]/ML
2500 INJECTION INTRAVENOUS; SUBCUTANEOUS EVERY 6 HOURS
Refills: 0 | Status: DISCONTINUED | OUTPATIENT
Start: 2022-01-01 | End: 2022-01-02

## 2022-01-01 RX ORDER — FUROSEMIDE 40 MG
40 TABLET ORAL
Refills: 0 | Status: DISCONTINUED | OUTPATIENT
Start: 2022-01-01 | End: 2022-01-02

## 2022-01-01 RX ORDER — PIPERACILLIN AND TAZOBACTAM 4; .5 G/20ML; G/20ML
3.38 INJECTION, POWDER, LYOPHILIZED, FOR SOLUTION INTRAVENOUS ONCE
Refills: 0 | Status: COMPLETED | OUTPATIENT
Start: 2022-01-01 | End: 2022-01-01

## 2022-01-01 RX ORDER — SODIUM CHLORIDE 9 MG/ML
1000 INJECTION INTRAMUSCULAR; INTRAVENOUS; SUBCUTANEOUS ONCE
Refills: 0 | Status: DISCONTINUED | OUTPATIENT
Start: 2022-01-01 | End: 2022-01-01

## 2022-01-01 RX ORDER — LANOLIN ALCOHOL/MO/W.PET/CERES
3 CREAM (GRAM) TOPICAL AT BEDTIME
Refills: 0 | Status: DISCONTINUED | OUTPATIENT
Start: 2022-01-01 | End: 2022-01-06

## 2022-01-01 RX ORDER — ONDANSETRON 8 MG/1
4 TABLET, FILM COATED ORAL EVERY 8 HOURS
Refills: 0 | Status: DISCONTINUED | OUTPATIENT
Start: 2022-01-01 | End: 2022-01-25

## 2022-01-01 RX ORDER — HEPARIN SODIUM 5000 [USP'U]/ML
INJECTION INTRAVENOUS; SUBCUTANEOUS
Qty: 25000 | Refills: 0 | Status: DISCONTINUED | OUTPATIENT
Start: 2022-01-01 | End: 2022-01-02

## 2022-01-01 RX ORDER — DIAZEPAM 5 MG
5 TABLET ORAL ONCE
Refills: 0 | Status: DISCONTINUED | OUTPATIENT
Start: 2022-01-01 | End: 2022-01-01

## 2022-01-01 RX ORDER — HEPARIN SODIUM 5000 [USP'U]/ML
5500 INJECTION INTRAVENOUS; SUBCUTANEOUS ONCE
Refills: 0 | Status: COMPLETED | OUTPATIENT
Start: 2022-01-01 | End: 2022-01-02

## 2022-01-01 RX ORDER — METOPROLOL TARTRATE 50 MG
25 TABLET ORAL ONCE
Refills: 0 | Status: COMPLETED | OUTPATIENT
Start: 2022-01-01 | End: 2022-01-01

## 2022-01-01 RX ORDER — PIPERACILLIN AND TAZOBACTAM 4; .5 G/20ML; G/20ML
3.38 INJECTION, POWDER, LYOPHILIZED, FOR SOLUTION INTRAVENOUS EVERY 12 HOURS
Refills: 0 | Status: COMPLETED | OUTPATIENT
Start: 2022-01-01 | End: 2022-01-08

## 2022-01-01 RX ORDER — ACETAMINOPHEN 500 MG
650 TABLET ORAL EVERY 6 HOURS
Refills: 0 | Status: DISCONTINUED | OUTPATIENT
Start: 2022-01-01 | End: 2022-01-06

## 2022-01-01 RX ORDER — TETANUS TOXOID, REDUCED DIPHTHERIA TOXOID AND ACELLULAR PERTUSSIS VACCINE, ADSORBED 5; 2.5; 8; 8; 2.5 [IU]/.5ML; [IU]/.5ML; UG/.5ML; UG/.5ML; UG/.5ML
0.5 SUSPENSION INTRAMUSCULAR ONCE
Refills: 0 | Status: COMPLETED | OUTPATIENT
Start: 2022-01-01 | End: 2022-01-01

## 2022-01-01 RX ORDER — SODIUM CHLORIDE 9 MG/ML
500 INJECTION INTRAMUSCULAR; INTRAVENOUS; SUBCUTANEOUS ONCE
Refills: 0 | Status: COMPLETED | OUTPATIENT
Start: 2022-01-01 | End: 2022-01-01

## 2022-01-01 RX ORDER — DIGOXIN 250 MCG
250 TABLET ORAL ONCE
Refills: 0 | Status: DISCONTINUED | OUTPATIENT
Start: 2022-01-01 | End: 2022-01-01

## 2022-01-01 RX ORDER — SODIUM CHLORIDE 9 MG/ML
250 INJECTION INTRAMUSCULAR; INTRAVENOUS; SUBCUTANEOUS ONCE
Refills: 0 | Status: COMPLETED | OUTPATIENT
Start: 2022-01-01 | End: 2022-01-01

## 2022-01-01 RX ORDER — CEFAZOLIN SODIUM 1 G
1000 VIAL (EA) INJECTION ONCE
Refills: 0 | Status: COMPLETED | OUTPATIENT
Start: 2022-01-01 | End: 2022-01-01

## 2022-01-01 RX ORDER — DIGOXIN 250 MCG
250 TABLET ORAL EVERY 6 HOURS
Refills: 0 | Status: COMPLETED | OUTPATIENT
Start: 2022-01-01 | End: 2022-01-02

## 2022-01-01 RX ADMIN — SODIUM CHLORIDE 500 MILLILITER(S): 9 INJECTION INTRAMUSCULAR; INTRAVENOUS; SUBCUTANEOUS at 15:30

## 2022-01-01 RX ADMIN — Medication 5 MILLIGRAM(S): at 11:45

## 2022-01-01 RX ADMIN — SODIUM CHLORIDE 500 MILLILITER(S): 9 INJECTION INTRAMUSCULAR; INTRAVENOUS; SUBCUTANEOUS at 14:21

## 2022-01-01 RX ADMIN — Medication 40 MILLIGRAM(S): at 17:33

## 2022-01-01 RX ADMIN — Medication 100 MILLIGRAM(S): at 12:10

## 2022-01-01 RX ADMIN — Medication 650 MILLIGRAM(S): at 22:24

## 2022-01-01 RX ADMIN — Medication 10 MILLIGRAM(S): at 11:55

## 2022-01-01 RX ADMIN — Medication 5 MG/HR: at 16:39

## 2022-01-01 RX ADMIN — PIPERACILLIN AND TAZOBACTAM 3.38 GRAM(S): 4; .5 INJECTION, POWDER, LYOPHILIZED, FOR SOLUTION INTRAVENOUS at 21:20

## 2022-01-01 RX ADMIN — Medication 116 MILLIGRAM(S): at 12:10

## 2022-01-01 RX ADMIN — SODIUM CHLORIDE 250 MILLILITER(S): 9 INJECTION INTRAMUSCULAR; INTRAVENOUS; SUBCUTANEOUS at 12:45

## 2022-01-01 RX ADMIN — Medication 1000 MILLIGRAM(S): at 12:40

## 2022-01-01 RX ADMIN — SODIUM CHLORIDE 250 MILLILITER(S): 9 INJECTION INTRAMUSCULAR; INTRAVENOUS; SUBCUTANEOUS at 11:45

## 2022-01-01 RX ADMIN — Medication 10 MILLIGRAM(S): at 14:21

## 2022-01-01 RX ADMIN — Medication 25 MILLIGRAM(S): at 12:08

## 2022-01-01 RX ADMIN — Medication 80 MILLIGRAM(S): at 12:40

## 2022-01-01 RX ADMIN — Medication 250 MICROGRAM(S): at 16:07

## 2022-01-01 RX ADMIN — PIPERACILLIN AND TAZOBACTAM 200 GRAM(S): 4; .5 INJECTION, POWDER, LYOPHILIZED, FOR SOLUTION INTRAVENOUS at 20:50

## 2022-01-01 RX ADMIN — Medication 650 MILLIGRAM(S): at 21:54

## 2022-01-01 NOTE — ED ADULT TRIAGE NOTE - CHIEF COMPLAINT QUOTE
Pt BIBA c/o back pain s/p fall yesterday.  Pt reports mechanical fall.  Denies dizziness prior.  Denies LOC, blood thinner use.  Pt also reports increased swelling of legs.

## 2022-01-01 NOTE — H&P ADULT - ASSESSMENT
87 y/o female with new onset Afib with RVR, CHF exacerbation, EDILBERTO on CKD, RLL w/ sepsis, left UE cellulitis, Hx of COPD, CKD, HTN    Afib:  -Admit to tele  -New onset with CHADs vasc of 3  -No absolute contraindications to A/C, Hbg normal, normally doesn't fall  -Start on Heparin with EDILBERTO, repeat cbc daily, PT eval with recent fall  -f/u official echo  -check TSH, free T4   -avoid digoxin after 2 doses with severe EDILBERTO and low GFR  -BB ordered by Cardiology, titrate off Cardizem drip  -fall risk, ambulate with assistance     CHF/Anasarca:  -Likely due to dietary indiscretion with high sodium load over last week along with Afib causing reduced cardiac o/p  -Cardio-renal syndrome?  -F/U echo  -Cont. IV lasix  -daily weights  -monitor renal fxn/lytes on IV lasix  -No ace/arb with hypotension/EDILBERTO  -Cardiology f/u    EDILBERTO on CKD:  -Baseline Cr. as above  -Cardio-Renal Syndrome   -[place] placed  -I/Os  -Avoid any nephrotoxins  -Nephrology consult  -Patient agreeable to RRT if needed    RLL PNA/Sepsis:  -No fever, no WBC but has left shift  -No hx of aspiration, but based on location will cover for aspiration for now  -trend temp/WBC  -f/u cultures  -CT as above  -SBP/MAP above goal  -Cap refill normal  -Persistent lactate elevation due to Liver congestion from CHF/likely Pulm HTN causing decreased metabolism   -Nebs, oxygen prn, chest PT, incentive spirometer    LUE cellulitis:  -Likely from skin tear  -Cont. Abx  -trend wbc/temp  -f/u cultures   -distal pulses intact, no crepitus     COPD:  -Cont. o/p regimen  -PRN nebs    Confirm accurate medication list in AM

## 2022-01-01 NOTE — PATIENT PROFILE ADULT - FALL HARM RISK - HARM RISK INTERVENTIONS

## 2022-01-01 NOTE — ED ADULT NURSE REASSESSMENT NOTE - NS ED NURSE REASSESS COMMENT FT1
pt sleeping arouses easily pt appears comfortable in stretcher, pt A&Ox4, resp even and unlabored no distress noted, cardiac monitor showing afib, Cardizem gtt infusion @15ml/hr, pt has no complaints at this time, gave report to HR RN LETHA and pt moved to CDU and placed on monitor

## 2022-01-01 NOTE — H&P ADULT - HISTORY OF PRESENT ILLNESS
85 y/o female from home with history of HTN, CKD baseline Cr. 1.7, COPD not on home 02, remote hx of breast cancer s/p mastectomy in 85, colon cancer s/p bowel resection 12 years ago, chronic LE edema. Patient lives alone and states shes usually independent with no assistive devices. Patient states she sustained a mechanical fall 1 week ago landing on her left side. She denies LOC, head or neck pain, She did sustain a skin tear to her left forearm. She denies being on the floor for more than a few minutes. She admits to feeling weak since the fall and has been ordering out chinese food most of the week. She has been eating won ton soup, fried rice, and chicken lo mein. She normally sees Dr. Snyder who has told her not to use any excess salt as she has chronic LE edema. She denies any hx of Afib, or CAD. She came to ED after she noted weight gain, SCHMID, and all her clothes fitting more tightly. She denies CP, fever, chills, N/V or diaphoresis. In the ED patient noted to be in afib w/ RVR, Anasarca, EDILBERTO on CKD, and with RLL PNA. She was cultured, given IV abx, started on Cardizem drip after PO cardizem and BB failed to control her HR. She was seen by Cardiology who did bedside echo showing diastolic dysfunction and dilated IVC, CTs showed anasarca and pleural effusions with RLLL infiltrate. COVID neg, U/A neg. no focal weakness. Patient given IV digoxin, tafoya placed, and IV lasix given with 600ml o/p. Currently awake in NAD.

## 2022-01-01 NOTE — ED PROVIDER NOTE - NS ED ROS FT
Constitutional: no fever, no sweats, and no chills.  CV: no chest pain, +edema.  Resp: no cough, no dyspnea  GI: no abdominal pain, no nausea and no vomiting.  MSK: no msk pain, no weakness  Skin: +lesions, and no rashes.  Neuro: no LOC, no headache, no sensory deficits, and no dizziness  ROS otherwise negative except as noted in HPI.

## 2022-01-01 NOTE — ED ADULT NURSE NOTE - OBJECTIVE STATEMENT
pt BIBA from home for reports of fall last week, states she has a cut on her left arm. pt noted to have skin tear to left forearm with redness and swelling. pt also noted to have bilateral upper and lower extremity pitting edema and crackles to lungs on auscultation. pt with no complaints of chest pain or SOB, no resp distress present. pt is afebrile rectally, reports she has not taken her meds this morning as of yet. pt noted to be in rapid AF and ER MD at bedside for immediate evaluation.

## 2022-01-01 NOTE — ED PROVIDER NOTE - CLINICAL SUMMARY MEDICAL DECISION MAKING FREE TEXT BOX
85yo F w/pmh breast cancer, colon cancer, HTN, CHF presents for worsening leg swelling x1 week, found to be in afib w/ RVR by EMS. EKG shows afib w/ RVR 150bpm in ER. Metoprolol 5 IV, diltiazem 10 IV given with HR reduced to 130s. Lasix 80 IV given. Small fluid bolus given due to EDILBERTO, concern for intravascular hypovolemia. Ancef given for cellulitis.

## 2022-01-01 NOTE — ED PROVIDER NOTE - ATTENDING CONTRIBUTION TO CARE
I, Sophie Lucero DO, have personally provided 40 minutes of critical care time exclusive of time spent on separately billable procedures. Time includes review of laboratory data, radiology results, discussion with consultants, and monitoring for potential decompensation. Interventions were performed as documented above.     I personally saw the patient with the resident, and completed the key components of the history and physical exam. I then discussed the management plan with the resident.    87 y/o F with PMH breast Ca, colon Ca, HTN, CHF presents for worsening b/l LE, sustained a fall last week with left arm skin tear that is now erythematous and edematous. Has been taking 40 mg PO lasix at home without improvement. Found to be in rapid A fib - no history of this, not anticoagulated. Denies chest pain, SOB, palpitations.    AP - NAD, well appearing, tachycardic irregularly irregular, lungs CTA B/L, abd soft, NT/ND, 2+ pitting edema to her axillae, 3+ pitting edema b/l LE, 2+ symmetrical pulses. 4 cm skin tear on left forearm with surrounding erythema, warmth, induration and pitting edema to the elbow.    Will control heart rate, small trials of fluid boluses, ABx for cellulitis, cardiology consult.

## 2022-01-01 NOTE — ED PROVIDER NOTE - NSICDXPASTMEDICALHX_GEN_ALL_CORE_FT
PAST MEDICAL HISTORY:  Breast cancer     Colon cancer     Hypertension     Macular degeneration     Renal insufficiency

## 2022-01-01 NOTE — ED PROVIDER NOTE - PROGRESS NOTE DETAILS
Resident Nikkie Monson: spoke with patient about lab results, plan for admission. Spoke to daughter by phone, she reports pt has been ambulatory after her fall but moving less than usual. Discussed lab results, plan.    Daughter: [phone] [place]: Patient reassessed, remains tachycardic to 140's - 150's, tafoya placed with minimal urine output. Will give another 500 cc fluid bolus and another 10 IV cardizem. Patient already loaded with PO medication - will continue to monitor.

## 2022-01-01 NOTE — ED ADULT NURSE REASSESSMENT NOTE - NS ED NURSE REASSESS COMMENT FT1
pt sleeping and arouses easily, resp even and unlabored no distress noted cardiac monitor in placed showing rapid Afib MD Monson and MD Guzman aware, pt has no complaints at this time

## 2022-01-01 NOTE — ED PROVIDER NOTE - NSICDXPASTSURGICALHX_GEN_ALL_CORE_FT
PAST SURGICAL HISTORY:  History of cholecystectomy     History of colon resection     History of hip replacement, total, left     History of right mastectomy     History of tonsillectomy     Status post thoracentesis Left Lung

## 2022-01-01 NOTE — CONSULT NOTE ADULT - ASSESSMENT
87 y/o F with PMH Breast cancer, Mastectomy 1985, colon ca resection 2010, VATS pleurectomy drainage 2018, COPD, CKD, presents to ED with c/o Leg swelling x 1 week. States fell on monday, able to get off floor, since then worsening shortness of breath, and leg swelling. Denies palpitations, chest pains. Noted to Be afib RVR in ED, given diltiazem. Edema noted to abd.     New Afib RVR  - Cardizem given in ED  - would avoid further cardizem till TTE and EF known  - metoprolol IV 5 mg as needed  - metoprolol 25 mg PO q 8 hold SBP <95, HR < 60  - TTE ordered   - telemetry monitoring   - CHADSVASC 3-     CKD  - unknown baseline   - monitor with diuresis     Heart failure   - TTE pending  - Edema up to abd  - pro bnp [id number]  - tele monitoring  - Lasix 40mg IV daily  - med reconsiliation  85 y/o F with PMH Breast cancer, Mastectomy 1985, colon ca resection 2010, VATS pleurectomy drainage 2018, COPD, CKD, presents to ED with c/o Leg swelling x 1 week. States fell on monday, able to get off floor, since then worsening shortness of breath, and leg swelling. Denies palpitations, chest pains. Noted to Be afib RVR in ED, given diltiazem. Edema noted to abd.     New Afib RVR  - Cardizem given in ED  - would avoid further cardizem till TTE and EF known  - metoprolol IV 5 mg as needed  - metoprolol 25 mg PO q 8 hold SBP <95, HR < 60  - TTE ordered   - telemetry monitoring   - CHADSVASC 3- age sex   - digoxin 250mcg IV 2 doses 6 hours apart  - dig level tomorrow    CKD  - unknown baseline   - monitor with diuresis     Heart failure diastolic  - TTE pending  - pocus echo diastolic HF with RV dysfunction , IVC dilated   - Edema up to abd  - pro bnp [id number]  - tele monitoring  - u/s abd eval ascites   - Lasix 40mg IV BID  - med reconciliation

## 2022-01-01 NOTE — ED PROVIDER NOTE - NSICDXFAMILYHX_GEN_ALL_CORE_FT
FAMILY HISTORY:  Child  Still living? Yes, Estimated age: Age Unknown  Family history of hypertension, Age at diagnosis: Age Unknown

## 2022-01-01 NOTE — ED ADULT NURSE NOTE - NSIMPLEMENTINTERV_GEN_ALL_ED
Implemented All Fall Risk Interventions:  [place] to call system. Call bell, personal items and telephone within reach. Instruct patient to call for assistance. Room bathroom lighting operational. Non-slip footwear when patient is off stretcher. Physically safe environment: no spills, clutter or unnecessary equipment. Stretcher in lowest position, wheels locked, appropriate side rails in place. Provide visual cue, wrist band, yellow gown, etc. Monitor gait and stability. Monitor for mental status changes and reorient to person, place, and time. Review medications for side effects contributing to fall risk. Reinforce activity limits and safety measures with patient and family.

## 2022-01-01 NOTE — ED PROVIDER NOTE - CARE PLAN
1 Principal Discharge DX:	Acute on chronic systolic congestive heart failure  Secondary Diagnosis:	EDILBERTO (acute kidney injury)  Secondary Diagnosis:	Cellulitis

## 2022-01-01 NOTE — H&P ADULT - NSICDXPASTMEDICALHX_GEN_ALL_CORE_FT
PAST MEDICAL HISTORY:  Breast cancer     Colon cancer     History of COPD     Hypertension     Macular degeneration     Renal insufficiency baseline Cr. 1.7

## 2022-01-01 NOTE — ED PROVIDER NOTE - OBJECTIVE STATEMENT
85yo F w/pmh breast cancer, colon cancer, HTN, CHF presents for worsening leg swelling x1 week, found to be in afib w/ RVR by EMS. Reports swelling of entire leg bilaterally, no erythema or drainage. Denies CP, SOB, palpitations, n/v. She takes lasix 40mg daily at home. Also reports she fell a week ago, skin tear on L arm. Denies LOC, head trauma. Cardiologist is Dr. Snyder.

## 2022-01-02 LAB
ANION GAP SERPL CALC-SCNC: 16 MMOL/L — SIGNIFICANT CHANGE UP (ref 5–17)
ANION GAP SERPL CALC-SCNC: 17 MMOL/L — SIGNIFICANT CHANGE UP (ref 5–17)
APPEARANCE UR: ABNORMAL
APTT BLD: 29.3 SEC — SIGNIFICANT CHANGE UP (ref 27.5–35.5)
APTT BLD: 75.8 SEC — HIGH (ref 27.5–35.5)
APTT BLD: >200 SEC — CRITICAL HIGH (ref 27.5–35.5)
APTT BLD: >200 SEC — CRITICAL HIGH (ref 27.5–35.5)
BACTERIA # UR AUTO: ABNORMAL
BASOPHILS # BLD AUTO: 0 K/UL — SIGNIFICANT CHANGE UP (ref 0–0.2)
BASOPHILS NFR BLD AUTO: 0 % — SIGNIFICANT CHANGE UP (ref 0–2)
BILIRUB UR-MCNC: NEGATIVE — SIGNIFICANT CHANGE UP
BUN SERPL-MCNC: 97.4 MG/DL — HIGH (ref 8–20)
BUN SERPL-MCNC: 99.9 MG/DL — HIGH (ref 8–20)
CALCIUM SERPL-MCNC: 8.2 MG/DL — LOW (ref 8.6–10.2)
CALCIUM SERPL-MCNC: 8.2 MG/DL — LOW (ref 8.6–10.2)
CHLORIDE SERPL-SCNC: 93 MMOL/L — LOW (ref 98–107)
CHLORIDE SERPL-SCNC: 95 MMOL/L — LOW (ref 98–107)
CK SERPL-CCNC: 26 U/L — SIGNIFICANT CHANGE UP (ref 25–170)
CO2 SERPL-SCNC: 20 MMOL/L — LOW (ref 22–29)
CO2 SERPL-SCNC: 21 MMOL/L — LOW (ref 22–29)
COLOR SPEC: ABNORMAL
CREAT ?TM UR-MCNC: 66 MG/DL — SIGNIFICANT CHANGE UP
CREAT SERPL-MCNC: 2.68 MG/DL — HIGH (ref 0.5–1.3)
CREAT SERPL-MCNC: 2.73 MG/DL — HIGH (ref 0.5–1.3)
CULTURE RESULTS: NO GROWTH — SIGNIFICANT CHANGE UP
DIFF PNL FLD: ABNORMAL
DIGOXIN SERPL-MCNC: 1.3 NG/ML — SIGNIFICANT CHANGE UP (ref 0.8–2)
EOSINOPHIL # BLD AUTO: 0.09 K/UL — SIGNIFICANT CHANGE UP (ref 0–0.5)
EOSINOPHIL NFR BLD AUTO: 0.8 % — SIGNIFICANT CHANGE UP (ref 0–6)
EPI CELLS # UR: SIGNIFICANT CHANGE UP
GLUCOSE SERPL-MCNC: 187 MG/DL — HIGH (ref 70–99)
GLUCOSE SERPL-MCNC: 226 MG/DL — HIGH (ref 70–99)
GLUCOSE UR QL: NEGATIVE — SIGNIFICANT CHANGE UP
HCT VFR BLD CALC: 41.9 % — SIGNIFICANT CHANGE UP (ref 34.5–45)
HCT VFR BLD CALC: 42.7 % — SIGNIFICANT CHANGE UP (ref 34.5–45)
HCT VFR BLD CALC: 43.3 % — SIGNIFICANT CHANGE UP (ref 34.5–45)
HGB BLD-MCNC: 13.6 G/DL — SIGNIFICANT CHANGE UP (ref 11.5–15.5)
HGB BLD-MCNC: 13.7 G/DL — SIGNIFICANT CHANGE UP (ref 11.5–15.5)
HGB BLD-MCNC: 13.8 G/DL — SIGNIFICANT CHANGE UP (ref 11.5–15.5)
IMM GRANULOCYTES NFR BLD AUTO: 0.6 % — SIGNIFICANT CHANGE UP (ref 0–1.5)
INR BLD: 1.22 RATIO — HIGH (ref 0.88–1.16)
INR BLD: 1.45 RATIO — HIGH (ref 0.88–1.16)
KETONES UR-MCNC: ABNORMAL
LACTATE SERPL-SCNC: 2.2 MMOL/L — HIGH (ref 0.5–2)
LACTATE SERPL-SCNC: 2.8 MMOL/L — HIGH (ref 0.5–2)
LEUKOCYTE ESTERASE UR-ACNC: ABNORMAL
LYMPHOCYTES # BLD AUTO: 0.61 K/UL — LOW (ref 1–3.3)
LYMPHOCYTES # BLD AUTO: 5.2 % — LOW (ref 13–44)
MAGNESIUM SERPL-MCNC: 2.8 MG/DL — HIGH (ref 1.6–2.6)
MCHC RBC-ENTMCNC: 25.2 PG — LOW (ref 27–34)
MCHC RBC-ENTMCNC: 25.4 PG — LOW (ref 27–34)
MCHC RBC-ENTMCNC: 25.4 PG — LOW (ref 27–34)
MCHC RBC-ENTMCNC: 31.9 GM/DL — LOW (ref 32–36)
MCHC RBC-ENTMCNC: 32.1 GM/DL — SIGNIFICANT CHANGE UP (ref 32–36)
MCHC RBC-ENTMCNC: 32.5 GM/DL — SIGNIFICANT CHANGE UP (ref 32–36)
MCV RBC AUTO: 77.7 FL — LOW (ref 80–100)
MCV RBC AUTO: 79.2 FL — LOW (ref 80–100)
MCV RBC AUTO: 79.6 FL — LOW (ref 80–100)
MONOCYTES # BLD AUTO: 0.72 K/UL — SIGNIFICANT CHANGE UP (ref 0–0.9)
MONOCYTES NFR BLD AUTO: 6.1 % — SIGNIFICANT CHANGE UP (ref 2–14)
NEUTROPHILS # BLD AUTO: 10.25 K/UL — HIGH (ref 1.8–7.4)
NEUTROPHILS NFR BLD AUTO: 87.3 % — HIGH (ref 43–77)
NITRITE UR-MCNC: POSITIVE
OSMOLALITY UR: 356 MOSM/KG — SIGNIFICANT CHANGE UP (ref 300–1000)
PH UR: 5 — SIGNIFICANT CHANGE UP (ref 5–8)
PHOSPHATE SERPL-MCNC: 4.5 MG/DL — SIGNIFICANT CHANGE UP (ref 2.4–4.7)
PLATELET # BLD AUTO: 121 K/UL — LOW (ref 150–400)
PLATELET # BLD AUTO: 128 K/UL — LOW (ref 150–400)
PLATELET # BLD AUTO: 133 K/UL — LOW (ref 150–400)
POTASSIUM SERPL-MCNC: 4.9 MMOL/L — SIGNIFICANT CHANGE UP (ref 3.5–5.3)
POTASSIUM SERPL-MCNC: 5.1 MMOL/L — SIGNIFICANT CHANGE UP (ref 3.5–5.3)
POTASSIUM SERPL-SCNC: 4.9 MMOL/L — SIGNIFICANT CHANGE UP (ref 3.5–5.3)
POTASSIUM SERPL-SCNC: 5.1 MMOL/L — SIGNIFICANT CHANGE UP (ref 3.5–5.3)
PROT ?TM UR-MCNC: 40 MG/DL — HIGH (ref 0–12)
PROT UR-MCNC: 15
PROT/CREAT UR-RTO: 0.6 RATIO — HIGH
PROTHROM AB SERPL-ACNC: 14 SEC — HIGH (ref 10.6–13.6)
PROTHROM AB SERPL-ACNC: 16.5 SEC — HIGH (ref 10.6–13.6)
RBC # BLD: 5.39 M/UL — HIGH (ref 3.8–5.2)
RBC # BLD: 5.39 M/UL — HIGH (ref 3.8–5.2)
RBC # BLD: 5.44 M/UL — HIGH (ref 3.8–5.2)
RBC # FLD: 18.2 % — HIGH (ref 10.3–14.5)
RBC # FLD: 18.4 % — HIGH (ref 10.3–14.5)
RBC # FLD: 18.6 % — HIGH (ref 10.3–14.5)
RBC CASTS # UR COMP ASSIST: >50 /HPF (ref 0–4)
SODIUM SERPL-SCNC: 129 MMOL/L — LOW (ref 135–145)
SODIUM SERPL-SCNC: 133 MMOL/L — LOW (ref 135–145)
SODIUM UR-SCNC: <30 MMOL/L — SIGNIFICANT CHANGE UP
SP GR SPEC: 1.01 — SIGNIFICANT CHANGE UP (ref 1.01–1.02)
SPECIMEN SOURCE: SIGNIFICANT CHANGE UP
T4 FREE SERPL-MCNC: 1.3 NG/DL — SIGNIFICANT CHANGE UP (ref 0.9–1.8)
TSH SERPL-MCNC: 5.33 UIU/ML — HIGH (ref 0.27–4.2)
UROBILINOGEN FLD QL: NEGATIVE — SIGNIFICANT CHANGE UP
WBC # BLD: 11.74 K/UL — HIGH (ref 3.8–10.5)
WBC # BLD: 11.84 K/UL — HIGH (ref 3.8–10.5)
WBC # BLD: 12.09 K/UL — HIGH (ref 3.8–10.5)
WBC # FLD AUTO: 11.74 K/UL — HIGH (ref 3.8–10.5)
WBC # FLD AUTO: 11.84 K/UL — HIGH (ref 3.8–10.5)
WBC # FLD AUTO: 12.09 K/UL — HIGH (ref 3.8–10.5)
WBC UR QL: ABNORMAL

## 2022-01-02 PROCEDURE — 93010 ELECTROCARDIOGRAM REPORT: CPT

## 2022-01-02 PROCEDURE — 99497 ADVNCD CARE PLAN 30 MIN: CPT

## 2022-01-02 PROCEDURE — 99233 SBSQ HOSP IP/OBS HIGH 50: CPT

## 2022-01-02 RX ORDER — TIOTROPIUM BROMIDE 18 UG/1
1 CAPSULE ORAL; RESPIRATORY (INHALATION) DAILY
Refills: 0 | Status: DISCONTINUED | OUTPATIENT
Start: 2022-01-02 | End: 2022-01-07

## 2022-01-02 RX ORDER — FUROSEMIDE 40 MG
60 TABLET ORAL
Refills: 0 | Status: DISCONTINUED | OUTPATIENT
Start: 2022-01-02 | End: 2022-01-07

## 2022-01-02 RX ORDER — HEPARIN SODIUM 5000 [USP'U]/ML
5500 INJECTION INTRAVENOUS; SUBCUTANEOUS EVERY 6 HOURS
Refills: 0 | Status: DISCONTINUED | OUTPATIENT
Start: 2022-01-02 | End: 2022-01-05

## 2022-01-02 RX ORDER — FUROSEMIDE 40 MG
80 TABLET ORAL ONCE
Refills: 0 | Status: COMPLETED | OUTPATIENT
Start: 2022-01-02 | End: 2022-01-02

## 2022-01-02 RX ORDER — HEPARIN SODIUM 5000 [USP'U]/ML
2500 INJECTION INTRAVENOUS; SUBCUTANEOUS EVERY 6 HOURS
Refills: 0 | Status: DISCONTINUED | OUTPATIENT
Start: 2022-01-02 | End: 2022-01-05

## 2022-01-02 RX ORDER — DILTIAZEM HCL 120 MG
30 CAPSULE, EXT RELEASE 24 HR ORAL EVERY 6 HOURS
Refills: 0 | Status: DISCONTINUED | OUTPATIENT
Start: 2022-01-02 | End: 2022-01-02

## 2022-01-02 RX ORDER — DILTIAZEM HCL 120 MG
10 CAPSULE, EXT RELEASE 24 HR ORAL EVERY 6 HOURS
Refills: 0 | Status: DISCONTINUED | OUTPATIENT
Start: 2022-01-02 | End: 2022-01-08

## 2022-01-02 RX ORDER — DILTIAZEM HCL 120 MG
60 CAPSULE, EXT RELEASE 24 HR ORAL EVERY 6 HOURS
Refills: 0 | Status: DISCONTINUED | OUTPATIENT
Start: 2022-01-02 | End: 2022-01-06

## 2022-01-02 RX ORDER — HEPARIN SODIUM 5000 [USP'U]/ML
INJECTION INTRAVENOUS; SUBCUTANEOUS
Qty: 25000 | Refills: 0 | Status: DISCONTINUED | OUTPATIENT
Start: 2022-01-02 | End: 2022-01-03

## 2022-01-02 RX ORDER — HEPARIN SODIUM 5000 [USP'U]/ML
5500 INJECTION INTRAVENOUS; SUBCUTANEOUS ONCE
Refills: 0 | Status: COMPLETED | OUTPATIENT
Start: 2022-01-02 | End: 2022-01-02

## 2022-01-02 RX ADMIN — Medication 80 MILLIGRAM(S): at 15:28

## 2022-01-02 RX ADMIN — PIPERACILLIN AND TAZOBACTAM 25 GRAM(S): 4; .5 INJECTION, POWDER, LYOPHILIZED, FOR SOLUTION INTRAVENOUS at 05:18

## 2022-01-02 RX ADMIN — Medication 40 MILLIGRAM(S): at 10:30

## 2022-01-02 RX ADMIN — Medication 60 MILLIGRAM(S): at 18:16

## 2022-01-02 RX ADMIN — HEPARIN SODIUM 1200 UNIT(S)/HR: 5000 INJECTION INTRAVENOUS; SUBCUTANEOUS at 00:49

## 2022-01-02 RX ADMIN — HEPARIN SODIUM 5500 UNIT(S): 5000 INJECTION INTRAVENOUS; SUBCUTANEOUS at 22:30

## 2022-01-02 RX ADMIN — Medication 25 MILLIGRAM(S): at 15:16

## 2022-01-02 RX ADMIN — Medication 250 MICROGRAM(S): at 00:26

## 2022-01-02 RX ADMIN — HEPARIN SODIUM 1200 UNIT(S)/HR: 5000 INJECTION INTRAVENOUS; SUBCUTANEOUS at 22:24

## 2022-01-02 RX ADMIN — Medication 60 MILLIGRAM(S): at 18:17

## 2022-01-02 RX ADMIN — Medication 30 MILLIGRAM(S): at 13:20

## 2022-01-02 RX ADMIN — HEPARIN SODIUM 5500 UNIT(S): 5000 INJECTION INTRAVENOUS; SUBCUTANEOUS at 00:52

## 2022-01-02 RX ADMIN — Medication 5 MG/HR: at 10:44

## 2022-01-02 RX ADMIN — Medication 25 MILLIGRAM(S): at 22:07

## 2022-01-02 RX ADMIN — PIPERACILLIN AND TAZOBACTAM 25 GRAM(S): 4; .5 INJECTION, POWDER, LYOPHILIZED, FOR SOLUTION INTRAVENOUS at 18:15

## 2022-01-02 NOTE — PROGRESS NOTE ADULT - ASSESSMENT
85 y/o female with new onset Afib with RVR, CHF exacerbation, EDILBERTO on CKD, RLL w/ sepsis, left UE cellulitis, Hx of COPD, CKD, HTN    Anasarca due to most likely combined R and dyastolic L side CHF complicated by Afib wtih RVR,   - upgraded to step down  - increased Furosemide to 60 bid with methalazone as per per cardiology team  - startd PO cardizem to transition from ggt and BB if pt bp allows  - Nephrology team and Cardiology team consult noted   - consulted IR for paracentesis in am  - pending formal ECHO      #EDILBERTO on CKD most likely venous congestion vs cardi-renal in nature  - Nephrology consult noted  - diuretics as above  - may consider milrinone later today  - avoid nephrotoxic agents     # A.fib with RVR, now better controlled   - bb and ccb as above  - c/w heparing ggt, aptt per protocol      # Hematuria, in setting of heparing ggt  - mild on exam  - will continue to observe for now  - cbc bid    RLL PNA/Sepsis:  -c/w Abx   -Nebs, oxygen prn, chest PT, incentive spirometer    LUE cellulitis:  -c/w Abx     COPD, w/o apparent exacerbation on exam  - on 3 l nc at this time at base line on 2 L nc  -Cont. o/p regimen  -PRN nebs    DVT ppx - on heparin ggt   CODE/Socia - Discussed with pt at bed side, her current condition and tx plan and prognosis. We also went over what it means to be dnr/dni and full code. At the moment pt elected to be full code, but pt doesn't want to be on long term vent. ( spent 30 min at bed side) daughter was updated.  Dispo - Guarded prognosis

## 2022-01-02 NOTE — CONSULT NOTE ADULT - ASSESSMENT
CKD(III): decompensated CHF (? R>L sided)  EDILBERTO, r/o prerenal  Probable R sided PNA  Hypotension   COVID(-)  CT scan no hydronephrosis; atrophic L kidney  - avoid potential nephrotoxins  - check urine studies  - renal sonogram  - consider holding Lasix for now if predominantly R sided CHF and hypotensive; consider repeat ECHO

## 2022-01-02 NOTE — CHART NOTE - NSCHARTNOTEFT_GEN_A_CORE
Called by RN to CDU unit for pt. noted with new onset hematuria per tafoya cath. Pt. is an 87 y/o woman admitted with CHF and new onset a-fib. She is on a heparin and cardizem drip. The pt. also denies any CP, SOB, abd/ back pain, light headness, dizziness, palpitations or any other c/o @ present.   O: NAD, A&Ox3  VS: T 97.8, / 59, HR 89 - 110's, R 18, Sat 95%  Abd: Soft, non-tender, +BS  Tafoya cath bag noted with light colored strawberry colored urin with a blood clot.   A/P: Hematuria.   - Stat CBC and PT/ PTT/ INR  * Case discussed with Dr. Díaz who wishes to continue heparin drip for now as benefit outweighs risk at this point. Will re-eval once stat labs are obtained.   * RN made aware of plan

## 2022-01-02 NOTE — CHART NOTE - NSCHARTNOTEFT_GEN_A_CORE
Following up on labs and renal function. Discussed with Dr. Sarkar who advised to call Dr. Caraballo, nephrology.  Informed Dr. Caraballo of repeat labs values including creatinine of 2.73 and Na+ of 129 and echo results.  He reports he will review chart and advise.

## 2022-01-02 NOTE — PROGRESS NOTE ADULT - SUBJECTIVE AND OBJECTIVE BOX
[facility]                                                         CARDIOLOGY AT [facility]                                                                  [address] [facility], [place]-[id number] [place] [address]- Formerly [facility]                                                         Telephone: [phone]. Fax:[phone]                                                                             PROGRESS NOTE    Reason for follow up: Afib, Heart failure   Update: [facility], O2 via NC, desaturations off oxygen.   Pt states feeling better, have chest tightness intermittent. Breathing is improved  Anasarca, +JVD. Blood pressures soft      Review of symptoms:   Cardiac: + chest pain. No dyspnea. No palpitations.  Respiratory: no cough. No dyspnea  Gastrointestinal: No diarrhea. No abdominal pain. No bleeding.   Neuro: No focal neuro complaints.      Vitals:  T(C): 36.4 (01-02-22 @ 05:13), Max: 36.7 (01-01-22 @ 23:57)  HR: 102 (01-02-22 @ 05:13) (93 - 152)  BP: 94/65 (01-02-22 @ 05:13) (83/64 - 130/60)  RR: 20 (01-02-22 @ 05:13) (16 - 22)  SpO2: 95% (01-02-22 @ 05:13) (92% - 98%)      Weight (kg): 68 (01-02 @ 00:15)      PHYSICAL EXAM:  Appearance: Comfortable. No acute distress  HEENT:  Atraumatic. Normocephalic.  Normal oral mucosa cataract   Neurologic: A & O x 3, no focal deficits. EOMI.  Cardiovascular: Normal S1 S2, No murmur, no rubs/gallops. + JVD, + edema  Respiratory: crackels R side, supplemental o2, unlabored   Gastrointestinal:  Soft, Non-tender, + BS  Lower Extremities: No edema  Psychiatry: Patient is calm. No agitation.   Skin: No rashes/ ecchymoses/cyanosis/ulcers visualized on the face, hands or feet.        CURRENT MEDICATIONS:  diltiazem Infusion 5 mG/Hr IV Continuous <Continuous>  furosemide   Injectable 40 milliGRAM(s) IV Push two times a day  metoprolol tartrate 25 milliGRAM(s) Oral three times a day    tiotropium 18 MICROgram(s) Capsule  piperacillin/tazobactam IVPB..  heparin  Infusion.        LABS:	 	  CARDIAC MARKERS ( 01 Jan 2022 11:45 )  x     / 0.04 ng/mL / x     / x     / x      p-BNP 01 Jan 2022 11:45: 5458 pg/mL                          13.6   11.74 )-----------( 121      ( 02 Jan 2022 07:17 )             41.9       01-01  132<L>  |  93<L>  |  97.7<H>  ----------------------------<  163<H>  5.2   |  24.0  |  2.68<H>    Ca    8.6      01 Jan 2022 11:45  Mg     2.9     01-01      TPro  5.7<L>  /  Alb  3.0<L>  /  TBili  1.5  /  DBili  x   /  AST  33<H>  /  ALT  22  /  AlkPhos  172<H>  01-01      proBNP: Serum Pro-Brain Natriuretic Peptide: 5458 pg/mL (01-01 @ 11:45)      TELEMETRY: Afib rates under 120. SPo2 desaturations

## 2022-01-02 NOTE — CHART NOTE - NSCHARTNOTEFT_GEN_A_CORE
Pt. again seen 2* noted with 3 beat run of v-tach with occasional PVC's noted per cardiac monitor. Pt. was asx. during event and continues to deny any c/o. She denies any CP, back/ abd pain, SOB, palpitations, light headness/ dizziness or any other c/o @ present. Likewise, the pt's repeat PTT = >200. Heparin drip held however, PTT was drawn from arm with heparin drip running. Repeat PTT drawn with heparin on hold and in opposite arm, still reading >200. Will continue to have heparin on hold for 2 more hours then repeat PTT.    O: NAD, Alert, elderly woman   VSS: 97.8, / 81, 's, Sat 95%  HEENT: AT, pupils reactive  Neck: supple  Heart: +S1, S2 noted  Lungs: No SOB, occasional diffuse rales   Abd: Soft, NT/ ND, +BS  A/P: Asymptomatic 3 beat run of v-tach. Few occasional PVC's afterwards but none since  - Stat EKG ord and (p)  - Case discussed with cardiology NP, JEMAL Wiggins who came to re-eval pt. as well.   - Case discussed with Dr. Díaz and PTT to be held for an additional 2 hours with adjusted heparin  dose which Dr. Díaz states she will ord. Pt. also upgraded to ICU via Dr. Díaz Pt. again seen 2* noted with 3 beat run of v-tach with occasional PVC's noted per cardiac monitor. Pt. was asx. during event and continues to deny any c/o. She denies any CP, back/ abd pain, SOB, palpitations, light headness/ dizziness or any other c/o @ present. Likewise, the pt's repeat PTT = >200. Heparin drip held however, PTT was drawn from arm with heparin drip running. Repeat PTT drawn with heparin on hold and in opposite arm, still reading >200. Will continue to have heparin on hold for 2 more hours then repeat PTT.    O: NAD, Alert, elderly woman   VSS: 97.8, / 81, 's, Sat 95%  HEENT: AT, pupils reactive  Neck: supple  Heart: +S1, S2 noted  Lungs: No SOB, occasional diffuse rales   Abd: Soft, NT/ ND, +BS  A/P: Asymptomatic 3 beat run of v-tach. Few occasional PVC's afterwards but none since  - Stat EKG ord = 1 PVC, no acute st-t wave changes noted. Official cardio review (p)  - Case discussed with cardiology NP, JEMAL Wiggins who came to re-eval pt. as well.   - Case discussed with Dr. Díaz and PTT to be held for an additional 2 hours with adjusted heparin  dose which Dr. Díaz states she will ord. Pt. also upgraded to ICU via Dr. Díaz

## 2022-01-02 NOTE — PROGRESS NOTE ADULT - SUBJECTIVE AND OBJECTIVE BOX
[facility] Division of Hospital Medicine    Chief Complaint:  chf, lacey,    SUBJECTIVE: pt reports feeling somewhat better, chest tightness has resolved,    OVERNIGHT EVENTS: none reported     Patient denies chest pain, SOB, abd pain, N/V, fever, chills, dysuria or any other complaints. All remainder ROS negative.     MEDICATIONS  (STANDING):  diltiazem    Tablet 30 milliGRAM(s) Oral every 6 hours  diltiazem Infusion 5 mG/Hr (5 mL/Hr) IV Continuous <Continuous>  furosemide   Injectable 40 milliGRAM(s) IV Push two times a day  heparin  Infusion.  Unit(s)/Hr (12 mL/Hr) IV Continuous <Continuous>  metoprolol tartrate 25 milliGRAM(s) Oral three times a day  piperacillin/tazobactam IVPB.. 3.375 Gram(s) IV Intermittent every 12 hours  tiotropium 18 MICROgram(s) Capsule 1 Capsule(s) Inhalation daily    MEDICATIONS  (PRN):  acetaminophen     Tablet .. 650 milliGRAM(s) Oral every 6 hours PRN Temp greater or equal to 38C (100.4F), Mild Pain (1 - 3)  aluminum hydroxide/magnesium hydroxide/simethicone Suspension 30 milliLiter(s) Oral every 4 hours PRN Dyspepsia  heparin   Injectable 5500 Unit(s) IV Push every 6 hours PRN For aPTT less than 40  heparin   Injectable 2500 Unit(s) IV Push every 6 hours PRN For aPTT between 40 - 57  melatonin 3 milliGRAM(s) Oral at bedtime PRN Insomnia  ondansetron Injectable 4 milliGRAM(s) IV Push every 8 hours PRN Nausea and/or Vomiting        I&O's Summary    2022 07:  -  2022 07:00  --------------------------------------------------------  IN: 0 mL / OUT: 475 mL / NET: -475 mL    2022 07:  -  2022 12:35  --------------------------------------------------------  IN: 81 mL / OUT: 0 mL / NET: 81 mL        PHYSICAL EXAM:  Vital Signs Last 24 Hrs  T(C): 36.4 (2022 05:13), Max: 36.7 (2022 23:57)  T(F): 97.5 (2022 05:13), Max: 98 (2022 23:57)  HR: 112 (2022 10:27) (93 - 152)  BP: 104/56 (2022 10:27) (83/64 - 119/69)  BP(mean): 73 (2022 23:57) (63 - 81)  RR: 20 (2022 10:27) (16 - 22)  SpO2: 95% (2022 10:27) (92% - 98%)        CONSTITUTIONAL: NAD,   ENMT: Moist oral mucosa, no pharyngeal injection or exudates; normal dentition; + JVD  RESPIRATORY: Normal respiratory effort; lungs are with crepitation on bases R>>L and some rails in central air way   CARDIOVASCULAR: irregular rate and rhythm, distant  S1 and S2, no murmur/rub/gallop + 3 pitting edema up to high tights, + anasarca lower extremity edema; Peripheral pulses are palpable bilaterally  ABDOMEN: Nontender to palpation, normoactive bowel sounds, no rebound/guarding; No hepatosplenomegaly  MUSCLOSKELETAL:  no clubbing or cyanosis of digits; no joint swelling or tenderness to palpation  PSYCH: A+O to person, place, and time; affect appropriate  NEUROLOGY: CN 2-12 are intact and symmetric; no gross sensory deficits; was observed moving all 4 ext against gravity cooperating with exam.   SKIN: atrophic skin changes, erythema over L forearm, warmer to touch    LABS:                        13.8   12.09 )-----------( 128      ( 2022 11:59 )             43.3     -    133<L>  |  95<L>  |  97.4<H>  ----------------------------<  226<H>  5.1   |  21.0<L>  |  2.68<H>    Ca    8.2<L>      2022 12:04  Phos  4.5       Mg     2.8         TPro  5.7<L>  /  Alb  3.0<L>  /  TBili  1.5  /  DBili  x   /  AST  33<H>  /  ALT  22  /  AlkPhos  172<H>      PT/INR - ( 2022 11:45 )   PT: 15.0 sec;   INR: 1.31 ratio         PTT - ( 2022 07:25 )  PTT:75.8 sec  CARDIAC MARKERS ( 2022 07:17 )  x     / x     / 26 U/L / x     / x      CARDIAC MARKERS ( 2022 11:45 )  x     / 0.04 ng/mL / x     / x     / x          Urinalysis Basic - ( 2022 11:07 )    Color: Leslie / Appearance: Slightly Turbid / S.010 / pH: x  Gluc: x / Ketone: Trace  / Bili: Negative / Urobili: Negative   Blood: x / Protein: 15 / Nitrite: Positive   Leuk Esterase: Small / RBC: >50 /HPF / WBC 6-10   Sq Epi: x / Non Sq Epi: Occasional / Bacteria: Moderate        CAPILLARY BLOOD GLUCOSE            RADIOLOGY & ADDITIONAL TESTS:  Results Reviewed:   Imaging Personally Reviewed:  Electrocardiogram Personally Reviewed:

## 2022-01-02 NOTE — PROGRESS NOTE ADULT - ASSESSMENT
87 y/o F with PMH Breast cancer, Mastectomy 1985, colon ca resection 2010, VATS pleurectomy drainage 2018, COPD, CKD, presents to ED with c/o Leg swelling x 1 week. States fell on monday, able to get off floor, since then worsening shortness of breath, and leg swelling. Denies palpitations, chest pains. Noted to Be afib RVR in ED, given diltiazem. Edema noted to abd.     New Afib RVR  - would avoid further cardizem  - metoprolol IV 5 mg as needed  - metoprolol 25 mg PO q 8 hold SBP <95, HR < 60  - TTE ordered pending  - telemetry monitoring - afib rates better controlled today   - CHADSVASC 3, cont heparin gtt  - digoxin x2 given yesterday; dig level pending today    CKD  - unknown baseline   - monitor with diuresis   - labs pending this am     Heart failure diastolic  - TTE pending  - pocus echo diastolic HF with RV dysfunction , IVC dilated   - Edema up to abd  - pro bnp [id number]  - tele monitoring  - u/s abd eval ascites + x 4 quadrants- cont to diuresis  - Lasix 40mg IV BID- hold SBP < 95   87 y/o F with PMH Breast cancer, Mastectomy 1985, colon ca resection 2010, VATS pleurectomy drainage 2018, COPD, CKD, presents to ED with c/o Leg swelling x 1 week. States fell on monday, able to get off floor, since then worsening shortness of breath, and leg swelling. Denies palpitations, chest pains. Noted to Be afib RVR in ED, given diltiazem. Edema noted to abd.     New Afib RVR  - start Cardizem 30mg PO Q6 hours, ordered  - d/c cardizem gtt 30mins after giving PO cardizem   - cont metoprolol 25 mg PO q 8 hold SBP <95, HR < 60  - TTE ordered pending  - telemetry monitoring - afib rates better controlled today   - CHADSVASC 3, cont heparin gtt  - hematuria- monitor HH  - digoxin x2 given yesterday; dig level WNL today  - paracentesis for fluid removal ascities    CKD  - unknown baseline   - monitor with diuresis   - nephrology consult    Heart failure diastolic  - TTE pending  - pocus echo diastolic HF with RV dysfunction , IVC dilated   - u/s abd eval ascites + x 4 quadrants- needs paracentesis   - cont to diuresis  - increase Lasix 60mg IV BID- hold SBP < 95  - strict I and Os, poor urnie out put this am.

## 2022-01-02 NOTE — GOALS OF CARE CONVERSATION - ADVANCED CARE PLANNING - CONVERSATION DETAILS
Discussed need for admission and IV diuresis, Antibiotics, possible need for Dialysis. Risk of respiratory decompensation and need for mechanical ventilation.

## 2022-01-02 NOTE — PROGRESS NOTE ADULT - ATTENDING COMMENTS
voleume overloade.  Ascitis.  renal failure.  did not respond well to diuresois.   need to decongest the abdomen to iincrease perfusion to kidnesy  recommend large volume paracentesis.   increase lasix to 80 mg once and 60 BID with metolazone.   reocmmend hemodialysis if does not respond .  rate control with cardizedm and Metopolol and intermittnet digoxin.  will defer inotropic support with milrinone as patietn LVEf is normal. for RV funciton may be used, however, with her atrial fibrillation with rapid ventricular rate and renal failure and low BP. will defer. If does not respond to above, may be helpful.

## 2022-01-03 LAB
ALBUMIN SERPL ELPH-MCNC: 2.6 G/DL — LOW (ref 3.3–5.2)
ALBUMIN SERPL ELPH-MCNC: 2.6 G/DL — LOW (ref 3.3–5.2)
ALP SERPL-CCNC: 168 U/L — HIGH (ref 40–120)
ALP SERPL-CCNC: 170 U/L — HIGH (ref 40–120)
ALT FLD-CCNC: 13 U/L — SIGNIFICANT CHANGE UP
ALT FLD-CCNC: 16 U/L — SIGNIFICANT CHANGE UP
ANION GAP SERPL CALC-SCNC: 13 MMOL/L — SIGNIFICANT CHANGE UP (ref 5–17)
ANION GAP SERPL CALC-SCNC: 16 MMOL/L — SIGNIFICANT CHANGE UP (ref 5–17)
APTT BLD: 32.1 SEC — SIGNIFICANT CHANGE UP (ref 27.5–35.5)
APTT BLD: 93.3 SEC — HIGH (ref 27.5–35.5)
APTT BLD: >200 SEC — CRITICAL HIGH (ref 27.5–35.5)
AST SERPL-CCNC: 23 U/L — SIGNIFICANT CHANGE UP
AST SERPL-CCNC: 25 U/L — SIGNIFICANT CHANGE UP
BILIRUB SERPL-MCNC: 1.1 MG/DL — SIGNIFICANT CHANGE UP (ref 0.4–2)
BILIRUB SERPL-MCNC: 1.2 MG/DL — SIGNIFICANT CHANGE UP (ref 0.4–2)
BUN SERPL-MCNC: 99 MG/DL — HIGH (ref 8–20)
BUN SERPL-MCNC: 99.2 MG/DL — HIGH (ref 8–20)
CALCIUM SERPL-MCNC: 8.4 MG/DL — LOW (ref 8.6–10.2)
CALCIUM SERPL-MCNC: 8.5 MG/DL — LOW (ref 8.6–10.2)
CHLORIDE SERPL-SCNC: 94 MMOL/L — LOW (ref 98–107)
CHLORIDE SERPL-SCNC: 94 MMOL/L — LOW (ref 98–107)
CHOLEST SERPL-MCNC: 91 MG/DL — SIGNIFICANT CHANGE UP
CO2 SERPL-SCNC: 21 MMOL/L — LOW (ref 22–29)
CO2 SERPL-SCNC: 25 MMOL/L — SIGNIFICANT CHANGE UP (ref 22–29)
CREAT SERPL-MCNC: 2.74 MG/DL — HIGH (ref 0.5–1.3)
CREAT SERPL-MCNC: 2.84 MG/DL — HIGH (ref 0.5–1.3)
GLUCOSE SERPL-MCNC: 130 MG/DL — HIGH (ref 70–99)
GLUCOSE SERPL-MCNC: 146 MG/DL — HIGH (ref 70–99)
HCT VFR BLD CALC: 39.8 % — SIGNIFICANT CHANGE UP (ref 34.5–45)
HCT VFR BLD CALC: 42.6 % — SIGNIFICANT CHANGE UP (ref 34.5–45)
HDLC SERPL-MCNC: 48 MG/DL — LOW
HGB BLD-MCNC: 12.8 G/DL — SIGNIFICANT CHANGE UP (ref 11.5–15.5)
HGB BLD-MCNC: 13.8 G/DL — SIGNIFICANT CHANGE UP (ref 11.5–15.5)
LIPID PNL WITH DIRECT LDL SERPL: 30 MG/DL — SIGNIFICANT CHANGE UP
MCHC RBC-ENTMCNC: 25.1 PG — LOW (ref 27–34)
MCHC RBC-ENTMCNC: 25.7 PG — LOW (ref 27–34)
MCHC RBC-ENTMCNC: 32.2 GM/DL — SIGNIFICANT CHANGE UP (ref 32–36)
MCHC RBC-ENTMCNC: 32.4 GM/DL — SIGNIFICANT CHANGE UP (ref 32–36)
MCV RBC AUTO: 78.2 FL — LOW (ref 80–100)
MCV RBC AUTO: 79.3 FL — LOW (ref 80–100)
NON HDL CHOLESTEROL: 43 MG/DL — SIGNIFICANT CHANGE UP
PLATELET # BLD AUTO: 133 K/UL — LOW (ref 150–400)
PLATELET # BLD AUTO: 136 K/UL — LOW (ref 150–400)
POTASSIUM SERPL-MCNC: 3.7 MMOL/L — SIGNIFICANT CHANGE UP (ref 3.5–5.3)
POTASSIUM SERPL-MCNC: 3.7 MMOL/L — SIGNIFICANT CHANGE UP (ref 3.5–5.3)
POTASSIUM SERPL-SCNC: 3.7 MMOL/L — SIGNIFICANT CHANGE UP (ref 3.5–5.3)
POTASSIUM SERPL-SCNC: 3.7 MMOL/L — SIGNIFICANT CHANGE UP (ref 3.5–5.3)
PROT SERPL-MCNC: 5.3 G/DL — LOW (ref 6.6–8.7)
PROT SERPL-MCNC: 5.3 G/DL — LOW (ref 6.6–8.7)
RBC # BLD: 5.09 M/UL — SIGNIFICANT CHANGE UP (ref 3.8–5.2)
RBC # BLD: 5.37 M/UL — HIGH (ref 3.8–5.2)
RBC # FLD: 17.8 % — HIGH (ref 10.3–14.5)
RBC # FLD: 18.3 % — HIGH (ref 10.3–14.5)
SODIUM SERPL-SCNC: 131 MMOL/L — LOW (ref 135–145)
SODIUM SERPL-SCNC: 132 MMOL/L — LOW (ref 135–145)
TRIGL SERPL-MCNC: 64 MG/DL — SIGNIFICANT CHANGE UP
WBC # BLD: 11.95 K/UL — HIGH (ref 3.8–10.5)
WBC # BLD: 12.14 K/UL — HIGH (ref 3.8–10.5)
WBC # FLD AUTO: 11.95 K/UL — HIGH (ref 3.8–10.5)
WBC # FLD AUTO: 12.14 K/UL — HIGH (ref 3.8–10.5)

## 2022-01-03 PROCEDURE — 99291 CRITICAL CARE FIRST HOUR: CPT

## 2022-01-03 PROCEDURE — 76705 ECHO EXAM OF ABDOMEN: CPT | Mod: 26

## 2022-01-03 PROCEDURE — 93975 VASCULAR STUDY: CPT | Mod: 26

## 2022-01-03 PROCEDURE — 99233 SBSQ HOSP IP/OBS HIGH 50: CPT

## 2022-01-03 PROCEDURE — 76775 US EXAM ABDO BACK WALL LIM: CPT | Mod: 26,59

## 2022-01-03 PROCEDURE — 71045 X-RAY EXAM CHEST 1 VIEW: CPT | Mod: 26

## 2022-01-03 RX ORDER — MILRINONE LACTATE 1 MG/ML
0.25 INJECTION, SOLUTION INTRAVENOUS
Qty: 20 | Refills: 0 | Status: DISCONTINUED | OUTPATIENT
Start: 2022-01-03 | End: 2022-01-05

## 2022-01-03 RX ORDER — HEPARIN SODIUM 5000 [USP'U]/ML
5500 INJECTION INTRAVENOUS; SUBCUTANEOUS ONCE
Refills: 0 | Status: COMPLETED | OUTPATIENT
Start: 2022-01-03 | End: 2022-01-03

## 2022-01-03 RX ORDER — HEPARIN SODIUM 5000 [USP'U]/ML
INJECTION INTRAVENOUS; SUBCUTANEOUS
Qty: 25000 | Refills: 0 | Status: DISCONTINUED | OUTPATIENT
Start: 2022-01-03 | End: 2022-01-05

## 2022-01-03 RX ADMIN — HEPARIN SODIUM 0 UNIT(S)/HR: 5000 INJECTION INTRAVENOUS; SUBCUTANEOUS at 06:33

## 2022-01-03 RX ADMIN — MILRINONE LACTATE 5.1 MICROGRAM(S)/KG/MIN: 1 INJECTION, SOLUTION INTRAVENOUS at 09:35

## 2022-01-03 RX ADMIN — HEPARIN SODIUM 1200 UNIT(S)/HR: 5000 INJECTION INTRAVENOUS; SUBCUTANEOUS at 23:50

## 2022-01-03 RX ADMIN — HEPARIN SODIUM 5500 UNIT(S): 5000 INJECTION INTRAVENOUS; SUBCUTANEOUS at 16:09

## 2022-01-03 RX ADMIN — Medication 25 MILLIGRAM(S): at 21:28

## 2022-01-03 RX ADMIN — Medication 60 MILLIGRAM(S): at 11:41

## 2022-01-03 RX ADMIN — Medication 25 MILLIGRAM(S): at 14:20

## 2022-01-03 RX ADMIN — Medication 60 MILLIGRAM(S): at 00:00

## 2022-01-03 RX ADMIN — Medication 10 MILLIGRAM(S): at 20:46

## 2022-01-03 RX ADMIN — PIPERACILLIN AND TAZOBACTAM 25 GRAM(S): 4; .5 INJECTION, POWDER, LYOPHILIZED, FOR SOLUTION INTRAVENOUS at 18:20

## 2022-01-03 RX ADMIN — PIPERACILLIN AND TAZOBACTAM 25 GRAM(S): 4; .5 INJECTION, POWDER, LYOPHILIZED, FOR SOLUTION INTRAVENOUS at 06:22

## 2022-01-03 RX ADMIN — Medication 60 MILLIGRAM(S): at 18:20

## 2022-01-03 RX ADMIN — HEPARIN SODIUM 1200 UNIT(S)/HR: 5000 INJECTION INTRAVENOUS; SUBCUTANEOUS at 16:03

## 2022-01-03 RX ADMIN — Medication 60 MILLIGRAM(S): at 23:32

## 2022-01-03 RX ADMIN — Medication 60 MILLIGRAM(S): at 11:43

## 2022-01-03 RX ADMIN — TIOTROPIUM BROMIDE 1 CAPSULE(S): 18 CAPSULE ORAL; RESPIRATORY (INHALATION) at 10:15

## 2022-01-03 NOTE — PROGRESS NOTE ADULT - ATTENDING COMMENTS
Acute on chronic CKD./  diastolic heart failure and RV dysfunction  Echo reviewed.  Mild RV dysfunction atleast. Diasolti cdysfunction.     Anasarca.  and JVD.  on exam fluid overaloaded with IVC dilated.  started  on milrinone. no significant RV dysfunction but tolerating milrinone for now  BP is stable and off cardizem druip due to hypotension. on PO cardizem. and beta-blocker  as tolerated.   received doses of dig since admission. heart rate acceptable for now,.  Bp is tolerating milrinone. received IV lasix today am 60 mg.  responding with urine output.    will give dose of metolazone too and continue 60 mg IV BID of lasix.   No significant ascitis as per IR to tap.   if does not respond despite the milrinone and diuresis and BUN increases, then consider hemodialysis  for fluid removal and uremia.   ON CT scan atrophic kidney, so not sure what her baseline GFR .   discussed with daughter in law in detail.  patient is ill and critical. and had low episode of blood pressure and has righ lung pneumonia,  . congestive heart failure and atrial fibrillation with rapid ventricular rate and ACute on chronic renal failure.   very sick and poor prognosis./ hopefully will get her out of the woods with above,. if not then will escalate care..  no need for ICU now.  Critical care cardiology.  I have personally provided critical care time > 45 mins excluding time spent on separate procedures.

## 2022-01-03 NOTE — PROCEDURE NOTE - PROCEDURE FINDINGS AND DETAILS
consulted for paracentesis  4 quadrant ascites survey shows small amount of ascites around the liver, insufficient for paracentesis.  there is generalized anasarca.     discussed with dr knight

## 2022-01-03 NOTE — PROGRESS NOTE ADULT - SUBJECTIVE AND OBJECTIVE BOX
[facility]                                                         CARDIOLOGY AT [place] [address]                                                         Telephone: [phone]. Fax:[phone]                                                                             PROGRESS NOTE    Reason for follow up:   Update:       Review of symptoms:   Cardiac:  No chest pain. No dyspnea. No palpitations.  Respiratory:no cough. No dyspnea  Gastrointestinal: No diarrhea. No abdominal pain. No bleeding.   Neuro: No focal neuro complaints.      Vitals:  T(C): 36.7 (01-03-22 @ 08:05), Max: 36.7 (01-03-22 @ 05:52)  HR: 87 (01-03-22 @ 08:05) (87 - 131)  BP: 102/63 (01-03-22 @ 08:05) (95/57 - 132/81)  RR: 18 (01-03-22 @ 08:05) (18 - 18)  SpO2: 94% (01-03-22 @ 08:05) (92% - 95%)  Wt(kg): --  I&O's Summary    02 Jan 2022 07:01  -  03 Jan 2022 07:00  --------------------------------------------------------  IN: 877.5 mL / OUT: 510 mL / NET: 367.5 mL    03 Jan 2022 07:01  -  03 Jan 2022 10:34  --------------------------------------------------------  IN: 15 mL / OUT: 350 mL / NET: -335 mL      Weight (kg): 68 (01-02 @ 00:15)      PHYSICAL EXAM:  Appearance: Comfortable. No acute distress  HEENT:  Atraumatic. Normocephalic.  Normal oral mucosa, PERRL, No carotid bruit.   Neurologic: A & O x 3, no focal deficits. EOMI.  Cardiovascular: Normal S1 S2, No murmur, no rubs/gallops. No JVD, No edema  Respiratory: Lungs clear to auscultation, unlabored   Gastrointestinal:  Soft, Non-tender, + BS  Lower Extremities: No edema  Psychiatry: Patient is calm. No agitation.   Skin: No rashes/ ecchymoses/cyanosis/ulcers visualized on the face, hands or feet.      CURRENT MEDICATIONS:  diltiazem    Tablet 60 milliGRAM(s) Oral every 6 hours  diltiazem Injectable 10 milliGRAM(s) IV Push every 6 hours PRN  furosemide   Injectable 60 milliGRAM(s) IV Push two times a day  metoprolol tartrate 25 milliGRAM(s) Oral three times a day  milrinone Infusion 0.25 MICROgram(s)/kG/Min IV Continuous <Continuous>    tiotropium 18 MICROgram(s) Capsule  piperacillin/tazobactam IVPB..  heparin   Injectable  heparin  Infusion.        LABS:	 	  CARDIAC MARKERS ( 02 Jan 2022 07:17 )  x     / x     / 26 U/L / x     / x      p-BNP 02 Jan 2022 07:17: x    , CARDIAC MARKERS ( 01 Jan 2022 11:45 )  x     / 0.04 ng/mL / x     / x     / x      p-BNP 01 Jan 2022 11:45: 5458 pg/mL                          13.8   11.95 )-----------( 133      ( 03 Jan 2022 05:50 )             42.6     01-03    131<L>  |  94<L>  |  99.2<H>  ----------------------------<  130<H>  3.7   |  21.0<L>  |  2.74<H>    Ca    8.5<L>      03 Jan 2022 05:50  Phos  4.5     01-02  Mg     2.8     01-02    TPro  5.3<L>  /  Alb  2.6<L>  /  TBili  1.1  /  DBili  x   /  AST  23  /  ALT  16  /  AlkPhos  170<H>  01-03    proBNP: Serum Pro-Brain Natriuretic Peptide: 5458 pg/mL (01-01 @ 11:45)    Lipid Profile:   HgA1c:   TSH: Thyroid Stimulating Hormone, Serum: 5.33 uIU/mL          TELEMETRY: Reviewed    ECG:  Reviewed by me. 	        DIAGNOSTIC TESTING:  [ ] Echocardiogram:   [ ]  Catheterization:  [ ] Stress Test:    OTHER: 	                                                              [facility]                                                         CARDIOLOGY AT [place] [address]                                                         Telephone: [phone]. Fax:[phone]                                                                             PROGRESS NOTE      Reason for follow up: Diastolic heart failure, Afib.  Update: Renal function down trending. edema improving  hematuria improved, still not adequate u/o.       Review of symptoms:   Cardiac:  No chest pain. No dyspnea. No palpitations.  Respiratory: no cough. No dyspnea  Gastrointestinal: No diarrhea. No abdominal pain. No bleeding.   Neuro: No focal neuro complaints.      Vitals:  T(C): 36.7 (01-03-22 @ 08:05), Max: 36.7 (01-03-22 @ 05:52)  HR: 87 (01-03-22 @ 08:05) (87 - 131)  BP: 102/63 (01-03-22 @ 08:05) (95/57 - 132/81)  RR: 18 (01-03-22 @ 08:05) (18 - 18)  SpO2: 94% (01-03-22 @ 08:05) (92% - 95%)      I&O's Summary    02 Jan 2022 07:01  -  03 Jan 2022 07:00  --------------------------------------------------------  IN: 877.5 mL / OUT: 510 mL / NET: 367.5 mL    03 Jan 2022 07:01  -  03 Jan 2022 10:34  --------------------------------------------------------  IN: 15 mL / OUT: 350 mL / NET: -335 mL      Weight (kg): 68 (01-02 @ 00:15)      PHYSICAL EXAM:  Appearance: Comfortable. No acute distress  HEENT:  Atraumatic. Normocephalic.  Normal oral mucosa, PERRL, No carotid bruit.   Neurologic: A & O x 3, no focal deficits. EOMI.  Cardiovascular: Normal S1 S2, No murmur, no rubs/gallops. No JVD, No edema  Respiratory: Lungs clear to auscultation, unlabored   Gastrointestinal:  Soft, Non-tender, + BS  Lower Extremities: No edema  Psychiatry: Patient is calm. No agitation.   Skin: No rashes/ ecchymoses/cyanosis/ulcers visualized on the face, hands or feet.      CURRENT MEDICATIONS:  diltiazem    Tablet 60 milliGRAM(s) Oral every 6 hours  diltiazem Injectable 10 milliGRAM(s) IV Push every 6 hours PRN  furosemide   Injectable 60 milliGRAM(s) IV Push two times a day  metoprolol tartrate 25 milliGRAM(s) Oral three times a day  milrinone Infusion 0.25 MICROgram(s)/kG/Min IV Continuous <Continuous>    tiotropium 18 MICROgram(s) Capsule  piperacillin/tazobactam IVPB..  heparin   Injectable  heparin  Infusion.        LABS:	 	  CARDIAC MARKERS ( 02 Jan 2022 07:17 )  x     / x     / 26 U/L / x     / x      p-BNP 02 Jan 2022 07:17: x    , CARDIAC MARKERS ( 01 Jan 2022 11:45 )  x     / 0.04 ng/mL / x     / x     / x      p-BNP 01 Jan 2022 11:45: 5458 pg/mL                          13.8   11.95 )-----------( 133      ( 03 Jan 2022 05:50 )             42.6     01-03    131<L>  |  94<L>  |  99.2<H>  ----------------------------<  130<H>  3.7   |  21.0<L>  |  2.74<H>    Ca    8.5<L>      03 Jan 2022 05:50  Phos  4.5     01-02  Mg     2.8     01-02    TPro  5.3<L>  /  Alb  2.6<L>  /  TBili  1.1  /  DBili  x   /  AST  23  /  ALT  16  /  AlkPhos  170<H>  01-03    proBNP: Serum Pro-Brain Natriuretic Peptide: 5458 pg/mL (01-01 @ 11:45)      TSH: Thyroid Stimulating Hormone, Serum: 5.33 uIU/mL      TELEMETRY: Reviewed    ECG:  Reviewed by me. 	      DIAGNOSTIC TESTING:  [ ] Echocardiogram:   [ ]  Catheterization:  [ ] Stress Test:    OTHER: 	                                                              [facility]                                                         CARDIOLOGY AT [facility]                                                                  [address] [place] [address]                                                         Telephone: [phone]. Fax:[phone]                                                                             PROGRESS NOTE      Reason for follow up: Diastolic heart failure, Afib.  Update: Renal function down trending. edema improving  hematuria improved, still not adequate u/o. atrophic kidneys, ascities.  telemetry: afib, rates 100-120.  on cardizem gtt.        Review of symptoms:   Cardiac:  No chest pain. No dyspnea. No palpitations.  Respiratory: no cough. No dyspnea  Gastrointestinal: No diarrhea. No abdominal pain. No bleeding.   Neuro: No focal neuro complaints.      Vitals:  T(C): 36.7 (01-03-22 @ 08:05), Max: 36.7 (01-03-22 @ 05:52)  HR: 87 (01-03-22 @ 08:05) (87 - 131)  BP: 102/63 (01-03-22 @ 08:05) (95/57 - 132/81)  RR: 18 (01-03-22 @ 08:05) (18 - 18)  SpO2: 94% (01-03-22 @ 08:05) (92% - 95%)      I&O's Summary    02 Jan 2022 07:01  -  03 Jan 2022 07:00  --------------------------------------------------------  IN: 877.5 mL / OUT: 510 mL / NET: 367.5 mL    03 Jan 2022 07:01  -  03 Jan 2022 10:34  --------------------------------------------------------  IN: 15 mL / OUT: 350 mL / NET: -335 mL      Weight (kg): 68 (01-02 @ 00:15)      PHYSICAL EXAM:  Appearance: Comfortable. No acute distress  HEENT:  Atraumatic. Normocephalic.  Normal oral mucosa  Neurologic: A & O x 3, no focal deficits. EOMI.  Cardiovascular: Normal S1 S2, No murmur, no rubs/gallops. +JVD  Respiratory: Lungs clear to auscultation, unlabored   Gastrointestinal:  Soft, Non-tender, + BS  Lower Extremities: + edema  Psychiatry: Patient is calm. No agitation.   Skin: No rashes/ ecchymoses/cyanosis/ulcers visualized on the face, hands or feet.      CURRENT MEDICATIONS:  diltiazem    Tablet 60 milliGRAM(s) Oral every 6 hours  diltiazem Injectable 10 milliGRAM(s) IV Push every 6 hours PRN  furosemide   Injectable 60 milliGRAM(s) IV Push two times a day  metoprolol tartrate 25 milliGRAM(s) Oral three times a day  milrinone Infusion 0.25 MICROgram(s)/kG/Min IV Continuous <Continuous>    tiotropium 18 MICROgram(s) Capsule  piperacillin/tazobactam IVPB..  heparin   Injectable  heparin  Infusion.        LABS:	 	  CARDIAC MARKERS ( 02 Jan 2022 07:17 )  x     / x     / 26 U/L / x     / x      p-BNP 02 Jan 2022 07:17: x    , CARDIAC MARKERS ( 01 Jan 2022 11:45 )  x     / 0.04 ng/mL / x     / x     / x      p-BNP 01 Jan 2022 11:45: 5458 pg/mL                          13.8   11.95 )-----------( 133      ( 03 Jan 2022 05:50 )             42.6     01-03    131<L>  |  94<L>  |  99.2<H>  ----------------------------<  130<H>  3.7   |  21.0<L>  |  2.74<H>    Ca    8.5<L>      03 Jan 2022 05:50  Phos  4.5     01-02  Mg     2.8     01-02    TPro  5.3<L>  /  Alb  2.6<L>  /  TBili  1.1  /  DBili  x   /  AST  23  /  ALT  16  /  AlkPhos  170<H>  01-03    proBNP: Serum Pro-Brain Natriuretic Peptide: 5458 pg/mL (01-01 @ 11:45)      TSH: Thyroid Stimulating Hormone, Serum: 5.33 uIU/mL      TELEMETRY: Afib rates 100-120s. 	      DIAGNOSTIC TESTING:  [ ] Echocardiogram:   < from: TTE Echo Complete w/o Contrast w/ Doppler (01.02.22 @ 09:12) >  Summary:   1. Left ventricular ejection fraction, by visual estimation, is >75%.   2. Technically suboptimal study.   3. Hyperdynamic global left ventricular systolic function.   4. Normal left ventricular internal cavity size.   5. The mitral in-flow pattern reveals no discernable A-wave, therefore   no comment on diastolic function can be made.   6. Normal right ventricular size and function.  7. Mild to moderately enlarged left atrium.   8. There is no evidence of pericardial effusion.   9. Mild mitral annular calcification.  10. Mild to moderate mitral valve regurgitation.  11. Thickening of the anterior and posterior mitral valve leaflets.  12. Mild tricuspid regurgitation.  13. Small mobile echodencities visualized on the aortic valve. 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 Clinical correlation recommended.    MD Jacy Electronically signed on 1/2/2022 at 12:06:49 PM    < end of copied text >

## 2022-01-03 NOTE — PROGRESS NOTE ADULT - ASSESSMENT
CKD(III): decompensated CHF ( R>L sided)  EDILBERTO, r/o prerenal (urine Na+ < 30)  PNA  Relative hypotension   COVID(-)  CT scan no hydronephrosis; atrophic L kidney  - avoid potential nephrotoxins  - await renal sono to assess L renal size  - pt currently receiving diuretics and contributing to renal hypoperfusion  - if fails medical management will need to consider HD for UF

## 2022-01-03 NOTE — PROGRESS NOTE ADULT - ASSESSMENT
Afib  Cardizem gtt- rates controlled, start PO cardizem, wean gtt   heparin gtt, PTT labile at this time  cont telemetry monitoring     Diastolic heart failure  - will review TTE, evaluate RV function  - cont lasix 60mg IVP BID  - spoke to nephrology.  - based on our recs, poor urine output x 2 days  - recommend UF  - paracentesis to relieve ascites   - if pt tolerating milrinone cont, but if hypotension or tachycardia would discontinue    EDILBERTO on CKD  - nephrology following  - recs as above Afib  Cardizem gtt d/c'd  PO cardizem  heparin gtt, PTT labile at this time  cont telemetry monitoring     Diastolic heart failure  - will review TTE, evaluate RV function  - cont lasix 60mg IVP BID  - spoke to nephrology.  - based on our recs, poor urine output x 2 days  - recommend UF  - paracentesis to relieve ascites   - if pt tolerating milrinone cont, but if hypotension or tachycardia would discontinue    EDILBERTO on CKD  - nephrology following  - recs as above Afib  Cardizem gtt d/c'd  PO cardizem  heparin gtt, PTT labile at this time  cont telemetry monitoring     Diastolic heart failure  - will review TTE, evaluate RV function  - cont lasix 60mg IVP BID  - held this morning, giving now  - one time metolazone 5mg   - spoke to nephrology.  - poor urine output x 2 days  - ultimately may need recommend UF  - paracentesis to relieve ascites   - if pt tolerating milrinone cont, but if hypotension or tachycardia would discontinue    EDILBERTO on CKD  - nephrology following  - recs as above

## 2022-01-03 NOTE — PROGRESS NOTE ADULT - ASSESSMENT
87 y/o female with new onset Afib with RVR, CHF exacerbation, EDILBERTO on CKD, RLL w/ sepsis, left UE cellulitis, Hx of COPD, CKD, HTN. She was admitted, started on Zosyn for RLL PNA and LUE cellulitis. Started workup for anasarca with diuretics. A.fib is now better controlled after few doses of Digoxin, and initiated CCB and BB, Heparin ggt started for AC.     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 complicated by Afib wtih RVR,   - c/w tele and vs q4h for today  - lab work noted, all cardiac meds and diuretics were held due to hypotension >> started Milrinon ggt at 0.25 mcg/kg/min and c/w Furosemide to 60 bid  for now  - will f/u on Nephrology team and Cardiology team recs  - c/w PO cardizem 60 q6h and metoprolol 25 tid if pt bp allows  - pending  paracentesis by IR and SAAG   - ECHO noted  - will obtainer US of abdomen to assess for portal vein thrombosis although pt already of full AC  - CXR to assess volume status better    #EDILBERTO on CKD most likely cardi-renal in nature  - Nephrology team follows   - diuretics as above  - avoid nephrotoxic agents     # A.fib with RVR, now better controlled   - bb and ccb as above  - c/w heparing ggt with aptt per normogram    # Hematuria, in setting of heparing ggt, has improved  - will continue to observe for now  - cbc daily for now    RLL PNA/Sepsis:  -c/w Abx d2/5   -Nebs, oxygen prn, chest PT, incentive spirometer    LUE cellulitis:  -c/w Abx d2/5     COPD, w/o apparent exacerbation on exam  - on 3 l nc at this time at base line on 2 L nc  -Cont. o/p regimen  -PRN nebs    DVT ppx - on heparin ggt   CODE/Socia - full code,family update over phone  Dispo - Guarded prognosis

## 2022-01-03 NOTE — PROGRESS NOTE ADULT - SUBJECTIVE AND OBJECTIVE BOX
[facility] Division of Hospital Medicine    Chief Complaint:  anasarca, chf     SUBJECTIVE: report feeling somewhat better, enjoying breakfast;     OVERNIGHT EVENTS: repeat lab with  worsening of kidney fucntion, most of cardiac meds were held due to soft BP.    Patient denies chest pain,abd pain, N/V, fever, chills, dysuria or any other complaints. All remainder ROS negative.     MEDICATIONS  (STANDING):  diltiazem    Tablet 60 milliGRAM(s) Oral every 6 hours  furosemide   Injectable 60 milliGRAM(s) IV Push two times a day  heparin   Injectable 5500 Unit(s) IV Push once  heparin  Infusion.  Unit(s)/Hr (12 mL/Hr) IV Continuous <Continuous>  metoprolol tartrate 25 milliGRAM(s) Oral three times a day  milrinone Infusion 0.25 MICROgram(s)/kG/Min (5.1 mL/Hr) IV Continuous <Continuous>  piperacillin/tazobactam IVPB.. 3.375 Gram(s) IV Intermittent every 12 hours  tiotropium 18 MICROgram(s) Capsule 1 Capsule(s) Inhalation daily    MEDICATIONS  (PRN):  acetaminophen     Tablet .. 650 milliGRAM(s) Oral every 6 hours PRN Temp greater or equal to 38C (100.4F), Mild Pain (1 - 3)  aluminum hydroxide/magnesium hydroxide/simethicone Suspension 30 milliLiter(s) Oral every 4 hours PRN Dyspepsia  diltiazem Injectable 10 milliGRAM(s) IV Push every 6 hours PRN HR > 130  heparin   Injectable 5500 Unit(s) IV Push every 6 hours PRN For aPTT less than 40  heparin   Injectable 2500 Unit(s) IV Push every 6 hours PRN For aPTT between 40 - 57  melatonin 3 milliGRAM(s) Oral at bedtime PRN Insomnia  ondansetron Injectable 4 milliGRAM(s) IV Push every 8 hours PRN Nausea and/or Vomiting        I&O's Summary    2022 07:01  -  2022 07:00  --------------------------------------------------------  IN: 877.5 mL / OUT: 510 mL / NET: 367.5 mL        PHYSICAL EXAM:  Vital Signs Last 24 Hrs  T(C): 36.7 (2022 08:05), Max: 36.7 (2022 05:52)  T(F): 98.1 (2022 08:05), Max: 98.1 (2022 05:52)  HR: 87 (2022 08:05) (87 - 131)  BP: 102/63 (2022 08:05) (95/57 - 132/81)  BP(mean): 87 (2022 23:51) (87 - 102)  RR: 18 (2022 08:05) (18 - 20)  SpO2: 94% (2022 08:05) (92% - 95%)        CONSTITUTIONAL: NAD,   ENMT: Moist oral mucosa, no pharyngeal injection or exudates; normal dentition; + JVD  RESPIRATORY: Normal respiratory effort; lungs are with crepitation on bases R>>L and some rails in central air way - has improved   CARDIOVASCULAR: irregular rate and rhythm, distant  S1 and S2, no murmur/rub/gallop + 3 pitting edema up to high tights, + anasarca lower extremity edema; Peripheral pulses are palpable bilaterally  ABDOMEN: Nontender to palpation, normoactive bowel sounds, no rebound/guarding; No hepatosplenomegaly  MUSCLOSKELETAL:  no clubbing or cyanosis of digits; no joint swelling or tenderness to palpation  PSYCH: A+O to person, place, and time; affect appropriate  NEUROLOGY: CN 2-12 are intact and symmetric; no gross sensory deficits; was observed moving all 4 ext against gravity cooperating with exam.   SKIN: atrophic skin changes, erythema over L forearm, warmer to touch - appears much better today  LABS:                        13.8   11.95 )-----------( 133      ( 2022 05:50 )             42.6     01-03    131<L>  |  94<L>  |  99.2<H>  ----------------------------<  130<H>  3.7   |  21.0<L>  |  2.74<H>    Ca    8.5<L>      2022 05:50  Phos  4.5     01-02  Mg     2.8         TPro  5.3<L>  /  Alb  2.6<L>  /  TBili  1.1  /  DBili  x   /  AST  23  /  ALT  16  /  AlkPhos  170<H>      PT/INR - ( 2022 18:25 )   PT: 14.0 sec;   INR: 1.22 ratio         PTT - ( 2022 05:50 )  PTT:>200.0 sec  CARDIAC MARKERS ( 2022 07:17 )  x     / x     / 26 U/L / x     / x      CARDIAC MARKERS ( 2022 11:45 )  x     / 0.04 ng/mL / x     / x     / x          Urinalysis Basic - ( 2022 11:07 )    Color: Leslie / Appearance: Slightly Turbid / S.010 / pH: x  Gluc: x / Ketone: Trace  / Bili: Negative / Urobili: Negative   Blood: x / Protein: 15 / Nitrite: Positive   Leuk Esterase: Small / RBC: >50 /HPF / WBC 6-10   Sq Epi: x / Non Sq Epi: Occasional / Bacteria: Moderate        Culture - Urine (collected 2022 22:09)  Source: Clean Catch Clean Catch (Midstream)  Final Report (2022 19:46):    No growth      CAPILLARY BLOOD GLUCOSE            RADIOLOGY & ADDITIONAL TESTS:  Results Reviewed:   Imaging Personally Reviewed:  Electrocardiogram Personally Reviewed:

## 2022-01-03 NOTE — PROGRESS NOTE ADULT - SUBJECTIVE AND OBJECTIVE BOX
NEPHROLOGY INTERVAL HPI/OVERNIGHT EVENTS:  pt still with sob at rest  no energy    MEDICATIONS  (STANDING):  diltiazem    Tablet 60 milliGRAM(s) Oral every 6 hours  furosemide   Injectable 60 milliGRAM(s) IV Push two times a day  heparin   Injectable 5500 Unit(s) IV Push once  heparin  Infusion.  Unit(s)/Hr (12 mL/Hr) IV Continuous <Continuous>  metoprolol tartrate 25 milliGRAM(s) Oral three times a day  milrinone Infusion 0.25 MICROgram(s)/kG/Min (5.1 mL/Hr) IV Continuous <Continuous>  piperacillin/tazobactam IVPB.. 3.375 Gram(s) IV Intermittent every 12 hours  tiotropium 18 MICROgram(s) Capsule 1 Capsule(s) Inhalation daily    MEDICATIONS  (PRN):  acetaminophen     Tablet .. 650 milliGRAM(s) Oral every 6 hours PRN Temp greater or equal to 38C (100.4F), Mild Pain (1 - 3)  aluminum hydroxide/magnesium hydroxide/simethicone Suspension 30 milliLiter(s) Oral every 4 hours PRN Dyspepsia  diltiazem Injectable 10 milliGRAM(s) IV Push every 6 hours PRN HR > 130  heparin   Injectable 5500 Unit(s) IV Push every 6 hours PRN For aPTT less than 40  heparin   Injectable 2500 Unit(s) IV Push every 6 hours PRN For aPTT between 40 - 57  melatonin 3 milliGRAM(s) Oral at bedtime PRN Insomnia  ondansetron Injectable 4 milliGRAM(s) IV Push every 8 hours PRN Nausea and/or Vomiting      Allergies    No Known Allergies          Vital Signs Last 24 Hrs  T(C): 36.7 (2022 08:05), Max: 36.7 (2022 05:52)  T(F): 98.1 (2022 08:05), Max: 98.1 (2022 05:52)  HR: 87 (2022 08:05) (87 - 131)  BP: 102/63 (2022 08:05) (95/57 - 132/81)  BP(mean): 87 (2022 23:51) (87 - 102)  RR: 18 (2022 08:05) (18 - 20)  SpO2: 94% (2022 08:05) (92% - 95%)    PHYSICAL EXAM:  GENERAL: Debilitated  ENMT: No periorbital edema  NECK: Supple, No JVD  NERVOUS SYSTEM:  Alert & Oriented X3, intact and symmetric  CHEST/LUNG: Diminished B/L breath sounds  HEART: No rub  ABDOMEN: Soft, Nontender, + ; BS+  EXTREMITIES:  2+ Peripheral Pulses, + dependent LE edema    LABS:                        13.8   11.95 )-----------( 133      ( 2022 05:50 )             42.6         131<L>  |  94<L>  |  99.2<H>  ----------------------------<  130<H>  3.7   |  21.0<L>  |  2.74<H>        Ca    8.5<L>      2022 05:50  Phos  4.5     -  Mg     2.8         TPro  5.3<L>  /  Alb  2.6<L>  /  TBili  1.1  /  DBili  x   /  AST  23  /  ALT  16  /  AlkPhos  170<H>  -    PT/INR - ( 2022 18:25 )   PT: 14.0 sec;   INR: 1.22 ratio         PTT - ( 2022 05:50 )  PTT:>200.0 sec  Urinalysis Basic - ( 2022 11:07 )    Color: Leslie / Appearance: Slightly Turbid / S.010 / pH: x  Gluc: x / Ketone: Trace  / Bili: Negative / Urobili: Negative   Blood: x / Protein: 15 / Nitrite: Positive   Leuk Esterase: Small / RBC: >50 /HPF / WBC 6-10   Sq Epi: x / Non Sq Epi: Occasional / Bacteria: Moderate          RADIOLOGY & ADDITIONAL TESTS:  < from: US Abdomen Limited (22 @ 20:52) >  ACC: [id number] EXAM:  US ABDOMEN LIMITED                          PROCEDURE DATE:  2022          INTERPRETATION:  CLINICAL INFORMATION: Anasarca, trauma, evaluate for   free fluid    COMPARISON: CT from the same day    TECHNIQUE:  Sonographic evaluation of the 4 quadrants for ascites    FINDINGS: Study is positive for moderate volume 4 quadrant ascites.   Deepest vertical pocket is in the lower quadrants, measures 6.3 cm in the   right lower quadrant 8.1 cm in the left lower quadrant    IMPRESSION:  Study is positive for 4 quadrant ascites    < end of copied text >      < from: CT Abdomen and Pelvis No Cont (22 @ 18:50) >    ACC: [id number] EXAM:  CT ABDOMEN AND PELVIS                        ACC: [id number] EXAM:  CT CHEST                          PROCEDURE DATE:  2022          INTERPRETATION:  CLINICAL INFORMATION: 86-year-old female with history   breast cancer, colon cancer, and CHF with increased lower extremity   swelling and history of recent fall    COMPARISON: CT chest 2018    CONTRAST/COMPLICATIONS:  IV Contrast: NONE  Oral Contrast: NONE  Complications: None reported at time of study completion    PROCEDURE:  CT of the Chest, Abdomen and Pelvis was performed.  Sagittal and coronal reformats were performed.    FINDINGS:  CHEST:  LUNGS AND LARGE AIRWAYS: Patent central airways. Biapical   pleural-parenchymal scarring unchanged. Multiple subcentimeter pulmonary   cysts. Bibasilar atelectasis. Groundglass opacity and solid consolidation   right lower lobe which may be secondary to pneumonia.  PLEURA: Small bilateral pleural effusions.  VESSELS: Within normal limits.  HEART: Heart size is normal. No pericardial effusion.  MEDIASTINUM AND JESS: Mildly enlarged mediastinal lymph nodes.  CHEST WALL AND LOWER NECK: Anasarca.    ABDOMEN AND PELVIS:  LIVER: Cirrhosis.  BILE DUCTS: Normal caliber.  GALLBLADDER: Cholecystectomy.  SPLEEN: Within normal limits.  PANCREAS: Within normal limits.  ADRENALS: Within normal limits.  KIDNEYS/URETERS: Atrophic right kidney.    BLADDER: Indwelling Latham catheter.  REPRODUCTIVE ORGANS: Uterus and adnexa within normal limits.    BOWEL: No bowel obstruction.Appendix not visualized.  PERITONEUM: Moderate ascites.  VESSELS: Distended IVC likely secondary to CHF.  RETROPERITONEUM/LYMPH NODES: No lymphadenopathy.  ABDOMINAL WALL: Superior midline hernia containing fluid. Right-sided   lower abdominal herniacontaining fluid and nonobstructed small bowel.   Severe anasarca.  BONES: Left hip replacement. Degenerative change.    IMPRESSION:  Severe generalized anasarca  Small bilateral pleural effusions.  Right lower lobe consolidation which may be infectious.  No traumatic injury.    < end of copied text >      < from: TTE Echo Complete w/o Contrast w/ Doppler (22 @ 09:12) >    EXAM:  ECHO TTE WO CON COMP W DOPP      PROCEDURE DATE:  2022   .      INTERPRETATION:  TRANSTHORACIC ECHOCARDIOGRAM REPORT        Patient Name:   ROYER RAMOS Patient Location: Emergency  Medical Rec #:  [id number]              Accession #:      [id number]  Account #:                            Height:           65.7 in 167.0 cm  YOB: 1935            Weight:           149.9 lb 68.00 kg  Patient Age:    86 years              BSA:              1.76 m²  Patient Gender: F                     BP:               94/68 mmHg      Date of Exam:        2022 9:12:13 AM  Sonographer:         Klaudia Andrews  Referring Physician: Elsa HUTCHINSON    Procedure:     2D Echo/Doppler/Color Doppler Complete.  Indications:   Abnormal electrocardiogram [ECG] [EKG] - R94.31  Diagnosis:     Nonrheumatic mitral (valve) insufficiency - I34.0  Study Details: Technically suboptimal study. Study quality was adversely                 affected due to body habitus.        2D AND M-MODE MEASUREMENTS (normal ranges within parentheses):  Left                 Normal   Aorta/Left            Normal  Ventricle:                    Atrium:  IVSd (2D):    1.06  (0.7-1.1) Aortic Root    2.72  (2.4-3.7)                 cm             (2D):   cm  LVPWd (2D):   1.10  (0.7-1.1) Left Atrium    4.75  (1.9-4.0)                 cm             (2D):           cm  LVIDd (2D):   2.84  (3.4-5.7) LA Volume      39.0                 cm             Index         ml/m²  LVIDs (2D):   1.47       cm  LV FS (2D):   48.2   (>25%)                  %  LV EF (2D):   81 %   (>55%)  Relative Wall 0.77   (<0.42)  Thickness    LV SYSTOLIC FUNCTION BY 2D PLANIMETRY (MOD):  EF-A4C View: 72.7 % EF-A2C View: 81.3 % EF-Biplane: 79 %    LV DIASTOLIC FUNCTION:  MV Peak E: 1.18 m/s e', MV Annelise: 0.08 m/s                      E/e' Ratio: 14.08    SPECTRAL DOPPLER ANALYSIS (where applicable):  Aortic Valve: AoV Max Filippo: 1.28 m/s AoV Peak P.6 mmHg AoV Mean PG:   3.0 mmHg    LVOT Vmax: 0.88 m/s LVOT VTI: 0.170 m LVOT Diameter: 1.74 cm    AoV Area, Vmax: 1.63 cm² AoV Area, VTI: 1.66 cm² AoV Area, Vmn: 1.72 cm²  Ao VTI: 0.244  Tricuspid Valve and PA/RV Systolic Pressure: TR Max Velocity: 2.05 m/s RA   Pressure: 15 mmHg RVSP/PASP: 31.8 mmHg      PHYSICIAN INTERPRETATION:  Left Ventricle: The left ventricular internal cavity size is normal.  Global LV systolic function was hyperdynamic. Left ventricular ejection   fraction, by visual estimation, is >75%. The mitral in-flow pattern   reveals no discernable A-wave, therefore no comment on diastolic function   can be made.  Right Ventricle: Normal right ventricular size and function.  Left Atrium: Mild to moderately enlarged left atrium.  Right Atrium: The right atrium is normal in size.  Pericardium: There is no evidence of pericardial effusion.  Mitral Valve: Thickening of the anterior and posterior mitral valve   leaflets. There is mild mitral annular calcification. Mild to moderate   mitral valve regurgitation is seen.  Tricuspid Valve: Mild tricuspid regurgitation is visualized.  Aortic Valve: The aortic valve is trileaflet. No evidence of aortic valve   regurgitation is seen. Small mobile echodencities visualized on the   aortic valve. 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 Clinical correlation recommended.  Pulmonic Valve: Trace pulmonic valve regurgitation.  Aorta: The aortic root is normal in size and structure.  Pulmonary Artery: The main pulmonary artery is normal in size.  Venous: The inferior vena cava was dilated, with respiratory size   variation less than 50%.      Summary:   1. Left ventricular ejection fraction, by visual estimation, is >75%.   2. Technically suboptimal study.   3. Hyperdynamic global left ventricular systolic function.   4. Normal left ventricular internal cavity size.   5. The mitral in-flow pattern reveals no discernable A-wave, therefore   no comment on diastolic function can be made.   6. Normal right ventricular size and function.  7. Mild to moderately enlarged left atrium.   8. There is no evidence of pericardial effusion.   9. Mild mitral annular calcification.  10. Mild to moderate mitral valve regurgitation.  11. Thickening of the anterior and posterior mitral valve leaflets.  12. Mild tricuspid regurgitation.  13. Small mobile echodencities visualized on the aortic valve. 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 Clinical correlation recommended.    < end of copied text >

## 2022-01-04 LAB
ALBUMIN SERPL ELPH-MCNC: 2.3 G/DL — LOW (ref 3.3–5.2)
ALP SERPL-CCNC: 159 U/L — HIGH (ref 40–120)
ALT FLD-CCNC: 11 U/L — SIGNIFICANT CHANGE UP
ANION GAP SERPL CALC-SCNC: 16 MMOL/L — SIGNIFICANT CHANGE UP (ref 5–17)
APTT BLD: 136.9 SEC — CRITICAL HIGH (ref 27.5–35.5)
APTT BLD: 32.1 SEC — SIGNIFICANT CHANGE UP (ref 27.5–35.5)
AST SERPL-CCNC: 23 U/L — SIGNIFICANT CHANGE UP
BILIRUB SERPL-MCNC: 1.2 MG/DL — SIGNIFICANT CHANGE UP (ref 0.4–2)
BUN SERPL-MCNC: 97.2 MG/DL — HIGH (ref 8–20)
CALCIUM SERPL-MCNC: 7.9 MG/DL — LOW (ref 8.6–10.2)
CHLORIDE SERPL-SCNC: 89 MMOL/L — LOW (ref 98–107)
CO2 SERPL-SCNC: 24 MMOL/L — SIGNIFICANT CHANGE UP (ref 22–29)
CREAT SERPL-MCNC: 2.84 MG/DL — HIGH (ref 0.5–1.3)
GLUCOSE SERPL-MCNC: 190 MG/DL — HIGH (ref 70–99)
HCT VFR BLD CALC: 38.2 % — SIGNIFICANT CHANGE UP (ref 34.5–45)
HCT VFR BLD CALC: 39.6 % — SIGNIFICANT CHANGE UP (ref 34.5–45)
HGB BLD-MCNC: 12.3 G/DL — SIGNIFICANT CHANGE UP (ref 11.5–15.5)
HGB BLD-MCNC: 12.4 G/DL — SIGNIFICANT CHANGE UP (ref 11.5–15.5)
MCHC RBC-ENTMCNC: 24.9 PG — LOW (ref 27–34)
MCHC RBC-ENTMCNC: 25.2 PG — LOW (ref 27–34)
MCHC RBC-ENTMCNC: 31.1 GM/DL — LOW (ref 32–36)
MCHC RBC-ENTMCNC: 32.5 GM/DL — SIGNIFICANT CHANGE UP (ref 32–36)
MCV RBC AUTO: 77.6 FL — LOW (ref 80–100)
MCV RBC AUTO: 80.3 FL — SIGNIFICANT CHANGE UP (ref 80–100)
PLATELET # BLD AUTO: 118 K/UL — LOW (ref 150–400)
PLATELET # BLD AUTO: 133 K/UL — LOW (ref 150–400)
POTASSIUM SERPL-MCNC: 3.5 MMOL/L — SIGNIFICANT CHANGE UP (ref 3.5–5.3)
POTASSIUM SERPL-SCNC: 3.5 MMOL/L — SIGNIFICANT CHANGE UP (ref 3.5–5.3)
PROT SERPL-MCNC: 5.2 G/DL — LOW (ref 6.6–8.7)
RBC # BLD: 4.92 M/UL — SIGNIFICANT CHANGE UP (ref 3.8–5.2)
RBC # BLD: 4.93 M/UL — SIGNIFICANT CHANGE UP (ref 3.8–5.2)
RBC # FLD: 17.3 % — HIGH (ref 10.3–14.5)
RBC # FLD: 17.5 % — HIGH (ref 10.3–14.5)
SODIUM SERPL-SCNC: 129 MMOL/L — LOW (ref 135–145)
WBC # BLD: 11.5 K/UL — HIGH (ref 3.8–10.5)
WBC # BLD: 11.99 K/UL — HIGH (ref 3.8–10.5)
WBC # FLD AUTO: 11.5 K/UL — HIGH (ref 3.8–10.5)
WBC # FLD AUTO: 11.99 K/UL — HIGH (ref 3.8–10.5)

## 2022-01-04 PROCEDURE — 99233 SBSQ HOSP IP/OBS HIGH 50: CPT

## 2022-01-04 PROCEDURE — 99291 CRITICAL CARE FIRST HOUR: CPT

## 2022-01-04 PROCEDURE — 36556 INSERT NON-TUNNEL CV CATH: CPT | Mod: LT

## 2022-01-04 RX ORDER — POTASSIUM CHLORIDE 20 MEQ
40 PACKET (EA) ORAL EVERY 4 HOURS
Refills: 0 | Status: COMPLETED | OUTPATIENT
Start: 2022-01-04 | End: 2022-01-05

## 2022-01-04 RX ORDER — DIGOXIN 250 MCG
250 TABLET ORAL ONCE
Refills: 0 | Status: COMPLETED | OUTPATIENT
Start: 2022-01-04 | End: 2022-01-04

## 2022-01-04 RX ORDER — MAGNESIUM OXIDE 400 MG ORAL TABLET 241.3 MG
400 TABLET ORAL ONCE
Refills: 0 | Status: COMPLETED | OUTPATIENT
Start: 2022-01-04 | End: 2022-01-04

## 2022-01-04 RX ORDER — MIDODRINE HYDROCHLORIDE 2.5 MG/1
5 TABLET ORAL ONCE
Refills: 0 | Status: COMPLETED | OUTPATIENT
Start: 2022-01-04 | End: 2022-01-04

## 2022-01-04 RX ORDER — ALBUMIN HUMAN 25 %
100 VIAL (ML) INTRAVENOUS ONCE
Refills: 0 | Status: COMPLETED | OUTPATIENT
Start: 2022-01-04 | End: 2022-01-04

## 2022-01-04 RX ORDER — SODIUM CHLORIDE 9 MG/ML
10 INJECTION INTRAMUSCULAR; INTRAVENOUS; SUBCUTANEOUS
Refills: 0 | Status: DISCONTINUED | OUTPATIENT
Start: 2022-01-04 | End: 2022-01-24

## 2022-01-04 RX ORDER — CHLORHEXIDINE GLUCONATE 213 G/1000ML
1 SOLUTION TOPICAL
Refills: 0 | Status: DISCONTINUED | OUTPATIENT
Start: 2022-01-04 | End: 2022-01-10

## 2022-01-04 RX ADMIN — Medication 25 MILLIGRAM(S): at 16:54

## 2022-01-04 RX ADMIN — Medication 60 MILLIGRAM(S): at 05:55

## 2022-01-04 RX ADMIN — Medication 60 MILLIGRAM(S): at 11:50

## 2022-01-04 RX ADMIN — MIDODRINE HYDROCHLORIDE 5 MILLIGRAM(S): 2.5 TABLET ORAL at 21:12

## 2022-01-04 RX ADMIN — MILRINONE LACTATE 5.1 MICROGRAM(S)/KG/MIN: 1 INJECTION, SOLUTION INTRAVENOUS at 02:03

## 2022-01-04 RX ADMIN — HEPARIN SODIUM 1000 UNIT(S)/HR: 5000 INJECTION INTRAVENOUS; SUBCUTANEOUS at 16:51

## 2022-01-04 RX ADMIN — HEPARIN SODIUM 5500 UNIT(S): 5000 INJECTION INTRAVENOUS; SUBCUTANEOUS at 20:30

## 2022-01-04 RX ADMIN — HEPARIN SODIUM 1300 UNIT(S)/HR: 5000 INJECTION INTRAVENOUS; SUBCUTANEOUS at 20:30

## 2022-01-04 RX ADMIN — PIPERACILLIN AND TAZOBACTAM 25 GRAM(S): 4; .5 INJECTION, POWDER, LYOPHILIZED, FOR SOLUTION INTRAVENOUS at 05:56

## 2022-01-04 RX ADMIN — Medication 250 MICROGRAM(S): at 20:04

## 2022-01-04 RX ADMIN — HEPARIN SODIUM 1000 UNIT(S)/HR: 5000 INJECTION INTRAVENOUS; SUBCUTANEOUS at 12:57

## 2022-01-04 RX ADMIN — Medication 50 MILLILITER(S): at 21:13

## 2022-01-04 RX ADMIN — PIPERACILLIN AND TAZOBACTAM 25 GRAM(S): 4; .5 INJECTION, POWDER, LYOPHILIZED, FOR SOLUTION INTRAVENOUS at 16:54

## 2022-01-04 RX ADMIN — HEPARIN SODIUM 0 UNIT(S)/HR: 5000 INJECTION INTRAVENOUS; SUBCUTANEOUS at 11:46

## 2022-01-04 RX ADMIN — MILRINONE LACTATE 5.1 MICROGRAM(S)/KG/MIN: 1 INJECTION, SOLUTION INTRAVENOUS at 13:20

## 2022-01-04 RX ADMIN — Medication 25 MILLIGRAM(S): at 05:55

## 2022-01-04 RX ADMIN — Medication 60 MILLIGRAM(S): at 16:53

## 2022-01-04 NOTE — PROGRESS NOTE ADULT - SUBJECTIVE AND OBJECTIVE BOX
[place] [facility]                                                               Office: [address] [facility] [address] [facility], [place] [address]                                                              Telephone: [phone]. Fax:[phone]                                                                             PROGRESS NOTE  Reason for follow up: decompensated CHF, New onset Afib  Overnight: No new events.   Update: 1/4: Pt denies chest pain, palpitations, SOB. Tele-Afib HR @ 110s. Cont. PO cardizem, Cont. Lopressor 25mg TID, pt Heparin gtt currently on hold for elevated PTT and hematuria. Lower Extremities: pitting, improved edema, +2/+2, Resp exam- +rhonchi bilateral. 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 Cont lasix 60mg IVP BID. Will discuss continuation of milrinone with attending. Nephrology on board-plan for temporary dialysis for fluid management.         Subjective: No new events   	  Vitals:  T(C): 36.5 (01-04-22 @ 15:17), Max: 36.6 (01-03-22 @ 20:25)  HR: 116 (01-04-22 @ 15:17) (84 - 137)  BP: 107/64 (01-04-22 @ 15:17) (104/58 - 125/76)  RR: 19 (01-04-22 @ 15:17) (18 - 20)  SpO2: 96% (01-04-22 @ 15:17) (92% - 96%)    I&O's Summary    03 Jan 2022 07:01  -  04 Jan 2022 07:00  --------------------------------------------------------  IN: 115 mL / OUT: 2125 mL / NET: -2010 mL    04 Jan 2022 07:01  -  04 Jan 2022 16:09  --------------------------------------------------------  IN: 0 mL / OUT: 500 mL / NET: -500 mL      Weight (kg): 68 (01-02 @ 00:15)      PHYSICAL EXAM:  Appearance: Comfortable. No acute distress  HEENT:  Head and neck: Atraumatic. Normocephalic.  Normal oral mucosa, PERRL, Neck is supple. No JVD, No carotid bruit.   Neurologic: A & O x 3, no focal deficits. EOMI  Lymphatic: No cervical lymphadenopathy  Cardiovascular: Normal S1 S2, No murmur, rubs/gallops. No JVD, No edema  Respiratory: +rhonchi bilateral  Gastrointestinal:  Soft, Non-tender, + BS  : +hematuria  Lower Extremities: pitting, improved edema, +2/+2  Psychiatry: Patient is calm. No agitation. Mood & affect appropriate  Skin: No rashes/ ecchymoses/cyanosis/ulcers visualized on the face, hands or feet      CURRENT MEDICATIONS:  diltiazem    Tablet 60 milliGRAM(s) Oral every 6 hours  diltiazem Injectable 10 milliGRAM(s) IV Push every 6 hours PRN  furosemide   Injectable 60 milliGRAM(s) IV Push two times a day  metoprolol tartrate 25 milliGRAM(s) Oral three times a day  milrinone Infusion 0.25 MICROgram(s)/kG/Min IV Continuous <Continuous>  tiotropium 18 MICROgram(s) Capsule  piperacillin/tazobactam IVPB..  heparin  Infusion.      DIAGNOSTIC TESTING:    [ ] Echocardiogram: < from: TTE Echo Complete w/o Contrast w/ Doppler (01.02.22 @ 09:12) >  Summary:   1. Left ventricular ejection fraction, by visual estimation, is >75%.   2. Technically suboptimal study.   3. Hyperdynamic global left ventricular systolic function.   4. Normal left ventricular internal cavity size.   5. The mitral in-flow pattern reveals no discernable A-wave, therefore   no comment on diastolic function can be made.   6. Normal right ventricular size and function.  7. Mild to moderately enlarged left atrium.   8. There is no evidence of pericardial effusion.   9. Mild mitral annular calcification.  10. Mild to moderate mitral valve regurgitation.  11. Thickening of the anterior and posterior mitral valve leaflets.  12. Mild tricuspid regurgitation.  13. Small mobile echodencities visualized on the aortic valve. 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 Clinical correlation recommended.      OTHER: 	    US:< from: US Abdomen Doppler (01.03.22 @ 17:08) >  IMPRESSION: Mildly enlarged nodular heterogeneous liver consistent with   cirrhosis. No focal masses are seen  Cholecystectomy  Normal duplex Doppler evaluation of vascular structures in the lacey   hepatis.  Ascites    < from: US Renal (01.03.22 @ 17:01) >  IMPRESSION:    Atrophic right kidney.  No hydronephrosis.      Xray-< from: Xray Chest 1 View-PORTABLE IMMEDIATE (Xray Chest 1 View-PORTABLE IMMEDIATE .) (01.03.22 @ 12:39) >  FINDINGS/  IMPRESSION:  Stable mild cardiomegaly.  Increasing hazy and reticular opacities bilaterally - right lung more   than left - likely due to pulmonary vascular congestive changes.  Stable trace left pleural effusion  Increasing right pleural effusion.  No pneumothorax.  No acute bony finding.    LABS:	 	  CARDIAC MARKERS ( 02 Jan 2022 07:17 )  x     / x     / 26 U/L / x     / x      p-BNP 02 Jan 2022 07:17: x                              12.3   11.50 )-----------( 118      ( 04 Jan 2022 09:47 )             39.6     01-04    129<L>  |  89<L>  |  97.2<H>  ----------------------------<  190<H>  3.5   |  24.0  |  2.84<H>    Ca    7.9<L>      04 Jan 2022 09:47    TPro  5.2<L>  /  Alb  2.3<L>  /  TBili  1.2  /  DBili  x   /  AST  23  /  ALT  11  /  AlkPhos  159<H>  01-04    proBNP: Serum Pro-Brain Natriuretic Peptide: 5458 pg/mL (01-01 @ 11:45)    Lipid Profile: Date: 01-03 @ 10:49  Total cholesterol 91; Direct LDL: --; HDL: 48; Triglycerides:64      TSH: Thyroid Stimulating Hormone, Serum: 5.33 uIU/mL    TELEMETRY: Afib HR @ 110s  	                                                                      [place] [facility]                                                               Office: [address] [facility] [address] [facility], [place] [address]                                                              Telephone: [phone]. Fax:[phone]                                                                             PROGRESS NOTE  Reason for follow up: decompensated CHF, New onset Afib  Overnight: No new events.   Update: 1/4: Pt denies chest pain, palpitations, SOB. Tele-Afib HR @ 110s. Cont. PO cardizem, Cont. Lopressor 25mg TID, pt Heparin gtt currently on hold for elevated PTT and hematuria. Lower Extremities: pitting, improved edema, +2/+2, Resp exam- +rhonchi bilateral. 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 Cont lasix 60mg IVP BID. Cont. milrinone. Nephrology on board-plan for temporary dialysis for fluid management.         Subjective: No new events   	  Vitals:  T(C): 36.5 (01-04-22 @ 15:17), Max: 36.6 (01-03-22 @ 20:25)  HR: 116 (01-04-22 @ 15:17) (84 - 137)  BP: 107/64 (01-04-22 @ 15:17) (104/58 - 125/76)  RR: 19 (01-04-22 @ 15:17) (18 - 20)  SpO2: 96% (01-04-22 @ 15:17) (92% - 96%)    I&O's Summary    03 Jan 2022 07:01  -  04 Jan 2022 07:00  --------------------------------------------------------  IN: 115 mL / OUT: 2125 mL / NET: -2010 mL    04 Jan 2022 07:01  -  04 Jan 2022 16:09  --------------------------------------------------------  IN: 0 mL / OUT: 500 mL / NET: -500 mL      Weight (kg): 68 (01-02 @ 00:15)      PHYSICAL EXAM:  Appearance: Comfortable. No acute distress  HEENT:  Head and neck: Atraumatic. Normocephalic.  Normal oral mucosa, PERRL, Neck is supple. No JVD, No carotid bruit.   Neurologic: A & O x 3, no focal deficits. EOMI  Lymphatic: No cervical lymphadenopathy  Cardiovascular: Normal S1 S2, No murmur, rubs/gallops. No JVD, No edema  Respiratory: +rhonchi bilateral  Gastrointestinal:  Soft, Non-tender, + BS  : +hematuria  Lower Extremities: pitting, improved edema, +2/+2  Psychiatry: Patient is calm. No agitation. Mood & affect appropriate  Skin: No rashes/ ecchymoses/cyanosis/ulcers visualized on the face, hands or feet      CURRENT MEDICATIONS:  diltiazem    Tablet 60 milliGRAM(s) Oral every 6 hours  diltiazem Injectable 10 milliGRAM(s) IV Push every 6 hours PRN  furosemide   Injectable 60 milliGRAM(s) IV Push two times a day  metoprolol tartrate 25 milliGRAM(s) Oral three times a day  milrinone Infusion 0.25 MICROgram(s)/kG/Min IV Continuous <Continuous>  tiotropium 18 MICROgram(s) Capsule  piperacillin/tazobactam IVPB..  heparin  Infusion.      DIAGNOSTIC TESTING:    [ ] Echocardiogram: < from: TTE Echo Complete w/o Contrast w/ Doppler (01.02.22 @ 09:12) >  Summary:   1. Left ventricular ejection fraction, by visual estimation, is >75%.   2. Technically suboptimal study.   3. Hyperdynamic global left ventricular systolic function.   4. Normal left ventricular internal cavity size.   5. The mitral in-flow pattern reveals no discernable A-wave, therefore   no comment on diastolic function can be made.   6. Normal right ventricular size and function.  7. Mild to moderately enlarged left atrium.   8. There is no evidence of pericardial effusion.   9. Mild mitral annular calcification.  10. Mild to moderate mitral valve regurgitation.  11. Thickening of the anterior and posterior mitral valve leaflets.  12. Mild tricuspid regurgitation.  13. Small mobile echodencities visualized on the aortic valve. 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 Clinical correlation recommended.      OTHER: 	    US:< from: US Abdomen Doppler (01.03.22 @ 17:08) >  IMPRESSION: Mildly enlarged nodular heterogeneous liver consistent with   cirrhosis. No focal masses are seen  Cholecystectomy  Normal duplex Doppler evaluation of vascular structures in the lacey   hepatis.  Ascites    < from: US Renal (01.03.22 @ 17:01) >  IMPRESSION:    Atrophic right kidney.  No hydronephrosis.      Xray-< from: Xray Chest 1 View-PORTABLE IMMEDIATE (Xray Chest 1 View-PORTABLE IMMEDIATE .) (01.03.22 @ 12:39) >  FINDINGS/  IMPRESSION:  Stable mild cardiomegaly.  Increasing hazy and reticular opacities bilaterally - right lung more   than left - likely due to pulmonary vascular congestive changes.  Stable trace left pleural effusion  Increasing right pleural effusion.  No pneumothorax.  No acute bony finding.    LABS:	 	  CARDIAC MARKERS ( 02 Jan 2022 07:17 )  x     / x     / 26 U/L / x     / x      p-BNP 02 Jan 2022 07:17: x                              12.3   11.50 )-----------( 118      ( 04 Jan 2022 09:47 )             39.6     01-04    129<L>  |  89<L>  |  97.2<H>  ----------------------------<  190<H>  3.5   |  24.0  |  2.84<H>    Ca    7.9<L>      04 Jan 2022 09:47    TPro  5.2<L>  /  Alb  2.3<L>  /  TBili  1.2  /  DBili  x   /  AST  23  /  ALT  11  /  AlkPhos  159<H>  01-04    proBNP: Serum Pro-Brain Natriuretic Peptide: 5458 pg/mL (01-01 @ 11:45)    Lipid Profile: Date: 01-03 @ 10:49  Total cholesterol 91; Direct LDL: --; HDL: 48; Triglycerides:64      TSH: Thyroid Stimulating Hormone, Serum: 5.33 uIU/mL    TELEMETRY: Afib HR @ 110s

## 2022-01-04 NOTE — PROGRESS NOTE ADULT - ASSESSMENT
87 y/o F with PMH Breast cancer, Mastectomy 1985, colon ca resection 2010, VATS pleurectomy drainage 2018, COPD, CKD, presents to ED with c/o Leg swelling x 1 week. States fell on monday, able to get off floor, since then worsening shortness of breath, and leg swelling. Denies palpitations, chest pains. Noted to Be afib RVR in ED, given diltiazem. Edema noted to abd.     1/4: Pt denies chest pain, palpitations, SOB. Tele-Afib HR @ 110s. Cont. PO cardizem, Cont. Lopressor 25mg TID, pt Heparin gtt currently on hold for elevated PTT and hematuria. Lower Extremities: pitting, improved edema, +2/+2, Resp exam- +rhonchi bilateral. 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 Cont lasix 60mg IVP BID. Will discuss continuation of milrinone with attending. Nephrology on board-plan for temporary dialysis for fluid management.       Afib  -Cont. PO cardizem  -Cont. 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   - Cont lasix 60mg IVP BID  - Will discuss continuation of milrinone with attending   - nephrology on board-plan for temporary dialysis for fluid management     EDILBERTO on CKD  -Recent BP-97.2/2.84  -Nephrology following   85 y/o F with PMH Breast cancer, Mastectomy 1985, colon ca resection 2010, VATS pleurectomy drainage 2018, COPD, CKD, presents to ED with c/o Leg swelling x 1 week. States fell on monday, able to get off floor, since then worsening shortness of breath, and leg swelling. Denies palpitations, chest pains. Noted to Be afib RVR in ED, given diltiazem. Edema noted to abd.     1/4: Pt denies chest pain, palpitations, SOB. Tele-Afib HR @ 110s. One dose digoxin 0.25mg IVP Cont. PO cardizem, Cont. Lopressor 25mg TID, pt Heparin gtt currently on hold for elevated PTT and hematuria. Lower Extremities: pitting, improved edema, +2/+2, Resp exam- +rhonchi bilateral. 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 Cont lasix 60mg IVP BID. Cont. milrinone. Nephrology on board-plan for temporary dialysis for fluid management.       Afib  -Cont. PO cardizem  -Cont. Lopressor 25mg TID   -Pt Heparin gtt currently on hold for elevated PTT and hematuria  -Cont telemetry monitoring   -One dose digoxin 0.25mg IVP     Diastolic heart failure  - Echo- EF >75%, normal RV size and fx., mild to moderately enlarged left atrium, there is no evidence of pericardial effusion, mild mitral annular calcification, mild to moderate MVR, mild TR, small mobile echo densitives visualized on the aortic valve, This   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   - Cont lasix 60mg IVP BID  - Cont. milrinone   - nephrology on board-plan for temporary dialysis for fluid management     EDILBERTO on CKD  -Recent BP-97.2/2.84  -Nephrology following

## 2022-01-04 NOTE — PROGRESS NOTE ADULT - SUBJECTIVE AND OBJECTIVE BOX
NEPHROLOGY INTERVAL HPI/OVERNIGHT EVENTS:  pt remains with some sob  very weak, tired  UO= 2125cc yesterday    MEDICATIONS  (STANDING):  diltiazem    Tablet 60 milliGRAM(s) Oral every 6 hours  furosemide   Injectable 60 milliGRAM(s) IV Push two times a day  heparin  Infusion.  Unit(s)/Hr (12 mL/Hr) IV Continuous <Continuous>  metoprolol tartrate 25 milliGRAM(s) Oral three times a day  milrinone Infusion 0.25 MICROgram(s)/kG/Min (5.1 mL/Hr) IV Continuous <Continuous>  piperacillin/tazobactam IVPB.. 3.375 Gram(s) IV Intermittent every 12 hours  tiotropium 18 MICROgram(s) Capsule 1 Capsule(s) Inhalation daily    MEDICATIONS  (PRN):  acetaminophen     Tablet .. 650 milliGRAM(s) Oral every 6 hours PRN Temp greater or equal to 38C (100.4F), Mild Pain (1 - 3)  aluminum hydroxide/magnesium hydroxide/simethicone Suspension 30 milliLiter(s) Oral every 4 hours PRN Dyspepsia  diltiazem Injectable 10 milliGRAM(s) IV Push every 6 hours PRN HR > 130  heparin   Injectable 5500 Unit(s) IV Push every 6 hours PRN For aPTT less than 40  heparin   Injectable 2500 Unit(s) IV Push every 6 hours PRN For aPTT between 40 - 57  melatonin 3 milliGRAM(s) Oral at bedtime PRN Insomnia  ondansetron Injectable 4 milliGRAM(s) IV Push every 8 hours PRN Nausea and/or Vomiting      Allergies    No Known Allergies          Vital Signs Last 24 Hrs  T(C): 36.6 (2022 07:13), Max: 36.6 (2022 20:25)  T(F): 97.8 (2022 07:13), Max: 97.8 (2022 20:25)  HR: 98 (2022 07:13) (84 - 137)  BP: 104/58 (2022 07:13) (101/54 - 125/76)  BP(mean): --  RR: 20 (2022 07:13) (18 - 20)  SpO2: 95% (2022 07:13) (92% - 96%)    PHYSICAL EXAM:  GENERAL: Frail, languid  ENMT: No periorbital edema  NECK: Supple, No JVD  NERVOUS SYSTEM:  Alert & Oriented X3, intact and symmetric  CHEST/LUNG: Coarse BS; + upper airway congestion  HEART: No rub  ABDOMEN: Soft, Nontender, + ascites; + body wall edema  EXTREMITIES:  2+ Peripheral Pulses, + anasarca    LABS:                        12.4   11.99 )-----------( 133      ( 2022 03:19 )             38.2         132<L>  |  94<L>  |  99.0<H>  ----------------------------<  146<H>  3.7   |  25.0  |  2.84<H>    Ca    8.4<L>      2022 22:49    TPro  5.3<L>  /  Alb  2.6<L>  /  TBili  1.2  /  DBili  x   /  AST  25  /  ALT  13  /  AlkPhos  168<H>      PT/INR - ( 2022 18:25 )   PT: 14.0 sec;   INR: 1.22 ratio         PTT - ( 2022 22:49 )  PTT:93.3 sec  Urinalysis Basic - ( 2022 11:07 )    Color: Leslie / Appearance: Slightly Turbid / S.010 / pH: x  Gluc: x / Ketone: Trace  / Bili: Negative / Urobili: Negative   Blood: x / Protein: 15 / Nitrite: Positive   Leuk Esterase: Small / RBC: >50 /HPF / WBC 6-10   Sq Epi: x / Non Sq Epi: Occasional / Bacteria: Moderate          RADIOLOGY & ADDITIONAL TESTS:  < from:  Renal (22 @ 17:01) >  ACC: [id number] EXAM:  US KIDNEY(S)                          PROCEDURE DATE:  2022          INTERPRETATION:  CLINICAL INFORMATION: Acute kidney injury, renal failure    COMPARISON: 2022    TECHNIQUE: Sonography of the kidneys and bladder.    FINDINGS:    Right kidney: 6.4 cm. Atrophic. No renal mass, hydronephrosis or calculi.    Left kidney: 8.5 cm. No renal mass, hydronephrosis or calculi.    Urinary bladder: Decompressed around Latham catheter.    IMPRESSION:    Atrophic right kidney.  No hydronephrosis.    < end of copied text >      < from: US Abdomen Doppler (22 @ 17:08) >    ACC: [id number] EXAM:  US DPLX ABDOMEN                          PROCEDURE DATE:  2022          INTERPRETATION:  History: Ascites. Assess portal venous patency.    Images from real-time ultrasound of the right upper quadrant are reviewed.    Thepancreas is not well seen    The IVC is patent.    The liver has a there are genius echotexture nodular contour consistent   with cirrhosis. There is mild hepatomegaly. There is no intrahepatic   biliary ductal dilatation. There is ascites. CBD measures up to 8 mm.   gallbladder is surgically absent    Duplex Doppler evaluation demonstrates patency and hepatopetal flow in   the main portal vein. Right and left portal veins are also patent.   Hepatic veins appear patent. Hepatic artery is patent with hepatopetal   flow.    IMPRESSION: Mildly enlarged nodular heterogeneous liver consistent with   cirrhosis. No focal masses are seen  Cholecystectomy  Normal duplex Doppler evaluation of vascular structures in the lacey   hepatis.  Ascites    < end of copied text >

## 2022-01-04 NOTE — PROGRESS NOTE ADULT - SUBJECTIVE AND OBJECTIVE BOX
[facility] Division of Hospital Medicine    Chief Complaint:  anasarca, chf     SUBJECTIVE: report feeling somewhat better    OVERNIGHT EVENTS: none     Patient denies chest pain, abd pain, N/V, fever, chills, dysuria or any other complaints. All remainder ROS negative.     MEDICATIONS  (STANDING):  diltiazem    Tablet 60 milliGRAM(s) Oral every 6 hours  furosemide   Injectable 60 milliGRAM(s) IV Push two times a day  heparin   Injectable 5500 Unit(s) IV Push once  heparin  Infusion.  Unit(s)/Hr (12 mL/Hr) IV Continuous <Continuous>  metoprolol tartrate 25 milliGRAM(s) Oral three times a day  milrinone Infusion 0.25 MICROgram(s)/kG/Min (5.1 mL/Hr) IV Continuous <Continuous>  piperacillin/tazobactam IVPB.. 3.375 Gram(s) IV Intermittent every 12 hours  tiotropium 18 MICROgram(s) Capsule 1 Capsule(s) Inhalation daily    MEDICATIONS  (PRN):  acetaminophen     Tablet .. 650 milliGRAM(s) Oral every 6 hours PRN Temp greater or equal to 38C (100.4F), Mild Pain (1 - 3)  aluminum hydroxide/magnesium hydroxide/simethicone Suspension 30 milliLiter(s) Oral every 4 hours PRN Dyspepsia  diltiazem Injectable 10 milliGRAM(s) IV Push every 6 hours PRN HR > 130  heparin   Injectable 5500 Unit(s) IV Push every 6 hours PRN For aPTT less than 40  heparin   Injectable 2500 Unit(s) IV Push every 6 hours PRN For aPTT between 40 - 57  melatonin 3 milliGRAM(s) Oral at bedtime PRN Insomnia  ondansetron Injectable 4 milliGRAM(s) IV Push every 8 hours PRN Nausea and/or Vomiting        I&O's Summary    02 Jan 2022 07:01  -  03 Jan 2022 07:00  --------------------------------------------------------  IN: 877.5 mL / OUT: 510 mL / NET: 367.5 mL        PHYSICAL EXAM:  Vital Signs Last 24 Hrs  T(C): 36.5 (04 Jan 2022 15:17), Max: 36.6 (03 Jan 2022 20:25)  T(F): 97.7 (04 Jan 2022 15:17), Max: 97.8 (03 Jan 2022 20:25)  HR: 116 (04 Jan 2022 15:17) (84 - 137)  BP: 107/64 (04 Jan 2022 15:17) (104/58 - 125/76)  BP(mean): --  RR: 19 (04 Jan 2022 15:17) (18 - 20)  SpO2: 96% (04 Jan 2022 15:17) (92% - 96%))        CONSTITUTIONAL: NAD,   ENMT: Moist oral mucosa, no pharyngeal injection or exudates; normal dentition; + JVD  RESPIRATORY: Normal respiratory effort; lungs are with rails and crackles on bases - somewhat improved  CARDIOVASCULAR: irregular rate and rhythm, distant  S1 and S2, no murmur/rub/gallop + 2-3 pitting edema up to high tights - somewhat better, Peripheral pulses are palpable bilaterally  ABDOMEN: Nontender to palpation, normoactive bowel sounds, no rebound/guarding; No hepatosplenomegaly  MUSCLOSKELETAL:  no clubbing or cyanosis of digits; no joint swelling or tenderness to palpation  PSYCH: A+O to person, place, and time; affect appropriate  NEUROLOGY: CN 2-12 are intact and symmetric; no gross sensory deficits; was observed moving all 4 ext against gravity cooperating with exam.   SKIN: atrophic skin changes, erythema over L forearm, warmer to touch - appears much better today    LABS:                        12.3   11.50 )-----------( 118      ( 04 Jan 2022 09:47 )             39.6     01-04    129<L>  |  89<L>  |  97.2<H>  ----------------------------<  190<H>  3.5   |  24.0  |  2.84<H>    Ca    7.9<L>      04 Jan 2022 09:47    TPro  5.2<L>  /  Alb  2.3<L>  /  TBili  1.2  /  DBili  x   /  AST  23  /  ALT  11  /  AlkPhos  159<H>  01-04    PT/INR - ( 02 Jan 2022 18:25 )   PT: 14.0 sec;   INR: 1.22 ratio         PTT - ( 04 Jan 2022 10:10 )  PTT:136.9 sec          Culture - Urine (collected 01 Jan 2022 22:09)  Source: Clean Catch Clean Catch (Midstream)  Final Report (02 Jan 2022 19:46):    No growth      CAPILLARY BLOOD GLUCOSE            RADIOLOGY & ADDITIONAL TESTS:  Results Reviewed:   Imaging Personally Reviewed:  Electrocardiogram Personally Reviewed:

## 2022-01-04 NOTE — PROCEDURE NOTE - NSICDXPROCEDURE_GEN_ALL_CORE_FT
PROCEDURES:  Insertion, catheter, hemodialysis, non-tunneled, with US guidance 04-Jan-2022 18:30:22  Tr Talavera

## 2022-01-04 NOTE — PROGRESS NOTE ADULT - ASSESSMENT
87 y/o female with new onset Afib with RVR, CHF exacerbation, EDILBERTO on CKD, RLL w/ sepsis, left UE cellulitis, Hx of COPD, CKD, HTN. She was admitted, started on Zosyn for RLL PNA and LUE cellulitis. Started workup for anasarca with diuretics. A.fib is now better controlled after few doses of Digoxin, and initiated CCB and BB, Heparin ggt started for AC. Pt was not responded to diuretics therefore started on Milrinone.      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 noted  - c/w PO cardizem 60 q6h and metoprolol 25 tid if pt bp allows  - IR team was not able able to get paracentesis  - ECHO noted  - renal and abdominal us noted    #EDILBERTO on CKD most likely cardio-renal in nature  - Nephrology team follows   - diuretics as above  - avoid nephrotoxic agents     # A.fib with RVR, now better controlled   - bb and ccb as above  - c/w heparing ggt with aptt per normogram    # Hematuria, in setting of heparing ggt, has improved  - will continue to observe for now  - cbc daily for now    RLL PNA/Sepsis:  -c/w Abx d3/5   -Nebs, oxygen prn, chest PT, incentive spirometer    LUE cellulitis:  -c/w Abx d2/5     COPD, w/o apparent exacerbation on exam  -at base line on 2 L nc  -Cont. o/p regimen  -PRN nebs    DVT ppx - on heparin ggt   CODE/Socia - full code,family update over phone  Dispo - may need HD

## 2022-01-04 NOTE — PROGRESS NOTE ADULT - ASSESSMENT
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

## 2022-01-04 NOTE — PROGRESS NOTE ADULT - ATTENDING COMMENTS
hematuria.  right arm swelling  agree with Duplex right arm venous.   on heparin drip. caution PTT control    Still has JVD.  good urine output.  BUn and cr is stable. planing for hemodialysis as needed.   Low potassium 3.5  Recommedn Potassium supplementation  mag nesium supplpementation  Right pleural effusion:  if breathing does not improve, repeat chest xray to see resolution, or IR for drainage of right pleural effusion  we need the US of the right lung.  to evalaute if its effusion or consolidation from pneumopnia.   Asicits: no significant  fluid to tap the ascitis.   congestive heart failure : responding to diuretics metolazone and Milrinone. no significanrt Rv duysfunction on echo, howefver, clinicallty she is responding to either aggressive diuresis alone or addition of milrinone.  nevertheless, she is tolerating it for now. we will continue for now  one dose of dig 0.25 mg once. ct cardizem and metoprolol  no ICU for now.   Critical care cardiology.  I have personally provided critical care time > 45 mins excluding time spent on separate procedures. hematuria.  right arm swelling  agree with Duplex right arm venous.   on heparin drip. caution PTT control    Still has JVD.  good urine output.  BUn and cr is stable. planing for hemodialysis as needed.   Low potassium 3.5  Recommedn Potassium supplementation  mag nesium supplpementation  Right pleural effusion:  if breathing does not improve, repeat chest xray to see resolution, or IR for drainage of right pleural effusion  we need the US of the right lung.  to evalaute if its effusion or consolidation from pneumopnia.   Asicits: no significant  fluid to tap the ascitis.   congestive heart failure : responding to diuretics metolazone and Milrinone. no significanrt Rv duysfunction on echo, howefver, clinicallty she is responding to either aggressive diuresis alone or addition of milrinone.  nevertheless, she is tolerating it for now. we will continue for now  one dose of dig 0.25 mg once. ct cardizem and metoprolol  despite the CKD, her potassium is low afetr diuresis.  I will give her 2 doses of potassium. as it is trending down.   2 doses of potassium and one dose of mag. and repeat mag and potassium tomorrow.   no ICU for now.   Critical care cardiology.  I have personally provided critical care time > 45 mins excluding time spent on separate procedures.

## 2022-01-05 LAB
ALBUMIN SERPL ELPH-MCNC: 3.1 G/DL — LOW (ref 3.3–5.2)
ALP SERPL-CCNC: 149 U/L — HIGH (ref 40–120)
ALT FLD-CCNC: 10 U/L — SIGNIFICANT CHANGE UP
ANION GAP SERPL CALC-SCNC: 16 MMOL/L — SIGNIFICANT CHANGE UP (ref 5–17)
APTT BLD: 178.9 SEC — CRITICAL HIGH (ref 27.5–35.5)
APTT BLD: 31.8 SEC — SIGNIFICANT CHANGE UP (ref 27.5–35.5)
APTT BLD: 44.9 SEC — HIGH (ref 27.5–35.5)
AST SERPL-CCNC: 31 U/L — SIGNIFICANT CHANGE UP
BILIRUB SERPL-MCNC: 1.2 MG/DL — SIGNIFICANT CHANGE UP (ref 0.4–2)
BUN SERPL-MCNC: 103.4 MG/DL — HIGH (ref 8–20)
CALCIUM SERPL-MCNC: 8.3 MG/DL — LOW (ref 8.6–10.2)
CHLORIDE SERPL-SCNC: 90 MMOL/L — LOW (ref 98–107)
CO2 SERPL-SCNC: 25 MMOL/L — SIGNIFICANT CHANGE UP (ref 22–29)
CREAT SERPL-MCNC: 2.81 MG/DL — HIGH (ref 0.5–1.3)
GLUCOSE SERPL-MCNC: 178 MG/DL — HIGH (ref 70–99)
HBV CORE AB SER-ACNC: SIGNIFICANT CHANGE UP
HBV CORE IGM SER-ACNC: SIGNIFICANT CHANGE UP
HBV SURFACE AB SER-ACNC: <3 MIU/ML — LOW
HBV SURFACE AG SER-ACNC: SIGNIFICANT CHANGE UP
HCT VFR BLD CALC: 37.1 % — SIGNIFICANT CHANGE UP (ref 34.5–45)
HCV AB S/CO SERPL IA: 0.15 S/CO — SIGNIFICANT CHANGE UP (ref 0–0.99)
HCV AB SERPL-IMP: SIGNIFICANT CHANGE UP
HGB BLD-MCNC: 11.7 G/DL — SIGNIFICANT CHANGE UP (ref 11.5–15.5)
MAGNESIUM SERPL-MCNC: 2.6 MG/DL — SIGNIFICANT CHANGE UP (ref 1.6–2.6)
MCHC RBC-ENTMCNC: 24.7 PG — LOW (ref 27–34)
MCHC RBC-ENTMCNC: 31.5 GM/DL — LOW (ref 32–36)
MCV RBC AUTO: 78.3 FL — LOW (ref 80–100)
PLATELET # BLD AUTO: 124 K/UL — LOW (ref 150–400)
POTASSIUM SERPL-MCNC: 4.1 MMOL/L — SIGNIFICANT CHANGE UP (ref 3.5–5.3)
POTASSIUM SERPL-SCNC: 4.1 MMOL/L — SIGNIFICANT CHANGE UP (ref 3.5–5.3)
PROT SERPL-MCNC: 5.7 G/DL — LOW (ref 6.6–8.7)
RBC # BLD: 4.74 M/UL — SIGNIFICANT CHANGE UP (ref 3.8–5.2)
RBC # FLD: 17.2 % — HIGH (ref 10.3–14.5)
SODIUM SERPL-SCNC: 131 MMOL/L — LOW (ref 135–145)
WBC # BLD: 13.58 K/UL — HIGH (ref 3.8–10.5)
WBC # FLD AUTO: 13.58 K/UL — HIGH (ref 3.8–10.5)

## 2022-01-05 PROCEDURE — 99233 SBSQ HOSP IP/OBS HIGH 50: CPT

## 2022-01-05 PROCEDURE — 76604 US EXAM CHEST: CPT | Mod: 26

## 2022-01-05 RX ORDER — ENOXAPARIN SODIUM 100 MG/ML
60 INJECTION SUBCUTANEOUS EVERY 12 HOURS
Refills: 0 | Status: DISCONTINUED | OUTPATIENT
Start: 2022-01-05 | End: 2022-01-06

## 2022-01-05 RX ORDER — ALBUMIN HUMAN 25 %
50 VIAL (ML) INTRAVENOUS ONCE
Refills: 0 | Status: COMPLETED | OUTPATIENT
Start: 2022-01-05 | End: 2022-01-05

## 2022-01-05 RX ADMIN — Medication 60 MILLIGRAM(S): at 23:48

## 2022-01-05 RX ADMIN — Medication 60 MILLIGRAM(S): at 05:13

## 2022-01-05 RX ADMIN — MILRINONE LACTATE 5.1 MICROGRAM(S)/KG/MIN: 1 INJECTION, SOLUTION INTRAVENOUS at 00:24

## 2022-01-05 RX ADMIN — HEPARIN SODIUM 0 UNIT(S)/HR: 5000 INJECTION INTRAVENOUS; SUBCUTANEOUS at 12:07

## 2022-01-05 RX ADMIN — Medication 40 MILLIEQUIVALENT(S): at 00:26

## 2022-01-05 RX ADMIN — PIPERACILLIN AND TAZOBACTAM 25 GRAM(S): 4; .5 INJECTION, POWDER, LYOPHILIZED, FOR SOLUTION INTRAVENOUS at 05:11

## 2022-01-05 RX ADMIN — ENOXAPARIN SODIUM 60 MILLIGRAM(S): 100 INJECTION SUBCUTANEOUS at 17:36

## 2022-01-05 RX ADMIN — Medication 30 MILLILITER(S): at 12:40

## 2022-01-05 RX ADMIN — Medication 60 MILLIGRAM(S): at 00:29

## 2022-01-05 RX ADMIN — Medication 25 MILLIGRAM(S): at 21:05

## 2022-01-05 RX ADMIN — Medication 25 MILLIGRAM(S): at 00:26

## 2022-01-05 RX ADMIN — HEPARIN SODIUM 2500 UNIT(S): 5000 INJECTION INTRAVENOUS; SUBCUTANEOUS at 05:09

## 2022-01-05 RX ADMIN — Medication 25 MILLIGRAM(S): at 08:49

## 2022-01-05 RX ADMIN — PIPERACILLIN AND TAZOBACTAM 25 GRAM(S): 4; .5 INJECTION, POWDER, LYOPHILIZED, FOR SOLUTION INTRAVENOUS at 17:36

## 2022-01-05 RX ADMIN — Medication 50 MILLILITER(S): at 12:40

## 2022-01-05 RX ADMIN — Medication 60 MILLIGRAM(S): at 17:36

## 2022-01-05 RX ADMIN — HEPARIN SODIUM 1400 UNIT(S)/HR: 5000 INJECTION INTRAVENOUS; SUBCUTANEOUS at 05:06

## 2022-01-05 RX ADMIN — MAGNESIUM OXIDE 400 MG ORAL TABLET 400 MILLIGRAM(S): 241.3 TABLET ORAL at 00:25

## 2022-01-05 RX ADMIN — CHLORHEXIDINE GLUCONATE 1 APPLICATION(S): 213 SOLUTION TOPICAL at 05:17

## 2022-01-05 NOTE — PROGRESS NOTE ADULT - SUBJECTIVE AND OBJECTIVE BOX
Patient was seen and evaluated on dialysis.   No c/o CP SOB NV  no F/C  + swelling    T(C): 36.2 (01-05-22 @ 10:35), Max: 37.2 (01-05-22 @ 08:21)  HR: 113 (01-05-22 @ 10:35) (76 - 125)  BP: 111/69 (01-05-22 @ 08:21) (84/31 - 114/74)  Wt(kg): --  PE ;  NAD  lungs - CTA  CV gr 1 murmer,  No gallop or rub  Abd : soft, NT BS +, No masses  Ext- No edema  Neuro : Grossly intact, moving extremities                             11.7   13.58 )-----------( 124      ( 05 Jan 2022 04:31 )             37.1        01-05    131<L>  |  90<L>  |  103.4<H>  ----------------------------<  178<H>  4.1   |  25.0  |  2.81<H>    Ca    8.3<L>      05 Jan 2022 04:31  Mg     2.6     01-05    TPro  5.7<L>  /  Alb  3.1<L>  /  TBili  1.2  /  DBili  x   /  AST  31  /  ALT  10  /  AlkPhos  149<H>  01-05    Interventional Radiology  consulted for paracentesis  4 quadrant ascites survey shows small amount of ascites around the liver, insufficient for paracentesis.  there is generalized anasarca.         albumin human 25% IVPB  aluminum hydroxide/magnesium hydroxide/simethicone Suspension PRN  chlorhexidine 4% Liquid  diltiazem    Tablet  diltiazem Injectable PRN  furosemide   Injectable  heparin   Injectable PRN  heparin   Injectable PRN  heparin  Infusion.  melatonin PRN  metoprolol tartrate  milrinone Infusion  ondansetron Injectable PRN  piperacillin/tazobactam IVPB..  sodium chloride 0.9% lock flush PRN  tiotropium 18 MICROgram(s) Capsule      Patient stable

## 2022-01-05 NOTE — PROGRESS NOTE ADULT - ASSESSMENT
85 y/o female with new onset Afib with RVR, CHF exacerbation, EDILBERTO on CKD, RLL w/ sepsis, left UE cellulitis, Hx of COPD, CKD, HTN. She was admitted, started on Zosyn for RLL PNA and LUE cellulitis. Started workup for anasarca with diuretics. A.fib is now better controlled after few doses of Digoxin, and initiated CCB and BB, Heparin ggt started for AC. Pt was not responded to diuretics therefore started on Milrinone. Diuresis has improved with Milrinone, however uremia has gotten worse, therefore pt was initiated on HD after dialysis RIJ line was placed on 1/04.     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 noted  - c/w PO cardizem 60 q6h and metoprolol 25 tid if pt bp allows  - IR team was not able to get paracentesis  - ECHO noted  - renal and abdominal us noted    #EDILBERTO on CKD most likely cardio-renal, now requiring HD   - HD as per Nephrology team recommendations  - diuretics as above  - avoid nephrotoxic agents     # A.fib with RVR, now better controlled   - bb and ccb as above  -heparing ggt was switched to Lovenox with plan to transition to DOAC if now invasive procedures planned.     # Hematuria, in setting of heparing ggt, has improved  - will continue to observe for now  - cbc daily for now    #RLL PNA/Sepsis:  -c/w Abx d4/5   -Nebs, oxygen prn, chest PT, incentive spirometer    #LUE cellulitis:  -c/w Abx d2/5     3COPD, w/o apparent exacerbation on exam  -at base line on 2 L nc  -Cont. o/p regimen  -PRN nebs    DVT ppx - on lovenox  CODE/Socia - full code; family update over phone - daughter Eloisa Reynoso - will stay over weekend, may need community HD sit

## 2022-01-05 NOTE — PROGRESS NOTE ADULT - ASSESSMENT
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

## 2022-01-05 NOTE — PROGRESS NOTE ADULT - SUBJECTIVE AND OBJECTIVE BOX
[facility] Division of Hospital Medicine    Chief Complaint:  CHF    SUBJECTIVE: reports feeling better, c/o of leaking IV lines, but no other new complains    OVERNIGHT EVENTS: got HD access line over night    Patient denies chest pain, abd pain, N/V, fever, chills, dysuria or any other complaints. All remainder ROS negative.     MEDICATIONS  (STANDING):  albumin human 25% IVPB 50 milliLiter(s) IV Intermittent once  chlorhexidine 4% Liquid 1 Application(s) Topical <User Schedule>  diltiazem    Tablet 60 milliGRAM(s) Oral every 6 hours  enoxaparin Injectable 60 milliGRAM(s) SubCutaneous every 12 hours  furosemide   Injectable 60 milliGRAM(s) IV Push two times a day  metoprolol tartrate 25 milliGRAM(s) Oral three times a day  milrinone Infusion 0.25 MICROgram(s)/kG/Min (5.1 mL/Hr) IV Continuous <Continuous>  piperacillin/tazobactam IVPB.. 3.375 Gram(s) IV Intermittent every 12 hours  tiotropium 18 MICROgram(s) Capsule 1 Capsule(s) Inhalation daily    MEDICATIONS  (PRN):  acetaminophen     Tablet .. 650 milliGRAM(s) Oral every 6 hours PRN Temp greater or equal to 38C (100.4F), Mild Pain (1 - 3)  aluminum hydroxide/magnesium hydroxide/simethicone Suspension 30 milliLiter(s) Oral every 4 hours PRN Dyspepsia  diltiazem Injectable 10 milliGRAM(s) IV Push every 6 hours PRN HR > 130  melatonin 3 milliGRAM(s) Oral at bedtime PRN Insomnia  ondansetron Injectable 4 milliGRAM(s) IV Push every 8 hours PRN Nausea and/or Vomiting  sodium chloride 0.9% lock flush 10 milliLiter(s) IV Push every 1 hour PRN Pre/post blood products, medications, blood draw, and to maintain line patency        I&O's Summary    04 Jan 2022 07:01  -  05 Jan 2022 07:00  --------------------------------------------------------  IN: 955.3 mL / OUT: 2730 mL / NET: -1774.7 mL        PHYSICAL EXAM:  Vital Signs Last 24 Hrs  T(C): 36.4 (05 Jan 2022 12:00), Max: 37.2 (05 Jan 2022 08:21)  T(F): 97.5 (05 Jan 2022 12:00), Max: 98.9 (05 Jan 2022 08:21)  HR: 94 (05 Jan 2022 12:00) (76 - 125)  BP: 119/57 (05 Jan 2022 12:00) (84/31 - 119/57)  BP(mean): --  RR: 18 (05 Jan 2022 12:00) (17 - 20)  SpO2: 96% (05 Jan 2022 12:00) (94% - 97%)      CONSTITUTIONAL: NAD,   ENMT: Moist oral mucosa, no pharyngeal injection or exudates; normal dentition; + JVD  RESPIRATORY: Normal respiratory effort; lungs are with rails and crackles on bases - somewhat improved  CARDIOVASCULAR: irregular rate and rhythm, distant  S1 and S2, no murmur/rub/gallop + 2 pitting edema up to high knees, Peripheral pulses are palpable bilaterally  ABDOMEN: Nontender to palpation, normoactive bowel sounds, no rebound/guarding; No hepatosplenomegaly  MUSCLOSKELETAL:  no clubbing or cyanosis of digits; no joint swelling or tenderness to palpation  PSYCH: A+O to person, place, and time; affect appropriate  NEUROLOGY: CN 2-12 are intact and symmetric; no gross sensory deficits; was observed moving all 4 ext against gravity cooperating with exam.   SKIN: atrophic skin changes, erythema over L forearm, warmer to touch - appears much better today    LABS:                        11.7   13.58 )-----------( 124      ( 05 Jan 2022 04:31 )             37.1     01-05    131<L>  |  90<L>  |  103.4<H>  ----------------------------<  178<H>  4.1   |  25.0  |  2.81<H>    Ca    8.3<L>      05 Jan 2022 04:31  Mg     2.6     01-05    TPro  5.7<L>  /  Alb  3.1<L>  /  TBili  1.2  /  DBili  x   /  AST  31  /  ALT  10  /  AlkPhos  149<H>  01-05    PTT - ( 05 Jan 2022 10:56 )  PTT:178.9 sec          CAPILLARY BLOOD GLUCOSE            RADIOLOGY & ADDITIONAL TESTS:  Results Reviewed:   Imaging Personally Reviewed:  Electrocardiogram Personally Reviewed:

## 2022-01-05 NOTE — PROGRESS NOTE ADULT - ASSESSMENT
87 y/o F with PMH Breast cancer, Mastectomy 1985, colon ca resection 2010, VATS pleurectomy drainage 2018, COPD, CKD, presents to ED with c/o Leg swelling x 1 week. States fell on monday, able to get off floor, since then worsening shortness of breath, and leg swelling. Denies palpitations, chest pains. Noted to Be afib RVR in ED, given diltiazem. Edema noted to abd.     1/5: Pt SOB improved. Pt started HD today. Pt states that she is less symptomatic, improved SOB since after HD. Will continue HD for fluid management. Tele- Afib HR @ 90-100s, 150-160s at times. Intermittent digoxin for rate control PRN.       Afib  -Tele-Afib HR @ 90-100s, 150-160s at times  -Cont. PO Cardizem     -Cont. 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   - Cont Lasix 60mg IVP BID  - d/c milrinone  - Cont. HD for fluid management      EDILBERTO on CKD  -Recent BP-115/76  -Nephrology following

## 2022-01-05 NOTE — PROGRESS NOTE ADULT - SUBJECTIVE AND OBJECTIVE BOX
[place] CARDIOLOGY-[facility]                                                               Office: [address] [address]                                                              Telephone: [phone]. Fax:[phone]                                                                             PROGRESS NOTE  Reason for follow up: decompensated CHF, New onset Afib  Overnight: No new events.   Update: 1/5: Pt SOB improved. Pt started HD today. Pt states that she is less symptomatic, improved SOB since after HD. Will continue HD for fluid management. Tele- Afib HR @ 90-100s, 150-160s at times. Intermittent digoxin for rate control PRN.    Subjective: No new events     	  Vitals:  T(C): 36.4 (01-05-22 @ 12:00), Max: 37.2 (01-05-22 @ 08:21)  HR: 80 (01-05-22 @ 17:32) (76 - 125)  BP: 115/76 (01-05-22 @ 17:32) (84/31 - 119/57)  RR: 18 (01-05-22 @ 12:00) (17 - 20)  SpO2: 96% (01-05-22 @ 12:00) (94% - 97%)    I&O's Summary    04 Jan 2022 07:01  -  05 Jan 2022 07:00  --------------------------------------------------------  IN: 955.3 mL / OUT: 2730 mL / NET: -1774.7 mL    05 Jan 2022 07:01  -  05 Jan 2022 18:09  --------------------------------------------------------  IN: 0 mL / OUT: 700 mL / NET: -700 mL      Weight (kg): 68 (01-02 @ 00:15)      PHYSICAL EXAM:  Appearance: Comfortable. No acute distress  HEENT:  Head and neck: Atraumatic. Normocephalic.  Normal oral mucosa, PERRL, Neck is supple. No JVD, No carotid bruit.   Neurologic: A & O x 3, no focal deficits. EOMI.  Lymphatic: No cervical lymphadenopathy  Cardiovascular: Normal S1 S2, No murmur, rubs/gallops. No JVD, No edema  Respiratory: +rhonchi  Gastrointestinal:  Soft, Non-tender, + BS  Lower Extremities: improved bilateral lower extremity edema, +1/+1  Psychiatry: Patient is calm. No agitation. Mood & affect appropriate  Skin: No rashes/ ecchymoses/cyanosis/ulcers visualized on the face, hands or feet.      CURRENT MEDICATIONS:  diltiazem    Tablet 60 milliGRAM(s) Oral every 6 hours  diltiazem Injectable 10 milliGRAM(s) IV Push every 6 hours PRN  furosemide   Injectable 60 milliGRAM(s) IV Push two times a day  metoprolol tartrate 25 milliGRAM(s) Oral three times a day  tiotropium 18 MICROgram(s) Capsule  piperacillin/tazobactam IVPB..  chlorhexidine 4% Liquid  enoxaparin Injectable      DIAGNOSTIC TESTING:  [ ] Echocardiogram: < from: TTE Echo Complete w/o Contrast w/ Doppler (01.02.22 @ 09:12) >  Summary:   1. Left ventricular ejection fraction, by visual estimation, is >75%.   2. Technically suboptimal study.   3. Hyperdynamic global left ventricular systolic function.   4. Normal left ventricular internal cavity size.   5. The mitral in-flow pattern reveals no discernable A-wave, therefore   no comment on diastolic function can be made.   6. Normal right ventricular size and function.  7. Mild to moderately enlarged left atrium.   8. There is no evidence of pericardial effusion.   9. Mild mitral annular calcification.  10. Mild to moderate mitral valve regurgitation.  11. Thickening of the anterior and posterior mitral valve leaflets.  12. Mild tricuspid regurgitation.  13. Small mobile echodencities visualized on the aortic valve. 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 Clinical correlation recommended.      LABS:	 	                            11.7   13.58 )-----------( 124      ( 05 Jan 2022 04:31 )             37.1     01-05    131<L>  |  90<L>  |  103.4<H>  ----------------------------<  178<H>  4.1   |  25.0  |  2.81<H>    Ca    8.3<L>      05 Jan 2022 04:31  Mg     2.6     01-05    TPro  5.7<L>  /  Alb  3.1<L>  /  TBili  1.2  /  DBili  x   /  AST  31  /  ALT  10  /  AlkPhos  149<H>  01-05    proBNP: Serum Pro-Brain Natriuretic Peptide: 5458 pg/mL (01-01 @ 11:45)    Lipid Profile: Date: 01-03 @ 10:49  Total cholesterol 91; Direct LDL: --; HDL: 48; Triglycerides:64      TSH: Thyroid Stimulating Hormone, Serum: 5.33 uIU/mL        TELEMETRY:   Afib HR @ 90-100s, 150-160S at times

## 2022-01-06 ENCOUNTER — TRANSCRIPTION ENCOUNTER (OUTPATIENT)
Age: 87
End: 2022-01-06

## 2022-01-06 ENCOUNTER — APPOINTMENT (OUTPATIENT)
Dept: NEUROSURGERY | Facility: HOSPITAL | Age: 87
End: 2022-01-06
Payer: MEDICARE

## 2022-01-06 LAB
ALBUMIN SERPL ELPH-MCNC: 3.2 G/DL — LOW (ref 3.3–5.2)
ALP SERPL-CCNC: 178 U/L — HIGH (ref 40–120)
ALT FLD-CCNC: 11 U/L — SIGNIFICANT CHANGE UP
ANION GAP SERPL CALC-SCNC: 12 MMOL/L — SIGNIFICANT CHANGE UP (ref 5–17)
ANION GAP SERPL CALC-SCNC: 17 MMOL/L — SIGNIFICANT CHANGE UP (ref 5–17)
ANISOCYTOSIS BLD QL: SLIGHT — SIGNIFICANT CHANGE UP
APTT BLD: 32.9 SEC — SIGNIFICANT CHANGE UP (ref 27.5–35.5)
AST SERPL-CCNC: 34 U/L — HIGH
BASOPHILS # BLD AUTO: 0 K/UL — SIGNIFICANT CHANGE UP (ref 0–0.2)
BASOPHILS NFR BLD AUTO: 0 % — SIGNIFICANT CHANGE UP (ref 0–2)
BILIRUB SERPL-MCNC: 1.5 MG/DL — SIGNIFICANT CHANGE UP (ref 0.4–2)
BUN SERPL-MCNC: 43.8 MG/DL — HIGH (ref 8–20)
BUN SERPL-MCNC: 74.2 MG/DL — HIGH (ref 8–20)
CALCIUM SERPL-MCNC: 8.3 MG/DL — LOW (ref 8.6–10.2)
CALCIUM SERPL-MCNC: 8.7 MG/DL — SIGNIFICANT CHANGE UP (ref 8.6–10.2)
CHLORIDE SERPL-SCNC: 92 MMOL/L — LOW (ref 98–107)
CHLORIDE SERPL-SCNC: 92 MMOL/L — LOW (ref 98–107)
CK SERPL-CCNC: 103 U/L — SIGNIFICANT CHANGE UP (ref 25–170)
CO2 SERPL-SCNC: 24 MMOL/L — SIGNIFICANT CHANGE UP (ref 22–29)
CO2 SERPL-SCNC: 27 MMOL/L — SIGNIFICANT CHANGE UP (ref 22–29)
CREAT SERPL-MCNC: 1.64 MG/DL — HIGH (ref 0.5–1.3)
CREAT SERPL-MCNC: 2.31 MG/DL — HIGH (ref 0.5–1.3)
CULTURE RESULTS: SIGNIFICANT CHANGE UP
CULTURE RESULTS: SIGNIFICANT CHANGE UP
ELLIPTOCYTES BLD QL SMEAR: SLIGHT — SIGNIFICANT CHANGE UP
EOSINOPHIL # BLD AUTO: 0 K/UL — SIGNIFICANT CHANGE UP (ref 0–0.5)
EOSINOPHIL NFR BLD AUTO: 0 % — SIGNIFICANT CHANGE UP (ref 0–6)
GAS PNL BLDA: SIGNIFICANT CHANGE UP
GAS PNL BLDA: SIGNIFICANT CHANGE UP
GLUCOSE BLDC GLUCOMTR-MCNC: 106 MG/DL — HIGH (ref 70–99)
GLUCOSE BLDC GLUCOMTR-MCNC: 117 MG/DL — HIGH (ref 70–99)
GLUCOSE BLDC GLUCOMTR-MCNC: 93 MG/DL — SIGNIFICANT CHANGE UP (ref 70–99)
GLUCOSE SERPL-MCNC: 102 MG/DL — HIGH (ref 70–99)
GLUCOSE SERPL-MCNC: 113 MG/DL — HIGH (ref 70–99)
HCT VFR BLD CALC: 37.7 % — SIGNIFICANT CHANGE UP (ref 34.5–45)
HCT VFR BLD CALC: 39.9 % — SIGNIFICANT CHANGE UP (ref 34.5–45)
HGB BLD-MCNC: 12.1 G/DL — SIGNIFICANT CHANGE UP (ref 11.5–15.5)
HGB BLD-MCNC: 12.8 G/DL — SIGNIFICANT CHANGE UP (ref 11.5–15.5)
INR BLD: 1.22 RATIO — HIGH (ref 0.88–1.16)
LYMPHOCYTES # BLD AUTO: 0.21 K/UL — LOW (ref 1–3.3)
LYMPHOCYTES # BLD AUTO: 1.8 % — LOW (ref 13–44)
MANUAL SMEAR VERIFICATION: SIGNIFICANT CHANGE UP
MCHC RBC-ENTMCNC: 25.7 PG — LOW (ref 27–34)
MCHC RBC-ENTMCNC: 25.7 PG — LOW (ref 27–34)
MCHC RBC-ENTMCNC: 32.1 GM/DL — SIGNIFICANT CHANGE UP (ref 32–36)
MCHC RBC-ENTMCNC: 32.1 GM/DL — SIGNIFICANT CHANGE UP (ref 32–36)
MCV RBC AUTO: 80 FL — SIGNIFICANT CHANGE UP (ref 80–100)
MCV RBC AUTO: 80.1 FL — SIGNIFICANT CHANGE UP (ref 80–100)
MICROCYTES BLD QL: SLIGHT — SIGNIFICANT CHANGE UP
MONOCYTES # BLD AUTO: 0.73 K/UL — SIGNIFICANT CHANGE UP (ref 0–0.9)
MONOCYTES NFR BLD AUTO: 6.2 % — SIGNIFICANT CHANGE UP (ref 2–14)
NEUTROPHILS # BLD AUTO: 10.5 K/UL — HIGH (ref 1.8–7.4)
NEUTROPHILS NFR BLD AUTO: 89.4 % — HIGH (ref 43–77)
OVALOCYTES BLD QL SMEAR: SLIGHT — SIGNIFICANT CHANGE UP
PLAT MORPH BLD: NORMAL — SIGNIFICANT CHANGE UP
PLATELET # BLD AUTO: 103 K/UL — LOW (ref 150–400)
PLATELET # BLD AUTO: 98 K/UL — LOW (ref 150–400)
POIKILOCYTOSIS BLD QL AUTO: SLIGHT — SIGNIFICANT CHANGE UP
POTASSIUM SERPL-MCNC: 3.8 MMOL/L — SIGNIFICANT CHANGE UP (ref 3.5–5.3)
POTASSIUM SERPL-MCNC: 3.9 MMOL/L — SIGNIFICANT CHANGE UP (ref 3.5–5.3)
POTASSIUM SERPL-SCNC: 3.8 MMOL/L — SIGNIFICANT CHANGE UP (ref 3.5–5.3)
POTASSIUM SERPL-SCNC: 3.9 MMOL/L — SIGNIFICANT CHANGE UP (ref 3.5–5.3)
PROT SERPL-MCNC: 6.2 G/DL — LOW (ref 6.6–8.7)
PROTHROM AB SERPL-ACNC: 14 SEC — HIGH (ref 10.6–13.6)
RBC # BLD: 4.71 M/UL — SIGNIFICANT CHANGE UP (ref 3.8–5.2)
RBC # BLD: 4.98 M/UL — SIGNIFICANT CHANGE UP (ref 3.8–5.2)
RBC # FLD: 17.5 % — HIGH (ref 10.3–14.5)
RBC # FLD: 18 % — HIGH (ref 10.3–14.5)
RBC BLD AUTO: SIGNIFICANT CHANGE UP
SMUDGE CELLS # BLD: PRESENT — SIGNIFICANT CHANGE UP
SODIUM SERPL-SCNC: 131 MMOL/L — LOW (ref 135–145)
SODIUM SERPL-SCNC: 133 MMOL/L — LOW (ref 135–145)
SPECIMEN SOURCE: SIGNIFICANT CHANGE UP
SPECIMEN SOURCE: SIGNIFICANT CHANGE UP
TROPONIN T SERPL-MCNC: 0.06 NG/ML — SIGNIFICANT CHANGE UP (ref 0–0.06)
VARIANT LYMPHS # BLD: 2.6 % — SIGNIFICANT CHANGE UP (ref 0–6)
WBC # BLD: 11.27 K/UL — HIGH (ref 3.8–10.5)
WBC # BLD: 11.75 K/UL — HIGH (ref 3.8–10.5)
WBC # FLD AUTO: 11.27 K/UL — HIGH (ref 3.8–10.5)
WBC # FLD AUTO: 11.75 K/UL — HIGH (ref 3.8–10.5)

## 2022-01-06 PROCEDURE — 70496 CT ANGIOGRAPHY HEAD: CPT | Mod: 26

## 2022-01-06 PROCEDURE — 61645 PERQ ART M-THROMBECT &/NFS: CPT | Mod: LT

## 2022-01-06 PROCEDURE — 71045 X-RAY EXAM CHEST 1 VIEW: CPT | Mod: 26

## 2022-01-06 PROCEDURE — 99233 SBSQ HOSP IP/OBS HIGH 50: CPT

## 2022-01-06 PROCEDURE — 0042T: CPT

## 2022-01-06 PROCEDURE — 71045 X-RAY EXAM CHEST 1 VIEW: CPT | Mod: 26,77

## 2022-01-06 PROCEDURE — 70498 CT ANGIOGRAPHY NECK: CPT | Mod: 26

## 2022-01-06 RX ORDER — ACETAMINOPHEN 500 MG
650 TABLET ORAL EVERY 6 HOURS
Refills: 0 | Status: DISCONTINUED | OUTPATIENT
Start: 2022-01-06 | End: 2022-01-09

## 2022-01-06 RX ORDER — SODIUM CHLORIDE 9 MG/ML
1000 INJECTION INTRAMUSCULAR; INTRAVENOUS; SUBCUTANEOUS
Refills: 0 | Status: DISCONTINUED | OUTPATIENT
Start: 2022-01-06 | End: 2022-01-08

## 2022-01-06 RX ORDER — DEXTROSE 50 % IN WATER 50 %
12.5 SYRINGE (ML) INTRAVENOUS ONCE
Refills: 0 | Status: DISCONTINUED | OUTPATIENT
Start: 2022-01-06 | End: 2022-01-10

## 2022-01-06 RX ORDER — SODIUM CHLORIDE 9 MG/ML
1000 INJECTION, SOLUTION INTRAVENOUS
Refills: 0 | Status: DISCONTINUED | OUTPATIENT
Start: 2022-01-06 | End: 2022-01-06

## 2022-01-06 RX ORDER — IPRATROPIUM/ALBUTEROL SULFATE 18-103MCG
3 AEROSOL WITH ADAPTER (GRAM) INHALATION EVERY 6 HOURS
Refills: 0 | Status: DISCONTINUED | OUTPATIENT
Start: 2022-01-06 | End: 2022-01-07

## 2022-01-06 RX ORDER — ATORVASTATIN CALCIUM 80 MG/1
40 TABLET, FILM COATED ORAL AT BEDTIME
Refills: 0 | Status: DISCONTINUED | OUTPATIENT
Start: 2022-01-06 | End: 2022-01-07

## 2022-01-06 RX ORDER — CHLORHEXIDINE GLUCONATE 213 G/1000ML
15 SOLUTION TOPICAL EVERY 12 HOURS
Refills: 0 | Status: DISCONTINUED | OUTPATIENT
Start: 2022-01-06 | End: 2022-01-09

## 2022-01-06 RX ORDER — PROPOFOL 10 MG/ML
10 INJECTION, EMULSION INTRAVENOUS
Qty: 1000 | Refills: 0 | Status: DISCONTINUED | OUTPATIENT
Start: 2022-01-06 | End: 2022-01-07

## 2022-01-06 RX ORDER — PANTOPRAZOLE SODIUM 20 MG/1
40 TABLET, DELAYED RELEASE ORAL DAILY
Refills: 0 | Status: DISCONTINUED | OUTPATIENT
Start: 2022-01-06 | End: 2022-01-11

## 2022-01-06 RX ORDER — ATORVASTATIN CALCIUM 80 MG/1
80 TABLET, FILM COATED ORAL ONCE
Refills: 0 | Status: DISCONTINUED | OUTPATIENT
Start: 2022-01-06 | End: 2022-01-06

## 2022-01-06 RX ORDER — SODIUM CHLORIDE 9 MG/ML
1000 INJECTION, SOLUTION INTRAVENOUS
Refills: 0 | Status: DISCONTINUED | OUTPATIENT
Start: 2022-01-06 | End: 2022-01-10

## 2022-01-06 RX ORDER — ASPIRIN/CALCIUM CARB/MAGNESIUM 324 MG
81 TABLET ORAL DAILY
Refills: 0 | Status: DISCONTINUED | OUTPATIENT
Start: 2022-01-06 | End: 2022-01-06

## 2022-01-06 RX ORDER — DILTIAZEM HCL 120 MG
60 CAPSULE, EXT RELEASE 24 HR ORAL EVERY 6 HOURS
Refills: 0 | Status: DISCONTINUED | OUTPATIENT
Start: 2022-01-06 | End: 2022-01-07

## 2022-01-06 RX ORDER — METOPROLOL TARTRATE 50 MG
25 TABLET ORAL THREE TIMES A DAY
Refills: 0 | Status: DISCONTINUED | OUTPATIENT
Start: 2022-01-06 | End: 2022-01-08

## 2022-01-06 RX ORDER — INSULIN LISPRO 100/ML
VIAL (ML) SUBCUTANEOUS EVERY 6 HOURS
Refills: 0 | Status: DISCONTINUED | OUTPATIENT
Start: 2022-01-06 | End: 2022-01-10

## 2022-01-06 RX ORDER — GLUCAGON INJECTION, SOLUTION 0.5 MG/.1ML
1 INJECTION, SOLUTION SUBCUTANEOUS ONCE
Refills: 0 | Status: DISCONTINUED | OUTPATIENT
Start: 2022-01-06 | End: 2022-01-24

## 2022-01-06 RX ORDER — NICARDIPINE HYDROCHLORIDE 30 MG/1
5 CAPSULE, EXTENDED RELEASE ORAL
Qty: 40 | Refills: 0 | Status: DISCONTINUED | OUTPATIENT
Start: 2022-01-06 | End: 2022-01-07

## 2022-01-06 RX ORDER — DEXTROSE 50 % IN WATER 50 %
15 SYRINGE (ML) INTRAVENOUS ONCE
Refills: 0 | Status: DISCONTINUED | OUTPATIENT
Start: 2022-01-06 | End: 2022-01-10

## 2022-01-06 RX ORDER — DEXTROSE 50 % IN WATER 50 %
25 SYRINGE (ML) INTRAVENOUS ONCE
Refills: 0 | Status: DISCONTINUED | OUTPATIENT
Start: 2022-01-06 | End: 2022-01-10

## 2022-01-06 RX ADMIN — Medication 60 MILLIGRAM(S): at 05:04

## 2022-01-06 RX ADMIN — Medication 60 MILLIGRAM(S): at 05:03

## 2022-01-06 RX ADMIN — Medication 25 MILLIGRAM(S): at 05:03

## 2022-01-06 RX ADMIN — SODIUM CHLORIDE 30 MILLILITER(S): 9 INJECTION INTRAMUSCULAR; INTRAVENOUS; SUBCUTANEOUS at 22:30

## 2022-01-06 RX ADMIN — PIPERACILLIN AND TAZOBACTAM 25 GRAM(S): 4; .5 INJECTION, POWDER, LYOPHILIZED, FOR SOLUTION INTRAVENOUS at 05:04

## 2022-01-06 RX ADMIN — ENOXAPARIN SODIUM 60 MILLIGRAM(S): 100 INJECTION SUBCUTANEOUS at 05:03

## 2022-01-06 NOTE — DISCHARGE NOTE NURSING/CASE MANAGEMENT/SOCIAL WORK - NSDCPEFALRISK_GEN_ALL_CORE
For information on Fall & Injury Prevention, visit: [email or web address] OR  [email or web address] OR  https://www.cdc.gov/steadi/patient.html

## 2022-01-06 NOTE — CONSULT NOTE ADULT - ASSESSMENT
ASSESSMENT:  86F with hx HTN, CKD baseline Scr 1.7. COPD not on home o2, remote hx breast CA s/p mastectomy 1985, colon cancer s/p bowel resection 12 years ago, chronic leg edema, mechanical fall 1 week ago presents with AFIB RVR, Acute on chronic CKD./  diastolic heart failure and RV dysfunction, RLL PNA now upgraded to NSICU for L MCA m1 occlusion pending mechanical thrombectomy. LKW 2pm today- found to be altered and confused with right side hemiaparesis at 530pm.     MRS 0 per family. NIH 25    PLAN:  NEURO;  -Admit to NSICU  -Neuro checks post thrombectomy protocol then q1 hour  -stat CTH if any ms changes  -PT/ OT/ SLP    CV:  -Allow for permissive HTN at this time given L MCA m1 occlusion.  -BP goals to be clarified by [facility] team post thrombectomy. They may want lower BP goals to decrease risk of hemorrhage post thrombectomy  -TTE: ef >75%. mild- mod MVR, mild TR. Small mobile echodencities visualized on the aortic valve. 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   -Cards following for acute decompensated HF: off milrinone today. Cardizem/ BB for rate control. IV lasix for diuresis  -lDL 30    Respiratory:  -Supplemental NC prn hypoxia  -PRN duonebs for bronchospasm/ wheezing    GI: NPO for thrombectomyAbd US: Mildly enlarged nodular heterogeneous liver consistent with cirrhosis  transaminitis ?passive liver congestion  continue to monitor LFTS    : [place]  Monitor Scr  Monitor I+ O  renal US: Atrophic right kidney.No hydronephrosis.  s/p HD today- 1.5 L fluid removal. monitor fluid status.  femoral shiley  -nephrology team following    ID:  Monitor leukocytosis, monitor fever  RLL Pnu versus effusion- zosyn q8  bcx neg, ucx neg 1/1    Heme:  -will reevaluate for AP/AC post thrombectomy    Endo:  pending A1C    Lines:  right femoral vein Star

## 2022-01-06 NOTE — PROGRESS NOTE ADULT - SUBJECTIVE AND OBJECTIVE BOX
[facility] Division of Hospital Medicine    Chief Complaint: CHF     SUBJECTIVE: reports feeling better     OVERNIGHT EVENTS:    Patient denies chest pain, SOB, abd pain, N/V, fever, chills, dysuria or any other complaints. All remainder ROS negative.     MEDICATIONS  (STANDING):  chlorhexidine 4% Liquid 1 Application(s) Topical <User Schedule>  diltiazem    Tablet 60 milliGRAM(s) Oral every 6 hours  enoxaparin Injectable 60 milliGRAM(s) SubCutaneous every 12 hours  furosemide   Injectable 60 milliGRAM(s) IV Push two times a day  metoprolol tartrate 25 milliGRAM(s) Oral three times a day  piperacillin/tazobactam IVPB.. 3.375 Gram(s) IV Intermittent every 12 hours  tiotropium 18 MICROgram(s) Capsule 1 Capsule(s) Inhalation daily    MEDICATIONS  (PRN):  acetaminophen     Tablet .. 650 milliGRAM(s) Oral every 6 hours PRN Temp greater or equal to 38C (100.4F), Mild Pain (1 - 3)  albuterol/ipratropium for Nebulization 3 milliLiter(s) Nebulizer every 6 hours PRN Shortness of Breath and/or Wheezing  aluminum hydroxide/magnesium hydroxide/simethicone Suspension 30 milliLiter(s) Oral every 4 hours PRN Dyspepsia  diltiazem Injectable 10 milliGRAM(s) IV Push every 6 hours PRN HR > 130  melatonin 3 milliGRAM(s) Oral at bedtime PRN Insomnia  ondansetron Injectable 4 milliGRAM(s) IV Push every 8 hours PRN Nausea and/or Vomiting  sodium chloride 0.9% lock flush 10 milliLiter(s) IV Push every 1 hour PRN Pre/post blood products, medications, blood draw, and to maintain line patency      I&O's Summary    05 Jan 2022 07:01  -  06 Jan 2022 07:00  --------------------------------------------------------  IN: 0 mL / OUT: 1250 mL / NET: -1250 mL    06 Jan 2022 07:01  -  06 Jan 2022 14:02  --------------------------------------------------------  IN: 0 mL / OUT: 1500 mL / NET: -1500 mL        PHYSICAL EXAM:  Vital Signs Last 24 Hrs  T(C): 36.8 (06 Jan 2022 13:11), Max: 37.2 (05 Jan 2022 19:00)  T(F): 98.2 (06 Jan 2022 13:11), Max: 99 (05 Jan 2022 19:00)  HR: 109 (06 Jan 2022 13:11) (80 - 124)  BP: 118/72 (06 Jan 2022 13:11) (115/72 - 145/74)  BP(mean): --  RR: 18 (06 Jan 2022 13:11) (18 - 20)  SpO2: 95% (06 Jan 2022 13:11) (93% - 98%)        CONSTITUTIONAL: NAD,   ENMT: Moist oral mucosa, no pharyngeal injection or exudates; normal dentition; + JVD  RESPIRATORY: Normal respiratory effort; lungs are with rails and crackles on bases and scattered wheezing today  CARDIOVASCULAR: irregular rate and rhythm, distant  S1 and S2, no murmur/rub/gallop + 2 pitting edema up to kneews, Peripheral pulses are palpable bilaterally  ABDOMEN: Nontender to palpation, normoactive bowel sounds, no rebound/guarding; No hepatosplenomegaly  MUSCLOSKELETAL:  no clubbing or cyanosis of digits; no joint swelling or tenderness to palpation  PSYCH: A+O to person, place, and time; affect appropriate  NEUROLOGY: CN 2-12 are intact and symmetric; no gross sensory deficits; was observed moving all 4 ext against gravity cooperating with exam.   SKIN: atrophic skin changes, erythema over L forearm has improved.    LABS:                        12.1   11.27 )-----------( 103      ( 06 Jan 2022 03:33 )             37.7     01-06    131<L>  |  92<L>  |  74.2<H>  ----------------------------<  102<H>  3.8   |  27.0  |  2.31<H>    Ca    8.3<L>      06 Jan 2022 03:33  Mg     2.6     01-05    TPro  5.7<L>  /  Alb  3.1<L>  /  TBili  1.2  /  DBili  x   /  AST  31  /  ALT  10  /  AlkPhos  149<H>  01-05    PTT - ( 05 Jan 2022 15:33 )  PTT:31.8 sec          CAPILLARY BLOOD GLUCOSE            RADIOLOGY & ADDITIONAL TESTS:  Results Reviewed:   Imaging Personally Reviewed:  Electrocardiogram Personally Reviewed:

## 2022-01-06 NOTE — PROGRESS NOTE ADULT - SUBJECTIVE AND OBJECTIVE BOX
[place] [facility]ARDIOLOGY-[facility]                                                               Office: [address] [address]                                                              Telephone: [phone]. Fax:[phone]                                                                             PROGRESS NOTE  Reason for follow up:   Overnight: No new events.   Update:     Subjective: "  ______________________"      	  Vitals:  T(C): 37 (01-06-22 @ 16:38), Max: 37.2 (01-05-22 @ 19:00)  HR: 127 (01-06-22 @ 16:38) (98 - 127)  BP: 121/70 (01-06-22 @ 16:38) (115/72 - 145/74)  RR: 19 (01-06-22 @ 16:38) (18 - 20)  SpO2: 93% (01-06-22 @ 16:38) (93% - 98%)  Wt(kg): --  I&O's Summary    05 Jan 2022 07:01  -  06 Jan 2022 07:00  --------------------------------------------------------  IN: 0 mL / OUT: 1250 mL / NET: -1250 mL    06 Jan 2022 07:01  -  06 Jan 2022 18:56  --------------------------------------------------------  IN: 0 mL / OUT: 1500 mL / NET: -1500 mL      Weight (kg): 68 (01-02 @ 00:15)      PHYSICAL EXAM:  Appearance: Comfortable. No acute distress  HEENT:  Head and neck: Atraumatic. Normocephalic.  Normal oral mucosa, PERRL, Neck is supple. No JVD, No carotid bruit.   Neurologic: A & O x 3, no focal deficits. EOMI.  Lymphatic: No cervical lymphadenopathy  Cardiovascular: Normal S1 S2, No murmur, rubs/gallops. No JVD, No edema  Respiratory: Lungs clear to auscultation  Gastrointestinal:  Soft, Non-tender, + BS  Lower Extremities: No edema  Psychiatry: Patient is calm. No agitation. Mood & affect appropriate  Skin: No rashes/ ecchymoses/cyanosis/ulcers visualized on the face, hands or feet.      CURRENT MEDICATIONS:  diltiazem    Tablet 60 milliGRAM(s) Oral every 6 hours  diltiazem Injectable 10 milliGRAM(s) IV Push every 6 hours PRN  furosemide   Injectable 60 milliGRAM(s) IV Push two times a day  metoprolol tartrate 25 milliGRAM(s) Oral three times a day    tiotropium 18 MICROgram(s) Capsule  piperacillin/tazobactam IVPB..  atorvastatin  chlorhexidine 4% Liquid  sodium chloride 0.45%.      DIAGNOSTIC TESTING:  [ ] Echocardiogram:   [ ]  Catheterization:  [ ] Stress Test:    OTHER: 	      LABS:	 	                            12.8   11.75 )-----------( 98       ( 06 Jan 2022 17:47 )             39.9     01-06    131<L>  |  92<L>  |  74.2<H>  ----------------------------<  102<H>  3.8   |  27.0  |  2.31<H>    Ca    8.3<L>      06 Jan 2022 03:33  Mg     2.6     01-05    TPro  5.7<L>  /  Alb  3.1<L>  /  TBili  1.2  /  DBili  x   /  AST  31  /  ALT  10  /  AlkPhos  149<H>  01-05    proBNP:   Lipid Profile: Date: 01-03 @ 10:49  Total cholesterol 91; Direct LDL: --; HDL: 48; Triglycerides:64      TSH: Thyroid Stimulating Hormone, Serum: 5.33 uIU/mL      TELEMETRY: Reviewed    	                                                                      [place] CARDIOLOG[facility]                                                               Office: [address] [address]                                                              Telephone: [phone]. Fax:[phone]                                                                             PROGRESS NOTE  Reason for follow up: decompensated CHF, New onset Afib  Overnight: No new events.   Update: 1/6: Tele-Afib HR @ 90-110s. Pt presents aphasic and with right sided weakness. Pt currently undergoing neurological evaluation. As per chart notes, patient presented with confusion and right sided weakness at 5:30. CT Head Perfusion indicates decreased blood flow within the left frontoparietal region compatible with a core infarction with a large surrounding ischemic penumbra with decreased perfusion compatible with parenchyma at risk. CT Angio-Occlusion of the left middle cerebral artery M1 segment with a paucity of distal MCA branches. Approximately 50% stenosis of the proximal right internal carotid artery. Plan for thrombectomy immediately    Subjective: + decreased blood flow within the left frontoparietal region   compatible with a core infarction with a large surrounding ischemic   penumbra with decreased perfusion compatible with parenchyma at risk    	  Vitals:  T(C): 37 (01-06-22 @ 16:38), Max: 37.2 (01-05-22 @ 19:00)  HR: 127 (01-06-22 @ 16:38) (98 - 127)  BP: 121/70 (01-06-22 @ 16:38) (115/72 - 145/74)  RR: 19 (01-06-22 @ 16:38) (18 - 20)  SpO2: 93% (01-06-22 @ 16:38) (93% - 98%)    I&O's Summary    05 Jan 2022 07:01  -  06 Jan 2022 07:00  --------------------------------------------------------  IN: 0 mL / OUT: 1250 mL / NET: -1250 mL    06 Jan 2022 07:01  -  06 Jan 2022 18:56  --------------------------------------------------------  IN: 0 mL / OUT: 1500 mL / NET: -1500 mL      Weight (kg): 68 (01-02 @ 00:15)      PHYSICAL EXAM:  Appearance: Comfortable. No acute distress  HEENT:  Head and neck: Atraumatic. Normocephalic.  Normal oral mucosa, PERRL, Neck is supple. No JVD, No carotid bruit.   Neurologic: Alert, +right sided weakness, +aphasia, EOMI  Lymphatic: No cervical lymphadenopathy  Cardiovascular: Normal S1 S2, No murmur, rubs/gallops. No JVD, No edema  Respiratory: rhonchi bilaterally  Gastrointestinal:  Soft, Non-tender, + BS  Lower Extremities: +1/+1 edema  Psychiatry: Patient is calm. No agitation. Mood & affect appropriate  Skin: No rashes/ ecchymoses/cyanosis/ulcers visualized on the face, hands or feet      CURRENT MEDICATIONS:  diltiazem    Tablet 60 milliGRAM(s) Oral every 6 hours  diltiazem Injectable 10 milliGRAM(s) IV Push every 6 hours PRN  furosemide   Injectable 60 milliGRAM(s) IV Push two times a day  metoprolol tartrate 25 milliGRAM(s) Oral three times a day  tiotropium 18 MICROgram(s) Capsule  piperacillin/tazobactam IVPB..  atorvastatin  chlorhexidine 4% Liquid  sodium chloride 0.45%.      DIAGNOSTIC TESTING:    [ ] Echocardiogram: < from: TTE Echo Complete w/o Contrast w/ Doppler (01.02.22 @ 09:12) >  Summary:   1. Left ventricular ejection fraction, by visual estimation, is >75%.   2. Technically suboptimal study.   3. Hyperdynamic global left ventricular systolic function.   4. Normal left ventricular internal cavity size.   5. The mitral in-flow pattern reveals no discernable A-wave, therefore   no comment on diastolic function can be made.   6. Normal right ventricular size and function.  7. Mild to moderately enlarged left atrium.   8. There is no evidence of pericardial effusion.   9. Mild mitral annular calcification.  10. Mild to moderate mitral valve regurgitation.  11. Thickening of the anterior and posterior mitral valve leaflets.  12. Mild tricuspid regurgitation.  13. Small mobile echodencities visualized on the aortic valve. 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 Clinical correlation recommended.      OTHER:   	  US:< from: US Chest (01.05.22 @ 14:01) >      CT:< from: CT Angio Head w/ IV Cont (01.06.22 @ 17:51) >  IMPRESSION:  CTA:  Occlusion of the left middle cerebral artery M1 segment with a paucity of   distal MCA branches.    Approximately 50% stenosis of the proximal right internal carotid artery.    No other significant stenosis, vessel occlusion or vessel abnormality is   seen.    CT brain:  Motion degraded exam.    Area of lucency and gray-white matter indistinctness in the right   occipital lobe. This is likely artifactual as no perfusion abnormality is   seen in this area. Clinical correlation will determine the need for MR of   the brain for further evaluation or continued follow-up.    No intracranial hemorrhage.    CT perfusion  Focus of decreased blood flow within the left frontoparietal region   compatible with a core infarction with a large surrounding ischemic   penumbra with decreased perfusion compatible with parenchyma at risk.    Extensive right upper lobe consolidation. Chest CT advised for further   evaluation as clinically warranted.      LABS:	 	                            12.8   11.75 )-----------( 98       ( 06 Jan 2022 17:47 )             39.9     01-06    131<L>  |  92<L>  |  74.2<H>  ----------------------------<  102<H>  3.8   |  27.0  |  2.31<H>    Ca    8.3<L>      06 Jan 2022 03:33  Mg     2.6     01-05    TPro  5.7<L>  /  Alb  3.1<L>  /  TBili  1.2  /  DBili  x   /  AST  31  /  ALT  10  /  AlkPhos  149<H>  01-05    Lipid Profile: Date: 01-03 @ 10:49  Total cholesterol 91; Direct LDL: --; HDL: 48; Triglycerides:64      TSH: Thyroid Stimulating Hormone, Serum: 5.33 uIU/mL      TELEMETRY:  Afib HR @ 90-110s

## 2022-01-06 NOTE — PHYSICAL THERAPY INITIAL EVALUATION ADULT - RANGE OF MOTION EXAMINATION, REHAB EVAL
bilateral shoulder limited to appx  degrees flexion/abduction, all else WFL/bilateral lower extremity ROM was WFL (within functional limits)

## 2022-01-06 NOTE — CHART NOTE - NSCHARTNOTEFT_GEN_A_CORE
Fred code called. New symptoms.   in CT scan awaiting imaging.  need approval for contrast.  patient already on hemodialysis .  Prior CKD with atrophic kidneys. she will continue hemodialysis for few days.  overall,. benefits outweigh the risk.  As if she would be a canddiate for thrombectomy, she would have better neurological outcome.  I had spoken to the patient in the past, and spoiken to the sister in law . who is the  in the cath lab.   they would want to pursue imaging and aggressive care if there was a chance she would have a good neurological outcomes. they were also aware of long term hemodialysis . and they were agreeable to start hemodialysis     Approved the ct angiography.    Stroke team on board.      off milrinone yesterday. of heparin drip yesterday. good urine output. received hemodialysis today.   raet controlled with cardizem and beta-blocker

## 2022-01-06 NOTE — DISCHARGE NOTE NURSING/CASE MANAGEMENT/SOCIAL WORK - NSDCPEELIQUISDIET_GEN_ALL_CORE
Self referral    IMPRESSION:    1. Spells of tingling, numbness of head, blurred vision and followed by headache-- lasting for one hour or so since 2013-- totally around 4 times     PLAN/RECOMMENDATIONS:    ________________________________________________________________________        Regarding the spells of tingling, blurred vision and headache-- the following are possible hypthesis    A. Migraine variants-- similar to hemiplegia migraine  B. Subtle seizures  C. TIA-- small strokes ( unlikely, since the symptoms are positive and the patient has no other stroke risk factors)  D. Psychogenic ( unlikely )    We will get MRI of Brain and EEG to investigate the causes  Could contact us one week after these tests        ________________________________________________________________________            SIGNATURE:  Camille Rodriguez    CC:  Krzysztof Breen M.D.          
Eat healthy foods you enjoy. Apixaban/Eliquis DOES NOT have a special diet. Limit your alcohol intake.

## 2022-01-06 NOTE — DISCHARGE NOTE NURSING/CASE MANAGEMENT/SOCIAL WORK - PATIENT PORTAL LINK FT
You can access the FollowMyHealth Patient Portal offered by [facility] by registering at the following website: [email or web address]. By joining [employer]’s FollowMyHealth portal, you will also be able to view your health information using other applications (apps) compatible with our system.

## 2022-01-06 NOTE — CHART NOTE - NSCHARTNOTEFT_GEN_A_CORE
Neurointerventional Surgery  Pre-Procedure Note     This is a 86y right hand dominant Female    HPI:  87 y/o female from home with history of HTN, CKD baseline Cr. 1.7, COPD not on home 02, remote hx of breast cancer s/p mastectomy in 85, colon cancer s/p bowel resection 12 years ago, chronic LE edema. Patient lives alone and states shes usually independent with no assistive devices. Patient states she sustained a mechanical fall 1 week ago landing on her left side. She denies LOC, head or neck pain, She did sustain a skin tear to her left forearm. She denies being on the floor for more than a few minutes. She admits to feeling weak since the fall and has been ordering out chinese food most of the week. She has been eating won ton soup, fried rice, and chicken lo mein. She normally sees Dr. Snyder who has told her not to use any excess salt as she has chronic LE edema. She denies any hx of Afib, or CAD. She came to ED after she noted weight gain, SCHMID, and all her clothes fitting more tightly. She denies CP, fever, chills, N/V or diaphoresis. In the ED patient noted to be in afib w/ RVR, Anasarca, EDILBERTO on CKD, and with RLL PNA. She was cultured, given IV abx, started on Cardizem drip after PO cardizem and BB failed to control her HR. She was seen by Cardiology who did bedside echo showing diastolic dysfunction and dilated IVC, CTs showed anasarca and pleural effusions with RLLL infiltrate. COVID neg, U/A neg. no focal weakness. Patient given IV digoxin, tafoya placed, and IV lasix given with 600ml o/p. Currently awake in NAD.        (01 Jan 2022 23:57)      Allergies: No Known Allergies      PMH/PSH:  PAST MEDICAL & SURGICAL HISTORY:  Hypertension    Breast cancer    Colon cancer    Renal insufficiency  baseline Cr. 1.7    Macular degeneration    History of COPD    History of hip replacement, total, left    History of colon resection    History of cholecystectomy    History of right mastectomy    History of tonsillectomy    Status post thoracentesis  Left Lung        Social History:   Social History:  former smoker  no etoh  no illicit drugs (01 Jan 2022 23:57)    FAMILY HISTORY:  Family history of hypertension (Child)        Current Medications:   acetaminophen     Tablet .. 650 milliGRAM(s) Oral every 6 hours PRN  albuterol/ipratropium for Nebulization 3 milliLiter(s) Nebulizer every 6 hours PRN  aluminum hydroxide/magnesium hydroxide/simethicone Suspension 30 milliLiter(s) Oral every 4 hours PRN  atorvastatin 80 milliGRAM(s) Oral once  chlorhexidine 4% Liquid 1 Application(s) Topical <User Schedule>  diltiazem    Tablet 60 milliGRAM(s) Oral every 6 hours  diltiazem Injectable 10 milliGRAM(s) IV Push every 6 hours PRN  furosemide   Injectable 60 milliGRAM(s) IV Push two times a day  melatonin 3 milliGRAM(s) Oral at bedtime PRN  metoprolol tartrate 25 milliGRAM(s) Oral three times a day  ondansetron Injectable 4 milliGRAM(s) IV Push every 8 hours PRN  piperacillin/tazobactam IVPB.. 3.375 Gram(s) IV Intermittent every 12 hours  sodium chloride 0.45%. 1000 milliLiter(s) IV Continuous <Continuous>  sodium chloride 0.9% lock flush 10 milliLiter(s) IV Push every 1 hour PRN  tiotropium 18 MICROgram(s) Capsule 1 Capsule(s) Inhalation daily      Physical Exam:  Constitutional: NAD    Neuro  * Mental Status:  GCS 11:  E(4), V(2), M(5).  Awake, Spontaneous EO, expressive/ receptive aphasia, not following commands, not mimicking  * Cranial Nerves: PERRL, L gaze preference, does not cross midline, R facial.  * Motor: RUE 0/5, LUE 5/5, RLE 0/5, LLE 4/5  * Sensory: right hemineglect  * Reflexes: Not assessed  * Gait: Not assessed    Cardiovascular:  S1, S2 no murmurs appreciated.  Regular rate and rhythm.  Eyes: See neurologic examination with detailed examination of eyes.  ENT: No JVD, Trachea Midline.  Respiratory: Clear to auscultation.  Gastrointestinal: Soft, nontender, nondistended.  Genitourinary: [X ] Tafoya,   Musculoskeletal: No muscle wasting noted, (See neurologic assessment for full muscle strength assessment) No pretibial edema appreciated, no appreciable calf tenderness.  Skin:  Wound inspected, no redness, bleeding or drainage noted.    Hematologic / Lymph / Immunologic: No bleeding from IV sites or wounds, No lymphadenopathy, No Hives or allergic type skin lesions      NIH SS: 25  DATE: 1/6/22  TIME: 1839  1A: Level of consciousness (0-3): 0  1B: Questions (0-2): 2  1C: Commands (0-2): 2  2: Gaze (0-2): 2  3: Visual fields (0-3): 2  4: Facial palsy (0-3): 1  MOTOR:  5A: Left arm motor drift (0-4): 4  5B: Right arm motor drift (0-4): 0  6A: Left leg motor drift (0-4): 4  6B: Right leg motor drift (0-4): 0  7: Limb ataxia (0-2): 0  SENSORY:  8: Sensation (0-2): 2  SPEECH:  9: Language (0-3): 2  10: Dysarthria (0-2): 2  EXTINCTION:  11: Extinction/inattention (0-2): 2    TOTAL SCORE: 25    Labs:                         12.8   11.75 )-----------( 98       ( 06 Jan 2022 17:47 )             39.9       01-06    131<L>  |  92<L>  |  74.2<H>  ----------------------------<  102<H>  3.8   |  27.0  |  2.31<H>    Ca    8.3<L>      06 Jan 2022 03:33  Mg     2.6     01-05    TPro  5.7<L>  /  Alb  3.1<L>  /  TBili  1.2  /  DBili  x   /  AST  31  /  ALT  10  /  AlkPhos  149<H>  01-05          PT/INR - ( 06 Jan 2022 17:47 )   PT: 14.0 sec;   INR: 1.22 ratio         PTT - ( 06 Jan 2022 17:47 )  PTT:32.9 sec    Blood Bank:         Assessment/Plan:   This is a 86y  year old right hand dominant Female  presents as code stroke with L m1 occlusion presents for mechanical thrombectomy.     Procedure, goals, risks, benefits and alternatives  were discussed with patient and (patient's family).  All questions were answered.  Risks include but are not limited to stroke, vessel injury, hemorrhage, and or groin hematoma.  Patient's family demonstrates understanding  of all risks involved with this procedure and wishes to continue.   Appropriate  content was obtained from patient and consent is in the patient's chart.

## 2022-01-06 NOTE — CHART NOTE - NSCHARTNOTEFT_GEN_A_CORE
Neurointerventional Surgery Post Procedure Note    Procedure: Selective Cerebral Angiography     Pre- Procedure Diagnosis: L M1 occlusion  Post- Procedure Diagnosis: distal L M1 occlusion, TICI 2B reperfusion     : Dr. Enoch MD  Physician Assistant: Vidhi Vaughan PA-C  Nurse: Darwin Cerda RN, Luana Steinberg RN  Anesthesiologist: Dr. Saniya Basurto      Radiology Tech: Selvin Miguel RT, Anselmo Azar RT, Lc Combs RT    Sheath:  6 Malian Sheath    I/Os: estimated blood loss less than 50cc,  IV fluids 300 cc, Urine output 350 cc, Contrast: Omnipaque 240  80 cc    Vitals:  /64   irregular  Spo2 100 %    Preliminary Report:  Under a 6 Malian BMX sheath via the left groin under MAC sedation via left internal carotid artery, a selective cerebral angiography  was performed and reveals distal L M1 occlusion, TICI2B reperfusion. ( Official note to follow).    Patient tolerated procedure well.  Patient remains hemodynamically stable, no change in neurological status compared to baseline.  Results were discussed with patient's family and Neurosurgery.  Left groin sheath  was discontinued using Vascade closure device. Hemostasis was obtained with approximately 5 minutes of manual compression.     No active bleeding, no hematoma, no ecchymosis.   Quick clot and safeguard balloon dressing applied at 2040.  Patient transferred to ICU in stable condition.

## 2022-01-06 NOTE — PROGRESS NOTE ADULT - ASSESSMENT
85 y/o F with PMH Breast cancer, Mastectomy 1985, colon ca resection 2010, VATS pleurectomy drainage 2018, COPD, CKD, presents to ED with c/o Leg swelling x 1 week. States fell on monday, able to get off floor, since then worsening shortness of breath, and leg swelling. Denies palpitations, chest pains. Noted to Be afib RVR in ED, given diltiazem. Edema noted to abd.     1/6: Tele-Afib HR @ 90-110s. Pt presents aphasic and with right sided weakness. Pt currently undergoing neurological evaluation. As per chart notes, patient presented with confusion and right sided weakness at 5:30. CT Head Perfusion indicates decreased blood flow within the left frontoparietal region compatible with a core infarction with a large surrounding ischemic penumbra with decreased perfusion compatible with parenchyma at risk. CT Angio-Occlusion of the left middle cerebral artery M1 segment with a paucity of distal MCA branches. Approximately 50% stenosis of the proximal right internal carotid artery. Plan for thrombectomy immediately.     LMA Occlusion  -Pt presents +right sided weakness, +aphasia  -CT Head Perfusion-indicates decreased blood flow within the left frontoparietal region compatible with a core infarction with a large surrounding ischemic penumbra with decreased perfusion compatible with parenchyma at risk.   -CT Angio-Occlusion of the left middle cerebral artery M1 segment with a paucity of distal MCA branches. Approximately 50% stenosis of the proximal right internal carotid artery  -Plan for thrombectomy immediately    Afib  -Tele-Afib HR @ 90-110s  -Cont. PO Cardizem     -Cont. 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   - Cont Lasix 60mg IVP BID  - d/c milrinone  - Cont. HD for fluid management      EDILBERTO on CKD  -Recent BP-118/72  -Nephrology following

## 2022-01-06 NOTE — PROGRESS NOTE ADULT - ASSESSMENT
85 y/o female with new onset Afib with RVR, CHF exacerbation, EDILBERTO on CKD, RLL w/ sepsis, left UE cellulitis, Hx of COPD, CKD, HTN. She was admitted, started on Zosyn for RLL PNA and LUE cellulitis. Started workup for anasarca with diuretics. A.fib is now better controlled after few doses of Digoxin, and initiated CCB and BB, Heparin ggt started for AC. Pt was not responded to diuretics therefore started on Milrinone. Diuresis has improved with Milrinone, however uremia has gotten worse, therefore pt was initiated on HD after dialysis RIJ line was placed on 1/04.     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 complicated by Afib wtih RVR,   - c/w tele  -c/w Furosemide to 60 bid  for now  - Nephrology team and Cardiology team recs. noted  - c/w PO cardizem 60 q6h and metoprolol 25 tid if pt bp allows  - IR team was not able to get paracentesis  - ECHO noted  - renal and abdominal us noted    #EDILBERTO on CKD most likely cardio-renal, now requiring HD via R femoral dialysis line  - HD as per Nephrology team recommendations  - diuretics as above  - avoid nephrotoxic agents     # A.fib with RVR, now better controlled   - bb and ccb as above  -c/w Lovenox with plan to transition to DOAC if no invasive procedures planned.     # Hematuria, in setting of heparing ggt, has improved  - will continue to observe for now  - cbc daily for now    #RLL PNA/Sepsis  #LUE cellulitis:  -c/w Abx d5/5   -Nebs, oxygen prn, chest PT, incentive spirometer    #COPD, w/o apparent exacerbation on exam  -at base line on 2 L nc  -Cont. o/p regimen  -PRN nebs    DVT ppx - on lovenox  CODE/Socia - full code; family update over phone - daughter Eloisa Reynoso - will stay over weekend, may need community HD sit

## 2022-01-06 NOTE — DISCHARGE NOTE NURSING/CASE MANAGEMENT/SOCIAL WORK - NSDCFUADDAPPT_GEN_ALL_CORE_FT
You have a Follow-up appointment with Dr. Estefani Samaniego on  2/8/22 at 1:00 PM  [address]   [facility]  [address]  Phone # [phone].

## 2022-01-06 NOTE — PROGRESS NOTE ADULT - ATTENDING COMMENTS
cerebrovascular accident . approved ct with iv contrast. patoetn on hemodialysis  ct diuresis. proceed with thrombectomy if indicated and Ct shows large infarct

## 2022-01-06 NOTE — CONSULT NOTE ADULT - ATTENDING COMMENTS
atrial fibrillation with rapid ventricular rate and congestive heart failure .   Anasarca and ascitis.   US abdomen.  IR evaluation fo rparacentesis.   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  evalute LFts and INR and Bilirubin.  mya Iv fluids. IV lasix. digoxing 2 doses as patietn has CKD. dig level in am.  cardizem drip if Blood pressure tolerated. 5 mg /hr.   admit to telemetrry.
85 yo F with extensive PMH, presented with acute l MCA CVA due to occlusion of LM1.  Seen in ED. Not a tPA candidate, being prepared for MT.  rest as above  Will follow.

## 2022-01-06 NOTE — DISCHARGE NOTE NURSING/CASE MANAGEMENT/SOCIAL WORK - NSDCVIVACCINE_GEN_ALL_CORE_FT
Tdap; 01-Jan-2022 14:22; Rox Wells (RN); [employer]; [id number] (Exp. Date: 09-Sep-2023); IntraMuscular; Deltoid Left.; 0.5 milliLiter(s); VIS (VIS Published: 09-May-2013, VIS Presented: 01-Jan-2022);

## 2022-01-06 NOTE — CHART NOTE - NSCHARTNOTEFT_GEN_A_CORE
[facility]                                        Neurology at [place]                                 [address], [address]                                  [address]                                        [place], [place], [address]                                             [phone]          Addendum:     Spoke again with Dr Díaz.    CT head with significant motion but no clear infarct.     CT-A with occlusion of left M1 and CT-P with decreased flow within the left frontoparietal region with large surrounding penumbra.     Neuro- IR team was called and I spoke with the neuro ICU PA who is aware of case. IR PA was not available to speak as she was with the patient.

## 2022-01-06 NOTE — CHART NOTE - NSCHARTNOTEFT_GEN_A_CORE
[facility]                                        Neurology at [place]                                 [address]                                  [address]                                        [place], [place], [address]                                             [phone]          Responded to code stroke   I spoke with Dr Díaz.  The patient was last known well at 2 pm.  At 5:30 she was found to be confused.  There is some suggestion of right sided weakness.    She was admitted with atrial fibrillation and started on anticoagulation with full dose Lovenox.     She has renal disease and received hemodialysis.    She is going to CT now.   She would not be a t-PA candidate secondary to anticoagulation, however if there is large vessel occlusion, she would still be a thrombectomy candidate.     Will discuss again once CT/CT-A done.

## 2022-01-06 NOTE — PROGRESS NOTE ADULT - SUBJECTIVE AND OBJECTIVE BOX
Patient was seen and evaluated on dialysis.   No c/o CP SOB NV  no F/C  less swelling    T(C): 36.3 (01-06-22 @ 07:38), Max: 37.2 (01-05-22 @ 19:00)  HR: 106 (01-06-22 @ 07:38) (80 - 124)  BP: 129/80 (01-06-22 @ 07:38) (115/72 - 133/80)  Wt(kg): --  PE ;  NAD  lungs - CTA  CV gr 1 murmer,  No gallop or rub  Abd : soft, NT BS +, No masses  Ext- No edema  Neuro : Grossly intact, moving extremities                             12.1   11.27 )-----------( 103      ( 06 Jan 2022 03:33 )             37.7        01-06    131<L>  |  92<L>  |  74.2<H>  ----------------------------<  102<H>  3.8   |  27.0  |  2.31<H>    Ca    8.3<L>      06 Jan 2022 03:33  Mg     2.6     01-05    TPro  5.7<L>  /  Alb  3.1<L>  /  TBili  1.2  /  DBili  x   /  AST  31  /  ALT  10  /  AlkPhos  149<H>  01-05      MEDICATIONS  (STANDING):  acetaminophen     Tablet .. PRN  aluminum hydroxide/magnesium hydroxide/simethicone Suspension PRN  chlorhexidine 4% Liquid  diltiazem    Tablet  diltiazem Injectable PRN  enoxaparin Injectable  furosemide   Injectable  melatonin PRN  metoprolol tartrate  ondansetron Injectable PRN  piperacillin/tazobactam IVPB..  sodium chloride 0.9% lock flush PRN  tiotropium 18 MICROgram(s) Capsule      Patient stable  Any HD easily  Continue

## 2022-01-06 NOTE — PROGRESS NOTE ADULT - ASSESSMENT
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

## 2022-01-07 LAB
A1C WITH ESTIMATED AVERAGE GLUCOSE RESULT: 6.1 % — HIGH (ref 4–5.6)
ANION GAP SERPL CALC-SCNC: 13 MMOL/L — SIGNIFICANT CHANGE UP (ref 5–17)
BASE EXCESS BLDA CALC-SCNC: 6.8 MMOL/L — HIGH (ref -2–3)
BLOOD GAS COMMENTS ARTERIAL: SIGNIFICANT CHANGE UP
BUN SERPL-MCNC: 46.5 MG/DL — HIGH (ref 8–20)
CALCIUM SERPL-MCNC: 7.9 MG/DL — LOW (ref 8.6–10.2)
CHLORIDE SERPL-SCNC: 95 MMOL/L — LOW (ref 98–107)
CO2 SERPL-SCNC: 26 MMOL/L — SIGNIFICANT CHANGE UP (ref 22–29)
CREAT SERPL-MCNC: 1.7 MG/DL — HIGH (ref 0.5–1.3)
ESTIMATED AVERAGE GLUCOSE: 128 MG/DL — HIGH (ref 68–114)
GLUCOSE BLDC GLUCOMTR-MCNC: 74 MG/DL — SIGNIFICANT CHANGE UP (ref 70–99)
GLUCOSE BLDC GLUCOMTR-MCNC: 77 MG/DL — SIGNIFICANT CHANGE UP (ref 70–99)
GLUCOSE BLDC GLUCOMTR-MCNC: 91 MG/DL — SIGNIFICANT CHANGE UP (ref 70–99)
GLUCOSE SERPL-MCNC: 91 MG/DL — SIGNIFICANT CHANGE UP (ref 70–99)
HCO3 BLDA-SCNC: 31 MMOL/L — HIGH (ref 21–28)
HCT VFR BLD CALC: 33.8 % — LOW (ref 34.5–45)
HGB BLD-MCNC: 10.9 G/DL — LOW (ref 11.5–15.5)
HOROWITZ INDEX BLDA+IHG-RTO: SIGNIFICANT CHANGE UP
MAGNESIUM SERPL-MCNC: 2.2 MG/DL — SIGNIFICANT CHANGE UP (ref 1.6–2.6)
MCHC RBC-ENTMCNC: 25.4 PG — LOW (ref 27–34)
MCHC RBC-ENTMCNC: 32.2 GM/DL — SIGNIFICANT CHANGE UP (ref 32–36)
MCV RBC AUTO: 78.8 FL — LOW (ref 80–100)
PCO2 BLDA: 43 MMHG — HIGH (ref 32–35)
PH BLDA: 7.46 — HIGH (ref 7.35–7.45)
PHOSPHATE SERPL-MCNC: 3.2 MG/DL — SIGNIFICANT CHANGE UP (ref 2.4–4.7)
PLATELET # BLD AUTO: 78 K/UL — LOW (ref 150–400)
PO2 BLDA: 141 MMHG — HIGH (ref 83–108)
POTASSIUM SERPL-MCNC: 3.1 MMOL/L — LOW (ref 3.5–5.3)
POTASSIUM SERPL-SCNC: 3.1 MMOL/L — LOW (ref 3.5–5.3)
RBC # BLD: 4.29 M/UL — SIGNIFICANT CHANGE UP (ref 3.8–5.2)
RBC # FLD: 17.4 % — HIGH (ref 10.3–14.5)
SAO2 % BLDA: 98.8 % — HIGH (ref 94–98)
SODIUM SERPL-SCNC: 134 MMOL/L — LOW (ref 135–145)
WBC # BLD: 9.35 K/UL — SIGNIFICANT CHANGE UP (ref 3.8–10.5)
WBC # FLD AUTO: 9.35 K/UL — SIGNIFICANT CHANGE UP (ref 3.8–10.5)

## 2022-01-07 PROCEDURE — 76937 US GUIDE VASCULAR ACCESS: CPT | Mod: 26,59

## 2022-01-07 PROCEDURE — 76942 ECHO GUIDE FOR BIOPSY: CPT | Mod: 26,59

## 2022-01-07 PROCEDURE — 99223 1ST HOSP IP/OBS HIGH 75: CPT

## 2022-01-07 PROCEDURE — 93010 ELECTROCARDIOGRAM REPORT: CPT

## 2022-01-07 PROCEDURE — 36556 INSERT NON-TUNNEL CV CATH: CPT

## 2022-01-07 PROCEDURE — 70450 CT HEAD/BRAIN W/O DYE: CPT | Mod: 26

## 2022-01-07 PROCEDURE — 99291 CRITICAL CARE FIRST HOUR: CPT

## 2022-01-07 PROCEDURE — 99233 SBSQ HOSP IP/OBS HIGH 50: CPT

## 2022-01-07 PROCEDURE — 71045 X-RAY EXAM CHEST 1 VIEW: CPT | Mod: 26

## 2022-01-07 RX ORDER — MONTELUKAST 4 MG/1
10 TABLET, CHEWABLE ORAL DAILY
Refills: 0 | Status: DISCONTINUED | OUTPATIENT
Start: 2022-01-07 | End: 2022-01-09

## 2022-01-07 RX ORDER — POTASSIUM CHLORIDE 20 MEQ
40 PACKET (EA) ORAL
Refills: 0 | Status: COMPLETED | OUTPATIENT
Start: 2022-01-07 | End: 2022-01-07

## 2022-01-07 RX ORDER — UMECLIDINIUM 62.5 UG/1
1 AEROSOL, POWDER ORAL
Qty: 0 | Refills: 0 | DISCHARGE

## 2022-01-07 RX ORDER — IPRATROPIUM/ALBUTEROL SULFATE 18-103MCG
3 AEROSOL WITH ADAPTER (GRAM) INHALATION EVERY 6 HOURS
Refills: 0 | Status: DISCONTINUED | OUTPATIENT
Start: 2022-01-07 | End: 2022-01-12

## 2022-01-07 RX ORDER — PHENYLEPHRINE HYDROCHLORIDE 10 MG/ML
0.1 INJECTION INTRAVENOUS
Qty: 40 | Refills: 0 | Status: DISCONTINUED | OUTPATIENT
Start: 2022-01-07 | End: 2022-01-07

## 2022-01-07 RX ORDER — PRAZOSIN HCL 2 MG
0 CAPSULE ORAL
Qty: 0 | Refills: 0 | DISCHARGE

## 2022-01-07 RX ORDER — PHENYLEPHRINE HYDROCHLORIDE 10 MG/ML
0.1 INJECTION INTRAVENOUS
Qty: 40 | Refills: 0 | Status: DISCONTINUED | OUTPATIENT
Start: 2022-01-07 | End: 2022-01-09

## 2022-01-07 RX ORDER — CALCIUM GLUCONATE 100 MG/ML
1 VIAL (ML) INTRAVENOUS ONCE
Refills: 0 | Status: COMPLETED | OUTPATIENT
Start: 2022-01-07 | End: 2022-01-07

## 2022-01-07 RX ORDER — ATORVASTATIN CALCIUM 80 MG/1
5 TABLET, FILM COATED ORAL AT BEDTIME
Refills: 0 | Status: DISCONTINUED | OUTPATIENT
Start: 2022-01-07 | End: 2022-01-09

## 2022-01-07 RX ORDER — CALCIUM CARBONATE 500(1250)
2 TABLET ORAL
Qty: 0 | Refills: 0 | DISCHARGE

## 2022-01-07 RX ORDER — SODIUM CHLORIDE 9 MG/ML
250 INJECTION INTRAMUSCULAR; INTRAVENOUS; SUBCUTANEOUS ONCE
Refills: 0 | Status: COMPLETED | OUTPATIENT
Start: 2022-01-07 | End: 2022-01-07

## 2022-01-07 RX ORDER — DEXMEDETOMIDINE HYDROCHLORIDE IN 0.9% SODIUM CHLORIDE 4 UG/ML
0.1 INJECTION INTRAVENOUS
Qty: 200 | Refills: 0 | Status: DISCONTINUED | OUTPATIENT
Start: 2022-01-07 | End: 2022-01-07

## 2022-01-07 RX ORDER — MONTELUKAST 4 MG/1
1 TABLET, CHEWABLE ORAL
Qty: 0 | Refills: 0 | DISCHARGE

## 2022-01-07 RX ORDER — ASCORBIC ACID 60 MG
500 TABLET,CHEWABLE ORAL DAILY
Refills: 0 | Status: DISCONTINUED | OUTPATIENT
Start: 2022-01-07 | End: 2022-01-09

## 2022-01-07 RX ADMIN — Medication 3 MILLILITER(S): at 14:04

## 2022-01-07 RX ADMIN — Medication 25 MILLIGRAM(S): at 15:47

## 2022-01-07 RX ADMIN — MONTELUKAST 10 MILLIGRAM(S): 4 TABLET, CHEWABLE ORAL at 12:37

## 2022-01-07 RX ADMIN — Medication 25 MILLIGRAM(S): at 05:29

## 2022-01-07 RX ADMIN — PIPERACILLIN AND TAZOBACTAM 25 GRAM(S): 4; .5 INJECTION, POWDER, LYOPHILIZED, FOR SOLUTION INTRAVENOUS at 05:30

## 2022-01-07 RX ADMIN — DEXMEDETOMIDINE HYDROCHLORIDE IN 0.9% SODIUM CHLORIDE 1.7 MICROGRAM(S)/KG/HR: 4 INJECTION INTRAVENOUS at 01:13

## 2022-01-07 RX ADMIN — Medication 650 MILLIGRAM(S): at 17:00

## 2022-01-07 RX ADMIN — Medication 100 GRAM(S): at 09:02

## 2022-01-07 RX ADMIN — Medication 60 MILLIGRAM(S): at 05:30

## 2022-01-07 RX ADMIN — Medication 650 MILLIGRAM(S): at 16:00

## 2022-01-07 RX ADMIN — CHLORHEXIDINE GLUCONATE 1 APPLICATION(S): 213 SOLUTION TOPICAL at 05:34

## 2022-01-07 RX ADMIN — PANTOPRAZOLE SODIUM 40 MILLIGRAM(S): 20 TABLET, DELAYED RELEASE ORAL at 12:32

## 2022-01-07 RX ADMIN — Medication 3 MILLILITER(S): at 21:15

## 2022-01-07 RX ADMIN — Medication 60 MILLIGRAM(S): at 05:29

## 2022-01-07 RX ADMIN — Medication 40 MILLIEQUIVALENT(S): at 09:02

## 2022-01-07 RX ADMIN — PIPERACILLIN AND TAZOBACTAM 25 GRAM(S): 4; .5 INJECTION, POWDER, LYOPHILIZED, FOR SOLUTION INTRAVENOUS at 17:25

## 2022-01-07 RX ADMIN — CHLORHEXIDINE GLUCONATE 15 MILLILITER(S): 213 SOLUTION TOPICAL at 17:56

## 2022-01-07 RX ADMIN — Medication 60 MILLIGRAM(S): at 01:39

## 2022-01-07 RX ADMIN — Medication 60 MILLIGRAM(S): at 12:28

## 2022-01-07 RX ADMIN — SODIUM CHLORIDE 1000 MILLILITER(S): 9 INJECTION INTRAMUSCULAR; INTRAVENOUS; SUBCUTANEOUS at 13:54

## 2022-01-07 RX ADMIN — PHENYLEPHRINE HYDROCHLORIDE 2.82 MICROGRAM(S)/KG/MIN: 10 INJECTION INTRAVENOUS at 17:25

## 2022-01-07 RX ADMIN — Medication 60 MILLIGRAM(S): at 12:32

## 2022-01-07 RX ADMIN — CHLORHEXIDINE GLUCONATE 15 MILLILITER(S): 213 SOLUTION TOPICAL at 05:33

## 2022-01-07 RX ADMIN — Medication 40 MILLIEQUIVALENT(S): at 05:30

## 2022-01-07 NOTE — CHART NOTE - NSCHARTNOTEFT_GEN_A_CORE
Left groin safeguard dressing removed ~ 01:30 AM without evidence of active hemorrhage. Small area of ecchymoses distally from access site on thigh, possibly attributed to manual groin compression immediately post procedure. Soft, no evidence of hematoma. Surrounding abdominal region without ecchymoses, stable firmness in various areas of abdomen compared to physical exam immediately post procedure.    EXAM:  MENTAL STATUS: Intubated, mildly sedated on precedex gtt. Opens eyes spontaneously, sustains eye opening. Does not consistently follow commands but at times seems to mimic. May have degree of receptive aphasia  CRANIAL NERVES: R pupil slightly dysfigured, ~ 3 mm, reactive. L pupil 3 mm reactive. No consistent tracking, ?possible left gaze preference. Facial symmetry difficult to accurately assess with ETT in place. Hearing/speech unable to assess at this time  MOTOR EXAM: unable to accurately grade as does not follow commands. B/l UE are purposefully spontaneous and antigravity, at times L>R but also evident with brisk RUE movement reaching for ETT. B/l LE withdraw briskly to noxious  SENSORY: unable to accurately assess, localizes/withdraws to light noxious

## 2022-01-07 NOTE — PROGRESS NOTE ADULT - NUTRITIONAL ASSESSMENT
This patient has been assessed with a concern for Malnutrition and has been determined to have a diagnosis/diagnoses of Moderate protein-calorie malnutrition.    This patient is being managed with:   Diet NPO-  Entered: Jan 6 2022  8:56PM

## 2022-01-07 NOTE — DIETITIAN INITIAL EVALUATION ADULT. - PHYSICAL ASSESSMENT SHOULDERS
moderate Calcipotriene Counseling:  I discussed with the patient the risks of calcipotriene including but not limited to erythema, scaling, itching, and irritation.

## 2022-01-07 NOTE — DIETITIAN INITIAL EVALUATION ADULT. - PERTINENT MEDS FT
MEDICATIONS  (STANDING):  albuterol/ipratropium for Nebulization 3 milliLiter(s) Nebulizer every 6 hours  atorvastatin 5 milliGRAM(s) Oral at bedtime  chlorhexidine 0.12% Liquid 15 milliLiter(s) Oral Mucosa every 12 hours  chlorhexidine 4% Liquid 1 Application(s) Topical <User Schedule>  dexMEDEtomidine Infusion 0.1 MICROgram(s)/kG/Hr (1.7 mL/Hr) IV Continuous <Continuous>  dextrose 40% Gel 15 Gram(s) Oral once  dextrose 5%. 1000 milliLiter(s) (50 mL/Hr) IV Continuous <Continuous>  dextrose 5%. 1000 milliLiter(s) (100 mL/Hr) IV Continuous <Continuous>  dextrose 50% Injectable 25 Gram(s) IV Push once  dextrose 50% Injectable 12.5 Gram(s) IV Push once  dextrose 50% Injectable 25 Gram(s) IV Push once  diltiazem    Tablet 60 milliGRAM(s) Oral every 6 hours  furosemide   Injectable 60 milliGRAM(s) IV Push two times a day  glucagon  Injectable 1 milliGRAM(s) IntraMuscular once  insulin lispro (ADMELOG) corrective regimen sliding scale   SubCutaneous every 6 hours  metoprolol tartrate 25 milliGRAM(s) Oral three times a day  montelukast 10 milliGRAM(s) Oral daily  pantoprazole  Injectable 40 milliGRAM(s) IV Push daily  piperacillin/tazobactam IVPB.. 3.375 Gram(s) IV Intermittent every 12 hours  sodium chloride 0.9%. 1000 milliLiter(s) (30 mL/Hr) IV Continuous <Continuous>    MEDICATIONS  (PRN):  acetaminophen     Tablet .. 650 milliGRAM(s) Oral every 6 hours PRN Temp greater or equal to 38C (100.4F), Mild Pain (1 - 3)  diltiazem Injectable 10 milliGRAM(s) IV Push every 6 hours PRN HR > 130  ondansetron Injectable 4 milliGRAM(s) IV Push every 8 hours PRN Nausea and/or Vomiting  sodium chloride 0.9% lock flush 10 milliLiter(s) IV Push every 1 hour PRN Pre/post blood products, medications, blood draw, and to maintain line patency

## 2022-01-07 NOTE — PROGRESS NOTE ADULT - SUBJECTIVE AND OBJECTIVE BOX
HPI:  85 y/o female from home with history of HTN, CKD baseline Cr. 1.7, COPD not on home 02, remote hx of breast cancer s/p mastectomy in 86, colon cancer s/p bowel resection 12 years ago, chronic LE edema. Patient lives alone and states shes usually independent with no assistive devices. Patient states she sustained a mechanical fall 1 week ago landing on her left side. She denies LOC, head or neck pain, She did sustain a skin tear to her left forearm. She denies being on the floor for more than a few minutes. She admits to feeling weak since the fall and has been ordering out chinese food most of the week. She has been eating won ton soup, fried rice, and chicken lo mein. She normally sees Dr. Snyder who has told her not to use any excess salt as she has chronic LE edema. She denies any hx of Afib, or CAD. She came to ED after she noted weight gain, SCHMID, and all her clothes fitting more tightly. She denies CP, fever, chills, N/V or diaphoresis. In the ED patient noted to be in afib w/ RVR, Anasarca, EDILBERTO on CKD, and with RLL PNA. She was cultured, given IV abx, started on Cardizem drip after PO cardizem and BB failed to control her HR. She was seen by Cardiology who did bedside echo showing diastolic dysfunction and dilated IVC, CTs showed anasarca and pleural effusions with RLLL infiltrate. COVID neg, U/A neg. no focal weakness. Patient given IV digoxin, tafoya placed, and IV lasix given with 600ml o/p. Currently awake in NAD. (01 Jan 2022 23:57)   Code Stroke, while waiting for a bed in ED. MRS 0 per family. NIH 25. Not a tPA candidate; underwent TICI 2b MT.  Arrived to NCCU intubated.    VS, labs, imaging reviewed.  ROS: limited due to pt's condition (intubated).    Exam:  intubated, off sedation, RASS -1.  EO to verbal, intermittently FC, PERRL, o/b the vent, + cough, motor - moves feet BL, brisk loc with R arm.  S1S2 present.  CTAb anteriorly, diminished Bibasilar sounds.  Abd soft.

## 2022-01-07 NOTE — PROGRESS NOTE ADULT - ASSESSMENT
Assessment & Plan:  87 y/o female from home with history of HTN, CKD baseline Cr. 1.7, COPD not on home 02, remote hx of breast cancer s/p mastectomy in 86, colon cancer s/p bowel resection 12 years ago, chronic LE edema. Patient lives alone and states shes usually independent with no assistive devices. Patient states she sustained a mechanical fall 1 week ago landing on her left side. She denies LOC, head or neck pain, She did sustain a skin tear to her left forearm. She denies being on the floor for more than a few minutes. She admits to feeling weak since the fall and has been ordering out chinese food most of the week. She has been eating won ton soup, fried rice, and chicken lo mein. She normally sees Dr. Snyder who has told her not to use any excess salt as she has chronic LE edema. She denies any hx of Afib, or CAD. She came to ED after she noted weight gain, SCHMID, and all her clothes fitting more tightly. She denies CP, fever, chills, N/V or diaphoresis. In the ED patient noted to be in afib w/ RVR, Anasarca, EDILBERTO on CKD, and with RLL PNA. She was cultured, given IV abx, started on Cardizem drip after PO cardizem and BB failed to control her HR. She was seen by Cardiology who did bedside echo showing diastolic dysfunction and dilated IVC, CTs showed anasarca and pleural effusions with RLLL infiltrate. COVID neg, U/A neg. no focal weakness. Patient given IV digoxin, tafoya placed, and IV lasix given with 600ml o/p. Currently awake in NAD.     Left MCA Infarction 2/2 Left M1 occlusion TICI 2b revascularization.  Case discussed with neurosurgery, patient does not warrant hemicraniectomy.     (01 Jan 2022 23:57)      Continue neuro checks  Repeat CT Scan as needed for change in exam  Maintain SBP within desired range 100-160  Antiplatelet / anticoagulation once cleared by Dr. Kendall  The patient does not require urgent neurointerventional procedures  We will continue to follow the patient's clinical course    Medical care as per NCCU

## 2022-01-07 NOTE — PROGRESS NOTE ADULT - ASSESSMENT
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

## 2022-01-07 NOTE — PROGRESS NOTE ADULT - ASSESSMENT
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   Afib RVR,   HFpEF with LV EF >75%, normal RV size and fx. Small mobile echo densities visualized on the aortic valve.  COPD not on home o2, remote hx breast CA s/p mastectomy 1985, colon cancer s/p bowel resection 12 years ago, chronic leg edema.  Worsening CKD - now requiring HD. Possible cardiorenal syndr.  Cardiac cirrhosis with ascites.  MRS 0 per family. NIH 25    PLAN:  -MRI brain to eval the extent of the stroke   -Neuro checks  -stat CTH if any ms changes  -PT/ OT/ SLP    CV:  --160; avoid hypotension to preserve perfusion  -TTE: ef >75%. mild- mod MVR, mild TR. Small mobile echodencities visualized on the aortic valve. 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 IV lasix for diuresis - cont    Respiratory:  -vent support for now, apneic this morning on SBT; off sedation now, plan to eval for weaning after HD today  -PRN duonebs for bronchospasm/ wheezing    GI:   NPO for now for weaning in pm  BM regimen    : [place]  Monitor Scr  Monitor I+ O  pending Renal plan for HD today - received contrast yesterday + better optimize for weaning    ID:  RLL Pnu versus effusion- cont zosyn q8  bcx neg, ucx neg 1/1    Heme:  -will reevaluate for AC with MRI results     Endo:  goal -180      Pt is critically ill and at high risk of rapid deterioration/death due to abovementioned conditions.   Still requires critical care interventions - ongoing frequent evaluations, interventions and management adjustment by the Attending and ICU team, - and included review of relevant history, clinical examination, review of data and images, discussion of treatment with the multidisciplinary team and any consultants involved in this patient’s care as well as family discussion.

## 2022-01-07 NOTE — DIETITIAN INITIAL EVALUATION ADULT. - OTHER INFO
86 year old female with PMH of COPD, CKD, HTN admitted with new onset Afib with RVR, CHF exacerbation, EDILBERTO on CKD, RLL w/ sepsis, left UE cellulitis. Started on Zosyn for RLL PNA and LUE cellulitis. Started workup for anasarca with diuretics. A.fib is now better controlled after few doses of Digoxin, and initiated CCB and BB, Heparin ggt started for AC. Pt was not responded to diuretics therefore started on Milrinone. Diuresis has improved with Milrinone, however uremia has gotten worse, therefore pt was initiated on HD after dialysis RIJ line was placed on 1/04. Now s/p left hemisphere stroke. Pt intubated at this time, remains NPO. Consult received for pressure injury - noted with stage II to sacrum and suspected DTI to sacrum/coccyx area. Limited NFPE conducted, RD to follow up.

## 2022-01-07 NOTE — DIETITIAN NUTRITION RISK NOTIFICATION - ADDITIONAL COMMENTS/DIETITIAN RECOMMENDATIONS
As medically feasible/tolerable, initiate Nepro @ 20 ml/hr and advance 10 ml/hr q4 hrs until goal rate of 50 ml/hr (x20 hrs) to provide 1000 ml, 1800 kcal, 81g protein, 727 ml free water, and 100% of RDIs for vitamins/minerals; additional free water per MD discretion.  Rx: Nephro-Moshe and vitamin C 500mg daily.   Obtain daily weights to monitor trends.

## 2022-01-07 NOTE — DIETITIAN INITIAL EVALUATION ADULT. - PERTINENT LABORATORY DATA
01-07 Na134 mmol/L<L> Glu 91 mg/dL K+ 3.1 mmol/L<L> Cr  1.70 mg/dL<H> BUN 46.5 mg/dL<H> Phos 3.2 mg/dL Alb n/a   PAB n/a

## 2022-01-07 NOTE — PROGRESS NOTE ADULT - SUBJECTIVE AND OBJECTIVE BOX
Interval History:  - No acute events in the last 24 hours  - Patient is s/p cerebral angio on 1/6.  TICI 2b revascularization of left m1 occlusion.    LKW 2pm on 1/6.  NIH 25.     Constitutional: NAD    Neuro  * Mental Status:  Opens eyes to voice, intubated, localizing  * Cranial Nerves: Cnii-Cnxii grossly intact. PERRL, EOMI, tongue midline, no gaze deviation  * Motor: RUE briskly localize, LUE less brisk localizing.  Holds b/l uppers antigravity with prompting.  Withdraw bilateral lowers  * Sensory: Above motor exam to noxious stimulation  * Reflexes: Not assessed  * Gait: Not assessed    No groin hematoma    Vitals:  Vital Signs Last 24 Hrs  T(C): 37.8 (07 Jan 2022 07:37), Max: 37.8 (07 Jan 2022 07:37)  T(F): 100 (07 Jan 2022 07:37), Max: 100 (07 Jan 2022 07:37)  HR: 78 (07 Jan 2022 06:40) (78 - 134)  BP: 136/86 (06 Jan 2022 21:25) (118/72 - 145/74)  BP(mean): 99 (06 Jan 2022 21:25) (99 - 99)  RR: 18 (07 Jan 2022 06:40) (12 - 22)  SpO2: 99% (07 Jan 2022 06:40) (93% - 100%)    I&O:  I&O's Summary    06 Jan 2022 07:01  -  07 Jan 2022 07:00  --------------------------------------------------------  IN: 397.6 mL / OUT: 2360 mL / NET: -1962.4 mL        Labs:                         10.9   9.35  )-----------( 78       ( 07 Jan 2022 03:02 )             33.8       01-07    134<L>  |  95<L>  |  46.5<H>  ----------------------------<  91  3.1<L>   |  26.0  |  1.70<H>    Ca    7.9<L>      07 Jan 2022 03:02  Phos  3.2     01-07  Mg     2.2     01-07    TPro  6.2<L>  /  Alb  3.2<L>  /  TBili  1.5  /  DBili  x   /  AST  34<H>  /  ALT  11  /  AlkPhos  178<H>  01-06      LIVER FUNCTIONS - ( 06 Jan 2022 17:47 )  Alb: 3.2 g/dL / Pro: 6.2 g/dL / ALK PHOS: 178 U/L / ALT: 11 U/L / AST: 34 U/L / GGT: x             PT/INR - ( 06 Jan 2022 17:47 )   PT: 14.0 sec;   INR: 1.22 ratio         PTT - ( 06 Jan 2022 17:47 )  PTT:32.9 sec    Neurosurgical Imaging:  I attest that I personally reviewed all pertinent neurosurgical imaging performed in the last 24 hours  Left MCA distribution infarct    Assessment & Plan:  87 y/o female from home with history of HTN, CKD baseline Cr. 1.7, COPD not on home 02, remote hx of breast cancer s/p mastectomy in 86, colon cancer s/p bowel resection 12 years ago, chronic LE edema. Patient lives alone and states shes usually independent with no assistive devices. Patient states she sustained a mechanical fall 1 week ago landing on her left side. She denies LOC, head or neck pain, She did sustain a skin tear to her left forearm. She denies being on the floor for more than a few minutes. She admits to feeling weak since the fall and has been ordering out chinese food most of the week. She has been eating [employer] soup, fried rice, and chicken lo mein. She normally sees Dr. Snyder who has told her not to use any excess salt as she has chronic LE edema. She denies any hx of Afib, or CAD. She came to ED after she noted weight gain, SCHMID, and all her clothes fitting more tightly. She denies CP, fever, chills, N/V or diaphoresis. In the ED patient noted to be in afib w/ RVR, Anasarca, EDILBERTO on CKD, and with RLL PNA. She was cultured, given IV abx, started on Cardizem drip after PO cardizem and BB failed to control her HR. She was seen by Cardiology who did bedside echo showing diastolic dysfunction and dilated IVC, CTs showed anasarca and pleural effusions with RLLL infiltrate. COVID neg, U/A neg. no focal weakness. Patient given IV digoxin, tafoya placed, and IV lasix given with 600ml o/p. Currently awake in NAD.     Left MCA Infarction 2/2 Left M1 occlusion TICI 2b revascularization.  Case discussed with neurosurgery, patient does not warrant hemicraniectomy.     (01 Jan 2022 23:57)      Continue neuro checks  Repeat CT Scan as needed for change in exam  Maintain SBP within desired range 100-160  Antiplatelet / anticoagulation once cleared by Dr. Kendall  The patient does not require urgent neurointerventional procedures  We will continue to follow the patient's clinical course    Medical care as per NCCU   Interval History:  - No acute events in the last 24 hours  - Patient is s/p cerebral angio on 1/6.  TICI 2b revascularization of left m1 occlusion.    LKW 2pm on 1/6.  NIH 25.     Constitutional: NAD    Neuro  * Mental Status:  Opens eyes to voice, intubated, localizing  * Cranial Nerves: Cnii-Cnxii grossly intact. PERRL, EOMI, tongue midline, no gaze deviation  * Motor: RUE briskly localize, LUE less brisk localizing.  Holds b/l uppers antigravity with prompting.  Withdraw bilateral lowers  * Sensory: Above motor exam to noxious stimulation  * Reflexes: Not assessed  * Gait: Not assessed    No groin hematoma    Vitals:  Vital Signs Last 24 Hrs  T(C): 37.8 (07 Jan 2022 07:37), Max: 37.8 (07 Jan 2022 07:37)  T(F): 100 (07 Jan 2022 07:37), Max: 100 (07 Jan 2022 07:37)  HR: 78 (07 Jan 2022 06:40) (78 - 134)  BP: 136/86 (06 Jan 2022 21:25) (118/72 - 145/74)  BP(mean): 99 (06 Jan 2022 21:25) (99 - 99)  RR: 18 (07 Jan 2022 06:40) (12 - 22)  SpO2: 99% (07 Jan 2022 06:40) (93% - 100%)    I&O:  I&O's Summary    06 Jan 2022 07:01  -  07 Jan 2022 07:00  --------------------------------------------------------  IN: 397.6 mL / OUT: 2360 mL / NET: -1962.4 mL        Labs:                         10.9   9.35  )-----------( 78       ( 07 Jan 2022 03:02 )             33.8       01-07    134<L>  |  95<L>  |  46.5<H>  ----------------------------<  91  3.1<L>   |  26.0  |  1.70<H>    Ca    7.9<L>      07 Jan 2022 03:02  Phos  3.2     01-07  Mg     2.2     01-07    TPro  6.2<L>  /  Alb  3.2<L>  /  TBili  1.5  /  DBili  x   /  AST  34<H>  /  ALT  11  /  AlkPhos  178<H>  01-06      LIVER FUNCTIONS - ( 06 Jan 2022 17:47 )  Alb: 3.2 g/dL / Pro: 6.2 g/dL / ALK PHOS: 178 U/L / ALT: 11 U/L / AST: 34 U/L / GGT: x             PT/INR - ( 06 Jan 2022 17:47 )   PT: 14.0 sec;   INR: 1.22 ratio         PTT - ( 06 Jan 2022 17:47 )  PTT:32.9 sec    Neurosurgical Imaging:  I attest that I personally reviewed all pertinent neurosurgical imaging performed in the last 24 hours  Left MCA distribution infarct

## 2022-01-07 NOTE — DIETITIAN INITIAL EVALUATION ADULT. - ETIOLOGY
related to inability to meet sufficient protein-energy in setting of COPD, CHF, skin breakdown, renal failure requiring HD, RLL PNA, LUE cellulitis, left hemisphere stroke

## 2022-01-07 NOTE — PROGRESS NOTE ADULT - SUBJECTIVE AND OBJECTIVE BOX
NEPHROLOGY INTERVAL HPI/OVERNIGHT EVENTS:    Interim noted   S/P contrast exposure  Now requiring 1 pressor   Abx noted       MEDICATIONS  (STANDING):  albuterol/ipratropium for Nebulization 3 milliLiter(s) Nebulizer every 6 hours  atorvastatin 5 milliGRAM(s) Oral at bedtime  chlorhexidine 0.12% Liquid 15 milliLiter(s) Oral Mucosa every 12 hours  chlorhexidine 4% Liquid 1 Application(s) Topical <User Schedule>  dexMEDEtomidine Infusion 0.1 MICROgram(s)/kG/Hr (1.7 mL/Hr) IV Continuous <Continuous>  dextrose 40% Gel 15 Gram(s) Oral once  dextrose 5%. 1000 milliLiter(s) (50 mL/Hr) IV Continuous <Continuous>  dextrose 5%. 1000 milliLiter(s) (100 mL/Hr) IV Continuous <Continuous>  dextrose 50% Injectable 25 Gram(s) IV Push once  dextrose 50% Injectable 12.5 Gram(s) IV Push once  dextrose 50% Injectable 25 Gram(s) IV Push once  furosemide   Injectable 60 milliGRAM(s) IV Push two times a day  glucagon  Injectable 1 milliGRAM(s) IntraMuscular once  insulin lispro (ADMELOG) corrective regimen sliding scale   SubCutaneous every 6 hours  metoprolol tartrate 25 milliGRAM(s) Oral three times a day  montelukast 10 milliGRAM(s) Oral daily  pantoprazole  Injectable 40 milliGRAM(s) IV Push daily  phenylephrine    Infusion 0.1 MICROgram(s)/kG/Min (2.82 mL/Hr) IV Continuous <Continuous>  piperacillin/tazobactam IVPB.. 3.375 Gram(s) IV Intermittent every 12 hours  sodium chloride 0.9% Bolus 250 milliLiter(s) IV Bolus once  sodium chloride 0.9%. 1000 milliLiter(s) (30 mL/Hr) IV Continuous <Continuous>    MEDICATIONS  (PRN):  acetaminophen     Tablet .. 650 milliGRAM(s) Oral every 6 hours PRN Temp greater or equal to 38C (100.4F), Mild Pain (1 - 3)  diltiazem Injectable 10 milliGRAM(s) IV Push every 6 hours PRN HR > 130  ondansetron Injectable 4 milliGRAM(s) IV Push every 8 hours PRN Nausea and/or Vomiting  sodium chloride 0.9% lock flush 10 milliLiter(s) IV Push every 1 hour PRN Pre/post blood products, medications, blood draw, and to maintain line patency      Allergies    No Known Allergies    Intolerances          Vital Signs Last 24 Hrs  T(C): 38 (07 Jan 2022 17:20), Max: 38.1 (07 Jan 2022 16:45)  T(F): 100.4 (07 Jan 2022 17:20), Max: 100.6 (07 Jan 2022 16:45)  HR: 90 (07 Jan 2022 17:20) (70 - 134)  BP: 136/86 (06 Jan 2022 21:25) (136/86 - 136/86)  BP(mean): 99 (06 Jan 2022 21:25) (99 - 99)  RR: 18 (07 Jan 2022 17:20) (12 - 22)  SpO2: 99% (07 Jan 2022 17:20) (97% - 100%)  Daily     Daily   I&O's Detail    06 Jan 2022 07:01  -  07 Jan 2022 07:00  --------------------------------------------------------  IN:    Dexmedetomidine: 22.6 mL    Propofol: 45 mL    sodium chloride 0.9%: 330 mL  Total IN: 397.6 mL    OUT:    Indwelling Catheter - Urethral (mL): 860 mL    NiCARdipine: 0 mL    Other (mL): 1500 mL  Total OUT: 2360 mL    Total NET: -1962.4 mL      07 Jan 2022 07:01  -  07 Jan 2022 17:40  --------------------------------------------------------  IN:    IV PiggyBack: 100 mL    sodium chloride 0.9%: 270 mL  Total IN: 370 mL    OUT:    Dexmedetomidine: 0 mL    Indwelling Catheter - Urethral (mL): 975 mL    NiCARdipine: 0 mL  Total OUT: 975 mL    Total NET: -605 mL        I&O's Summary    06 Jan 2022 07:01  -  07 Jan 2022 07:00  --------------------------------------------------------  IN: 397.6 mL / OUT: 2360 mL / NET: -1962.4 mL    07 Jan 2022 07:01  -  07 Jan 2022 17:40  --------------------------------------------------------  IN: 370 mL / OUT: 975 mL / NET: -605 mL        PHYSICAL EXAM:    GENERAL: orally intubated   HEAD:  no edema   NECK: Supple, + JVP   CHEST/LUNG: EAE, Bibasilar crackles   HEART: Regular rate and rhythm; No rub   ABDOMEN: Soft, Nontender, Nondistended;   EXTREMITIES:  pitting edema , R femoral [place]         LABS:                        10.9   9.35  )-----------( 78       ( 07 Jan 2022 03:02 )             33.8     01-07    134<L>  |  95<L>  |  46.5<H>  ----------------------------<  91  3.1<L>   |  26.0  |  1.70<H>    Ca    7.9<L>      07 Jan 2022 03:02  Phos  3.2     01-07  Mg     2.2     01-07    TPro  6.2<L>  /  Alb  3.2<L>  /  TBili  1.5  /  DBili  x   /  AST  34<H>  /  ALT  11  /  AlkPhos  178<H>  01-06    PT/INR - ( 06 Jan 2022 17:47 )   PT: 14.0 sec;   INR: 1.22 ratio         PTT - ( 06 Jan 2022 17:47 )  PTT:32.9 sec    Magnesium, Serum: 2.2 mg/dL (01-07 @ 03:02)  Phosphorus Level, Serum: 3.2 mg/dL (01-07 @ 03:02)    ABG - ( 07 Jan 2022 00:23 )  pH, Arterial: 7.460 pH, Blood: x     /  pCO2: 43    /  pO2: 141   / HCO3: 31    / Base Excess: 6.8   /  SaO2: 98.8                  RADIOLOGY & ADDITIONAL TESTS:

## 2022-01-07 NOTE — CONSULT NOTE ADULT - ASSESSMENT
The patient is a 86y Female with multiple medical issues admitted for cardiac issues including atrial fibrillation now with left hemisphere stroke.     Stroke.   Was not given t-PA as was > 3 hrs from last known well time and patient > 80 years old and in addition was on full anticoagulation dose of Lovenox.   She did go for thrombectomy and is now in ICU.  LDL: 30  At goal of < 70  Antiplatelet and anticoagulation on hold now post procedure.   Restart once cleared with Dr Kendall.     CHF   Per cardiology.     Case discussed with Dr Díaz and PA for neuro-ICU last night and with Dr Pierre (neuro-ICU attending) this morning.

## 2022-01-08 LAB
ANION GAP SERPL CALC-SCNC: 17 MMOL/L — SIGNIFICANT CHANGE UP (ref 5–17)
BASE EXCESS BLDA CALC-SCNC: 5.7 MMOL/L — HIGH (ref -2–3)
BLOOD GAS COMMENTS ARTERIAL: SIGNIFICANT CHANGE UP
BUN SERPL-MCNC: 46.8 MG/DL — HIGH (ref 8–20)
CALCIUM SERPL-MCNC: 8.3 MG/DL — LOW (ref 8.6–10.2)
CHLORIDE SERPL-SCNC: 96 MMOL/L — LOW (ref 98–107)
CO2 SERPL-SCNC: 24 MMOL/L — SIGNIFICANT CHANGE UP (ref 22–29)
CREAT SERPL-MCNC: 1.8 MG/DL — HIGH (ref 0.5–1.3)
GLUCOSE BLDC GLUCOMTR-MCNC: 80 MG/DL — SIGNIFICANT CHANGE UP (ref 70–99)
GLUCOSE BLDC GLUCOMTR-MCNC: 81 MG/DL — SIGNIFICANT CHANGE UP (ref 70–99)
GLUCOSE BLDC GLUCOMTR-MCNC: 84 MG/DL — SIGNIFICANT CHANGE UP (ref 70–99)
GLUCOSE BLDC GLUCOMTR-MCNC: 87 MG/DL — SIGNIFICANT CHANGE UP (ref 70–99)
GLUCOSE SERPL-MCNC: 77 MG/DL — SIGNIFICANT CHANGE UP (ref 70–99)
HCO3 BLDA-SCNC: 29 MMOL/L — HIGH (ref 21–28)
HCT VFR BLD CALC: 35.5 % — SIGNIFICANT CHANGE UP (ref 34.5–45)
HGB BLD-MCNC: 11.6 G/DL — SIGNIFICANT CHANGE UP (ref 11.5–15.5)
HOROWITZ INDEX BLDA+IHG-RTO: SIGNIFICANT CHANGE UP
MAGNESIUM SERPL-MCNC: 2.2 MG/DL — SIGNIFICANT CHANGE UP (ref 1.6–2.6)
MCHC RBC-ENTMCNC: 25.4 PG — LOW (ref 27–34)
MCHC RBC-ENTMCNC: 32.7 GM/DL — SIGNIFICANT CHANGE UP (ref 32–36)
MCV RBC AUTO: 77.9 FL — LOW (ref 80–100)
PCO2 BLDA: 35 MMHG — SIGNIFICANT CHANGE UP (ref 32–35)
PH BLDA: 7.52 — HIGH (ref 7.35–7.45)
PHOSPHATE SERPL-MCNC: 3.2 MG/DL — SIGNIFICANT CHANGE UP (ref 2.4–4.7)
PLATELET # BLD AUTO: 106 K/UL — LOW (ref 150–400)
PO2 BLDA: 199 MMHG — HIGH (ref 83–108)
POTASSIUM SERPL-MCNC: 3.3 MMOL/L — LOW (ref 3.5–5.3)
POTASSIUM SERPL-SCNC: 3.3 MMOL/L — LOW (ref 3.5–5.3)
RBC # BLD: 4.56 M/UL — SIGNIFICANT CHANGE UP (ref 3.8–5.2)
RBC # FLD: 18.4 % — HIGH (ref 10.3–14.5)
SAO2 % BLDA: 99.4 % — HIGH (ref 94–98)
SODIUM SERPL-SCNC: 137 MMOL/L — SIGNIFICANT CHANGE UP (ref 135–145)
WBC # BLD: 15.1 K/UL — HIGH (ref 3.8–10.5)
WBC # FLD AUTO: 15.1 K/UL — HIGH (ref 3.8–10.5)

## 2022-01-08 PROCEDURE — 99232 SBSQ HOSP IP/OBS MODERATE 35: CPT

## 2022-01-08 PROCEDURE — 71045 X-RAY EXAM CHEST 1 VIEW: CPT | Mod: 26

## 2022-01-08 PROCEDURE — 99291 CRITICAL CARE FIRST HOUR: CPT

## 2022-01-08 RX ORDER — POTASSIUM CHLORIDE 20 MEQ
40 PACKET (EA) ORAL
Refills: 0 | Status: COMPLETED | OUTPATIENT
Start: 2022-01-08 | End: 2022-01-08

## 2022-01-08 RX ORDER — DEXAMETHASONE 0.5 MG/5ML
4 ELIXIR ORAL ONCE
Refills: 0 | Status: DISCONTINUED | OUTPATIENT
Start: 2022-01-08 | End: 2022-01-08

## 2022-01-08 RX ORDER — HEPARIN SODIUM 5000 [USP'U]/ML
5000 INJECTION INTRAVENOUS; SUBCUTANEOUS EVERY 8 HOURS
Refills: 0 | Status: DISCONTINUED | OUTPATIENT
Start: 2022-01-08 | End: 2022-01-10

## 2022-01-08 RX ORDER — METOPROLOL TARTRATE 50 MG
50 TABLET ORAL EVERY 8 HOURS
Refills: 0 | Status: DISCONTINUED | OUTPATIENT
Start: 2022-01-08 | End: 2022-01-09

## 2022-01-08 RX ORDER — METOPROLOL TARTRATE 50 MG
5 TABLET ORAL EVERY 6 HOURS
Refills: 0 | Status: DISCONTINUED | OUTPATIENT
Start: 2022-01-08 | End: 2022-01-10

## 2022-01-08 RX ORDER — FENTANYL CITRATE 50 UG/ML
50 INJECTION INTRAVENOUS
Refills: 0 | Status: DISCONTINUED | OUTPATIENT
Start: 2022-01-08 | End: 2022-01-09

## 2022-01-08 RX ORDER — ACETAMINOPHEN 500 MG
1000 TABLET ORAL ONCE
Refills: 0 | Status: DISCONTINUED | OUTPATIENT
Start: 2022-01-08 | End: 2022-01-08

## 2022-01-08 RX ADMIN — ATORVASTATIN CALCIUM 5 MILLIGRAM(S): 80 TABLET, FILM COATED ORAL at 00:23

## 2022-01-08 RX ADMIN — Medication 500 MILLIGRAM(S): at 11:03

## 2022-01-08 RX ADMIN — HEPARIN SODIUM 5000 UNIT(S): 5000 INJECTION INTRAVENOUS; SUBCUTANEOUS at 13:29

## 2022-01-08 RX ADMIN — CHLORHEXIDINE GLUCONATE 1 APPLICATION(S): 213 SOLUTION TOPICAL at 05:44

## 2022-01-08 RX ADMIN — Medication 40 MILLIEQUIVALENT(S): at 05:44

## 2022-01-08 RX ADMIN — Medication 40 MILLIEQUIVALENT(S): at 08:55

## 2022-01-08 RX ADMIN — Medication 3 MILLILITER(S): at 15:37

## 2022-01-08 RX ADMIN — PANTOPRAZOLE SODIUM 40 MILLIGRAM(S): 20 TABLET, DELAYED RELEASE ORAL at 11:02

## 2022-01-08 RX ADMIN — CHLORHEXIDINE GLUCONATE 15 MILLILITER(S): 213 SOLUTION TOPICAL at 05:44

## 2022-01-08 RX ADMIN — FENTANYL CITRATE 50 MICROGRAM(S): 50 INJECTION INTRAVENOUS at 15:51

## 2022-01-08 RX ADMIN — Medication 50 MILLIGRAM(S): at 21:51

## 2022-01-08 RX ADMIN — Medication 3 MILLILITER(S): at 03:40

## 2022-01-08 RX ADMIN — FENTANYL CITRATE 50 MICROGRAM(S): 50 INJECTION INTRAVENOUS at 16:20

## 2022-01-08 RX ADMIN — PIPERACILLIN AND TAZOBACTAM 25 GRAM(S): 4; .5 INJECTION, POWDER, LYOPHILIZED, FOR SOLUTION INTRAVENOUS at 17:44

## 2022-01-08 RX ADMIN — ATORVASTATIN CALCIUM 5 MILLIGRAM(S): 80 TABLET, FILM COATED ORAL at 21:51

## 2022-01-08 RX ADMIN — Medication 3 MILLILITER(S): at 21:01

## 2022-01-08 RX ADMIN — PHENYLEPHRINE HYDROCHLORIDE 2.82 MICROGRAM(S)/KG/MIN: 10 INJECTION INTRAVENOUS at 12:51

## 2022-01-08 RX ADMIN — MONTELUKAST 10 MILLIGRAM(S): 4 TABLET, CHEWABLE ORAL at 11:03

## 2022-01-08 RX ADMIN — PIPERACILLIN AND TAZOBACTAM 25 GRAM(S): 4; .5 INJECTION, POWDER, LYOPHILIZED, FOR SOLUTION INTRAVENOUS at 05:44

## 2022-01-08 RX ADMIN — CHLORHEXIDINE GLUCONATE 15 MILLILITER(S): 213 SOLUTION TOPICAL at 17:43

## 2022-01-08 RX ADMIN — Medication 50 MILLIGRAM(S): at 13:29

## 2022-01-08 RX ADMIN — Medication 1 TABLET(S): at 11:03

## 2022-01-08 RX ADMIN — Medication 3 MILLILITER(S): at 08:23

## 2022-01-08 RX ADMIN — Medication 5 MILLIGRAM(S): at 12:52

## 2022-01-08 RX ADMIN — HEPARIN SODIUM 5000 UNIT(S): 5000 INJECTION INTRAVENOUS; SUBCUTANEOUS at 21:51

## 2022-01-08 NOTE — PROGRESS NOTE ADULT - SUBJECTIVE AND OBJECTIVE BOX
HPI:  85 y/o female from home with history of HTN, CKD baseline Cr. 1.7, COPD not on home 02, remote hx of breast cancer s/p mastectomy in 85, colon cancer s/p bowel resection 12 years ago, chronic LE edema. Patient lives alone and states shes usually independent with no assistive devices. Patient states she sustained a mechanical fall 1 week ago landing on her left side. She denies LOC, head or neck pain, She did sustain a skin tear to her left forearm. She denies being on the floor for more than a few minutes. She admits to feeling weak since the fall and has been ordering out chinese food most of the week. She has been eating won ton soup, fried rice, and chicken lo mein. She normally sees Dr. Snyder who has told her not to use any excess salt as she has chronic LE edema. She denies any hx of Afib, or CAD. She came to ED after she noted weight gain, SCHMID, and all her clothes fitting more tightly. She denies CP, fever, chills, N/V or diaphoresis. In the ED patient noted to be in afib w/ RVR, Anasarca, EDILBERTO on CKD, and with RLL PNA. She was cultured, given IV abx, started on Cardizem drip after PO cardizem and BB failed to control her HR. She was seen by Cardiology who did bedside echo showing diastolic dysfunction and dilated IVC, CTs showed anasarca and pleural effusions with RLLL infiltrate. COVID neg, U/A neg. no focal weakness. Patient given IV digoxin, tafoya placed, and IV lasix given with 600ml o/p. Currently awake in NAD. (01 Jan 2022 23:57)   Code Stroke, while waiting for a bed in ED. MRS 0 per family. NIH 25. Not a tPA candidate; underwent TICI 2b MT.  Arrived to [facility]U intubated.    24 hr events: 1/7 poor mental status, apneic on CPAP, hypotensive in pm, required Hunter IV pushes + small bolus, started on [place] ggt; Cardizem and diuretics held.  SBT this am - apnea noted again.  VS, labs, imaging reviewed.  ROS: limited due to pt's condition (intubated).    Exam:  intubated, off sedation, RASS -1.  EO to verbal, intermittently FC, tries to mouth words, PERRL, L gaze preference, o/b the vent, + cough, motor - withdraws bilateral lowers, holds b/l uppers antigravity with prompting.   S1S2 present.  CTAb anteriorly, diminished Bibasilar sounds.  Abd soft.  No periph swelling.

## 2022-01-08 NOTE — PROGRESS NOTE ADULT - NUTRITIONAL ASSESSMENT
This patient has been assessed with a concern for Malnutrition and has been determined to have a diagnosis/diagnoses of Moderate protein-calorie malnutrition.    This patient is being managed with:   Diet NPO with Tube Feed-  Tube Feeding Modality: Orogastric  Glucerna 1.5 Jaskaran (GLUCERNA1.5)  Total Volume for 24 Hours (mL): 960  Continuous  Starting Tube Feed Rate {mL per Hour}: 10  Increase Tube Feed Rate by (mL): 10     Every 6 hours  Until Goal Tube Feed Rate (mL per Hour): 40  Tube Feed Duration (in Hours): 24  Tube Feed Start Time: 09:30  Entered: Jan 8 2022  9:25AM

## 2022-01-08 NOTE — PROGRESS NOTE ADULT - SUBJECTIVE AND OBJECTIVE BOX
Patient seen and examined    Feels better, undergoing HD  no c/o CP SOB NV   + swelling feet    Vital Signs Last 24 Hrs  T(C): 37 (08 Jan 2022 15:00), Max: 38.1 (07 Jan 2022 16:45)  T(F): 98.6 (08 Jan 2022 15:00), Max: 100.6 (07 Jan 2022 16:45)  HR: 96 (08 Jan 2022 15:33) (89 - 126)  BP: --  BP(mean): --  RR: 15 (08 Jan 2022 15:00) (13 - 19)  SpO2: 100% (08 Jan 2022 15:33) (98% - 100%)    PHYSICAL EXAM    GENERAL: NAD,   EYES:  conjunctiva and sclera clear  NECK: Supple, No JVD/Bruit  NERVOUS SYSTEM:  A/O x3,   CHEST:  No rales, few rhonchi  HEART:  RRR, No murmur  ABDOMEN: Soft, NT/ND BS+  EXTREMITIES:  + Edema;  SKIN: No rashes    08 Jan 2022 04:36    137    |  96     |  46.8   ----------------------------<  77     3.3     |  24.0   |  1.80     Ca    8.3        08 Jan 2022 04:36  Phos  3.2       08 Jan 2022 04:36  Mg     2.2       08 Jan 2022 04:36    TPro  6.2    /  Alb  3.2    /  TBili  1.5    /  DBili  x      /  AST  34     /  ALT  11     /  AlkPhos  178    06 Jan 2022 17:47                          11.6   15.10 )-----------( 106      ( 08 Jan 2022 04:36 )             35.5

## 2022-01-08 NOTE — PROGRESS NOTE ADULT - ASSESSMENT
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 Concern for embolic event (likely cardio-).  Afib RVR.  HFpEF with LV EF >75%, normal RV size and fx. Small mobile echo densities visualized on the aortic valve.  COPD not on home o2, remote hx breast CA s/p mastectomy 1985, colon cancer s/p bowel resection 12 years ago, chronic leg edema.  Worsening CKD - now requiring HD. Possible cardiorenal syndr.  Cardiac cirrhosis with ascites.  MRS 0 per family. NIH 25.    PLAN:  -MRI brain to eval the extent of the stroke   -Neuro checks  -stat CTH if any ment status changes  -PT/ OT/ SLP  - keep off sedation    CV:  --160; avoid hypotension to preserve cerebral perfusion  -TTE: ef >75%. mild- mod MVR, mild TR. Small mobile echodencities visualized on the aortic valve (Lambl's excrescence but can not rule out vegetation or fibroelastoma) - no plan for DAWSON from Cardiology as pt needs AC regardless - pending MRI  - hold Cardizem for now as pt is hypotensive, requires Hunter; no improvement in BP with acceptable HR; maintain BB for rate control - Metoprolol 50 q8h, + Meto IVP 5 PRN for HR >110    Respiratory:  -vent support for now, apneic this morning on SBT; plan to re-eval for weaning after HD today  -PRN duonebs for bronchospasm/ wheezing  -ABG    GI:   NPO for now for weaning in pm  BM regimen  cont Protonix for ppx    : [place]  Monitor Scr  Monitor I+ O  undergoing HD today - after contrast administration on 1/6 + better optimize for weaning  hold Lasix for now as hypotensive + dyalized for 3 days with fluid removal, negative fluid balance; reassess daily.  notified by HD team that wont be able to use R kaylee Graves next time (would be 1 week old) - will replace to IJ + Vascular consult for tunneled catheter    ID:  RLL Pnu versus effusion- cont zosyn q8  bcx neg, ucx neg 1/1    Heme:  -will reevaluate for full AC with MRI results   - anemia w/up  - start SQH for DVT ppx - very high risk for VTE; if MRI is not available today, plan repeat CTh in am    Endo:  goal -180    Pt is critically ill and at high risk of rapid deterioration/death due to abovementioned conditions.   Still requires critical care interventions - ongoing frequent evaluations, interventions and management adjustment by the Attending and ICU team, - and included review of relevant history, clinical examination, review of data and images, discussion of treatment with the multidisciplinary team and any consultants involved in this patient’s care as well as family discussion.

## 2022-01-08 NOTE — PROGRESS NOTE ADULT - SUBJECTIVE AND OBJECTIVE BOX
Interval History:  - No acute events in the last 24 hours: failing CPAP trials  - Patient is s/p cerebral angio on 1/6.  TICI 2b revascularization of left m1 occlusion.        Constitutional: NAD    Neuro  * Mental Status:  Opens eyes spontaneous, intubated, mimicking commands  * Cranial Nerves: Cnii-Cnxii grossly intact. PERRL, EOMI, tongue midline, no gaze deviation  * Motor: Bilateral uppers mimic and are both antigravity.  Bilateral lowers withdraw  * Sensory: Above motor exam to noxious stimulation  * Reflexes: Not assessed  * Gait: Not assessed    No groin hematoma      Vitals:  Vital Signs Last 24 Hrs  T(C): 37.3 (08 Jan 2022 07:32), Max: 38.1 (07 Jan 2022 16:45)  T(F): 99.1 (08 Jan 2022 07:32), Max: 100.6 (07 Jan 2022 16:45)  HR: 104 (08 Jan 2022 07:00) (89 - 120)  BP: --  BP(mean): --  RR: 18 (08 Jan 2022 07:00) (17 - 21)  SpO2: 99% (08 Jan 2022 07:00) (98% - 100%)    I&O:  I&O's Summary    07 Jan 2022 07:01  -  08 Jan 2022 07:00  --------------------------------------------------------  IN: 1209.6 mL / OUT: 2710 mL / NET: -1500.4 mL        Labs:                         11.6   15.10 )-----------( 106      ( 08 Jan 2022 04:36 )             35.5       01-08    137  |  96<L>  |  46.8<H>  ----------------------------<  77  3.3<L>   |  24.0  |  1.80<H>    Ca    8.3<L>      08 Jan 2022 04:36  Phos  3.2     01-08  Mg     2.2     01-08    TPro  6.2<L>  /  Alb  3.2<L>  /  TBili  1.5  /  DBili  x   /  AST  34<H>  /  ALT  11  /  AlkPhos  178<H>  01-06      LIVER FUNCTIONS - ( 06 Jan 2022 17:47 )  Alb: 3.2 g/dL / Pro: 6.2 g/dL / ALK PHOS: 178 U/L / ALT: 11 U/L / AST: 34 U/L / GGT: x             PT/INR - ( 06 Jan 2022 17:47 )   PT: 14.0 sec;   INR: 1.22 ratio         PTT - ( 06 Jan 2022 17:47 )  PTT:32.9 sec      MEDICATIONS  (STANDING):  albuterol/ipratropium for Nebulization 3 milliLiter(s) Nebulizer every 6 hours  ascorbic acid 500 milliGRAM(s) Oral daily  atorvastatin 5 milliGRAM(s) Oral at bedtime  chlorhexidine 0.12% Liquid 15 milliLiter(s) Oral Mucosa every 12 hours  chlorhexidine 4% Liquid 1 Application(s) Topical <User Schedule>  dextrose 40% Gel 15 Gram(s) Oral once  dextrose 5%. 1000 milliLiter(s) (50 mL/Hr) IV Continuous <Continuous>  dextrose 5%. 1000 milliLiter(s) (100 mL/Hr) IV Continuous <Continuous>  dextrose 50% Injectable 25 Gram(s) IV Push once  dextrose 50% Injectable 12.5 Gram(s) IV Push once  dextrose 50% Injectable 25 Gram(s) IV Push once  glucagon  Injectable 1 milliGRAM(s) IntraMuscular once  insulin lispro (ADMELOG) corrective regimen sliding scale   SubCutaneous every 6 hours  metoprolol tartrate 25 milliGRAM(s) Oral three times a day  montelukast 10 milliGRAM(s) Oral daily  Nephro-gustavo 1 Tablet(s) Oral daily  pantoprazole  Injectable 40 milliGRAM(s) IV Push daily  phenylephrine    Infusion 0.1 MICROgram(s)/kG/Min (2.82 mL/Hr) IV Continuous <Continuous>  piperacillin/tazobactam IVPB.. 3.375 Gram(s) IV Intermittent every 12 hours  potassium chloride   Powder 40 milliEquivalent(s) Oral every 2 hours  sodium chloride 0.9% Bolus 250 milliLiter(s) IV Bolus once  sodium chloride 0.9%. 1000 milliLiter(s) (30 mL/Hr) IV Continuous <Continuous>    MEDICATIONS  (PRN):  acetaminophen     Tablet .. 650 milliGRAM(s) Oral every 6 hours PRN Temp greater or equal to 38C (100.4F), Mild Pain (1 - 3)  diltiazem Injectable 10 milliGRAM(s) IV Push every 6 hours PRN HR > 130  ondansetron Injectable 4 milliGRAM(s) IV Push every 8 hours PRN Nausea and/or Vomiting  sodium chloride 0.9% lock flush 10 milliLiter(s) IV Push every 1 hour PRN Pre/post blood products, medications, blood draw, and to maintain line patency          Neurosurgical Imaging:  I attest that I personally reviewed all pertinent neurosurgical imaging performed in the last 24 hours  Left MCA distribution infarct    Assessment & Plan:  87 y/o female from home with history of HTN, CKD baseline Cr. 1.7, COPD not on home 02, remote hx of breast cancer s/p mastectomy in 85, colon cancer s/p bowel resection 12 years ago, chronic LE edema. Patient lives alone and states shes usually independent with no assistive devices. Patient states she sustained a mechanical fall 1 week ago landing on her left side. She denies LOC, head or neck pain, She did sustain a skin tear to her left forearm. She denies being on the floor for more than a few minutes. She admits to feeling weak since the fall and has been ordering out chinese food most of the week. She has been eating won ton soup, fried rice, and chicken lo mein. She normally sees Dr. Snyedr who has told her not to use any excess salt as she has chronic LE edema. She denies any hx of Afib, or CAD. She came to ED after she noted weight gain, SCHMID, and all her clothes fitting more tightly. She denies CP, fever, chills, N/V or diaphoresis. In the ED patient noted to be in afib w/ RVR, Anasarca, EDILBERTO on CKD, and with RLL PNA. She was cultured, given IV abx, started on Cardizem drip after PO cardizem and BB failed to control her HR. She was seen by Cardiology who did bedside echo showing diastolic dysfunction and dilated IVC, CTs showed anasarca and pleural effusions with RLLL infiltrate. COVID neg, U/A neg. no focal weakness. Patient given IV digoxin, tafoya placed, and IV lasix given with 600ml o/p. Currently awake in NAD.     Left MCA Infarction 2/2 Left M1 occlusion TICI 2b revascularization.  Case discussed with neurosurgery, patient does not warrant hemicraniectomy.     (01 Jan 2022 23:57)      Continue neuro checks  Repeat CT Scan as needed for change in exam  Maintain SBP within desired range 100-160  Antiplatelet / anticoagulation once cleared by Dr. Kendall, has SAH vs. contrast staining on CT.  Pending MRI.  Will be helpful to risk stratify the risk of hemorrhagic conversion  The patient does not require urgent neurointerventional procedures  We will continue to follow the patient's clinical course    Medical care as per NCCU

## 2022-01-08 NOTE — PROGRESS NOTE ADULT - ASSESSMENT
86y Female with multiple medical issues admitted for cardiac issues including atrial fibrillation now with left hemisphere stroke.     Stroke.   Was not given t-PA as was > 3 hrs from last known well time and patient > 80 years old and in addition was on full anticoagulation dose of Lovenox.   She did go for thrombectomy and is now in ICU.  LDL: 30  At goal of < 70  Back on anticoagulation.  Continue statin.    Renal disease.   HD per nephrology service.    CHF   Per cardiology.

## 2022-01-08 NOTE — PROGRESS NOTE ADULT - SUBJECTIVE AND OBJECTIVE BOX
[facility]                                        Neurology at [place]                                 [facility]                                  [address]                                        [place], [place], [address]                                             [phone]        CC: Stroke    HISTORY:  The patient is a 86y Female who was admitted 1/1/22.  She had fallen 1 week prior to arrival and landed on her left side.   She had been eating a lot of take out Chinese food since the fall.   She presented secondary to weight gain, dyspnea on exertion, and edema.   Noted to be in atrial fibrillation on arrival.   She was started on treatment including anticoagulation.    On 1/6/22 she was found to be confused.   There is some suggestion of right sided weakness.  Stroke code was activated around 5:30 pm. She was last known well around 2 pm.   STAT CT and CT-A/CT-P were performed and she was found to have large vessel occlusion (left M1) and neuro-intervention team was called.    She was taken to the cath lab for thrombectomy with TICI 2b revascularization.  NIH SS score was 9 prior to intervention.    Interim history:  Remains in ICU on mechanical ventilator.    ROS:   Unobtainable due to patient's condition.     MEDICATIONS  (STANDING):  albuterol/ipratropium for Nebulization 3 milliLiter(s) Nebulizer every 6 hours  ascorbic acid 500 milliGRAM(s) Oral daily  atorvastatin 5 milliGRAM(s) Oral at bedtime  chlorhexidine 0.12% Liquid 15 milliLiter(s) Oral Mucosa every 12 hours  chlorhexidine 4% Liquid 1 Application(s) Topical <User Schedule>  dextrose 40% Gel 15 Gram(s) Oral once  dextrose 5%. 1000 milliLiter(s) (50 mL/Hr) IV Continuous <Continuous>  glucagon  Injectable 1 milliGRAM(s) IntraMuscular once  heparin   Injectable 5000 Unit(s) SubCutaneous every 8 hours  insulin lispro (ADMELOG) corrective regimen sliding scale   SubCutaneous every 6 hours  metoprolol tartrate 50 milliGRAM(s) Oral every 8 hours  montelukast 10 milliGRAM(s) Oral daily  Nephro-gustavo 1 Tablet(s) Oral daily  pantoprazole  Injectable 40 milliGRAM(s) IV Push daily  phenylephrine    Infusion 0.1 MICROgram(s)/kG/Min (2.82 mL/Hr) IV Continuous <Continuous>  piperacillin/tazobactam IVPB.. 3.375 Gram(s) IV Intermittent every 12 hours  sodium chloride 0.9% Bolus 250 milliLiter(s) IV Bolus once    Vital Signs Last 24 Hrs  T(C): 37.1 (08 Jan 2022 10:00), Max: 38.1 (07 Jan 2022 16:45)  T(F): 98.8 (08 Jan 2022 10:00), Max: 100.6 (07 Jan 2022 16:45)  HR: 116 (08 Jan 2022 10:00) (89 - 126)  RR: 13 (08 Jan 2022 10:00) (13 - 21)  SpO2: 100% (08 Jan 2022 10:00) (98% - 100%)    Detailed Neurologic Exam:    Mental status: The patient is on mechanical ventilator. She opens eyes to voice. She does not follow instructions.    Cranial nerves: Pupils react symmetrically to light. She blinks to threat to confrontation. She tracks with gaze. An endotracheal tube is in place. Palate and tongue cannot be assessed.     Motor/sensory: There is normal bulk and tone.  Moves extremities to stimuli although left slightly more than right.    Reflexes: 1+ throughout and plantar responses are flexor.    Cerebellar: Cannot be assessed.     Cerebellar: No dysmetria on finger nose testing.    Labs:     01-08    137  |  96<L>  |  46.8<H>  ----------------------------<  77  3.3<L>   |  24.0  |  1.80<H>    Ca    8.3<L>      08 Jan 2022 04:36  Phos  3.2     01-08  Mg     2.2     01-08    TPro  6.2<L>  /  Alb  3.2<L>  /  TBili  1.5  /  DBili  x   /  AST  34<H>  /  ALT  11  /  AlkPhos  178<H>  01-06                            11.6   15.10 )-----------( 106      ( 08 Jan 2022 04:36 )             35.5

## 2022-01-09 LAB
ANION GAP SERPL CALC-SCNC: 16 MMOL/L — SIGNIFICANT CHANGE UP (ref 5–17)
BASOPHILS # BLD AUTO: 0.02 K/UL — SIGNIFICANT CHANGE UP (ref 0–0.2)
BASOPHILS NFR BLD AUTO: 0.2 % — SIGNIFICANT CHANGE UP (ref 0–2)
BUN SERPL-MCNC: 48 MG/DL — HIGH (ref 8–20)
CALCIUM SERPL-MCNC: 8.3 MG/DL — LOW (ref 8.6–10.2)
CHLORIDE SERPL-SCNC: 98 MMOL/L — SIGNIFICANT CHANGE UP (ref 98–107)
CO2 SERPL-SCNC: 23 MMOL/L — SIGNIFICANT CHANGE UP (ref 22–29)
CREAT SERPL-MCNC: 1.98 MG/DL — HIGH (ref 0.5–1.3)
EOSINOPHIL # BLD AUTO: 0.13 K/UL — SIGNIFICANT CHANGE UP (ref 0–0.5)
EOSINOPHIL NFR BLD AUTO: 1 % — SIGNIFICANT CHANGE UP (ref 0–6)
FERRITIN SERPL-MCNC: 339 NG/ML — HIGH (ref 15–150)
FOLATE SERPL-MCNC: 17.1 NG/ML — SIGNIFICANT CHANGE UP
GLUCOSE BLDC GLUCOMTR-MCNC: 79 MG/DL — SIGNIFICANT CHANGE UP (ref 70–99)
GLUCOSE BLDC GLUCOMTR-MCNC: 80 MG/DL — SIGNIFICANT CHANGE UP (ref 70–99)
GLUCOSE BLDC GLUCOMTR-MCNC: 84 MG/DL — SIGNIFICANT CHANGE UP (ref 70–99)
GLUCOSE BLDC GLUCOMTR-MCNC: 87 MG/DL — SIGNIFICANT CHANGE UP (ref 70–99)
GLUCOSE BLDC GLUCOMTR-MCNC: 89 MG/DL — SIGNIFICANT CHANGE UP (ref 70–99)
GLUCOSE SERPL-MCNC: 85 MG/DL — SIGNIFICANT CHANGE UP (ref 70–99)
HCT VFR BLD CALC: 36.9 % — SIGNIFICANT CHANGE UP (ref 34.5–45)
HGB BLD-MCNC: 11.5 G/DL — SIGNIFICANT CHANGE UP (ref 11.5–15.5)
IMM GRANULOCYTES NFR BLD AUTO: 0.6 % — SIGNIFICANT CHANGE UP (ref 0–1.5)
IRON SATN MFR SERPL: 11 % — LOW (ref 14–50)
IRON SATN MFR SERPL: 24 UG/DL — LOW (ref 37–145)
LYMPHOCYTES # BLD AUTO: 0.54 K/UL — LOW (ref 1–3.3)
LYMPHOCYTES # BLD AUTO: 4.3 % — LOW (ref 13–44)
MAGNESIUM SERPL-MCNC: 2.3 MG/DL — SIGNIFICANT CHANGE UP (ref 1.6–2.6)
MCHC RBC-ENTMCNC: 25.1 PG — LOW (ref 27–34)
MCHC RBC-ENTMCNC: 31.2 GM/DL — LOW (ref 32–36)
MCV RBC AUTO: 80.6 FL — SIGNIFICANT CHANGE UP (ref 80–100)
MONOCYTES # BLD AUTO: 0.82 K/UL — SIGNIFICANT CHANGE UP (ref 0–0.9)
MONOCYTES NFR BLD AUTO: 6.6 % — SIGNIFICANT CHANGE UP (ref 2–14)
NEUTROPHILS # BLD AUTO: 10.87 K/UL — HIGH (ref 1.8–7.4)
NEUTROPHILS NFR BLD AUTO: 87.3 % — HIGH (ref 43–77)
PHOSPHATE SERPL-MCNC: 4.5 MG/DL — SIGNIFICANT CHANGE UP (ref 2.4–4.7)
PLATELET # BLD AUTO: 90 K/UL — LOW (ref 150–400)
POTASSIUM SERPL-MCNC: 4.8 MMOL/L — SIGNIFICANT CHANGE UP (ref 3.5–5.3)
POTASSIUM SERPL-SCNC: 4.8 MMOL/L — SIGNIFICANT CHANGE UP (ref 3.5–5.3)
RBC # BLD: 4.46 M/UL — SIGNIFICANT CHANGE UP (ref 3.8–5.2)
RBC # BLD: 4.58 M/UL — SIGNIFICANT CHANGE UP (ref 3.8–5.2)
RBC # FLD: 18.5 % — HIGH (ref 10.3–14.5)
RETICS #: 78.9 K/UL — SIGNIFICANT CHANGE UP (ref 25–125)
RETICS/RBC NFR: 1.8 % — SIGNIFICANT CHANGE UP (ref 0.5–2.5)
SARS-COV-2 RNA SPEC QL NAA+PROBE: SIGNIFICANT CHANGE UP
SODIUM SERPL-SCNC: 137 MMOL/L — SIGNIFICANT CHANGE UP (ref 135–145)
TIBC SERPL-MCNC: 227 UG/DL — SIGNIFICANT CHANGE UP (ref 220–430)
TRANSFERRIN SERPL-MCNC: 159 MG/DL — LOW (ref 192–382)
VIT B12 SERPL-MCNC: 999 PG/ML — SIGNIFICANT CHANGE UP (ref 232–1245)
WBC # BLD: 12.46 K/UL — HIGH (ref 3.8–10.5)
WBC # FLD AUTO: 12.46 K/UL — HIGH (ref 3.8–10.5)

## 2022-01-09 PROCEDURE — 99233 SBSQ HOSP IP/OBS HIGH 50: CPT

## 2022-01-09 PROCEDURE — 70551 MRI BRAIN STEM W/O DYE: CPT | Mod: 26

## 2022-01-09 PROCEDURE — 99291 CRITICAL CARE FIRST HOUR: CPT

## 2022-01-09 PROCEDURE — 70450 CT HEAD/BRAIN W/O DYE: CPT | Mod: 26

## 2022-01-09 RX ORDER — METOPROLOL TARTRATE 50 MG
5 TABLET ORAL EVERY 6 HOURS
Refills: 0 | Status: DISCONTINUED | OUTPATIENT
Start: 2022-01-09 | End: 2022-01-09

## 2022-01-09 RX ORDER — METOPROLOL TARTRATE 50 MG
2.5 TABLET ORAL EVERY 6 HOURS
Refills: 0 | Status: DISCONTINUED | OUTPATIENT
Start: 2022-01-09 | End: 2022-01-10

## 2022-01-09 RX ORDER — FUROSEMIDE 40 MG
60 TABLET ORAL EVERY 12 HOURS
Refills: 0 | Status: DISCONTINUED | OUTPATIENT
Start: 2022-01-09 | End: 2022-01-10

## 2022-01-09 RX ADMIN — CHLORHEXIDINE GLUCONATE 1 APPLICATION(S): 213 SOLUTION TOPICAL at 05:14

## 2022-01-09 RX ADMIN — Medication 5 MILLIGRAM(S): at 12:06

## 2022-01-09 RX ADMIN — HEPARIN SODIUM 5000 UNIT(S): 5000 INJECTION INTRAVENOUS; SUBCUTANEOUS at 05:14

## 2022-01-09 RX ADMIN — Medication 60 MILLIGRAM(S): at 17:01

## 2022-01-09 RX ADMIN — Medication 3 MILLILITER(S): at 09:50

## 2022-01-09 RX ADMIN — PANTOPRAZOLE SODIUM 40 MILLIGRAM(S): 20 TABLET, DELAYED RELEASE ORAL at 11:07

## 2022-01-09 RX ADMIN — Medication 3 MILLILITER(S): at 21:04

## 2022-01-09 RX ADMIN — HEPARIN SODIUM 5000 UNIT(S): 5000 INJECTION INTRAVENOUS; SUBCUTANEOUS at 13:18

## 2022-01-09 RX ADMIN — Medication 3 MILLILITER(S): at 14:41

## 2022-01-09 RX ADMIN — Medication 5 MILLIGRAM(S): at 17:01

## 2022-01-09 RX ADMIN — CHLORHEXIDINE GLUCONATE 15 MILLILITER(S): 213 SOLUTION TOPICAL at 05:14

## 2022-01-09 RX ADMIN — Medication 50 MILLIGRAM(S): at 05:14

## 2022-01-09 RX ADMIN — HEPARIN SODIUM 5000 UNIT(S): 5000 INJECTION INTRAVENOUS; SUBCUTANEOUS at 21:50

## 2022-01-09 RX ADMIN — Medication 3 MILLILITER(S): at 03:38

## 2022-01-09 NOTE — PROGRESS NOTE ADULT - SUBJECTIVE AND OBJECTIVE BOX
HPI:  87 y/o female from home with history of HTN, CKD baseline Cr. 1.7, COPD not on home 02, remote hx of breast cancer s/p mastectomy in 85, colon cancer s/p bowel resection 12 years ago, chronic LE edema. Patient lives alone and states shes usually independent with no assistive devices. Patient states she sustained a mechanical fall 1 week ago landing on her left side. She denies LOC, head or neck pain, She did sustain a skin tear to her left forearm. She denies being on the floor for more than a few minutes. She admits to feeling weak since the fall and has been ordering out chinese food most of the week. She has been eating won ton soup, fried rice, and chicken lo mein. She normally sees Dr. Snyder who has told her not to use any excess salt as she has chronic LE edema. She denies any hx of Afib, or CAD. She came to ED after she noted weight gain, SCHMID, and all her clothes fitting more tightly. She denies CP, fever, chills, N/V or diaphoresis. In the ED patient noted to be in afib w/ RVR, Anasarca, EDILBERTO on CKD, and with RLL PNA. She was cultured, given IV abx, started on Cardizem drip after PO cardizem and BB failed to control her HR. She was seen by Cardiology who did bedside echo showing diastolic dysfunction and dilated IVC, CTs showed anasarca and pleural effusions with RLLL infiltrate. COVID neg, U/A neg. no focal weakness. Patient given IV digoxin, tafoya placed, and IV lasix given with 600ml o/p. Currently awake in NAD. (01 Jan 2022 23:57)   Code Stroke, while waiting for a bed in ED. MRS 0 per family. NIH 25. Not a tPA candidate; underwent TICI 2b MT.  Arrived to NCCU intubated.    24 hr events: successfully extubated this am.   VS, labs, imaging reviewed.  ROS: limited due to pt's condition (intubated).    Exam:  intubated, off sedation, RASS -1.  EO to verbal, intermittently FC, tries to mouth words, PERRL, L gaze preference, o/b the vent, + cough, motor - withdraws bilateral lowers, holds b/l uppers antigravity with prompting.   S1S2 present.  CTAb anteriorly, diminished Bibasilar sounds.  Abd soft.  No periph swelling.     HPI:  87 y/o female from home with history of HTN, CKD baseline Cr. 1.7, COPD not on home 02, remote hx of breast cancer s/p mastectomy in 85, colon cancer s/p bowel resection 12 years ago, chronic LE edema. Patient lives alone and states shes usually independent with no assistive devices. Patient states she sustained a mechanical fall 1 week ago landing on her left side. She denies LOC, head or neck pain, She did sustain a skin tear to her left forearm. She denies being on the floor for more than a few minutes. She admits to feeling weak since the fall and has been ordering out chinese food most of the week. She has been eating won ton soup, fried rice, and chicken lo mein. She normally sees Dr. Snyder who has told her not to use any excess salt as she has chronic LE edema. She denies any hx of Afib, or CAD. She came to ED after she noted weight gain, SCHMID, and all her clothes fitting more tightly. She denies CP, fever, chills, N/V or diaphoresis. In the ED patient noted to be in afib w/ RVR, Anasarca, EDILBERTO on CKD, and with RLL PNA. She was cultured, given IV abx, started on Cardizem drip after PO cardizem and BB failed to control her HR. She was seen by Cardiology who did bedside echo showing diastolic dysfunction and dilated IVC, CTs showed anasarca and pleural effusions with RLLL infiltrate. COVID neg, U/A neg. no focal weakness. Patient given IV digoxin, tafoya placed, and IV lasix given with 600ml o/p. Currently awake in NAD. (01 Jan 2022 23:57)   Code Stroke, while waiting for a bed in ED. MRS 0 per family. NIH 25. Not a tPA candidate; underwent TICI 2b MT.  Arrived to NCCU intubated.    24 hr events: successfully extubated this am.   VS, labs, imaging reviewed.  ROS: limited due to pt's condition (intubated).    Exam:  NAD, calm.  EO spont, not FC, perseverates, global aphasia, PERRL, L gaze preference, motor - holds b/l uppers antigravity with prompting, no drift, LLE at least 3/5, RLE some antigravity effort.   S1S2 present.  CTAb anteriorly, diminished Bibasilar sounds.  Abd soft.  BL LE and lower abd pitting edema.  R Shiley in place.

## 2022-01-09 NOTE — PROGRESS NOTE ADULT - ASSESSMENT
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 Concern for embolic event (likely cardio-).  Afib RVR.  HFpEF with LV EF >75%, normal RV size and fx. Small mobile echo densities visualized on the aortic valve.  COPD not on home o2, remote hx breast CA s/p mastectomy 1985, colon cancer s/p bowel resection 12 years ago, chronic leg edema.  Worsening CKD - now requiring HD. Possible cardiorenal syndr.  Cardiac cirrhosis with ascites.  MRS 0 per family. NIH 25.  TIANA.    PLAN:  -pending MRI brain to eval the extent of the stroke   -Neuro checks  - PT/ OT/ SLP    CV:  --160; avoid hypotension to preserve cerebral perfusion  -TTE: ef >75%. mild- mod MVR, mild TR. Small mobile echodencities visualized on the aortic valve (Lambl's excrescence but can not rule out vegetation or fibroelastoma) - no plan for DAWSON from Cardiology as pt needs AC regardless - pending MRI  - no PO access now - cont Metoprolol 5 q6h, + Meto IVP 5 PRN for HR >110; plan to restart PO 50 q8hr    Respiratory:  -PRN duonebs for bronchospasm/ wheezing    GI:   NPO for now, repeat S/S in am' if fails in am - NGT placement  BM regimen    : Latham - keep for strict uop monitoring in pt on HD and diuretics  Monitor Scr, Monitor I+ O  next HD likely 1/10  re-start Lasix 60 q12hrs as with BL LE and lower abd pitting edema  notified by HD team that wont be able to use R fem [place] soon (would be 1 week old) - ultrasounded both IJs snf L femoral - poor access- called Vascular consult for tunneled catheter    ID:  RLL Pnu versus effusion- complete course of zosyn q8  bcx neg, ucx neg 1/1    Heme:  -will reevaluate for full AC with MRI results   - TIANA - will start on 325 iron when access established, hold IV iron in view of possible active infection  - on SQH for DVT ppx - very high risk for VTE    Endo:  goal -180    Pt is critically ill and at high risk of rapid deterioration/death due to abovementioned conditions.   Still requires critical care interventions - ongoing frequent evaluations, interventions and management adjustment by the Attending and ICU team, - and included review of relevant history, clinical examination, review of data and images, discussion of treatment with the multidisciplinary team and any consultants involved in this patient’s care as well as family discussion.

## 2022-01-09 NOTE — PROGRESS NOTE ADULT - SUBJECTIVE AND OBJECTIVE BOX
Interval History:  - No acute events in the last 24 hours: Extubated, awaiting MRI  - Patient is s/p cerebral angio on 1/6.  TICI 2b revascularization of left m1 occlusion.        Constitutional: NAD    Neuro  * Mental Status:  Opens eyes spontaneous, intubated, mimicking commands  * Cranial Nerves: Cnii-Cnxii grossly intact. PERRL, EOMI, tongue midline, no gaze deviation  * Motor: Bilateral uppers mimic and are both antigravity.  Bilateral lowers withdraw  * Sensory: Above motor exam to noxious stimulation  * Reflexes: Not assessed  * Gait: Not assessed    No groin hematoma      Vitals:  Vital Signs Last 24 Hrs  T(C): 36.8 (09 Jan 2022 15:00), Max: 37.2 (08 Jan 2022 16:00)  T(F): 98.2 (09 Jan 2022 15:00), Max: 99 (08 Jan 2022 16:00)  HR: 109 (09 Jan 2022 15:00) (87 - 114)  BP: --  BP(mean): --  RR: 19 (09 Jan 2022 15:00) (12 - 113)  SpO2: 100% (09 Jan 2022 15:00) (95% - 100%)    I&O:  I&O's Summary    08 Jan 2022 07:01  -  09 Jan 2022 07:00  --------------------------------------------------------  IN: 988.1 mL / OUT: 3315 mL / NET: -2326.9 mL    09 Jan 2022 07:01  -  09 Jan 2022 15:40  --------------------------------------------------------  IN: 0 mL / OUT: 140 mL / NET: -140 mL        Labs:                         11.5   12.46 )-----------( 90       ( 09 Jan 2022 04:19 )             36.9       01-09    137  |  98  |  48.0<H>  ----------------------------<  85  4.8   |  23.0  |  1.98<H>    Ca    8.3<L>      09 Jan 2022 04:19  Phos  4.5     01-09  Mg     2.3     01-09          MEDICATIONS  (STANDING):  albuterol/ipratropium for Nebulization 3 milliLiter(s) Nebulizer every 6 hours  chlorhexidine 4% Liquid 1 Application(s) Topical <User Schedule>  dextrose 40% Gel 15 Gram(s) Oral once  dextrose 5%. 1000 milliLiter(s) (50 mL/Hr) IV Continuous <Continuous>  dextrose 5%. 1000 milliLiter(s) (100 mL/Hr) IV Continuous <Continuous>  dextrose 50% Injectable 25 Gram(s) IV Push once  dextrose 50% Injectable 12.5 Gram(s) IV Push once  dextrose 50% Injectable 25 Gram(s) IV Push once  furosemide   Injectable 60 milliGRAM(s) IV Push every 12 hours  glucagon  Injectable 1 milliGRAM(s) IntraMuscular once  heparin   Injectable 5000 Unit(s) SubCutaneous every 8 hours  insulin lispro (ADMELOG) corrective regimen sliding scale   SubCutaneous every 6 hours  metoprolol tartrate Injectable 5 milliGRAM(s) IV Push every 6 hours  pantoprazole  Injectable 40 milliGRAM(s) IV Push daily    MEDICATIONS  (PRN):  metoprolol tartrate Injectable 5 milliGRAM(s) IV Push every 6 hours PRN HR >120  ondansetron Injectable 4 milliGRAM(s) IV Push every 8 hours PRN Nausea and/or Vomiting  sodium chloride 0.9% lock flush 10 milliLiter(s) IV Push every 1 hour PRN Pre/post blood products, medications, blood draw, and to maintain line patency            Neurosurgical Imaging:  I attest that I personally reviewed all pertinent neurosurgical imaging performed in the last 24 hours  Left MCA distribution infarct    Assessment & Plan:  85 y/o female from home with history of HTN, CKD baseline Cr. 1.7, COPD not on home 02, remote hx of breast cancer s/p mastectomy in 85, colon cancer s/p bowel resection 12 years ago, chronic LE edema. Patient lives alone and states shes usually independent with no assistive devices. Patient states she sustained a mechanical fall 1 week ago landing on her left side. She denies LOC, head or neck pain, She did sustain a skin tear to her left forearm. She denies being on the floor for more than a few minutes. She admits to feeling weak since the fall and has been ordering out chinese food most of the week. She has been eating won ton soup, fried rice, and chicken lo mein. She normally sees Dr. Snyder who has told her not to use any excess salt as she has chronic LE edema. She denies any hx of Afib, or CAD. She came to ED after she noted weight gain, SCHMID, and all her clothes fitting more tightly. She denies CP, fever, chills, N/V or diaphoresis. In the ED patient noted to be in afib w/ RVR, Anasarca, EDILBERTO on CKD, and with RLL PNA. She was cultured, given IV abx, started on Cardizem drip after PO cardizem and BB failed to control her HR. She was seen by Cardiology who did bedside echo showing diastolic dysfunction and dilated IVC, CTs showed anasarca and pleural effusions with RLLL infiltrate. COVID neg, U/A neg. no focal weakness. Patient given IV digoxin, tafoya placed, and IV lasix given with 600ml o/p. Currently awake in NAD.     Left MCA Infarction 2/2 Left M1 occlusion TICI 2b revascularization.  Case discussed with neurosurgery, patient does not warrant hemicraniectomy.     (01 Jan 2022 23:57)      Continue neuro checks  Repeat CT Scan as needed for change in exam  Maintain SBP within desired range 100-160  Antiplatelet / anticoagulation once cleared by Dr. Kendall, has SAH vs. contrast staining on CT.  Pending MRI.  Will be helpful to risk stratify the risk of hemorrhagic conversion.  Aspirin on hold for now due to risk of bleeding  The patient does not require urgent neurointerventional procedures  We will continue to follow the patient's clinical course    Medical care as per NCCU     Interval History:  - No acute events in the last 24 hours: Extubated, awaiting MRI  - Patient is s/p cerebral angio on 1/6.  TICI 2b revascularization of left m1 occlusion.        Constitutional: NAD    Neuro  * Mental Status:  Opens eyes spontaneous, intubated, mimicking commands  * Cranial Nerves: Cnii-Cnxii grossly intact. PERRL, EOMI, tongue midline, no gaze deviation  * Motor: Bilateral uppers mimic and are both antigravity.  Bilateral lowers withdraw  * Sensory: Above motor exam to noxious stimulation  * Reflexes: Not assessed  * Gait: Not assessed    No groin hematoma      Vitals:  Vital Signs Last 24 Hrs  T(C): 36.8 (09 Jan 2022 15:00), Max: 37.2 (08 Jan 2022 16:00)  T(F): 98.2 (09 Jan 2022 15:00), Max: 99 (08 Jan 2022 16:00)  HR: 109 (09 Jan 2022 15:00) (87 - 114)  BP: --  BP(mean): --  RR: 19 (09 Jan 2022 15:00) (12 - 113)  SpO2: 100% (09 Jan 2022 15:00) (95% - 100%)    I&O:  I&O's Summary    08 Jan 2022 07:01  -  09 Jan 2022 07:00  --------------------------------------------------------  IN: 988.1 mL / OUT: 3315 mL / NET: -2326.9 mL    09 Jan 2022 07:01  -  09 Jan 2022 15:40  --------------------------------------------------------  IN: 0 mL / OUT: 140 mL / NET: -140 mL        Labs:                         11.5   12.46 )-----------( 90       ( 09 Jan 2022 04:19 )             36.9       01-09    137  |  98  |  48.0<H>  ----------------------------<  85  4.8   |  23.0  |  1.98<H>    Ca    8.3<L>      09 Jan 2022 04:19  Phos  4.5     01-09  Mg     2.3     01-09          MEDICATIONS  (STANDING):  albuterol/ipratropium for Nebulization 3 milliLiter(s) Nebulizer every 6 hours  chlorhexidine 4% Liquid 1 Application(s) Topical <User Schedule>  dextrose 40% Gel 15 Gram(s) Oral once  dextrose 5%. 1000 milliLiter(s) (50 mL/Hr) IV Continuous <Continuous>  dextrose 5%. 1000 milliLiter(s) (100 mL/Hr) IV Continuous <Continuous>  dextrose 50% Injectable 25 Gram(s) IV Push once  dextrose 50% Injectable 12.5 Gram(s) IV Push once  dextrose 50% Injectable 25 Gram(s) IV Push once  furosemide   Injectable 60 milliGRAM(s) IV Push every 12 hours  glucagon  Injectable 1 milliGRAM(s) IntraMuscular once  heparin   Injectable 5000 Unit(s) SubCutaneous every 8 hours  insulin lispro (ADMELOG) corrective regimen sliding scale   SubCutaneous every 6 hours  metoprolol tartrate Injectable 5 milliGRAM(s) IV Push every 6 hours  pantoprazole  Injectable 40 milliGRAM(s) IV Push daily    MEDICATIONS  (PRN):  metoprolol tartrate Injectable 5 milliGRAM(s) IV Push every 6 hours PRN HR >120  ondansetron Injectable 4 milliGRAM(s) IV Push every 8 hours PRN Nausea and/or Vomiting  sodium chloride 0.9% lock flush 10 milliLiter(s) IV Push every 1 hour PRN Pre/post blood products, medications, blood draw, and to maintain line patency            Neurosurgical Imaging:  I attest that I personally reviewed all pertinent neurosurgical imaging performed in the last 24 hours  Left MCA distribution infarct

## 2022-01-09 NOTE — PROGRESS NOTE ADULT - NUTRITIONAL ASSESSMENT
This patient has been assessed with a concern for Malnutrition and has been determined to have a diagnosis/diagnoses of Moderate protein-calorie malnutrition.    This patient is being managed with:   Diet NPO-  Entered: Jan 9 2022  8:43AM

## 2022-01-09 NOTE — PROVIDER CONTACT NOTE (OTHER) - SITUATION
patient not following commands and isnt answering questions appropriately. She is lethargic and its is difficult to assess NIH score

## 2022-01-09 NOTE — PROGRESS NOTE ADULT - ASSESSMENT
86y Female with multiple medical issues admitted for cardiac issues including atrial fibrillation now with left hemisphere stroke.     Stroke.   Was not given t-PA as was > 3 hrs from last known well time and patient > 81 years old and in addition was on full anticoagulation dose of Lovenox.   She did go for thrombectomy and is now in ICU.  LDL: 30  At goal of < 70  Back on anticoagulation.  Continue statin.  Await MRI brain.    Renal disease.   HD per nephrology service.    CHF   Per cardiology.     Case discussed with Neuro-ICU team (Dr Pierre attending).

## 2022-01-09 NOTE — PROGRESS NOTE ADULT - ASSESSMENT
CKD(III): decompensated CHF ( R>L sided)  EDILBERTO with cardiorenal syndrome  Cardiac cirrhosis due to passive congestion  PNA  COVID(-)  CT scan no hydronephrosis; atrophic R kidney---> confirmed on sonogram   Resp failure   S/p CVA --> Angio noted     HD as needed tomorrow or Tuesday

## 2022-01-09 NOTE — PROGRESS NOTE ADULT - ASSESSMENT
Assessment & Plan:  87 y/o female from home with history of HTN, CKD baseline Cr. 1.7, COPD not on home 02, remote hx of breast cancer s/p mastectomy in 86, colon cancer s/p bowel resection 12 years ago, chronic LE edema. Patient lives alone and states shes usually independent with no assistive devices. Patient states she sustained a mechanical fall 1 week ago landing on her left side. She denies LOC, head or neck pain, She did sustain a skin tear to her left forearm. She denies being on the floor for more than a few minutes. She admits to feeling weak since the fall and has been ordering out chinese food most of the week. She has been eating won ton soup, fried rice, and chicken lo mein. She normally sees Dr. Snyder who has told her not to use any excess salt as she has chronic LE edema. She denies any hx of Afib, or CAD. She came to ED after she noted weight gain, SCHMID, and all her clothes fitting more tightly. She denies CP, fever, chills, N/V or diaphoresis. In the ED patient noted to be in afib w/ RVR, Anasarca, EDILBERTO on CKD, and with RLL PNA. She was cultured, given IV abx, started on Cardizem drip after PO cardizem and BB failed to control her HR. She was seen by Cardiology who did bedside echo showing diastolic dysfunction and dilated IVC, CTs showed anasarca and pleural effusions with RLLL infiltrate. COVID neg, U/A neg. no focal weakness. Patient given IV digoxin, tafoya placed, and IV lasix given with 600ml o/p. Currently awake in NAD.     Left MCA Infarction 2/2 Left M1 occlusion TICI 2b revascularization.  Case discussed with neurosurgery, patient does not warrant hemicraniectomy.     (01 Jan 2022 23:57)      Continue neuro checks  Repeat CT Scan as needed for change in exam  Maintain SBP within desired range 100-160  Antiplatelet / anticoagulation once cleared by Dr. Kendall, has SAH vs. contrast staining on CT.  Pending MRI.  Will be helpful to risk stratify the risk of hemorrhagic conversion.  Aspirin on hold for now due to risk of bleeding  The patient does not require urgent neurointerventional procedures  We will continue to follow the patient's clinical course    Medical care as per NCCU

## 2022-01-09 NOTE — AIRWAY REMOVAL NOTE  ADULT & PEDS - ARTIFICAL AIRWAY REMOVAL COMMENTS
Written order for extubation verified. The patient was identified by full name and birth date compared to the identification band. Present during the procedure was VALERI Fuchs and RT Lizabeth Flores

## 2022-01-09 NOTE — PROGRESS NOTE ADULT - THIS PATIENT HAS THE FOLLOWING CONDITION(S)/DIAGNOSES ON THIS ADMISSION:
None
Heart Failure/Renal Disease/Encephalopathy

## 2022-01-09 NOTE — PROGRESS NOTE ADULT - SUBJECTIVE AND OBJECTIVE BOX
[facility]                                        [facility] [place]                                 [facility]                                  [address]                                        [place], [place], [address]                                             [phone]        CC: Stroke    HPI:   The patient is a 86y Female who was admitted 1/1/22.  She had fallen 1 week prior to arrival and landed on her left side.   She had been eating a lot of take out Chinese food since the fall.   She presented secondary to weight gain, dyspnea on exertion, and edema.   Noted to be in atrial fibrillation on arrival.   She was started on treatment including anticoagulation.    On 1/6/22 she was found to be confused.   There is some suggestion of right sided weakness.  Stroke code was activated around 5:30 pm. She was last known well around 2 pm.   STAT CT and CT-A/CT-P were performed and she was found to have large vessel occlusion (left M1) and neuro-intervention team was called.    She was taken to the cath lab for thrombectomy with TICI 2b revascularization.  NIH SS score was 9 prior to intervention.    Interim history:  Remains in ICU.   Extubated this am.    ROS:   Unobtainable due to patient's condition.     MEDICATIONS  (STANDING):  albuterol/ipratropium for Nebulization 3 milliLiter(s) Nebulizer every 6 hours  chlorhexidine 4% Liquid 1 Application(s) Topical <User Schedule>  dextrose 40% Gel 15 Gram(s) Oral once  dextrose 5%. 1000 milliLiter(s) (50 mL/Hr) IV Continuous <Continuous>  glucagon  Injectable 1 milliGRAM(s) IntraMuscular once  heparin   Injectable 5000 Unit(s) SubCutaneous every 8 hours  insulin lispro (ADMELOG) corrective regimen sliding scale   SubCutaneous every 6 hours  metoprolol tartrate Injectable 5 milliGRAM(s) IV Push every 6 hours  pantoprazole  Injectable 40 milliGRAM(s) IV Push daily      Vital Signs Last 24 Hrs  T(C): 36.6 (09 Jan 2022 11:00), Max: 37.2 (08 Jan 2022 12:00)  T(F): 97.9 (09 Jan 2022 11:00), Max: 99 (08 Jan 2022 12:00)  HR: 111 (09 Jan 2022 11:00) (87 - 115)  RR: 18 (09 Jan 2022 11:00) (12 - 113)  SpO2: 100% (09 Jan 2022 11:00) (95% - 100%)    Detailed Neurologic Exam:    Mental status: The patient opens eyes to voice. She answers "OK" to all questions. She does not follow instructions.    Cranial nerves: Pupils react symmetrically to light. She blinks to threat to confrontation. She tracks with gaze. There is a subtle depression of the right nasolabial fold.     Motor/sensory: There is normal bulk and tone.  Moves extremities to stimuli although left slightly more than right.    Reflexes: 1+ throughout and plantar responses are flexor.    Cerebellar: Cannot be assessed.     Cerebellar: No dysmetria on finger nose testing.  Labs:     01-09    137  |  98  |  48.0<H>  ----------------------------<  85  4.8   |  23.0  |  1.98<H>    Ca    8.3<L>      09 Jan 2022 04:19  Phos  4.5     01-09  Mg     2.3     01-09                              11.5   12.46 )-----------( 90       ( 09 Jan 2022 04:19 )             36.9       Rad:   Repeat CT head 1/9 images reviewed. There is acute infarct in the left temporal-occipital area with suggestion of left caudate infarction as well. There is no hemorrhage.

## 2022-01-09 NOTE — PROGRESS NOTE ADULT - SUBJECTIVE AND OBJECTIVE BOX
Patient seen and examined    awake, looks up but confused,   no c/o CP SOB NV   No swelling feet    Vital Signs Last 24 Hrs  T(C): 36.8 (09 Jan 2022 16:01), Max: 37.1 (08 Jan 2022 19:15)  T(F): 98.2 (09 Jan 2022 16:01), Max: 98.8 (08 Jan 2022 19:15)  HR: 118 (09 Jan 2022 16:00) (90 - 118)  BP: --  BP(mean): --  RR: 17 (09 Jan 2022 16:00) (13 - 113)  SpO2: 99% (09 Jan 2022 16:00) (95% - 100%)    PHYSICAL EXAM    GENERAL: NAD,   EYES:  conjunctiva and sclera clear  NECK: Supple, No JVD/Bruit  NERVOUS SYSTEM:  A/a, seems to have apraxia, not following much, power 3/5  CHEST:  No rales, No rhonchi  HEART:  RRR, No murmur  ABDOMEN: Soft, NT/ND BS+  EXTREMITIES:  mild Edema;  SKIN: No rashes    09 Jan 2022 04:19    137    |  98     |  48.0   ----------------------------<  85     4.8     |  23.0   |  1.98     Ca    8.3        09 Jan 2022 04:19  Phos  4.5       09 Jan 2022 04:19  Mg     2.3       09 Jan 2022 04:19                            11.5   12.46 )-----------( 90       ( 09 Jan 2022 04:19 )             36.9

## 2022-01-10 LAB
ANION GAP SERPL CALC-SCNC: 18 MMOL/L — HIGH (ref 5–17)
APTT BLD: 31.8 SEC — SIGNIFICANT CHANGE UP (ref 27.5–35.5)
APTT BLD: 65.8 SEC — HIGH (ref 27.5–35.5)
BUN SERPL-MCNC: 57.6 MG/DL — HIGH (ref 8–20)
CALCIUM SERPL-MCNC: 8.7 MG/DL — SIGNIFICANT CHANGE UP (ref 8.6–10.2)
CHLORIDE SERPL-SCNC: 98 MMOL/L — SIGNIFICANT CHANGE UP (ref 98–107)
CO2 SERPL-SCNC: 22 MMOL/L — SIGNIFICANT CHANGE UP (ref 22–29)
CREAT SERPL-MCNC: 2.25 MG/DL — HIGH (ref 0.5–1.3)
GLUCOSE BLDC GLUCOMTR-MCNC: 129 MG/DL — HIGH (ref 70–99)
GLUCOSE BLDC GLUCOMTR-MCNC: 74 MG/DL — SIGNIFICANT CHANGE UP (ref 70–99)
GLUCOSE BLDC GLUCOMTR-MCNC: 82 MG/DL — SIGNIFICANT CHANGE UP (ref 70–99)
GLUCOSE SERPL-MCNC: 76 MG/DL — SIGNIFICANT CHANGE UP (ref 70–99)
HCT VFR BLD CALC: 35.5 % — SIGNIFICANT CHANGE UP (ref 34.5–45)
HGB BLD-MCNC: 11 G/DL — LOW (ref 11.5–15.5)
INR BLD: 1.35 RATIO — HIGH (ref 0.88–1.16)
MAGNESIUM SERPL-MCNC: 2.4 MG/DL — SIGNIFICANT CHANGE UP (ref 1.6–2.6)
MCHC RBC-ENTMCNC: 24.9 PG — LOW (ref 27–34)
MCHC RBC-ENTMCNC: 31 GM/DL — LOW (ref 32–36)
MCV RBC AUTO: 80.5 FL — SIGNIFICANT CHANGE UP (ref 80–100)
PHOSPHATE SERPL-MCNC: 5.5 MG/DL — HIGH (ref 2.4–4.7)
PLATELET # BLD AUTO: 93 K/UL — LOW (ref 150–400)
POTASSIUM SERPL-MCNC: 4.7 MMOL/L — SIGNIFICANT CHANGE UP (ref 3.5–5.3)
POTASSIUM SERPL-SCNC: 4.7 MMOL/L — SIGNIFICANT CHANGE UP (ref 3.5–5.3)
PROTHROM AB SERPL-ACNC: 15.4 SEC — HIGH (ref 10.6–13.6)
RBC # BLD: 4.41 M/UL — SIGNIFICANT CHANGE UP (ref 3.8–5.2)
RBC # FLD: 18.6 % — HIGH (ref 10.3–14.5)
SODIUM SERPL-SCNC: 138 MMOL/L — SIGNIFICANT CHANGE UP (ref 135–145)
WBC # BLD: 8.92 K/UL — SIGNIFICANT CHANGE UP (ref 3.8–10.5)
WBC # FLD AUTO: 8.92 K/UL — SIGNIFICANT CHANGE UP (ref 3.8–10.5)

## 2022-01-10 PROCEDURE — 36558 INSERT TUNNELED CV CATH: CPT

## 2022-01-10 PROCEDURE — 99232 SBSQ HOSP IP/OBS MODERATE 35: CPT

## 2022-01-10 PROCEDURE — 99291 CRITICAL CARE FIRST HOUR: CPT

## 2022-01-10 PROCEDURE — 77001 FLUOROGUIDE FOR VEIN DEVICE: CPT | Mod: 26

## 2022-01-10 PROCEDURE — 76937 US GUIDE VASCULAR ACCESS: CPT | Mod: 26

## 2022-01-10 PROCEDURE — 99233 SBSQ HOSP IP/OBS HIGH 50: CPT

## 2022-01-10 RX ORDER — DIGOXIN 250 MCG
500 TABLET ORAL ONCE
Refills: 0 | Status: COMPLETED | OUTPATIENT
Start: 2022-01-10 | End: 2022-01-10

## 2022-01-10 RX ORDER — DIGOXIN 250 MCG
0.5 TABLET ORAL ONCE
Refills: 0 | Status: DISCONTINUED | OUTPATIENT
Start: 2022-01-10 | End: 2022-01-10

## 2022-01-10 RX ORDER — METOPROLOL TARTRATE 50 MG
25 TABLET ORAL EVERY 8 HOURS
Refills: 0 | Status: DISCONTINUED | OUTPATIENT
Start: 2022-01-10 | End: 2022-01-10

## 2022-01-10 RX ORDER — METOPROLOL TARTRATE 50 MG
25 TABLET ORAL EVERY 8 HOURS
Refills: 0 | Status: DISCONTINUED | OUTPATIENT
Start: 2022-01-10 | End: 2022-01-14

## 2022-01-10 RX ORDER — FERROUS SULFATE 325(65) MG
325 TABLET ORAL DAILY
Refills: 0 | Status: DISCONTINUED | OUTPATIENT
Start: 2022-01-10 | End: 2022-01-25

## 2022-01-10 RX ORDER — FUROSEMIDE 40 MG
40 TABLET ORAL
Refills: 0 | Status: DISCONTINUED | OUTPATIENT
Start: 2022-01-10 | End: 2022-01-14

## 2022-01-10 RX ORDER — HEPARIN SODIUM 5000 [USP'U]/ML
750 INJECTION INTRAVENOUS; SUBCUTANEOUS
Qty: 25000 | Refills: 0 | Status: DISCONTINUED | OUTPATIENT
Start: 2022-01-10 | End: 2022-01-11

## 2022-01-10 RX ADMIN — HEPARIN SODIUM 5000 UNIT(S): 5000 INJECTION INTRAVENOUS; SUBCUTANEOUS at 05:26

## 2022-01-10 RX ADMIN — Medication 2.5 MILLIGRAM(S): at 05:26

## 2022-01-10 RX ADMIN — Medication 3 MILLILITER(S): at 20:27

## 2022-01-10 RX ADMIN — Medication 500 MICROGRAM(S): at 15:10

## 2022-01-10 RX ADMIN — HEPARIN SODIUM 7.5 UNIT(S)/HR: 5000 INJECTION INTRAVENOUS; SUBCUTANEOUS at 21:30

## 2022-01-10 RX ADMIN — Medication 3 MILLILITER(S): at 04:07

## 2022-01-10 RX ADMIN — Medication 25 MILLIGRAM(S): at 21:24

## 2022-01-10 RX ADMIN — Medication 5 MILLIGRAM(S): at 09:11

## 2022-01-10 RX ADMIN — Medication 325 MILLIGRAM(S): at 14:44

## 2022-01-10 RX ADMIN — Medication 3 MILLILITER(S): at 08:57

## 2022-01-10 RX ADMIN — Medication 40 MILLIGRAM(S): at 18:52

## 2022-01-10 RX ADMIN — Medication 60 MILLIGRAM(S): at 05:26

## 2022-01-10 RX ADMIN — Medication 3 MILLILITER(S): at 16:01

## 2022-01-10 RX ADMIN — CHLORHEXIDINE GLUCONATE 1 APPLICATION(S): 213 SOLUTION TOPICAL at 05:28

## 2022-01-10 RX ADMIN — PANTOPRAZOLE SODIUM 40 MILLIGRAM(S): 20 TABLET, DELAYED RELEASE ORAL at 11:30

## 2022-01-10 RX ADMIN — HEPARIN SODIUM 7.5 UNIT(S)/HR: 5000 INJECTION INTRAVENOUS; SUBCUTANEOUS at 14:37

## 2022-01-10 RX ADMIN — Medication 2.5 MILLIGRAM(S): at 11:30

## 2022-01-10 NOTE — PROGRESS NOTE ADULT - ASSESSMENT
86y Female with multiple medical issues admitted for cardiac issues including atrial fibrillation now with left hemisphere stroke.     Stroke.   Was not given t-PA as was > 3 hrs from last known well time and patient > 81 years old and in addition was on full anticoagulation dose of Lovenox.   She did go for thrombectomy and is now in ICU.  LDL: 30  At goal of < 70  Back on anticoagulation.  Continue statin.  Await MRI brain.    Renal disease.   HD per nephrology service.    CHF   Per cardiology.     Case discussed with Neuro-ICU team (Dr Pierre attending).  86y Female with multiple medical issues admitted for cardiac issues including atrial fibrillation now with left hemisphere stroke.     Stroke.   Was not given t-PA as was > 3 hrs from last known well time and patient > 81 years old and in addition was on full anticoagulation dose of Lovenox.   She did go for thrombectomy and is now in ICU.  LDL: 30  At goal of < 70  She is on Heparin gtt for afib per NSCU.   Need to watch for hemorrhagic conversion. Keep PTT 40-55 range for now.   Continue statin.

## 2022-01-10 NOTE — PROGRESS NOTE ADULT - SUBJECTIVE AND OBJECTIVE BOX
HPI:  87 y/o female from home with history of HTN, CKD baseline Cr. 1.7, COPD not on home 02, remote hx of breast cancer s/p mastectomy in 86, colon cancer s/p bowel resection 12 years ago, chronic LE edema. Patient lives alone and states shes usually independent with no assistive devices. Patient states she sustained a mechanical fall 1 week ago landing on her left side. She denies LOC, head or neck pain, She did sustain a skin tear to her left forearm. She denies being on the floor for more than a few minutes. She admits to feeling weak since the fall and has been ordering out chinese food most of the week. She has been eating won ton soup, fried rice, and chicken lo mein. She normally sees Dr. Snyder who has told her not to use any excess salt as she has chronic LE edema. She denies any hx of Afib, or CAD. She came to ED after she noted weight gain, SCHMID, and all her clothes fitting more tightly. She denies CP, fever, chills, N/V or diaphoresis. In the ED patient noted to be in afib w/ RVR, Anasarca, EDILBERTO on CKD, and with RLL PNA. She was cultured, given IV abx, started on Cardizem drip after PO cardizem and BB failed to control her HR. She was seen by Cardiology who did bedside echo showing diastolic dysfunction and dilated IVC, CTs showed anasarca and pleural effusions with RLLL infiltrate. COVID neg, U/A neg. no focal weakness. Patient given IV digoxin, tafoya placed, and IV lasix given with 600ml o/p. Currently awake in NAD. (01 Jan 2022 23:57)   Code Stroke, while waiting for a bed in ED. MRS 0 per family. NIH 25. Not a tPA candidate; underwent TICI 2b MT.  Arrived to [facility] intubated.    ICU Vital Signs Last 24 Hrs  T(C): 36.8 (10 James 2022 04:27), Max: 36.8 (09 Jan 2022 15:00)  T(F): 98.2 (10 James 2022 04:27), Max: 98.3 (09 Jan 2022 19:38)  HR: 126 (10 James 2022 07:00) (96 - 126)  BP: 116/63 (10 James 2022 07:00) (111/66 - 116/63)  BP(mean): 75 (10 James 2022 07:00) (75 - 81)  ABP: 119/50 (10 James 2022 05:00) (101/41 - 137/62)  ABP(mean): 70 (10 James 2022 05:00) (60 - 89)  RR: 20 (10 James 2022 07:00) (13 - 26)  SpO2: 100% (10 James 2022 07:00) (91% - 100%)      MEDICATIONS  (STANDING):  albuterol/ipratropium for Nebulization 3 milliLiter(s) Nebulizer every 6 hours  chlorhexidine 4% Liquid 1 Application(s) Topical <User Schedule>  dextrose 40% Gel 15 Gram(s) Oral once  dextrose 5%. 1000 milliLiter(s) (50 mL/Hr) IV Continuous <Continuous>  dextrose 5%. 1000 milliLiter(s) (100 mL/Hr) IV Continuous <Continuous>  dextrose 50% Injectable 25 Gram(s) IV Push once  dextrose 50% Injectable 12.5 Gram(s) IV Push once  dextrose 50% Injectable 25 Gram(s) IV Push once  furosemide   Injectable 60 milliGRAM(s) IV Push every 12 hours  glucagon  Injectable 1 milliGRAM(s) IntraMuscular once  heparin   Injectable 5000 Unit(s) SubCutaneous every 8 hours  insulin lispro (ADMELOG) corrective regimen sliding scale   SubCutaneous every 6 hours  metoprolol tartrate Injectable 2.5 milliGRAM(s) IV Push every 6 hours  pantoprazole  Injectable 40 milliGRAM(s) IV Push daily    MEDICATIONS  (PRN):  metoprolol tartrate Injectable 5 milliGRAM(s) IV Push every 6 hours PRN HR >120  ondansetron Injectable 4 milliGRAM(s) IV Push every 8 hours PRN Nausea and/or Vomiting  sodium chloride 0.9% lock flush 10 milliLiter(s) IV Push every 1 hour PRN Pre/post blood products, medications, blood draw, and to maintain line patency        09 Jan 2022 07:01  -  10 James 2022 07:00  --------------------------------------------------------  IN:  Total IN: 0 mL    OUT:    Indwelling Catheter - Urethral (mL): 890 mL  Total OUT: 890 mL    Total NET: -890 mL     MR Head No Cont (01.09.22 @ 18:22) >    INTERPRETATION:  Clinical indication: Left M1 occlusion. Status post   thrombectomy.    MRI of the brain was performed using sagittal T1, axial T1 T2 T2 FLAIR   diffusion and gradient echo sequence. Coronal T1 and T2-weighted   sequences performed as well.    This exam is compared with prior head CT performed on January 7, 2022.    Parenchymal volume loss and chronic microvascular ischemic changes are   identified    Scattered areas of abnormal T2 prolongation with restricted diffusion is   seen involving the posterior left insula, left posterior temporal, left   posterior frontal, left parietal and left occipital cortical and   subcortical region. Involvement of the posterior limb of the left   internal capsule, left caudate head and left corona radiata region are   seen as well as the left pre and postcentral gyrus region. These findings   are compatible with areas of acute left MCA and PCA infarcts. No definite   evidence of hemorrhagic transformation is seen at this time. There is   localized mass effect seen consisting of sulcal effacement. No   significant shift or herniation is seen.    The large vessels appear demonstrate normal flow voids    Right maxillary sinus mucosal thickening is seen. Right frontal sinus   mucosal thickening is seen.    The patient is status post bilateral cataract surgery.    Inflammatory changes are seen involving the right mastoid and middle ear   regions.    IMPRESSION: Acute left MCA and PCA infarcts as described above.    ECHO    Summary:   1. Left ventricular ejection fraction, by visual estimation, is >75%.   2. Technically suboptimal study.   3. Hyperdynamic global left ventricular systolic function.   4. Normal left ventricular internal cavity size.   5. The mitral in-flow pattern reveals no discernable A-wave, therefore   no comment on diastolic function can be made.   6. Normal right ventricular size and function.   7. Mild to moderately enlarged left atrium.   8. There is no evidence of pericardial effusion.   9. Mild mitral annular calcification.  10. Mild to moderate mitral valve regurgitation.  11. Thickening of the anterior and posterior mitral valve leaflets.  12. Mild tricuspid regurgitation.  13. Small mobile echodencities visualized on the aortic valve. 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 Clinical correlation recommended.    01-10    138  |  98  |  57.6<H>  ----------------------------<  76  4.7   |  22.0  |  2.25<H>    Ca    8.7      10 James 2022 03:28  Phos  5.5     01-10  Mg     2.4     01-10    HGBA1c- 6.1  LDL-30  Ferritin- 339  Sat- 11%  Fe-24  AST- 34  ALT-11  APO4- 178  TSH- nl                        11.0   8.92  )-----------( 93       ( 10 James 2022 03:28 )             35.5         Exam:  NAD, calm.  EO spont, not FC, perseverates, global aphasia, PERRL, L gaze preference, motor - holds b/l uppers antigravity with prompting, no drift, LLE at least 3/5, RLE some antigravity effort.   S1S2 present.  CTAb anteriorly, diminished Bibasilar sounds.  Abd soft.  BL LE and lower abd pitting edema.  R Radhaley in place.     HPI:  85 y/o female from home with history of HTN, CKD baseline Cr. 1.7, COPD not on home 02, remote hx of breast cancer s/p mastectomy in 86, colon cancer s/p bowel resection 12 years ago, chronic LE edema. Patient lives alone and states shes usually independent with no assistive devices. Patient states she sustained a mechanical fall 1 week ago landing on her left side. She denies LOC, head or neck pain, She did sustain a skin tear to her left forearm. She denies being on the floor for more than a few minutes. She admits to feeling weak since the fall and has been ordering out chinese food most of the week. She has been eating won ton soup, fried rice, and chicken lo mein. She normally sees Dr. Snyder who has told her not to use any excess salt as she has chronic LE edema. She denies any hx of Afib, or CAD. She came to ED after she noted weight gain, SCHMID, and all her clothes fitting more tightly. She denies CP, fever, chills, N/V or diaphoresis. In the ED patient noted to be in afib w/ RVR, Anasarca, EDILBERTO on CKD, and with RLL PNA. She was cultured, given IV abx, started on Cardizem drip after PO cardizem and BB failed to control her HR. She was seen by Cardiology who did bedside echo showing diastolic dysfunction and dilated IVC, CTs showed anasarca and pleural effusions with RLLL infiltrate. COVID neg, U/A neg. no focal weakness. Patient given IV digoxin, tafoya placed, and IV lasix given with 600ml o/p. Currently awake in NAD. (01 Jan 2022 23:57)   Code Stroke, while waiting for a bed in ED. MRS 0 per family. NIH 25. Not a tPA candidate; underwent TICI 2b MT.  New onset afib   Arrived to [facility] intubated on 1/6/22.    ICU Vital Signs Last 24 Hrs  T(C): 36.8 (10 James 2022 04:27), Max: 36.8 (09 Jan 2022 15:00)  T(F): 98.2 (10 James 2022 04:27), Max: 98.3 (09 Jan 2022 19:38)  HR: 126 (10 James 2022 07:00) (96 - 126)- afib   BP: 116/63 (10 James 2022 07:00) (111/66 - 116/63)  BP(mean): 75 (10 James 2022 07:00) (75 - 81)  ABP: 119/50 (10 James 2022 05:00) (101/41 - 137/62)  ABP(mean): 70 (10 James 2022 05:00) (60 - 89)  RR: 20 (10 James 2022 07:00) (13 - 26)- spont 2 L Nc  SpO2: 100% (10 James 2022 07:00) (91% - 100%)      MEDICATIONS  (STANDING):  albuterol/ipratropium for Nebulization 3 milliLiter(s) Nebulizer every 6 hours- COPD  chlorhexidine 4% Liquid 1 Application(s) Topical <User Schedule>  dextrose 40% Gel 15 Gram(s) Oral once  dextrose 5%. 1000 milliLiter(s) (50 mL/Hr) IV Continuous <Continuous>  dextrose 5%. 1000 milliLiter(s) (100 mL/Hr) IV Continuous <Continuous>  dextrose 50% Injectable 25 Gram(s) IV Push once  dextrose 50% Injectable 12.5 Gram(s) IV Push once  dextrose 50% Injectable 25 Gram(s) IV Push once  furosemide   Injectable 60 milliGRAM(s) IV Push every 12 hours  glucagon  Injectable 1 milliGRAM(s) IntraMuscular once  heparin   Injectable 5000 Unit(s) SubCutaneous every 8 hours  insulin lispro (ADMELOG) corrective regimen sliding scale   SubCutaneous every 6 hours  metoprolol tartrate Injectable 2.5 milliGRAM(s) IV Push every 6 hours  pantoprazole  Injectable 40 milliGRAM(s) IV Push daily    MEDICATIONS  (PRN):  metoprolol tartrate Injectable 5 milliGRAM(s) IV Push every 6 hours PRN HR >120  ondansetron Injectable 4 milliGRAM(s) IV Push every 8 hours PRN Nausea and/or Vomiting  sodium chloride 0.9% lock flush 10 milliLiter(s) IV Push every 1 hour PRN Pre/post blood products, medications, blood draw, and to maintain line patency        09 Jan 2022 07:01  -  10 James 2022 07:00  --------------------------------------------------------  IN:  Total IN: 0 mL    OUT:    Indwelling Catheter - Urethral (mL): 890 mL  Total OUT: 890 mL    Total NET: -890 mL     MR Head No Cont (01.09.22 @ 18:22) >    INTERPRETATION:  Clinical indication: Left M1 occlusion. Status post   thrombectomy.    MRI of the brain was performed using sagittal T1, axial T1 T2 T2 FLAIR   diffusion and gradient echo sequence. Coronal T1 and T2-weighted   sequences performed as well.    This exam is compared with prior head CT performed on January 7, 2022.    Parenchymal volume loss and chronic microvascular ischemic changes are   identified    Scattered areas of abnormal T2 prolongation with restricted diffusion is   seen involving the posterior left insula, left posterior temporal, left   posterior frontal, left parietal and left occipital cortical and   subcortical region. Involvement of the posterior limb of the left   internal capsule, left caudate head and left corona radiata region are   seen as well as the left pre and postcentral gyrus region. These findings   are compatible with areas of acute left MCA and PCA infarcts. No definite   evidence of hemorrhagic transformation is seen at this time. There is   localized mass effect seen consisting of sulcal effacement. No   significant shift or herniation is seen.    The large vessels appear demonstrate normal flow voids    Right maxillary sinus mucosal thickening is seen. Right frontal sinus   mucosal thickening is seen.    The patient is status post bilateral cataract surgery.    Inflammatory changes are seen involving the right mastoid and middle ear   regions.    IMPRESSION: Acute left MCA and PCA infarcts as described above.    ECHO    Summary:   1. Left ventricular ejection fraction, by visual estimation, is >75%.   2. Technically suboptimal study.   3. Hyperdynamic global left ventricular systolic function.   4. Normal left ventricular internal cavity size.   5. The mitral in-flow pattern reveals no discernable A-wave, therefore   no comment on diastolic function can be made.   6. Normal right ventricular size and function.   7. Mild to moderately enlarged left atrium.   8. There is no evidence of pericardial effusion.   9. Mild mitral annular calcification.  10. Mild to moderate mitral valve regurgitation.  11. Thickening of the anterior and posterior mitral valve leaflets.  12. Mild tricuspid regurgitation.  13. Small mobile echodencities visualized on the aortic valve. 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 Clinical correlation recommended.    01-10    138  |  98  |  57.6<H>  ----------------------------<  76  4.7   |  22.0  |  2.25<H>    Ca    8.7      10 James 2022 03:28  Phos  5.5     01-10  Mg     2.4     01-10    HGBA1c- 6.1  LDL-30  Ferritin- 339  Sat- 11%  Fe-24  AST- 34  ALT-11  APO4- 178  TSH- nl                        11.0   8.92  )-----------( 93       ( 10 James 2022 03:28 )             35.5         Exam:  NAD, calm.  EO spont, not FC, perseverates, global aphasia, PERRL, L gaze preference, motor - holds b/l uppers antigravity with prompting, no drift, LLE at least 3/5, RLE some antigravity effort.   S1S2 present.  CTAb anteriorly, diminished Bibasilar sounds.  Abd soft.  BL LE and lower abd pitting edema.  R Star in place.

## 2022-01-10 NOTE — PROGRESS NOTE ADULT - SUBJECTIVE AND OBJECTIVE BOX
Patient seen and examined    I&O's Summary    09 Jan 2022 07:01  -  10 James 2022 07:00  --------------------------------------------------------  IN: 0 mL / OUT: 990 mL / NET: -990 mL    10 James 2022 07:01  -  10 James 2022 18:27  --------------------------------------------------------  IN: 0 mL / OUT: 410 mL / NET: -410 mL    Seen earlier today, lying quietly    REVIEW OF SYSTEMS: pt unable to offer any replies herself    CONSTITUTIONAL: No F/C  RESPIRATORY: No cough,  No SOB  CARDIOVASCULAR: No CP/palpitations,    Moves extr sl more cf yesterday  EXTREMITIES : no swelling,    Vital Signs Last 24 Hrs  T(C): 36.4 (10 James 2022 15:54), Max: 36.8 (09 Jan 2022 19:38)  T(F): 97.6 (10 James 2022 15:54), Max: 98.3 (09 Jan 2022 19:38)  HR: 115 (10 James 2022 13:00) (100 - 128)  BP: 102/67 (10 James 2022 13:00) (102/67 - 116/69)  BP(mean): 78 (10 James 2022 13:00) (75 - 86)  RR: 14 (10 James 2022 13:00) (13 - 23)  SpO2: 92% (10 James 2022 13:00) (91% - 100%)    PHYSICAL EXAM:    GENERAL: NAD,   EYES:  conjunctiva and sclera clear  NECK: Supple, No JVD/Bruit  NERVOUS SYSTEM:  A/a, follows few VC, power 3/5, Apraxia +   CHEST:  No rales or rhonchi  HEART:  RRR, No murmur  ABDOMEN: Soft, NT/ND BS+  EXTREMITIES:  mild Edema;  SKIN: No rashes    LABS:                          11.0   8.92  )-----------( 93       ( 10 James 2022 03:28 )             35.5     01-10    138  |  98  |  57.6<H>  ----------------------------<  76  4.7   |  22.0  |  2.25<H>    Ca    8.7      10 James 2022 03:28  Phos  5.5     01-10  Mg     2.4     01-10        MEDICATIONS  (STANDING):  albuterol/ipratropium for Nebulization  dextrose 40% Gel  dextrose 5%.  dextrose 5%.  dextrose 50% Injectable  dextrose 50% Injectable  dextrose 50% Injectable  ferrous    sulfate  glucagon  Injectable  heparin  Infusion  insulin lispro (ADMELOG) corrective regimen sliding scale  metoprolol tartrate  ondansetron Injectable PRN  pantoprazole  Injectable  sodium chloride 0.9% lock flush PRN

## 2022-01-10 NOTE — PROCEDURE NOTE - ADDITIONAL PROCEDURE DETAILS
right IJ 19 cm permcath placed  tip distal SVC (no ptx)    May use    please remove the right groin line as it is no longer needed.

## 2022-01-10 NOTE — PROGRESS NOTE ADULT - NUTRITIONAL ASSESSMENT
This patient has been assessed with a concern for Malnutrition and has been determined to have a diagnosis/diagnoses of Moderate protein-calorie malnutrition.    This patient is being managed with:   Diet Soft and Bite Sized-  Moderately Thick Liquids (MODTHICKLIQS)  Entered: James 10 2022  1:51PM

## 2022-01-10 NOTE — PROGRESS NOTE ADULT - ASSESSMENT
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

## 2022-01-10 NOTE — SWALLOW BEDSIDE ASSESSMENT ADULT - SLP GENERAL OBSERVATIONS
Pt received & seen seated upright in bed, awake/alert, 02 NC (sats: 99%), aphasic, reduced command following, PA Dora present, 0/10 pain

## 2022-01-10 NOTE — PROGRESS NOTE ADULT - ASSESSMENT
85 y/o F with PMH Breast cancer, Mastectomy 1985, colon ca resection 2010, VATS pleurectomy drainage 2018, COPD, CKD, presents to ED with c/o Leg swelling x 1 week. States fell on monday, able to get off floor, since then worsening shortness of breath, and leg swelling. Denies palpitations, chest pains. Noted to Be afib RVR in ED, given diltiazem. Edema noted to abd.     1/10: Pt denies chest pain, palpitations, SOB. Tele- Afib HR @ 110-130s. Pt currently on on Heparin gtt, Digoxin IVP 500mcg given, Will continue Digoxin use intermittently for rate control,Start Lopressor 25mg PO q 8 hours with parameters. MR Head-Acute left MCA and PCA infarcts, s/p cerebral angio on 1/6.  TICI 2b revascularization of left m1 occlusion, Pt presents with right sided weakness/neglect, however has improving aphasia. Cont Lasix 60mg IVP BID, with plan to decrease to 40mg BID in AM. Cont. HD for fluid management       LMA Occlusion  -MR Head-Acute left MCA and PCA infarcts   -s/p cerebral angio on 1/6.  TICI 2b revascularization of left m1 occlusion  -Pt presents with right sided weakness/neglect, however has improving aphasia  -Pt currently on Heparin gtt    Afib  -Tele-Afib HR @ 110-130s  -Cardizem discontinued and Lopressor decreased d/t hypotension  -Cont telemetry monitoring   -Pt currently on on Heparin gtt  -Digoxin IVP 500mcg given  -Will continue Digoxin use intermittently for rate control  -Start Lopressor 25mg PO q 8 hours with parameters      Diastolic heart failure  - Echo- EF >75%, normal RV size and fx., mild to moderately enlarged left atrium, there is no evidence of pericardial effusion, mild mitral annular calcification, mild to moderate MVR, mild TR, small mobile echo densitives visualized on the aortic valve, This   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   - Cont Lasix 60mg IVP BID, with plan to decrease to 40mg BID in AM  - Cont. HD for fluid management      EDILBERTO on CKD  -BUN/Creatnin-57.6/2.25  -Nephrology following

## 2022-01-10 NOTE — PROGRESS NOTE ADULT - ASSESSMENT
ASSESSMENT:  86F with hx HTN, CKD baseline Scr 1.7. COPD not on home o2, remote hx breast CA s/p mastectomy 1985, colon cancer s/p bowel resection 12 years ago, chronic leg edema, mechanical fall 1 week ago presents with AFIB RVR, Acute on chronic CKD./  diastolic heart failure and RV dysfunction, RLL PNA, CODE STROKE IN ED on 1/6 L MCA m1 occlusion s/p mechanical thrombectomy TICI 2B likely cardio embolic event now extubated. Heparin gtt intiated by NSICU team today.    PLAN:  -D/W Dr. Kendall  -No further neuro endovascular interventions/ procedures at this time  -Continue management in the NSICU  -Continue neuro checks q2 hours  -stat head CT if any MS changes  -Heparin gtt started by NSICU team. No bolus. PTT goal 50-70. Recommend repeat PTT q 6 hours. Recommend repeat head CTH after therapeutic PTT to r/o heme transformation.   -Continue with PT/ OT/ SLP  -OOB as tolerated  -Chest PT/ Incentive spirometry PRN  -Passed dysphagia with speech pathologist. 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   -Lopressor/ digoxin for rate control  -Will continue to follow

## 2022-01-10 NOTE — PROGRESS NOTE ADULT - ATTENDING COMMENTS
compfrotablke. but confused. expressive aphasia. cannot name objects.    reduce diuresis dose. anasarca improved. ct hemodialysis   Still making good urine outoput.  cerebrovascular accident s/p thrombectomy  on heparin drip. ct until discharge. will discharge on Noacs.  we will get insurance authourization about noac. if not approve then coumadin dosing in the mean time.  ct statins.

## 2022-01-10 NOTE — PRE PROCEDURE NOTE - PRE PROCEDURE EVALUATION
IR PRE-PROCEDURE NOTE  DIAGNOSIS: real failure  PROCEDURE: permcath placement   RECENT CHANGES: None  PERTINENT LABS: Within acceptable limits.  IMAGING: Reviewed  CONSENT: On chart from son via telephone

## 2022-01-10 NOTE — SWALLOW BEDSIDE ASSESSMENT ADULT - ORAL PHASE
premature loss & piecemeal deglutition suspected Within functional limits Decreased anterior-posterior movement of the bolus/Delayed oral transit time

## 2022-01-10 NOTE — PROGRESS NOTE ADULT - SUBJECTIVE AND OBJECTIVE BOX
[facility] Faculty Practice                                                               Office: [address] [facility] [facility], [place] [address]                                                              Telephone: [phone]. Fax:[phone]                                                                             PROGRESS NOTE  Reason for follow up: decompensated CHF, New onset Afib  Overnight: No new events.   Update: 1/10: Pt denies chest pain, palpitations, SOB. Tele- Afib HR @ 110-130s. Pt currently on on Heparin gtt, Digoxin IVP 500mcg given, Will continue Digoxin use intermittently for rate control,Start Lopressor 25mg PO q 8 hours with parameters. MR Head-Acute left MCA and PCA infarcts, s/p cerebral angio on 1/6.  TICI 2b revascularization of left m1 occlusion, Pt presents with right sided weakness/neglect, however has improving aphasia. Cont Lasix 60mg IVP BID, with plan to decrease to 40mg BID in AM. Cont. HD for fluid management     Subjective: No new events    	  Vitals:  T(C): 36.3 (01-10-22 @ 11:56), Max: 36.8 (01-09-22 @ 15:00)  HR: 115 (01-10-22 @ 13:00) (100 - 128)  BP: 102/67 (01-10-22 @ 13:00) (102/67 - 116/69)  RR: 14 (01-10-22 @ 13:00) (13 - 26)  SpO2: 92% (01-10-22 @ 13:00) (91% - 100%)    I&O's Summary    09 Jan 2022 07:01  -  10 James 2022 07:00  --------------------------------------------------------  IN: 0 mL / OUT: 990 mL / NET: -990 mL    10 James 2022 07:01  -  10 James 2022 14:31  --------------------------------------------------------  IN: 0 mL / OUT: 410 mL / NET: -410 mL      Weight (kg): 75.2 (01-06 @ 21:25)      PHYSICAL EXAM:  Appearance: Comfortable. No acute distress  HEENT:  Head and neck: Atraumatic. Normocephalic.  Normal oral mucosa, PERRL, Neck is supple. No JVD, No carotid bruit.   Neurologic: A & O x 3, patient is verbal, easily aroused but lethargic at times when not stimulated, + right sided focal deficits. EOMI.  Lymphatic: No cervical lymphadenopathy  Cardiovascular: Normal S1 S2, No murmur, rubs/gallops. No JVD, No edema  Respiratory: bibasilar rales, +clearing  Gastrointestinal:  Soft, Non-tender, + BS  Lower Extremities: +1/+1 bilateral lower extremity edema  Psychiatry: Patient is calm. No agitation. Mood & affect appropriate  Skin: No rashes/ ecchymoses/cyanosis/ulcers visualized on the face, hands or feet.      CURRENT MEDICATIONS:  furosemide   Injectable 60 milliGRAM(s) IV Push every 12 hours  metoprolol tartrate Injectable 5 milliGRAM(s) IV Push every 6 hours PRN  metoprolol tartrate Injectable 2.5 milliGRAM(s) IV Push every 6 hours  albuterol/ipratropium for Nebulization  pantoprazole  Injectable  dextrose 40% Gel  dextrose 50% Injectable  dextrose 50% Injectable  dextrose 50% Injectable  glucagon  Injectable  insulin lispro (ADMELOG) corrective regimen sliding scale  dextrose 5%.  dextrose 5%.  ferrous    sulfate  heparin  Infusion      DIAGNOSTIC TESTING:  [ ] Echocardiogram: < from: TTE Echo Complete w/o Contrast w/ Doppler (01.02.22 @ 09:12) >  Summary:   1. Left ventricular ejection fraction, by visual estimation, is >75%.   2. Technically suboptimal study.   3. Hyperdynamic global left ventricular systolic function.   4. Normal left ventricular internal cavity size.   5. The mitral in-flow pattern reveals no discernable A-wave, therefore   no comment on diastolic function can be made.   6. Normal right ventricular size and function.  7. Mild to moderately enlarged left atrium.   8. There is no evidence of pericardial effusion.   9. Mild mitral annular calcification.  10. Mild to moderate mitral valve regurgitation.  11. Thickening of the anterior and posterior mitral valve leaflets.  12. Mild tricuspid regurgitation.  13. Small mobile echodencities visualized on the aortic valve. 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 Clinical correlation recommended.      OTHER: 	    MR:< from: MR Head No Cont (01.09.22 @ 18:22) >  IMPRESSION: Acute left MCA and PCA infarcts as described above.          LABS:	 	                            11.0   8.92  )-----------( 93       ( 10 James 2022 03:28 )             35.5     01-10    138  |  98  |  57.6<H>  ----------------------------<  76  4.7   |  22.0  |  2.25<H>    Ca    8.7      10 James 2022 03:28  Phos  5.5     01-10  Mg     2.4     01-10      TELEMETRY: Afib HR @ 110-130s

## 2022-01-10 NOTE — SWALLOW BEDSIDE ASSESSMENT ADULT - ASR SWALLOW ASPIRATION MONITOR
discontinue PO if overt s/s are observed/change of breathing pattern/oral hygiene/position upright (90Y)/cough/gurgly voice/fever/pneumonia/throat clearing/upper respiratory infection

## 2022-01-10 NOTE — CHART NOTE - NSCHARTNOTEFT_GEN_A_CORE
R shiley femoral line removed in Trendelenburg position, good hemostasis achieved, clean dressing placed, patient tolerated well.

## 2022-01-10 NOTE — PROVIDER CONTACT NOTE (CHANGE IN STATUS NOTIFICATION) - SITUATION
Afib with RVR up to 130s despite giving total of 7.5 metoprolol. SBP low 100s. Team also aware that patient had some excessive bleeding during permacath placement and some blood tinged urine this AM>

## 2022-01-10 NOTE — CHART NOTE - NSCHARTNOTEFT_GEN_A_CORE
Source: Patient [ ]  Family [ ]   other [x ] EMR and discussed in A.MITZI rodas    Current Diet: Diet, NPO (01-09-22 @ 08:43)    Current Weight:   1/7: 67.8 kg     % Weight Change (N/A) No new weight to assess at this time. (3+ edema to R arm and L hip, pitting edema to abd)     Pertinent Medications: MEDICATIONS  (STANDING):  albuterol/ipratropium for Nebulization 3 milliLiter(s) Nebulizer every 6 hours  chlorhexidine 4% Liquid 1 Application(s) Topical <User Schedule>  dextrose 40% Gel 15 Gram(s) Oral once  dextrose 5%. 1000 milliLiter(s) (50 mL/Hr) IV Continuous <Continuous>  dextrose 5%. 1000 milliLiter(s) (100 mL/Hr) IV Continuous <Continuous>  dextrose 50% Injectable 25 Gram(s) IV Push once  dextrose 50% Injectable 12.5 Gram(s) IV Push once  dextrose 50% Injectable 25 Gram(s) IV Push once  furosemide   Injectable 60 milliGRAM(s) IV Push every 12 hours  glucagon  Injectable 1 milliGRAM(s) IntraMuscular once  heparin   Injectable 5000 Unit(s) SubCutaneous every 8 hours  insulin lispro (ADMELOG) corrective regimen sliding scale   SubCutaneous every 6 hours  metoprolol tartrate Injectable 2.5 milliGRAM(s) IV Push every 6 hours  pantoprazole  Injectable 40 milliGRAM(s) IV Push daily    MEDICATIONS  (PRN):  metoprolol tartrate Injectable 5 milliGRAM(s) IV Push every 6 hours PRN HR >120  ondansetron Injectable 4 milliGRAM(s) IV Push every 8 hours PRN Nausea and/or Vomiting  sodium chloride 0.9% lock flush 10 milliLiter(s) IV Push every 1 hour PRN Pre/post blood products, medications, blood draw, and to maintain line patency    Pertinent Labs: CBC Full  -  ( 10 James 2022 03:28 )  WBC Count : 8.92 K/uL  RBC Count : 4.41 M/uL  Hemoglobin : 11.0 g/dL  Hematocrit : 35.5 %  Platelet Count - Automated : 93 K/uL  Mean Cell Volume : 80.5 fl  Mean Cell Hemoglobin : 24.9 pg  Mean Cell Hemoglobin Concentration : 31.0 gm/dL  Auto Neutrophil # : x  Auto Lymphocyte # : x  Auto Monocyte # : x  Auto Eosinophil # : x  Auto Basophil # : x  Auto Neutrophil % : x  Auto Lymphocyte % : x  Auto Monocyte % : x  Auto Eosinophil % : x  Auto Basophil % : x        Skin: Skin tear to L FA, R upper arm, Stage II sacrum, Suspected DTI to Coccyx/sacrum     Nutrition focused physical exam previously conducted - found signs of malnutrition [ ]absent [x ]present    Subcutaneous fat loss: Mod [x ] Orbital fat pads region, [x ]Buccal fat region, [ ]Triceps region,  [ ]Ribs region    Muscle wasting: Mod [ x]Temples region, [x ]Clavicle region, [x ]Shoulder region, [ ]Scapula region, [ ]Interosseous region,  [ ]thigh region, [ ]Calf region    Estimated Needs:   [x ] no change since previous assessment  [ ] recalculated:     Brief Hospital Course:   Pt admitted with Left MCA Infarction 2/2 Left M1 occlusion TICI 2b revascularization. Pt with Worsening CKD - now requiring HD    Current Nutrition Diagnosis:  Pt remains at high nutrition risk secondary to moderate, (acute on chronic) protein calorie malnutrition related to inability to meet sufficient protein-energy needs in setting of COPD, CHF, skin breakdown, renal failure requiring HD, RLL PNA, LUE cellulitis, left hemisphere stroke as evidenced by physicals signs of moderate muscle/fat loss and suspect meeting </75% nutrient needs >/1 month. Pt is now extubated; NPO at this time pending permacath placement later today. Swallow evaluation pending. Recommendations below:     Recommendations:   1. RX: Nephro-gustavo and folic acid daily   2. RX: Vit. C daily (500mg) daily via tolerated route   3. Check weight daily to monitor trends   4. PO diet consistency per SLP   5. Re-start enteral nutrition if unable to eat PO    Monitoring and Evaluation:   [ ] PO intake [x ] Tolerance to diet prescription [X] Weights  [X] Follow up per protocol [X] Labs:

## 2022-01-10 NOTE — PROGRESS NOTE ADULT - SUBJECTIVE AND OBJECTIVE BOX
CC: Stroke    HPI:   The patient is a 86y Female who was admitted 1/1/22.  She had fallen 1 week prior to arrival and landed on her left side.   She had been eating a lot of take out Chinese food since the fall.   She presented secondary to weight gain, dyspnea on exertion, and edema.   Noted to be in atrial fibrillation on arrival.   She was started on treatment including anticoagulation.    On 1/6/22 she was found to be confused.   There is some suggestion of right sided weakness.  Stroke code was activated around 5:30 pm. She was last known well around 2 pm.   STAT CT and CT-A/CT-P were performed and she was found to have large vessel occlusion (left M1) and neuro-intervention team was called.    She was taken to the cath lab for thrombectomy with TICI 2b revascularization.  NIH SS score was 9 prior to intervention.    1-  Remains neurologically stable.          Vital Signs Last 24 Hrs  T(C): 36.4 (10 James 2022 15:54), Max: 36.8 (09 Jan 2022 17:00)  T(F): 97.6 (10 James 2022 15:54), Max: 98.3 (09 Jan 2022 19:38)  HR: 115 (10 James 2022 13:00) (100 - 128)  BP: 102/67 (10 James 2022 13:00) (102/67 - 116/69)  BP(mean): 78 (10 James 2022 13:00) (75 - 86)  RR: 14 (10 James 2022 13:00) (13 - 26)  SpO2: 92% (10 James 2022 13:00) (91% - 100%)    MEDICATIONS    albuterol/ipratropium for Nebulization 3 milliLiter(s) Nebulizer every 6 hours  dextrose 40% Gel 15 Gram(s) Oral once  dextrose 5%. 1000 milliLiter(s) IV Continuous <Continuous>  dextrose 5%. 1000 milliLiter(s) IV Continuous <Continuous>  dextrose 50% Injectable 25 Gram(s) IV Push once  dextrose 50% Injectable 12.5 Gram(s) IV Push once  dextrose 50% Injectable 25 Gram(s) IV Push once  ferrous    sulfate 325 milliGRAM(s) Oral daily  furosemide   Injectable 60 milliGRAM(s) IV Push every 12 hours  glucagon  Injectable 1 milliGRAM(s) IntraMuscular once  heparin  Infusion 750 Unit(s)/Hr IV Continuous <Continuous>  insulin lispro (ADMELOG) corrective regimen sliding scale   SubCutaneous every 6 hours  metoprolol tartrate 25 milliGRAM(s) Oral every 8 hours  ondansetron Injectable 4 milliGRAM(s) IV Push every 8 hours PRN  pantoprazole  Injectable 40 milliGRAM(s) IV Push daily  sodium chloride 0.9% lock flush 10 milliLiter(s) IV Push every 1 hour PRN         LABS:  CBC Full  -  ( 10 James 2022 03:28 )  WBC Count : 8.92 K/uL  RBC Count : 4.41 M/uL  Hemoglobin : 11.0 g/dL  Hematocrit : 35.5 %  Platelet Count - Automated : 93 K/uL  Mean Cell Volume : 80.5 fl  Mean Cell Hemoglobin : 24.9 pg  Mean Cell Hemoglobin Concentration : 31.0 gm/dL  Auto Neutrophil # : x  Auto Lymphocyte # : x  Auto Monocyte # : x  Auto Eosinophil # : x  Auto Basophil # : x  Auto Neutrophil % : x  Auto Lymphocyte % : x  Auto Monocyte % : x  Auto Eosinophil % : x  Auto Basophil % : x      01-10    138  |  98  |  57.6<H>  ----------------------------<  76  4.7   |  22.0  |  2.25<H>    Ca    8.7      10 James 2022 03:28  Phos  5.5     01-10  Mg     2.4     01-10        Hemoglobin A1C:       PT/INR - ( 10 James 2022 13:06 )   PT: 15.4 sec;   INR: 1.35 ratio         PTT - ( 10 James 2022 13:06 )  PTT:31.8 sec          Detailed Neurologic Exam:    Mental status: The patient opens eyes to voice. She answers "OK" to all questions. She does not follow instructions.    Cranial nerves: Pupils react symmetrically to light. She blinks to threat to confrontation. She tracks with gaze. There is a subtle depression of the right nasolabial fold.     Motor/sensory: There is normal bulk and tone.  Moves extremities to stimuli although left slightly more than right.    Reflexes: 1+ throughout and plantar responses are flexor.    Cerebellar: Cannot be assessed.     Cerebellar: No dysmetria on finger nose testing.          Rad:   Repeat CT head 1/9 images reviewed. There is acute infarct in the left temporal-occipital area with suggestion of left caudate infarction as well. There is no hemorrhage.                                 CC: Stroke    HPI:   The patient is a 86y Female who was admitted 1/1/22.  She had fallen 1 week prior to arrival and landed on her left side.   She had been eating a lot of take out Chinese food since the fall.   She presented secondary to weight gain, dyspnea on exertion, and edema.   Noted to be in atrial fibrillation on arrival.   She was started on treatment including anticoagulation.    On 1/6/22 she was found to be confused.   There is some suggestion of right sided weakness.  Stroke code was activated around 5:30 pm. She was last known well around 2 pm.   STAT CT and CT-A/CT-P were performed and she was found to have large vessel occlusion (left M1) and neuro-intervention team was called.    She was taken to the cath lab for thrombectomy with TICI 2b revascularization.  NIH SS score was 9 prior to intervention.    1-  Remains neurologically stable.          Vital Signs Last 24 Hrs  T(C): 36.4 (10 James 2022 15:54), Max: 36.8 (09 Jan 2022 17:00)  T(F): 97.6 (10 James 2022 15:54), Max: 98.3 (09 Jan 2022 19:38)  HR: 115 (10 James 2022 13:00) (100 - 128)  BP: 102/67 (10 James 2022 13:00) (102/67 - 116/69)  BP(mean): 78 (10 James 2022 13:00) (75 - 86)  RR: 14 (10 James 2022 13:00) (13 - 26)  SpO2: 92% (10 James 2022 13:00) (91% - 100%)    MEDICATIONS    albuterol/ipratropium for Nebulization 3 milliLiter(s) Nebulizer every 6 hours  dextrose 40% Gel 15 Gram(s) Oral once  dextrose 5%. 1000 milliLiter(s) IV Continuous <Continuous>  dextrose 5%. 1000 milliLiter(s) IV Continuous <Continuous>  dextrose 50% Injectable 25 Gram(s) IV Push once  dextrose 50% Injectable 12.5 Gram(s) IV Push once  dextrose 50% Injectable 25 Gram(s) IV Push once  ferrous    sulfate 325 milliGRAM(s) Oral daily  furosemide   Injectable 60 milliGRAM(s) IV Push every 12 hours  glucagon  Injectable 1 milliGRAM(s) IntraMuscular once  heparin  Infusion 750 Unit(s)/Hr IV Continuous <Continuous>  insulin lispro (ADMELOG) corrective regimen sliding scale   SubCutaneous every 6 hours  metoprolol tartrate 25 milliGRAM(s) Oral every 8 hours  ondansetron Injectable 4 milliGRAM(s) IV Push every 8 hours PRN  pantoprazole  Injectable 40 milliGRAM(s) IV Push daily  sodium chloride 0.9% lock flush 10 milliLiter(s) IV Push every 1 hour PRN         LABS:  CBC Full  -  ( 10 James 2022 03:28 )  WBC Count : 8.92 K/uL  RBC Count : 4.41 M/uL  Hemoglobin : 11.0 g/dL  Hematocrit : 35.5 %  Platelet Count - Automated : 93 K/uL  Mean Cell Volume : 80.5 fl  Mean Cell Hemoglobin : 24.9 pg  Mean Cell Hemoglobin Concentration : 31.0 gm/dL  Auto Neutrophil # : x  Auto Lymphocyte # : x  Auto Monocyte # : x  Auto Eosinophil # : x  Auto Basophil # : x  Auto Neutrophil % : x  Auto Lymphocyte % : x  Auto Monocyte % : x  Auto Eosinophil % : x  Auto Basophil % : x      01-10    138  |  98  |  57.6<H>  ----------------------------<  76  4.7   |  22.0  |  2.25<H>    Ca    8.7      10 James 2022 03:28  Phos  5.5     01-10  Mg     2.4     01-10        Hemoglobin A1C:       PT/INR - ( 10 James 2022 13:06 )   PT: 15.4 sec;   INR: 1.35 ratio         PTT - ( 10 James 2022 13:06 )  PTT:31.8 sec          Detailed Neurologic Exam:    Mental status: The patient awake and alert. She answers "OK" to questions. She does not follow instructions.    Cranial nerves: Pupils react symmetrically to light. She blinks to threat to confrontation but less consistently on the right. She tracks bilaterally. There is a subtle depression of the right nasolabial fold.     Motor: There is normal bulk and tone.  Has a RUE drift . LUE has no drift. Both LE are antigravity.    Sensory responds to noxious stimulation bilaterally.  Cerebellar: Cannot be assessed.               Rad:   Repeat CT head 1/9 images reviewed. There is acute infarct in the left temporal-occipital area with suggestion of left caudate infarction as well. There is no hemorrhage.     MR Head No Cont (01.09.22 @ 18:22) >    IMPRESSION: Acute left MCA and PCA infarcts as described above.

## 2022-01-10 NOTE — SWALLOW BEDSIDE ASSESSMENT ADULT - SWALLOW EVAL: DIAGNOSIS
Oral dysphagia suspected: premature loss & piecemeal deglutition across all fluid densities, mild oral dysphagia for assessed solids.  Pharyngeal dysphagia suspected for thin & mildly thick fluids: +overt s/s aspiration observed. No overt s/s aspiration noted post swallow for puree,. soft & bite-sized solids & moderately thick fluids.

## 2022-01-10 NOTE — PROGRESS NOTE ADULT - ASSESSMENT
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 Concern for embolic event (likely cardio-).  Afib RVR.  HFpEF with LV EF >75%, normal RV size and fx. Small mobile echo densities visualized on the aortic valve.  COPD not on home o2, remote hx breast CA s/p mastectomy 1985, colon cancer s/p bowel resection 12 years ago, chronic leg edema.  Worsening CKD - now requiring HD. Possible cardiorenal syndr.  Cardiac cirrhosis with ascites.  MRS 0 per family. NIH 25.  TIANA.    PLAN:  -pending MRI brain to eval the extent of the stroke   -Neuro checks  - PT/ OT/ SLP    CV:  --160; avoid hypotension to preserve cerebral perfusion  -TTE: ef >75%. mild- mod MVR, mild TR. Small mobile echodencities visualized on the aortic valve (Lambl's excrescence but can not rule out vegetation or fibroelastoma) - no plan for DAWSON from Cardiology as pt needs AC regardless - pending MRI  - no PO access now - cont Metoprolol 5 q6h, + Meto IVP 5 PRN for HR >110; plan to restart PO 50 q8hr    Respiratory:  -PRN duonebs for bronchospasm/ wheezing    GI:   NPO for now, repeat S/S in am' if fails in am - NGT placement  BM regimen    : [place] - keep for strict uop monitoring in pt on HD and diuretics  Monitor Scr, Monitor I+ O  next HD likely 1/10  re-start Lasix 60 q12hrs as with BL LE and lower abd pitting edema  notified by HD team that wont be able to use R fem [place] soon (would be 1 week old) - ultrasounded both IJs snf L femoral - poor access- called Vascular consult for tunneled catheter    ID:  RLL Pnu versus effusion- complete course of zosyn q8  bcx neg, ucx neg 1/1    Heme:  -will reevaluate for full AC with MRI results   - TIANA - will start on 325 iron when access established, hold IV iron in view of possible active infection  - on SQH for DVT ppx - very high risk for VTE    Endo:  goal -180    Pt is critically ill and at high risk of rapid deterioration/death due to abovementioned conditions.   Still requires critical care interventions - ongoing frequent evaluations, interventions and management adjustment by the Attending and ICU team, - and included review of relevant history, clinical examination, review of data and images, discussion of treatment with the multidisciplinary team and any consultants involved in this patient’s care as well as family discussion.          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 Concern for embolic event (likely cardio-).  Afib RVR.  HFpEF with LV EF >75%, normal RV size and fx. Small mobile echo densities visualized on the aortic valve.  COPD not on home o2, remote hx breast CA s/p mastectomy 1985, colon cancer s/p bowel resection 12 years ago, chronic leg edema.  Worsening CKD - now requiring HD. Possible cardiorenal syndr.  Cardiac cirrhosis with ascites.  MRS 0 per family. NIH 25.  TIANA.    PLAN:  -pending MRI brain to eval the extent of the stroke   -Neuro checks  - PT/ OT/ SLP    CV:  Off AC for 7 days since 1/3/22 with prior stroke secondary to afib - Now at high risk of aNOTHER STROKE AND IMAGING SHOWS NO HEME THEREFORE WOULD START ac TODAY   --160; avoid hypotension to preserve cerebral perfusion  -TTE: ef >75%. mild- mod MVR, mild TR. Small mobile echodencities visualized on the aortic valve (Lambl's excrescence but can not rule out vegetation or fibroelastoma) - no plan for DAWSON from Cardiology as pt needs AC regardless - I  - no PO access now - cont Metoprolol 5 q6h, + Meto IVP 5 PRN for HR >110; plan to restart PO 50 q8hr    Respiratory:  -PRN duonebs for bronchospasm/ wheezing    GI:   NPO for now, repeat S/S in am' if fails in am - NGT placement  BM regimen    : [place] - keep for strict uop monitoring in pt on HD and diuretics  Monitor Scr, Monitor I+ O  next HD today  re-start Lasix 60 q12hrs as with BL LE and lower abd pitting edema  R Permacath  Remove R Fem shiley    ID:  RLL Pnu versus effusion- complete course of zosyn q8  bcx neg, ucx neg 1/1    Heme:  -start heparin drip  - goal 50-70sec.   - TIANA - will start on 325 iron when taking po  -  very high risk for VTE    Endo:  goal -180    Pt is critically ill and at high risk of rapid deterioration/death due to abovementioned conditions.   Still requires critical care interventions - ongoing frequent evaluations, interventions and management adjustment by the Attending and ICU team, - and included review of relevant history, clinical examination, review of data and images, discussion of treatment with the multidisciplinary team and any consultants involved in this patient’s care as well as family discussion.          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 Concern for embolic event (likely cardio-).  Afib RVR.  HFpEF with LV EF >75%, normal RV size and fx. Small mobile echo densities visualized on the aortic valve.  COPD not on home o2, remote hx breast CA s/p mastectomy 1985, colon cancer s/p bowel resection 12 years ago, chronic leg edema.  Worsening CKD - now requiring HD. Possible cardiorenal syndr.  Cardiac cirrhosis with ascites.  MRS 0 per family. NIH 25.  TIANA.    PLAN:  -pending MRI brain to eval the extent of the stroke   -Neuro checks  - PT/ OT/ SLP    CV:  Off AC for 7 days since 1/3/22 with prior stroke secondary to afib - Now at high risk of aNOTHER STROKE AND IMAGING SHOWS NO HEME THEREFORE WOULD START ac TODAY   --160; avoid hypotension to preserve cerebral perfusion  -TTE: ef >75%. mild- mod MVR, mild TR. Small mobile echodencities visualized on the aortic valve (Lambl's excrescence but can not rule out vegetation or fibroelastoma) - no plan for DAWSON from -Cardiology as pt needs AC regardless - I  - decreased lopressor to 2.5 mg q6    Respiratory:  -PRN duonebs for bronchospasm/ wheezing    GI:   NPO for now, repeat S/S in am' if fails in am - NGT placement  BM regimen    : [place] - keep for strict uop monitoring in pt on HD and diuretics  Monitor Scr, Monitor I+ O  next HD today  re-start Lasix 60 q12hrs as with BL LE and lower abd pitting edema  R Permacath  Remove R Fem shiley    ID:  RLL Pnu versus effusion- complete course of zosyn q8  bcx neg, ucx neg 1/1    Heme:  -start heparin drip  - goal 50-70sec.   - TIANA - will start on 325 iron when taking po  -  very high risk for VTE    Endo:  goal -180    Pt is critically ill and at high risk of rapid deterioration/death due to abovementioned conditions.   Still requires critical care interventions - ongoing frequent evaluations, interventions and management adjustment by the Attending and ICU team, - and included review of relevant history, clinical examination, review of data and images, discussion of treatment with the multidisciplinary team and any consultants involved in this patient’s care as well as family discussion.

## 2022-01-10 NOTE — PROGRESS NOTE ADULT - SUBJECTIVE AND OBJECTIVE BOX
HPI:  85 y/o female from home with history of HTN, CKD baseline Cr. 1.7, COPD not on home 02, remote hx of breast cancer s/p mastectomy in 85, colon cancer s/p bowel resection 12 years ago, chronic LE edema. Patient lives alone and states shes usually independent with no assistive devices. Patient states she sustained a mechanical fall 1 week ago landing on her left side. She denies LOC, head or neck pain, She did sustain a skin tear to her left forearm. She denies being on the floor for more than a few minutes. She admits to feeling weak since the fall and has been ordering out chinese food most of the week. She has been eating won ton soup, fried rice, and chicken lo mein. She normally sees Dr. Snyder who has told her not to use any excess salt as she has chronic LE edema. She denies any hx of Afib, or CAD. She came to ED after she noted weight gain, SCHMID, and all her clothes fitting more tightly. She denies CP, fever, chills, N/V or diaphoresis. In the ED patient noted to be in afib w/ RVR, Anasarca, EDILBERTO on CKD, and with RLL PNA. She was cultured, given IV abx, started on Cardizem drip after PO cardizem and BB failed to control her HR. She was seen by Cardiology who did bedside echo showing diastolic dysfunction and dilated IVC, CTs showed anasarca and pleural effusions with RLLL infiltrate. COVID neg, U/A neg. no focal weakness. Patient given IV digoxin, tafoya placed, and IV lasix given with 600ml o/p. Currently awake in NAD.      (01 Jan 2022 23:57)      INTERVAL HPI/OVERNIGHT EVENTS:  Passed dysphagia screen. MRI with acute left mca strokes/ left pca. Heparin gtt started by NSICU team. Permacath placed RIJ by IR    Vital Signs Last 24 Hrs  T(C): 36.4 (10 James 2022 15:54), Max: 36.8 (09 Jan 2022 17:00)  T(F): 97.6 (10 James 2022 15:54), Max: 98.3 (09 Jan 2022 19:38)  HR: 115 (10 James 2022 13:00) (100 - 128)  BP: 102/67 (10 James 2022 13:00) (102/67 - 116/69)  BP(mean): 78 (10 James 2022 13:00) (75 - 86)  RR: 14 (10 James 2022 13:00) (13 - 26)  SpO2: 92% (10 James 2022 13:00) (91% - 100%)    PHYSICAL EXAM:  GENERAL: NAD  HEAD:  Atraumatic, normocephalic  WOUND: Left groin puncture c/d/i. no bleeding, no groin hematoma  MAIA COMA SCORE: E- V- M- =12       E: 4= opens eyes spontaneously        V: 3= inappropriate word       M:  5= localizes   MENTAL STATUS: AAO x0; Says Yes/ ok, does better when given options,  Awake; Opens eyes spontaneously; +expressive/ receptive aphasia, not following commands, mimics some commands  CRANIAL NERVES: Visual field cut on right to confrontation,  PERRL. Midline gaze. No facial droop noted  REFLEXES: PERRL.   MOTOR: RUE AG WITH SLIGHT DRIFT, RLE AG, LUE 5/5 LLE AG  SENSATION: intact to noxious  COORDINATION: Gait not assessed    LABS:                        11.0   8.92  )-----------( 93       ( 10 James 2022 03:28 )             35.5     01-10    138  |  98  |  57.6<H>  ----------------------------<  76  4.7   |  22.0  |  2.25<H>    Ca    8.7      10 James 2022 03:28  Phos  5.5     01-10  Mg     2.4     01-10      PT/INR - ( 10 James 2022 13:06 )   PT: 15.4 sec;   INR: 1.35 ratio         PTT - ( 10 James 2022 13:06 )  PTT:31.8 sec      01-09 @ 07:01  -  01-10 @ 07:00  --------------------------------------------------------  IN: 0 mL / OUT: 990 mL / NET: -990 mL    01-10 @ 07:01  -  01-10 @ 16:56  --------------------------------------------------------  IN: 0 mL / OUT: 410 mL / NET: -410 mL        RADIOLOGY & ADDITIONAL TESTS:  < from: CT Head No Cont (01.09.22 @ 04:34) >    ACC: [id number] EXAM:  CT BRAIN                          PROCEDURE DATE:  01/09/2022          INTERPRETATION:  CLINICAL INDICATIONS:  Stroke    COMPARISON: CT head dated 1/7/2021, CT head dated 1/6/2000, CTP dated   1/6/2022    TECHNIQUE: NoncontrastCT of the head. Multiplanar reformations are   submitted.    FINDINGS:  Abnormal low density in the left temporal occipital lobe on image 24 of   series 3, compatible with an acute infarction. Possible acute left   caudate head lacunar infarction.  There is periventricular and subcortical white matter hypodensity without   mass effect, nonspecific, likely representing moderate chronic   microvascular ischemic changes. There is no other compelling evidence for   an acute transcortical infarction. There is no evidence of mass, mass   effect, midline shift or extra-axial fluid collection. The lateral   ventricles and cortical sulci are age-appropriate in size and   configuration. The patient is status post bilateral ocular lens   replacement surgery. Chronic right mastoid sinus wall thickening. The   orbits, mastoid air cells and remaining visualized paranasal sinuses are   unremarkable. The calvarium is intact.    IMPRESSION: Acute left temporal occipital lobe infarction. Possible acute   left caudate head infarction. No evidence of hemorrhagic transformation.   Consider MRI for further evaluation.    --- End of Report ---      < end of copied text >    < from: MR Head No Cont (01.09.22 @ 18:22) >    ACC: [id number] EXAM:  MR BRAIN                          PROCEDURE DATE:  01/09/2022          INTERPRETATION:  Clinical indication: Left M1 occlusion. Status post   thrombectomy.    MRI of the brain was performed using sagittal T1, axial T1 T2 T2 FLAIR   diffusion and gradient echo sequence. Coronal T1 and T2-weighted   sequences performed as well.    This exam is compared with prior head CT performed on January 7, 2022.    Parenchymal volume loss and chronic microvascular ischemic changes are   identified    Scattered areas of abnormal T2 prolongation with restricted diffusion is   seen involving the posterior left insula, left posterior temporal, left   posterior frontal, left parietal and left occipital cortical and   subcortical region. Involvement of the posterior limb of the left   internal capsule, left caudate head and left corona radiata region are   seen as well as the left pre and postcentral gyrus region. These findings   are compatible with areas of acute left MCA and PCA infarcts. No definite   evidence of hemorrhagic transformation is seen at this time. There is   localized mass effect seen consisting of sulcal effacement. No   significant shift or herniation is seen.    The large vessels appear demonstrate normal flow voids    Right maxillary sinus mucosal thickening is seen. Right frontal sinus   mucosal thickening is seen.    The patient is status post bilateral cataract surgery.    Inflammatory changes are seen involving the right mastoid and middle ear   regions.    IMPRESSION: Acute left MCA and PCA infarcts as described above.    < end of copied text >          CAPRINI SCORE [CLOT]:  Patient has an estimated Caprini score of greater than 5.  However, the patient's unique clinical situation will be addressed in an individual manner to determine appropriate anticoagulation treatment, if any.

## 2022-01-10 NOTE — SWALLOW BEDSIDE ASSESSMENT ADULT - COMMENTS
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 Concern for embolic event (likely cardio-).  Afib RVR.  HFpEF with LV EF >75%, normal RV size and fx. Small mobile echo densities visualized on the aortic valve.  COPD not on home o2, remote hx breast CA s/p mastectomy 1985, colon cancer s/p bowel resection 12 years ago, chronic leg edema.  Worsening CKD - now requiring HD. Possible cardiorenal syndr.  Cardiac cirrhosis with ascites.  MRS 0 per family. NIH 25.  TIANA. As per MD note: "85 yo F with L M1 occlusion, s/p TICI 2b MT. Concern for embolic event (likely cardio-).  Afib RVR.  HFpEF with LV EF >75%, normal RV size and fx. Small mobile echo densities visualized on the aortic valve.  COPD not on home o2, remote hx breast CA s/p mastectomy 1985, colon cancer s/p bowel resection 12 years ago, chronic leg edema.  Worsening CKD - now requiring HD. Possible cardiorenal syndr.  Cardiac cirrhosis with ascites.  MRS 0 per family. NIH 25"    MRI head: 1/9:  "acute left MCA & PCA infarcts" as per radiology notes

## 2022-01-11 LAB
ANION GAP SERPL CALC-SCNC: 14 MMOL/L — SIGNIFICANT CHANGE UP (ref 5–17)
APTT BLD: 60.5 SEC — HIGH (ref 27.5–35.5)
APTT BLD: 65.1 SEC — HIGH (ref 27.5–35.5)
BUN SERPL-MCNC: 69.3 MG/DL — HIGH (ref 8–20)
CALCIUM SERPL-MCNC: 8.4 MG/DL — LOW (ref 8.6–10.2)
CHLORIDE SERPL-SCNC: 100 MMOL/L — SIGNIFICANT CHANGE UP (ref 98–107)
CO2 SERPL-SCNC: 26 MMOL/L — SIGNIFICANT CHANGE UP (ref 22–29)
CREAT SERPL-MCNC: 2.41 MG/DL — HIGH (ref 0.5–1.3)
DIGOXIN SERPL-MCNC: 2 NG/ML — SIGNIFICANT CHANGE UP (ref 0.8–2)
GLUCOSE BLDC GLUCOMTR-MCNC: 170 MG/DL — HIGH (ref 70–99)
GLUCOSE SERPL-MCNC: 153 MG/DL — HIGH (ref 70–99)
HCT VFR BLD CALC: 31.8 % — LOW (ref 34.5–45)
HCT VFR BLD CALC: 34.4 % — LOW (ref 34.5–45)
HGB BLD-MCNC: 10.6 G/DL — LOW (ref 11.5–15.5)
HGB BLD-MCNC: 9.9 G/DL — LOW (ref 11.5–15.5)
INR BLD: 1.36 RATIO — HIGH (ref 0.88–1.16)
MAGNESIUM SERPL-MCNC: 2.3 MG/DL — SIGNIFICANT CHANGE UP (ref 1.6–2.6)
MCHC RBC-ENTMCNC: 24.9 PG — LOW (ref 27–34)
MCHC RBC-ENTMCNC: 25.6 PG — LOW (ref 27–34)
MCHC RBC-ENTMCNC: 30.8 GM/DL — LOW (ref 32–36)
MCHC RBC-ENTMCNC: 31.1 GM/DL — LOW (ref 32–36)
MCV RBC AUTO: 80.8 FL — SIGNIFICANT CHANGE UP (ref 80–100)
MCV RBC AUTO: 82.2 FL — SIGNIFICANT CHANGE UP (ref 80–100)
PHOSPHATE SERPL-MCNC: 5 MG/DL — HIGH (ref 2.4–4.7)
PLATELET # BLD AUTO: 109 K/UL — LOW (ref 150–400)
PLATELET # BLD AUTO: 99 K/UL — LOW (ref 150–400)
POTASSIUM SERPL-MCNC: 4.1 MMOL/L — SIGNIFICANT CHANGE UP (ref 3.5–5.3)
POTASSIUM SERPL-SCNC: 4.1 MMOL/L — SIGNIFICANT CHANGE UP (ref 3.5–5.3)
PROTHROM AB SERPL-ACNC: 15.5 SEC — HIGH (ref 10.6–13.6)
RBC # BLD: 3.87 M/UL — SIGNIFICANT CHANGE UP (ref 3.8–5.2)
RBC # BLD: 4.26 M/UL — SIGNIFICANT CHANGE UP (ref 3.8–5.2)
RBC # FLD: 18.3 % — HIGH (ref 10.3–14.5)
RBC # FLD: 18.4 % — HIGH (ref 10.3–14.5)
SODIUM SERPL-SCNC: 140 MMOL/L — SIGNIFICANT CHANGE UP (ref 135–145)
WBC # BLD: 6.58 K/UL — SIGNIFICANT CHANGE UP (ref 3.8–10.5)
WBC # BLD: 8.92 K/UL — SIGNIFICANT CHANGE UP (ref 3.8–10.5)
WBC # FLD AUTO: 6.58 K/UL — SIGNIFICANT CHANGE UP (ref 3.8–10.5)
WBC # FLD AUTO: 8.92 K/UL — SIGNIFICANT CHANGE UP (ref 3.8–10.5)

## 2022-01-11 PROCEDURE — 99232 SBSQ HOSP IP/OBS MODERATE 35: CPT

## 2022-01-11 PROCEDURE — 99233 SBSQ HOSP IP/OBS HIGH 50: CPT

## 2022-01-11 PROCEDURE — 99291 CRITICAL CARE FIRST HOUR: CPT

## 2022-01-11 RX ORDER — PANTOPRAZOLE SODIUM 20 MG/1
40 TABLET, DELAYED RELEASE ORAL DAILY
Refills: 0 | Status: DISCONTINUED | OUTPATIENT
Start: 2022-01-11 | End: 2022-01-25

## 2022-01-11 RX ORDER — HEPARIN SODIUM 5000 [USP'U]/ML
500 INJECTION INTRAVENOUS; SUBCUTANEOUS
Qty: 25000 | Refills: 0 | Status: DISCONTINUED | OUTPATIENT
Start: 2022-01-11 | End: 2022-01-12

## 2022-01-11 RX ORDER — ALBUMIN HUMAN 25 %
100 VIAL (ML) INTRAVENOUS ONCE
Refills: 0 | Status: COMPLETED | OUTPATIENT
Start: 2022-01-11 | End: 2022-01-11

## 2022-01-11 RX ADMIN — Medication 325 MILLIGRAM(S): at 13:48

## 2022-01-11 RX ADMIN — Medication 3 MILLILITER(S): at 20:30

## 2022-01-11 RX ADMIN — Medication 40 MILLIGRAM(S): at 05:24

## 2022-01-11 RX ADMIN — PANTOPRAZOLE SODIUM 40 MILLIGRAM(S): 20 TABLET, DELAYED RELEASE ORAL at 13:51

## 2022-01-11 RX ADMIN — HEPARIN SODIUM 5 UNIT(S)/HR: 5000 INJECTION INTRAVENOUS; SUBCUTANEOUS at 22:11

## 2022-01-11 RX ADMIN — Medication 3 MILLILITER(S): at 08:36

## 2022-01-11 RX ADMIN — Medication 40 MILLIGRAM(S): at 17:38

## 2022-01-11 RX ADMIN — Medication 25 MILLIGRAM(S): at 22:16

## 2022-01-11 RX ADMIN — HEPARIN SODIUM 7.5 UNIT(S)/HR: 5000 INJECTION INTRAVENOUS; SUBCUTANEOUS at 04:25

## 2022-01-11 RX ADMIN — Medication 3 MILLILITER(S): at 04:18

## 2022-01-11 RX ADMIN — Medication 50 MILLILITER(S): at 12:50

## 2022-01-11 RX ADMIN — Medication 3 MILLILITER(S): at 14:25

## 2022-01-11 NOTE — PROGRESS NOTE ADULT - ASSESSMENT
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 Concern for embolic event (likely cardio-).  Afib RVR.  HFpEF with LV EF >75%, normal RV size and fx. Small mobile echo densities visualized on the aortic valve.  COPD not on home o2, remote hx breast CA s/p mastectomy 1985, colon cancer s/p bowel resection 12 years ago, chronic leg edema.  Worsening CKD - now requiring HD. Possible cardiorenal syndr.  Cardiac cirrhosis with ascites.  MRS 0 per family. NIH 25.  TIANA.    PLAN:  -pending MRI brain to eval the extent of the stroke   -Neuro checks  - PT/ OT/ SLP    CV:  Off AC for 7 days since 1/3/22 with prior stroke secondary to afib - Now at high risk of aNOTHER STROKE AND IMAGING SHOWS NO HEME THEREFORE WOULD START ac TODAY   --160; avoid hypotension to preserve cerebral perfusion  -TTE: ef >75%. mild- mod MVR, mild TR. Small mobile echodencities visualized on the aortic valve (Lambl's excrescence but can not rule out vegetation or fibroelastoma) - no plan for DAWSON from -Cardiology as pt needs AC regardless - I  - decreased lopressor to 2.5 mg q6    Respiratory:  -PRN duonebs for bronchospasm/ wheezing    GI:   NPO for now, repeat S/S in am' if fails in am - NGT placement  BM regimen    : [place] - keep for strict uop monitoring in pt on HD and diuretics  Monitor Scr, Monitor I+ O  next HD today  re-start Lasix 60 q12hrs as with BL LE and lower abd pitting edema  R Permacath  Remove R Fem shiley    ID:  RLL Pnu versus effusion- complete course of zosyn q8  bcx neg, ucx neg 1/1    Heme:  -start heparin drip  - goal 50-70sec.   - TIANA - will start on 325 iron when taking po  -  very high risk for VTE    Endo:  goal -180    Pt is critically ill and at high risk of rapid deterioration/death due to abovementioned conditions.   Still requires critical care interventions - ongoing frequent evaluations, interventions and management adjustment by the Attending and ICU team, - and included review of relevant history, clinical examination, review of data and images, discussion of treatment with the multidisciplinary team and any consultants involved in this patient’s care as well as family discussion.          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 Concern for embolic event (likely cardio-).  Afib RVR.  HFpEF with LV EF >75%, normal RV size and fx. Small mobile echo densities visualized on the aortic valve.  COPD not on home o2, remote hx breast CA s/p mastectomy 1985, colon cancer s/p bowel resection 12 years ago, chronic leg edema.  Worsening CKD - now requiring HD. Possible cardiorenal syndr.  Cardiac cirrhosis with ascites.  MRS 0 per family. NIH 25.  TIANA.    PLAN:  -pending MRI brain to eval the extent of the stroke   -Neuro checks  - PT/ OT/ SLP    CV: S/P Tachycardia  Off AC for 7 days since 1/3/22 with prior stroke secondary to afib - Now at high risk of aNOTHER STROKE AND IMAGING SHOWS NO HEME THEREFORE WOULD START ac TODAY   --160; avoid hypotension to preserve cerebral perfusion  -TTE: ef >75%. mild- mod MVR, mild TR. Small mobile echodencities visualized on the aortic valve (Lambl's excrescence but can not rule out vegetation or fibroelastoma) - no plan for DAWSON from -Cardiology as pt needs AC regardless - I  - decreased lopressor to 2.5 mg q6  - Loaded digoxin 0.5 mg x1 - level 2.0 today - hold further dosing    Respiratory:  -PRN duonebs for bronchospasm/ wheezing    GI:   Passed S/S - mod thick liquisds  BM regimen    : [place] - keep for strict uop monitoring in pt on HD and diuretics  Monitor Scr, Monitor I+ O  next HD today  re-start Lasix 60 q12hrs as with BL LE and lower abd pitting edema  R Permacath  Remove R Fem shiley    ID:  RLL Pnu versus effusion- complete course of zosyn q8  bcx neg, ucx neg 1/1    Heme:  -start heparin drip  - goal 50-70sec.   - TIANA - started on 325 iron   -  very high risk for VTE    Endo:  goal -180    Pt is critically ill and at high risk of rapid deterioration/death due to abovementioned conditions.   Still requires critical care interventions - ongoing frequent evaluations, interventions and management adjustment by the Attending and ICU team, - and included review of relevant history, clinical examination, review of data and images, discussion of treatment with the multidisciplinary team and any consultants involved in this patient’s care as well as family discussion.

## 2022-01-11 NOTE — PROGRESS NOTE ADULT - ASSESSMENT
CKD(III): decompensated CHF ( R>L sided)  EDILBERTO with cardiorenal syndrome  Cardiac cirrhosis due to passive congestion  PNA  COVID(-)  CT scan no hydronephrosis; atrophic R kidney---> confirmed on sonogram   Resp failure   S/p CVA --> Angio noted , L hemispheric CVA; Apraxia +, power sl better 3/5 ; L > R    HD done, some fluid removed  Renal function stable  Next Hd as needed- maybe 2/week should suffice for fluid - unable to remove much today

## 2022-01-11 NOTE — PROGRESS NOTE ADULT - SUBJECTIVE AND OBJECTIVE BOX
Patient seen and examined      s/p HD earlier today; BP dropped briefly, better with SPA   Fluid UF stopped  Seen earlier today, Eating easily with aid's help  Awake and follows but has some apraxia    REVIEW OF SYSTEMS: pt unable to offer any replies herself    CONSTITUTIONAL: No F/C  RESPIRATORY: No cough,  No SOB  CARDIOVASCULAR: No CP/palpitations,    Neuro more awake, Moves extr sl more cf yesterday  EXTREMITIES : no swelling,    Vital Signs Last 24 Hrs  T(C): 36.4 (10 James 2022 15:54), Max: 36.8 (09 Jan 2022 19:38)  T(F): 97.6 (10 James 2022 15:54), Max: 98.3 (09 Jan 2022 19:38)  HR: 115 (10 James 2022 13:00) (100 - 128)  BP: 102/67 (10 James 2022 13:00) (102/67 - 116/69)  BP(mean): 78 (10 James 2022 13:00) (75 - 86)  RR: 14 (10 James 2022 13:00) (13 - 23)  SpO2: 92% (10 James 2022 13:00) (91% - 100%)    PHYSICAL EXAM:    GENERAL: NAD,   EYES:  conjunctiva and sclera clear  NECK: Supple, No JVD/Bruit  NERVOUS SYSTEM:  A/a, follows few VC, power 3/5, Apraxia + ? dysphasia   CHEST:  No rales or rhonchi  HEART:  RRR, No murmur  ABDOMEN: Soft, NT/ND BS+  EXTREMITIES:  mild Edema;  SKIN: No rashes    LABS:                          10.6   8.92  )-----------( 109      ( 11 Jan 2022 03:36 )             34.4   11 Jan 2022 03:36    140    |  100    |  69.3   ----------------------------<  153    4.1     |  26.0   |  2.41     Ca    8.4        11 Jan 2022 03:36  Phos  5.0       11 Jan 2022 03:36  Mg     2.3       11 Jan 2022 03:36      MEDICATIONS  (STANDING):  albuterol/ipratropium for Nebulization 3 milliLiter(s) Nebulizer every 6 hours  ferrous    sulfate 325 milliGRAM(s) Oral daily  furosemide   Injectable 40 milliGRAM(s) IV Push two times a day  glucagon  Injectable 1 milliGRAM(s) IntraMuscular once  heparin  Infusion 750 Unit(s)/Hr (7.5 mL/Hr) IV Continuous <Continuous>  metoprolol tartrate 25 milliGRAM(s) Oral every 8 hours  pantoprazole    Tablet 40 milliGRAM(s) Oral daily

## 2022-01-11 NOTE — PROGRESS NOTE ADULT - SUBJECTIVE AND OBJECTIVE BOX
HPI:  85 y/o female from home with history of HTN, CKD baseline Cr. 1.7, COPD not on home 02, remote hx of breast cancer s/p mastectomy in 86, colon cancer s/p bowel resection 12 years ago, chronic LE edema. Patient lives alone and states shes usually independent with no assistive devices. Patient states she sustained a mechanical fall 1 week ago landing on her left side. She denies LOC, head or neck pain, She did sustain a skin tear to her left forearm. She denies being on the floor for more than a few minutes. She admits to feeling weak since the fall and has been ordering out chinese food most of the week. She has been eating won ton soup, fried rice, and chicken lo mein. She normally sees Dr. Snyder who has told her not to use any excess salt as she has chronic LE edema. She denies any hx of Afib, or CAD. She came to ED after she noted weight gain, SCHMID, and all her clothes fitting more tightly. She denies CP, fever, chills, N/V or diaphoresis. In the ED patient noted to be in afib w/ RVR, Anasarca, EDILBERTO on CKD, and with RLL PNA. She was cultured, given IV abx, started on Cardizem drip after PO cardizem and BB failed to control her HR. She was seen by Cardiology who did bedside echo showing diastolic dysfunction and dilated IVC, CTs showed anasarca and pleural effusions with RLLL infiltrate. COVID neg, U/A neg. no focal weakness. Patient given IV digoxin, tafoya placed, and IV lasix given with 600ml o/p. Currently awake in NAD.      (01 Jan 2022 23:57)      INTERVAL HPI/OVERNIGHT EVENTS:  Heparin gtt started yesterday 1/10. Passed swallow eval. HD in progress    Vital Signs Last 24 Hrs  T(C): 36.3 (11 Jan 2022 11:40), Max: 36.6 (10 James 2022 23:37)  T(F): 97.4 (11 Jan 2022 11:40), Max: 97.8 (10 James 2022 23:37)  HR: 117 (11 Jan 2022 11:00) (94 - 128)  BP: 72/41 (11 Jan 2022 11:00) (72/41 - 134/114)  BP(mean): 46 (11 Jan 2022 11:00) (46 - 122)  RR: 12 (11 Jan 2022 11:00) (12 - 18)  SpO2: 97% (11 Jan 2022 11:00) (92% - 107%)    PHYSICAL EXAM:  GENERAL: NAD, well-groomed, well-developed  HEAD:  Atraumatic, normocephalic  DRAINS:   WOUND: Dressing clean dry intact  MAIA COMA SCORE: E- V- M- =       E: 4= opens eyes spontaneously 3= to voice 2= to noxious 1= no opening       V: 5= oriented 4= confused 3= inappropriate words 2= incomprehensible sounds 1= nonverbal 1T= intubated       M: 6= follows commands 5= localizes 4= withdraws 3= flexor posturing 2= extensor posturing 1= no movement  MENTAL STATUS: AAO x3; Awake/Comatose; Opens eyes spontaneously/to voice/to light touch/to noxious stimuli; Appropriately conversant without aphasia/Nonverbal; following simple commands/mimicking/not following commands  CRANIAL NERVES: Visual acuity normal for age, visual fields full to confrontation, PERRL. EOMI without nystagmus. Facial sensation intact V1-3 distribution b/l. Face symmetric w/ normal eye closure and smile, tongue midline. Hearing grossly intact. Speech clear. Head turning and shoulder shrug intact.   REFLEXES: PERRL. Corneals intact b/l. Gag intact. Cough intact. Oculocephalic reflex intact (Doll's eye). Negative Zarate's b/l. Negative clonus b/l  MOTOR: strength 5/5 b/l upper and lower extremities  Uppers     Delt (C5/6)     Bicep (C5/6)     Wrist Extend (C6)     Tricep (C7)     HG (C8/T1)  R                     5/5                 5/5                         5/5                           5/5                   5/5  L                      5/5                 5/5                         5/5                           5/5                   5/5  Lowers      HF(L1/L2)     KE (L3)     DF (L4)     EHL (L5)     PF (S1)      R                     5/5              5/5           5/5           5/5            5/5  L                     5/5               5/5          5/5            5/5            5/5  SENSATION: grossly intact to light touch all extremities  COORDINATION: Gait intact; rapid alternating movements intact; heel to shin intact; no upper extremity dysmetria  CHEST/LUNG: Clear to auscultation bilaterally; no rales, rhonchi, wheezing, or rubs  HEART: +S1/+S2; Regular rate and rhythm; no murmurs, rubs, or gallops  ABDOMEN: Soft, nontender, nondistended; bowel sounds present all four quadrants  EXTREMITIES:  2+ peripheral pulses, no clubbing, cyanosis, or edema  SKIN: Warm, dry; no rashes or lesions    LABS:                        10.6   8.92  )-----------( 109      ( 11 Jan 2022 03:36 )             34.4     01-11    140  |  100  |  69.3<H>  ----------------------------<  153<H>  4.1   |  26.0  |  2.41<H>    Ca    8.4<L>      11 Jan 2022 03:36  Phos  5.0     01-11  Mg     2.3     01-11      PT/INR - ( 10 James 2022 13:06 )   PT: 15.4 sec;   INR: 1.35 ratio         PTT - ( 11 Jan 2022 03:39 )  PTT:65.1 sec      01-10 @ 07:01 - 01-11 @ 07:00  --------------------------------------------------------  IN: 427.5 mL / OUT: 1560 mL / NET: -1132.5 mL    01-11 @ 07:01 - 01-11 @ 12:04  --------------------------------------------------------  IN: 30 mL / OUT: 0 mL / NET: 30 mL        RADIOLOGY & ADDITIONAL TESTS:          CAPRINI SCORE [CLOT]:  Patient has an estimated Caprini score of greater than 5.  However, the patient's unique clinical situation will be addressed in an individual manner to determine appropriate anticoagulation treatment, if any. HPI:  85 y/o female from home with history of HTN, CKD baseline Cr. 1.7, COPD not on home 02, remote hx of breast cancer s/p mastectomy in 86, colon cancer s/p bowel resection 12 years ago, chronic LE edema. Patient lives alone and states shes usually independent with no assistive devices. Patient states she sustained a mechanical fall 1 week ago landing on her left side. She denies LOC, head or neck pain, She did sustain a skin tear to her left forearm. She denies being on the floor for more than a few minutes. She admits to feeling weak since the fall and has been ordering out chinese food most of the week. She has been eating won ton soup, fried rice, and chicken lo mein. She normally sees Dr. Snyder who has told her not to use any excess salt as she has chronic LE edema. She denies any hx of Afib, or CAD. She came to ED after she noted weight gain, SCHMID, and all her clothes fitting more tightly. She denies CP, fever, chills, N/V or diaphoresis. In the ED patient noted to be in afib w/ RVR, Anasarca, EDILBERTO on CKD, and with RLL PNA. She was cultured, given IV abx, started on Cardizem drip after PO cardizem and BB failed to control her HR. She was seen by Cardiology who did bedside echo showing diastolic dysfunction and dilated IVC, CTs showed anasarca and pleural effusions with RLLL infiltrate. COVID neg, U/A neg. no focal weakness. Patient given IV digoxin, tafoya placed, and IV lasix given with 600ml o/p. Currently awake in NAD.      (01 Jan 2022 23:57)      INTERVAL HPI/OVERNIGHT EVENTS:  Heparin gtt started yesterday 1/10. Passed swallow eval. HD in progress today    Vital Signs Last 24 Hrs  T(C): 36.3 (11 Jan 2022 11:40), Max: 36.6 (10 James 2022 23:37)  T(F): 97.4 (11 Jan 2022 11:40), Max: 97.8 (10 James 2022 23:37)  HR: 117 (11 Jan 2022 11:00) (94 - 128)  BP: 72/41 (11 Jan 2022 11:00) (72/41 - 134/114)  BP(mean): 46 (11 Jan 2022 11:00) (46 - 122)  RR: 12 (11 Jan 2022 11:00) (12 - 18)  SpO2: 97% (11 Jan 2022 11:00) (92% - 107%)    PHYSICAL EXAM:  GENERAL: NAD, laying in bed with HD currently in progress  HEAD:  Atraumatic, normocephalic  WOUND: R groin site c/d/i  MENTAL STATUS: ; Awake; Opens eyes spontaneously; Receptive aphasia/ Expressive aphasia, Will say yes/ no when given choices otherwise not much verbal output; mimicking some commands, does not follow  CRANIAL NERVES: Field cut to confrontation on right side, PERRL. EOMI without nystagmus. Face symmetric.  aphasic.    REFLEXES: PERRL.  MOTOR: LUE AG, RUE AG without drift, LLE AG with drift, RLE AG without drift  SENSATION: grossly intact to light touch all extremities  COORDINATION: Gait not assessed    LABS:                        10.6   8.92  )-----------( 109      ( 11 Jan 2022 03:36 )             34.4     01-11    140  |  100  |  69.3<H>  ----------------------------<  153<H>  4.1   |  26.0  |  2.41<H>    Ca    8.4<L>      11 Jan 2022 03:36  Phos  5.0     01-11  Mg     2.3     01-11      PT/INR - ( 10 James 2022 13:06 )   PT: 15.4 sec;   INR: 1.35 ratio         PTT - ( 11 Jan 2022 03:39 )  PTT:65.1 sec      01-10 @ 07:01 - 01-11 @ 07:00  --------------------------------------------------------  IN: 427.5 mL / OUT: 1560 mL / NET: -1132.5 mL    01-11 @ 07:01  - 01-11 @ 12:04  --------------------------------------------------------  IN: 30 mL / OUT: 0 mL / NET: 30 mL        RADIOLOGY & ADDITIONAL TESTS:    < from: CT Head No Cont (01.09.22 @ 04:34) >  ACC: [id number] EXAM:  CT BRAIN                          PROCEDURE DATE:  01/09/2022          INTERPRETATION:  CLINICAL INDICATIONS:  Stroke    COMPARISON: CT head dated 1/7/2021, CT head dated 1/6/2000, CTP dated   1/6/2022    TECHNIQUE: NoncontrastCT of the head. Multiplanar reformations are   submitted.    FINDINGS:  Abnormal low density in the left temporal occipital lobe on image 24 of   series 3, compatible with an acute infarction. Possible acute left   caudate head lacunar infarction.  There is periventricular and subcortical white matter hypodensity without   mass effect, nonspecific, likely representing moderate chronic   microvascular ischemic changes. There is no other compelling evidence for   an acute transcortical infarction. There is no evidence of mass, mass   effect, midline shift or extra-axial fluid collection. The lateral   ventricles and cortical sulci are age-appropriate in size and   configuration. The patient is status post bilateral ocular lens   replacement surgery. Chronic right mastoid sinus wall thickening. The   orbits, mastoid air cells and remaining visualized paranasal sinuses are   unremarkable. The calvarium is intact.    IMPRESSION: Acute left temporal occipital lobe infarction. Possible acute   left caudate head infarction. No evidence of hemorrhagic transformation.   Consider MRI for further evaluation.    < end of copied text >      CAPRINI SCORE [CLOT]:  Patient has an estimated Caprini score of greater than 5.  However, the patient's unique clinical situation will be addressed in an individual manner to determine appropriate anticoagulation treatment, if any. HPI:  85 y/o female from home with history of HTN, CKD baseline Cr. 1.7, COPD not on home 02, remote hx of breast cancer s/p mastectomy in 86, colon cancer s/p bowel resection 12 years ago, chronic LE edema. Patient lives alone and states shes usually independent with no assistive devices. Patient states she sustained a mechanical fall 1 week ago landing on her left side. She denies LOC, head or neck pain, She did sustain a skin tear to her left forearm. She denies being on the floor for more than a few minutes. She admits to feeling weak since the fall and has been ordering out chinese food most of the week. She has been eating won ton soup, fried rice, and chicken lo mein. She normally sees Dr. Snyder who has told her not to use any excess salt as she has chronic LE edema. She denies any hx of Afib, or CAD. She came to ED after she noted weight gain, SCHMID, and all her clothes fitting more tightly. She denies CP, fever, chills, N/V or diaphoresis. In the ED patient noted to be in afib w/ RVR, Anasarca, EDILBERTO on CKD, and with RLL PNA. She was cultured, given IV abx, started on Cardizem drip after PO cardizem and BB failed to control her HR. She was seen by Cardiology who did bedside echo showing diastolic dysfunction and dilated IVC, CTs showed anasarca and pleural effusions with RLLL infiltrate. COVID neg, U/A neg. no focal weakness. Patient given IV digoxin, tafoya placed, and IV lasix given with 600ml o/p. Currently awake in NAD.      (01 Jan 2022 23:57)      INTERVAL HPI/OVERNIGHT EVENTS:  Heparin gtt started yesterday 1/10. Passed swallow eval. HD in progress today    Vital Signs Last 24 Hrs  T(C): 36.3 (11 Jan 2022 11:40), Max: 36.6 (10 James 2022 23:37)  T(F): 97.4 (11 Jan 2022 11:40), Max: 97.8 (10 James 2022 23:37)  HR: 117 (11 Jan 2022 11:00) (94 - 128)  BP: 72/41 (11 Jan 2022 11:00) (72/41 - 134/114)  BP(mean): 46 (11 Jan 2022 11:00) (46 - 122)  RR: 12 (11 Jan 2022 11:00) (12 - 18)  SpO2: 97% (11 Jan 2022 11:00) (92% - 107%)    PHYSICAL EXAM:  GENERAL: NAD, laying in bed with HD currently in progress  HEAD:  Atraumatic, normocephalic  WOUND: R groin site c/d/i  MENTAL STATUS: ; Awake; Opens eyes spontaneously; Receptive aphasia/ Expressive aphasia, Will say yes/ no when given choices otherwise not much verbal output; mimicking some commands, does not follow  CRANIAL NERVES: Field cut to confrontation on right side, PERRL. EOMI without nystagmus. Face symmetric.  aphasic.    REFLEXES: PERRL.  MOTOR: LUE AG, RUE AG without drift, LLE AG with drift, RLE AG without drift  SENSATION: grossly intact to light touch all extremities  COORDINATION: Gait not assessed  Peripheral VASCULAR: generalized anasarca    LABS:                        10.6   8.92  )-----------( 109      ( 11 Jan 2022 03:36 )             34.4     01-11    140  |  100  |  69.3<H>  ----------------------------<  153<H>  4.1   |  26.0  |  2.41<H>    Ca    8.4<L>      11 Jan 2022 03:36  Phos  5.0     01-11  Mg     2.3     01-11      PT/INR - ( 10 James 2022 13:06 )   PT: 15.4 sec;   INR: 1.35 ratio         PTT - ( 11 Jan 2022 03:39 )  PTT:65.1 sec      01-10 @ 07:01 - 01-11 @ 07:00  --------------------------------------------------------  IN: 427.5 mL / OUT: 1560 mL / NET: -1132.5 mL    01-11 @ 07:01 - 01-11 @ 12:04  --------------------------------------------------------  IN: 30 mL / OUT: 0 mL / NET: 30 mL        RADIOLOGY & ADDITIONAL TESTS:    < from: CT Head No Cont (01.09.22 @ 04:34) >  ACC: [id number] EXAM:  CT BRAIN                          PROCEDURE DATE:  01/09/2022          INTERPRETATION:  CLINICAL INDICATIONS:  Stroke    COMPARISON: CT head dated 1/7/2021, CT head dated 1/6/2000, CTP dated   1/6/2022    TECHNIQUE: NoncontrastCT of the head. Multiplanar reformations are   submitted.    FINDINGS:  Abnormal low density in the left temporal occipital lobe on image 24 of   series 3, compatible with an acute infarction. Possible acute left   caudate head lacunar infarction.  There is periventricular and subcortical white matter hypodensity without   mass effect, nonspecific, likely representing moderate chronic   microvascular ischemic changes. There is no other compelling evidence for   an acute transcortical infarction. There is no evidence of mass, mass   effect, midline shift or extra-axial fluid collection. The lateral   ventricles and cortical sulci are age-appropriate in size and   configuration. The patient is status post bilateral ocular lens   replacement surgery. Chronic right mastoid sinus wall thickening. The   orbits, mastoid air cells and remaining visualized paranasal sinuses are   unremarkable. The calvarium is intact.    IMPRESSION: Acute left temporal occipital lobe infarction. Possible acute   left caudate head infarction. No evidence of hemorrhagic transformation.   Consider MRI for further evaluation.    < end of copied text >      CAPRINI SCORE [CLOT]:  Patient has an estimated Caprini score of greater than 5.  However, the patient's unique clinical situation will be addressed in an individual manner to determine appropriate anticoagulation treatment, if any.

## 2022-01-11 NOTE — PROGRESS NOTE ADULT - SUBJECTIVE AND OBJECTIVE BOX
CC: Stroke    HPI:   The patient is a 86y Female who was admitted 1/1/22.  She had fallen 1 week prior to arrival and landed on her left side.   She had been eating a lot of take out Chinese food since the fall.   She presented secondary to weight gain, dyspnea on exertion, and edema.   Noted to be in atrial fibrillation on arrival.   She was started on treatment including anticoagulation.    On 1/6/22 she was found to be confused.   There is some suggestion of right sided weakness.  Stroke code was activated around 5:30 pm. She was last known well around 2 pm.   STAT CT and CT-A/CT-P were performed and she was found to have large vessel occlusion (left M1) and neuro-intervention team was called.    She was taken to the cath lab for thrombectomy with TICI 2b revascularization.  NIH SS score was 9 prior to intervention.    1-  Remains neurologically stable.  1- No new events.      Vital Signs Last 24 Hrs  T(C): 36.4 (11 Jan 2022 23:17), Max: 36.6 (10 James 2022 23:37)  T(F): 97.5 (11 Jan 2022 23:17), Max: 97.8 (10 James 2022 23:37)  HR: 107 (11 Jan 2022 23:00) (93 - 117)  BP: 123/69 (11 Jan 2022 23:00) (72/41 - 125/72)  BP(mean): 83 (11 Jan 2022 23:00) (46 - 97)  RR: 12 (11 Jan 2022 19:00) (12 - 14)  SpO2: 97% (11 Jan 2022 23:00) (92% - 107%)    MEDICATIONS    albuterol/ipratropium for Nebulization 3 milliLiter(s) Nebulizer every 6 hours  ferrous    sulfate 325 milliGRAM(s) Oral daily  furosemide   Injectable 40 milliGRAM(s) IV Push two times a day  glucagon  Injectable 1 milliGRAM(s) IntraMuscular once  heparin  Infusion 500 Unit(s)/Hr IV Continuous <Continuous>  metoprolol tartrate 25 milliGRAM(s) Oral every 8 hours  ondansetron Injectable 4 milliGRAM(s) IV Push every 8 hours PRN  pantoprazole    Tablet 40 milliGRAM(s) Oral daily  sodium chloride 0.9% lock flush 10 milliLiter(s) IV Push every 1 hour PRN         LABS:  CBC Full  -  ( 11 Jan 2022 19:11 )  WBC Count : 6.58 K/uL  RBC Count : 3.87 M/uL  Hemoglobin : 9.9 g/dL  Hematocrit : 31.8 %  Platelet Count - Automated : 99 K/uL  Mean Cell Volume : 82.2 fl  Mean Cell Hemoglobin : 25.6 pg  Mean Cell Hemoglobin Concentration : 31.1 gm/dL  Auto Neutrophil # : x  Auto Lymphocyte # : x  Auto Monocyte # : x  Auto Eosinophil # : x  Auto Basophil # : x  Auto Neutrophil % : x  Auto Lymphocyte % : x  Auto Monocyte % : x  Auto Eosinophil % : x  Auto Basophil % : x      01-11    140  |  100  |  69.3<H>  ----------------------------<  153<H>  4.1   |  26.0  |  2.41<H>    Ca    8.4<L>      11 Jan 2022 03:36  Phos  5.0     01-11  Mg     2.3     01-11        Hemoglobin A1C:       PT/INR - ( 11 Jan 2022 19:11 )   PT: 15.5 sec;   INR: 1.36 ratio         PTT - ( 11 Jan 2022 19:11 )  PTT:60.5 sec      Detailed Neurologic Exam:    Mental status: The patient awake and alert. She answers "YES" to most questions. She does not follow instructions.    Cranial nerves: Pupils react symmetrically to light. She blinks to threat to confrontation but less consistently on the right. She tracks bilaterally. There is a subtle depression of the right nasolabial fold.     Motor: There is normal bulk and tone.  Has a RUE drift . LUE has no drift. Both LE are antigravity.    Sensory responds to noxious stimulation bilaterally.  Cerebellar: Cannot be assessed.               Rad:   Repeat CT head 1/9 images reviewed. There is acute infarct in the left temporal-occipital area with suggestion of left caudate infarction as well. There is no hemorrhage.     MR Head No Cont (01.09.22 @ 18:22) >    IMPRESSION: Acute left MCA and PCA infarcts as described above.

## 2022-01-11 NOTE — PROGRESS NOTE ADULT - ASSESSMENT
86y Female with multiple medical issues admitted for cardiac issues including atrial fibrillation now with left hemisphere stroke.     Stroke.   Was not given t-PA as was > 3 hrs from last known well time and patient > 80 years old and in addition was on full anticoagulation dose of Lovenox.   She did go for thrombectomy and is now in ICU.  LDL: 30  At goal of < 70  She is on Heparin gtt for afib per NSCU.   Need to watch for hemorrhagic conversion. Keep PTT 40-55 range for now.   Continue statin.

## 2022-01-11 NOTE — PROGRESS NOTE ADULT - ASSESSMENT
ASSESSMENT:  86F with hx HTN, CKD baseline Scr 1.7. COPD not on home o2, remote hx breast CA s/p mastectomy 1985, colon cancer s/p bowel resection 12 years ago, chronic leg edema, mechanical fall 1 week ago presents with AFIB RVR, Acute on chronic CKD./  diastolic heart failure and RV dysfunction, RLL PNA, CODE STROKE IN ED on 1/6 L MCA m1 occlusion s/p mechanical thrombectomy TICI 2B likely cardio embolic event now extubated. Heparin gtt intiated by NSICU team yesterday. Clinical exam Stable. HD in progress today.     PLAN:  -D/W Dr. Kendall  -No further neuro endovascular interventions/ procedures at this time  -Continue management in the NSICU  -Continue neuro checks q2 hours  -stat head CT if any MS changes  -Heparin gtt started by NSICU team yesterday. No bolus. PTT goal 50-70. Recommend repeat PTT q 6 hours. Recommend repeat head CTH after therapeutic PTT to r/o heme transformation prior to starting DOAC  -Continue with PT/ OT/ SLP  -OOB as tolerated  -Chest PT/ Incentive spirometry PRN  -Passed dysphagia with speech pathologist. 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   -Lopressor for rate control  -Will continue to follow

## 2022-01-11 NOTE — PROGRESS NOTE ADULT - SUBJECTIVE AND OBJECTIVE BOX
[place] [facility]                                                               Office: [address] [address]                                                              Telephone: [phone]. Fax:[phone]                                                                             PROGRESS NOTE    Reason for follow up: decompensated CHF, New onset Afib  Overnight: No new events.   Update:     Subjective: No new events     	  Vitals:  T(C): 36.3 (01-11-22 @ 15:25), Max: 36.6 (01-10-22 @ 23:37)  HR: 115 (01-11-22 @ 16:00) (93 - 128)  BP: 110/55 (01-11-22 @ 16:00) (72/41 - 134/114)  RR: 12 (01-11-22 @ 16:00) (12 - 18)  SpO2: 100% (01-11-22 @ 16:00) (92% - 107%)    I&O's Summary    10 James 2022 07:01  -  11 Jan 2022 07:00  --------------------------------------------------------  IN: 427.5 mL / OUT: 1560 mL / NET: -1132.5 mL    11 Jan 2022 07:01  -  11 Jan 2022 16:20  --------------------------------------------------------  IN: 67.5 mL / OUT: 950 mL / NET: -882.5 mL      Weight (kg): 75.2 (01-06 @ 21:25)      PHYSICAL EXAM:  Appearance: Comfortable. No acute distress  HEENT:  Head and neck: Atraumatic. Normocephalic.  Normal oral mucosa, PERRL, Neck is supple. No JVD, No carotid bruit.   Neurologic: A & O x 0, patient is repetitively saying "yes",  +focal deficits. EOMI.  Lymphatic: No cervical lymphadenopathy  Cardiovascular: Normal S1 S2, No murmur, rubs/gallops. No JVD, No edema  Respiratory: Lungs clear to auscultation  Gastrointestinal:  Soft, Non-tender, + BS  Lower Extremities: No edema  Psychiatry: Patient is calm. No agitation. Mood & affect appropriate  Skin: No rashes/ ecchymoses/cyanosis/ulcers visualized on the face, hands or feet.      CURRENT MEDICATIONS:  furosemide   Injectable 40 milliGRAM(s) IV Push two times a day  metoprolol tartrate 25 milliGRAM(s) Oral every 8 hours  albuterol/ipratropium for Nebulization  pantoprazole    Tablet  glucagon  Injectable  ferrous    sulfate  heparin  Infusion      DIAGNOSTIC TESTING:    [ ] Echocardiogram: < from: TTE Echo Complete w/o Contrast w/ Doppler (01.02.22 @ 09:12) >  Summary:   1. Left ventricular ejection fraction, by visual estimation, is >75%.   2. Technically suboptimal study.   3. Hyperdynamic global left ventricular systolic function.   4. Normal left ventricular internal cavity size.   5. The mitral in-flow pattern reveals no discernable A-wave, therefore   no comment on diastolic function can be made.   6. Normal right ventricular size and function.  7. Mild to moderately enlarged left atrium.   8. There is no evidence of pericardial effusion.   9. Mild mitral annular calcification.  10. Mild to moderate mitral valve regurgitation.  11. Thickening of the anterior and posterior mitral valve leaflets.  12. Mild tricuspid regurgitation.  13. Small mobile echodencities visualized on the aortic valve. 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 Clinical correlation recommended.      OTHER: 	      LABS:	 	                            10.6   8.92  )-----------( 109      ( 11 Jan 2022 03:36 )             34.4     01-11    140  |  100  |  69.3<H>  ----------------------------<  153<H>  4.1   |  26.0  |  2.41<H>    Ca    8.4<L>      11 Jan 2022 03:36  Phos  5.0     01-11  Mg     2.3     01-11      proBNP:   Lipid Profile:   HgA1c:   TSH:       TELEMETRY: Reviewed    ECG:  Reviewed by me. 	                                                                      [place] [facility]                                                               Office: [address] [facility], [place] [address]                                                              Telephone: [phone]. Fax:[phone]                                                                             PROGRESS NOTE    Reason for follow up: decompensated CHF, New onset Afib  Overnight: No new events.   Update: 1/11: Pt denies chest pain, palpitations, SOB. Tele-Afib HR @ 90-110s with PVCs, HR in 130s at times. Cont. Lopressor 25mg PO q 8 hours with parameters, Midodrine 5mg TID to increase BP, Keep PTT goal between 50-60. Cont Lasix 40mg BID, Bladder scan intermittently.        Subjective: No new events     	  Vitals:  T(C): 36.3 (01-11-22 @ 15:25), Max: 36.6 (01-10-22 @ 23:37)  HR: 115 (01-11-22 @ 16:00) (93 - 128)  BP: 110/55 (01-11-22 @ 16:00) (72/41 - 134/114)  RR: 12 (01-11-22 @ 16:00) (12 - 18)  SpO2: 100% (01-11-22 @ 16:00) (92% - 107%)    I&O's Summary    10 James 2022 07:01  -  11 Jan 2022 07:00  --------------------------------------------------------  IN: 427.5 mL / OUT: 1560 mL / NET: -1132.5 mL    11 Jan 2022 07:01  -  11 Jan 2022 16:20  --------------------------------------------------------  IN: 67.5 mL / OUT: 950 mL / NET: -882.5 mL      Weight (kg): 75.2 (01-06 @ 21:25)      PHYSICAL EXAM:  Appearance: Comfortable. No acute distress  HEENT:  Head and neck: Atraumatic. Normocephalic.  Normal oral mucosa, PERRL, Neck is supple. No JVD, No carotid bruit.   Neurologic: A & O x 0, patient is repetitively saying "yes",  +focal deficits. EOMI.  Lymphatic: No cervical lymphadenopathy  Cardiovascular: Normal S1 S2, No murmur, rubs/gallops. No JVD, No edema  Respiratory: diminished bilateral breath sounds   Gastrointestinal:  Soft, Non-tender, + BS  Lower Extremities: No edema  Psychiatry: Patient is calm. No agitation. Mood & affect appropriate  Skin: No rashes/ ecchymoses/cyanosis/ulcers visualized on the face, hands or feet.      CURRENT MEDICATIONS:  furosemide   Injectable 40 milliGRAM(s) IV Push two times a day  metoprolol tartrate 25 milliGRAM(s) Oral every 8 hours  albuterol/ipratropium for Nebulization  pantoprazole    Tablet  glucagon  Injectable  ferrous    sulfate  heparin  Infusion      DIAGNOSTIC TESTING:    [ ] Echocardiogram: < from: TTE Echo Complete w/o Contrast w/ Doppler (01.02.22 @ 09:12) >  Summary:   1. Left ventricular ejection fraction, by visual estimation, is >75%.   2. Technically suboptimal study.   3. Hyperdynamic global left ventricular systolic function.   4. Normal left ventricular internal cavity size.   5. The mitral in-flow pattern reveals no discernable A-wave, therefore   no comment on diastolic function can be made.   6. Normal right ventricular size and function.  7. Mild to moderately enlarged left atrium.   8. There is no evidence of pericardial effusion.   9. Mild mitral annular calcification.  10. Mild to moderate mitral valve regurgitation.  11. Thickening of the anterior and posterior mitral valve leaflets.  12. Mild tricuspid regurgitation.  13. Small mobile echodencities visualized on the aortic valve. 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 Clinical correlation recommended.      OTHER: 	    MR:< from: MR Head No Cont (01.09.22 @ 18:22) >  IMPRESSION: Acute left MCA and PCA infarcts as described above.      LABS:	 	                            10.6   8.92  )-----------( 109      ( 11 Jan 2022 03:36 )             34.4     01-11    140  |  100  |  69.3<H>  ----------------------------<  153<H>  4.1   |  26.0  |  2.41<H>    Ca    8.4<L>      11 Jan 2022 03:36  Phos  5.0     01-11  Mg     2.3     01-11      TELEMETRY: Afib HR @ 90-110s with PVCs, HR in 130s at times

## 2022-01-11 NOTE — PROGRESS NOTE ADULT - SUBJECTIVE AND OBJECTIVE BOX
HPI:  87 y/o female from home with history of HTN, CKD baseline Cr. 1.7, COPD not on home 02, remote hx of breast cancer s/p mastectomy in 85, colon cancer s/p bowel resection 12 years ago, chronic LE edema. Patient lives alone and states shes usually independent with no assistive devices. Patient states she sustained a mechanical fall 1 week ago landing on her left side. She denies LOC, head or neck pain, She did sustain a skin tear to her left forearm. She denies being on the floor for more than a few minutes. She admits to feeling weak since the fall and has been ordering out chinese food most of the week. She has been eating won ton soup, fried rice, and chicken lo mein. She normally sees Dr. nSyder who has told her not to use any excess salt as she has chronic LE edema. She denies any hx of Afib, or CAD. She came to ED after she noted weight gain, SCHMID, and all her clothes fitting more tightly. She denies CP, fever, chills, N/V or diaphoresis. In the ED patient noted to be in afib w/ RVR, Anasarca, EDILBERTO on CKD, and with RLL PNA. She was cultured, given IV abx, started on Cardizem drip after PO cardizem and BB failed to control her HR. She was seen by Cardiology who did bedside echo showing diastolic dysfunction and dilated IVC, CTs showed anasarca and pleural effusions with RLLL infiltrate. COVID neg, U/A neg. no focal weakness. Patient given IV digoxin, tafoya placed, and IV lasix given with 600ml o/p. Currently awake in NAD. (01 Jan 2022 23:57)   Code Stroke, while waiting for a bed in ED. MRS 0 per family. NIH 25. Not a tPA candidate; underwent TICI 2b MT.  New onset afib   Arrived to [facility] intubated on 1/6/22.    ICU Vital Signs Last 24 Hrs  T(C): 36.3 (11 Jan 2022 07:33), Max: 36.6 (10 James 2022 23:37)  T(F): 97.3 (11 Jan 2022 07:33), Max: 97.8 (10 James 2022 23:37)  HR: 109 (11 Jan 2022 09:00) (94 - 128)  BP: 115/66 (11 Jan 2022 09:00) (97/53 - 134/114)  BP(mean): 82 (11 Jan 2022 09:00) (67 - 122)  RR: 12 (11 Jan 2022 09:00) (12 - 18)  SpO2: 97% (11 Jan 2022 09:00) (92% - 107%)      MEDICATIONS  (STANDING):  albuterol/ipratropium for Nebulization 3 milliLiter(s) Nebulizer every 6 hours  ferrous    sulfate 325 milliGRAM(s) Oral daily  furosemide   Injectable 40 milliGRAM(s) IV Push two times a day  glucagon  Injectable 1 milliGRAM(s) IntraMuscular once  heparin  Infusion 750 Unit(s)/Hr (7.5 mL/Hr) IV Continuous <Continuous>  metoprolol tartrate 25 milliGRAM(s) Oral every 8 hours  pantoprazole  Injectable 40 milliGRAM(s) IV Push daily    MEDICATIONS  (PRN):  ondansetron Injectable 4 milliGRAM(s) IV Push every 8 hours PRN Nausea and/or Vomiting  sodium chloride 0.9% lock flush 10 milliLiter(s) IV Push every 1 hour PRN Pre/post blood products, medications, blood draw, and to maintain line patency  y        10 James 2022 07:01  -  11 Jan 2022 07:00  --------------------------------------------------------  IN:    Heparin: 127.5 mL    Oral Fluid: 300 mL  Total IN: 427.5 mL    OUT:    Indwelling Catheter - Urethral (mL): 410 mL    Voided (mL): 1150 mL  Total OUT: 1560 mL    Total NET: -1132.5 mL      11 Jan 2022 07:01  -  11 Jan 2022 09:30  --------------------------------------------------------  IN:    Heparin: 15 mL  Total IN: 15 mL    OUT:  Total OUT: 0 mL    Total NET: 15 mL    09 Jan 2022 07:01  -  10 Jan 2022 07:00  Total NET: -890 mL      IMAGING     MR Head No Cont (01.09.22 @ 18:22) >    INTERPRETATION:  Clinical indication: Left M1 occlusion. Status post   thrombectomy.    MRI of the brain was performed using sagittal T1, axial T1 T2 T2 FLAIR   diffusion and gradient echo sequence. Coronal T1 and T2-weighted   sequences performed as well.    This exam is compared with prior head CT performed on January 7, 2022.    Parenchymal volume loss and chronic microvascular ischemic changes are   identified    Scattered areas of abnormal T2 prolongation with restricted diffusion is   seen involving the posterior left insula, left posterior temporal, left   posterior frontal, left parietal and left occipital cortical and   subcortical region. Involvement of the posterior limb of the left   internal capsule, left caudate head and left corona radiata region are   seen as well as the left pre and postcentral gyrus region. These findings   are compatible with areas of acute left MCA and PCA infarcts. No definite   evidence of hemorrhagic transformation is seen at this time. There is   localized mass effect seen consisting of sulcal effacement. No   significant shift or herniation is seen.    The large vessels appear demonstrate normal flow voids    Right maxillary sinus mucosal thickening is seen. Right frontal sinus   mucosal thickening is seen.    The patient is status post bilateral cataract surgery.    Inflammatory changes are seen involving the right mastoid and middle ear   regions.    IMPRESSION: Acute left MCA and PCA infarcts as described above.    ECHO    Summary:   1. Left ventricular ejection fraction, by visual estimation, is >75%.   2. Technically suboptimal study.   3. Hyperdynamic global left ventricular systolic function.   4. Normal left ventricular internal cavity size.   5. The mitral in-flow pattern reveals no discernable A-wave, therefore   no comment on diastolic function can be made.   6. Normal right ventricular size and function.   7. Mild to moderately enlarged left atrium.   8. There is no evidence of pericardial effusion.   9. Mild mitral annular calcification.  10. Mild to moderate mitral valve regurgitation.  11. Thickening of the anterior and posterior mitral valve leaflets.  12. Mild tricuspid regurgitation.  13. Small mobile echodencities visualized on the aortic valve. 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 Clinical correlation recommended.      01-11    140  |  100  |  69.3<H>  ----------------------------<  153<H>  4.1   |  26.0  |  2.41<H>    Ca    8.4<L>      11 Jan 2022 03:36  Phos  5.0     01-11  Mg     2.3     01-11      HGBA1c- 6.1  LDL-30  Ferritin- 339  Sat- 11%  Fe-24  AST- 34  ALT-11  APO4- 178  TSH- nl                            10.6   8.92  )-----------( 109      ( 11 Jan 2022 03:36 )             34.4                      Exam:  NAD, calm.  EO spont, not FC, perseverates, global aphasia, PERRL, L gaze preference, motor - holds b/l uppers antigravity with prompting, no drift, LLE at least 3/5, RLE some antigravity effort.   S1S2 present.  CTAb anteriorly, diminished Bibasilar sounds.  Abd soft.  BL LE and lower abd pitting edema.  R Shiley in place.     HPI:  85 y/o female from home with history of HTN, CKD baseline Cr. 1.7, COPD not on home 02, remote hx of breast cancer s/p mastectomy in 85, colon cancer s/p bowel resection 12 years ago, chronic LE edema. Patient lives alone and states shes usually independent with no assistive devices. Patient states she sustained a mechanical fall 1 week ago landing on her left side. She denies LOC, head or neck pain, She did sustain a skin tear to her left forearm. She denies being on the floor for more than a few minutes. She admits to feeling weak since the fall and has been ordering out chinese food most of the week. She has been eating won ton soup, fried rice, and chicken lo mein. She normally sees Dr. Snyder who has told her not to use any excess salt as she has chronic LE edema. She denies any hx of Afib, or CAD. She came to ED after she noted weight gain, SCHMID, and all her clothes fitting more tightly. She denies CP, fever, chills, N/V or diaphoresis. In the ED patient noted to be in afib w/ RVR, Anasarca, EDILBERTO on CKD, and with RLL PNA. She was cultured, given IV abx, started on Cardizem drip after PO cardizem and BB failed to control her HR. She was seen by Cardiology who did bedside echo showing diastolic dysfunction and dilated IVC, CTs showed anasarca and pleural effusions with RLLL infiltrate. COVID neg, U/A neg. no focal weakness. Patient given IV digoxin, tafoya placed, and IV lasix given with 600ml o/p. Currently awake in NAD. (01 Jan 2022 23:57)   Code Stroke, while waiting for a bed in ED. MRS 0 per family. NIH 25. Not a tPA candidate; underwent TICI 2b MT.  New onset afib   Arrived to [facility] intubated on 1/6/22.    ICU Vital Signs Last 24 Hrs  T(C): 36.3 (11 Jan 2022 07:33), Max: 36.6 (10 James 2022 23:37)  T(F): 97.3 (11 Jan 2022 07:33), Max: 97.8 (10 James 2022 23:37)  HR: 109 (11 Jan 2022 09:00) (94 - 128)  BP: 115/66 (11 Jan 2022 09:00) (97/53 - 134/114)  BP(mean): 82 (11 Jan 2022 09:00) (67 - 122)  RR: 12 (11 Jan 2022 09:00) (12 - 18)  SpO2: 97% (11 Jan 2022 09:00) (92% - 107%)      MEDICATIONS  (STANDING):  albuterol/ipratropium for Nebulization 3 milliLiter(s) Nebulizer every 6 hours  ferrous    sulfate 325 milliGRAM(s) Oral daily  furosemide   Injectable 40 milliGRAM(s) IV Push two times a day  glucagon  Injectable 1 milliGRAM(s) IntraMuscular once  heparin  Infusion 750 Unit(s)/Hr (7.5 mL/Hr) IV Continuous <Continuous>  metoprolol tartrate 25 milliGRAM(s) Oral every 8 hours  pantoprazole  Injectable 40 milliGRAM(s) IV Push daily    MEDICATIONS  (PRN):  ondansetron Injectable 4 milliGRAM(s) IV Push every 8 hours PRN Nausea and/or Vomiting  sodium chloride 0.9% lock flush 10 milliLiter(s) IV Push every 1 hour PRN Pre/post blood products, medications, blood draw, and to maintain line patency  y        10 James 2022 07:01  -  11 Jan 2022 07:00  --------------------------------------------------------  IN:    Heparin: 127.5 mL    Oral Fluid: 300 mL  Total IN: 427.5 mL    OUT:    Indwelling Catheter - Urethral (mL): 410 mL    Voided (mL): 1150 mL  Total OUT: 1560 mL    Total NET: -1132.5 mL      11 Jan 2022 07:01  -  11 Jan 2022 09:30  --------------------------------------------------------  IN:    Heparin: 15 mL  Total IN: 15 mL    OUT:  Total OUT: 0 mL    Total NET: 15 mL    09 Jan 2022 07:01  -  10 Jan 2022 07:00  Total NET: -890 mL      IMAGING     MR Head No Cont (01.09.22 @ 18:22) >    INTERPRETATION:  Clinical indication: Left M1 occlusion. Status post   thrombectomy.    MRI of the brain was performed using sagittal T1, axial T1 T2 T2 FLAIR   diffusion and gradient echo sequence. Coronal T1 and T2-weighted   sequences performed as well.    This exam is compared with prior head CT performed on January 7, 2022.    Parenchymal volume loss and chronic microvascular ischemic changes are   identified    Scattered areas of abnormal T2 prolongation with restricted diffusion is   seen involving the posterior left insula, left posterior temporal, left   posterior frontal, left parietal and left occipital cortical and   subcortical region. Involvement of the posterior limb of the left   internal capsule, left caudate head and left corona radiata region are   seen as well as the left pre and postcentral gyrus region. These findings   are compatible with areas of acute left MCA and PCA infarcts. No definite   evidence of hemorrhagic transformation is seen at this time. There is   localized mass effect seen consisting of sulcal effacement. No   significant shift or herniation is seen.    The large vessels appear demonstrate normal flow voids    Right maxillary sinus mucosal thickening is seen. Right frontal sinus   mucosal thickening is seen.    The patient is status post bilateral cataract surgery.    Inflammatory changes are seen involving the right mastoid and middle ear   regions.    IMPRESSION: Acute left MCA and PCA infarcts as described above.    ECHO    Summary:   1. Left ventricular ejection fraction, by visual estimation, is >75%.   2. Technically suboptimal study.   3. Hyperdynamic global left ventricular systolic function.   4. Normal left ventricular internal cavity size.   5. The mitral in-flow pattern reveals no discernable A-wave, therefore   no comment on diastolic function can be made.   6. Normal right ventricular size and function.   7. Mild to moderately enlarged left atrium.   8. There is no evidence of pericardial effusion.   9. Mild mitral annular calcification.  10. Mild to moderate mitral valve regurgitation.  11. Thickening of the anterior and posterior mitral valve leaflets.  12. Mild tricuspid regurgitation.  13. Small mobile echodencities visualized on the aortic valve. 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 Clinical correlation recommended.      01-11    140  |  100  |  69.3<H>  ----------------------------<  153<H>  4.1   |  26.0  |  2.41<H>    Ca    8.4<L>      11 Jan 2022 03:36  Phos  5.0     01-11  Mg     2.3     01-11      HGBA1c- 6.1  LDL-30  Ferritin- 339  Sat- 11%  Fe-24  AST- 34  ALT-11  APO4- 178  TSH- nl    PHYSICAL EXAM:    Constitutional: No Acute Distress     Neurological: Awake, alert oriented to person, place and time, Following Commands, PERRL, EOMI, No Gaze Preference, Face Symmetrical, Speech Fluent, No dysmetria, No ataxia, No nystagmus     Motor exam:          Upper extremity                         Delt     Bicep     Tricep    HG                                                 R         5/5        5/5        5/5       5/5                                               L          5/5        5/5        5/5       5/5          Lower extremity                        HF         KF        KE       DF         PF                                                  R        5/5        5/5        5/5       5/5         5/5                                               L         5/5        5/5       5/5       5/5          5/5                                                 Sensation: [ ] intact to light touch  [ ] decreased:     Reflexes: Deep Tendon Reflexes Intact     Pulmonary: Clear to Auscultation, No rales, No rhonchi, No wheezes     Cardiovascular: S1, S2, Regular rate and rhythm     Gastrointestinal: Soft, Non-tender, Non-distended     Extremities: No calf tenderness     Incision:                             10.6   8.92  )-----------( 109      ( 11 Jan 2022 03:36 )             34.4                      Exam:  NAD, calm.  EO spont, not FC, perseverates, global aphasia, PERRL, L gaze preference, motor - holds b/l uppers antigravity with prompting, no drift, LLE at least 3/5, RLE some antigravity effort.   S1S2 present.  CTAb anteriorly, diminished Bibasilar sounds.  Abd soft.  BL LE and lower abd pitting edema.  R Shiley in place.     HPI:  85 y/o female from home with history of HTN, CKD baseline Cr. 1.7, COPD not on home 02, remote hx of breast cancer s/p mastectomy in 85, colon cancer s/p bowel resection 12 years ago, chronic LE edema. Patient lives alone and states shes usually independent with no assistive devices. Patient states she sustained a mechanical fall 1 week ago landing on her left side. She denies LOC, head or neck pain, She did sustain a skin tear to her left forearm. She denies being on the floor for more than a few minutes. She admits to feeling weak since the fall and has been ordering out chinese food most of the week. She has been eating won ton soup, fried rice, and chicken lo mein. She normally sees Dr. Snyder who has told her not to use any excess salt as she has chronic LE edema. She denies any hx of Afib, or CAD. She came to ED after she noted weight gain, SCHMID, and all her clothes fitting more tightly. She denies CP, fever, chills, N/V or diaphoresis. In the ED patient noted to be in afib w/ RVR, Anasarca, EDILBERTO on CKD, and with RLL PNA. She was cultured, given IV abx, started on Cardizem drip after PO cardizem and BB failed to control her HR. She was seen by Cardiology who did bedside echo showing diastolic dysfunction and dilated IVC, CTs showed anasarca and pleural effusions with RLLL infiltrate. COVID neg, U/A neg. no focal weakness. Patient given IV digoxin, tafoya placed, and IV lasix given with 600ml o/p. Currently awake in NAD. (01 Jan 2022 23:57)   Code Stroke, while waiting for a bed in ED. MRS 0 per family. NIH 25. Not a tPA candidate; underwent TICI 2b MT.  New onset afib   Arrived to [facility] intubated on 1/6/22.    ICU Vital Signs Last 24 Hrs  T(C): 36.3 (11 Jan 2022 07:33), Max: 36.6 (10 James 2022 23:37)  T(F): 97.3 (11 Jan 2022 07:33), Max: 97.8 (10 James 2022 23:37)  HR: 109 (11 Jan 2022 09:00) (94 - 128)  BP: 115/66 (11 Jan 2022 09:00) (97/53 - 134/114)  BP(mean): 82 (11 Jan 2022 09:00) (67 - 122)  RR: 12 (11 Jan 2022 09:00) (12 - 18)  SpO2: 97% (11 Jan 2022 09:00) (92% - 107%)      MEDICATIONS  (STANDING):  albuterol/ipratropium for Nebulization 3 milliLiter(s) Nebulizer every 6 hours  ferrous    sulfate 325 milliGRAM(s) Oral daily  furosemide   Injectable 40 milliGRAM(s) IV Push two times a day  glucagon  Injectable 1 milliGRAM(s) IntraMuscular once  heparin  Infusion 750 Unit(s)/Hr (7.5 mL/Hr) IV Continuous <Continuous>  metoprolol tartrate 25 milliGRAM(s) Oral every 8 hours  pantoprazole  Injectable 40 milliGRAM(s) IV Push daily    MEDICATIONS  (PRN):  ondansetron Injectable 4 milliGRAM(s) IV Push every 8 hours PRN Nausea and/or Vomiting  sodium chloride 0.9% lock flush 10 milliLiter(s) IV Push every 1 hour PRN Pre/post blood products, medications, blood draw, and to maintain line patency  y        10 James 2022 07:01  -  11 Jan 2022 07:00  --------------------------------------------------------  IN:    Heparin: 127.5 mL    Oral Fluid: 300 mL  Total IN: 427.5 mL    OUT:    Indwelling Catheter - Urethral (mL): 410 mL    Voided (mL): 1150 mL  Total OUT: 1560 mL    Total NET: -1132.5 mL      11 Jan 2022 07:01  -  11 Jan 2022 09:30  --------------------------------------------------------  IN:    Heparin: 15 mL  Total IN: 15 mL    OUT:  Total OUT: 0 mL    Total NET: 15 mL    09 Jan 2022 07:01  -  10 Jan 2022 07:00  Total NET: -890 mL      IMAGING     MR Head No Cont (01.09.22 @ 18:22) >    INTERPRETATION:  Clinical indication: Left M1 occlusion. Status post   thrombectomy.    MRI of the brain was performed using sagittal T1, axial T1 T2 T2 FLAIR   diffusion and gradient echo sequence. Coronal T1 and T2-weighted   sequences performed as well.    This exam is compared with prior head CT performed on January 7, 2022.    Parenchymal volume loss and chronic microvascular ischemic changes are   identified    Scattered areas of abnormal T2 prolongation with restricted diffusion is   seen involving the posterior left insula, left posterior temporal, left   posterior frontal, left parietal and left occipital cortical and   subcortical region. Involvement of the posterior limb of the left   internal capsule, left caudate head and left corona radiata region are   seen as well as the left pre and postcentral gyrus region. These findings   are compatible with areas of acute left MCA and PCA infarcts. No definite   evidence of hemorrhagic transformation is seen at this time. There is   localized mass effect seen consisting of sulcal effacement. No   significant shift or herniation is seen.    The large vessels appear demonstrate normal flow voids    Right maxillary sinus mucosal thickening is seen. Right frontal sinus   mucosal thickening is seen.    The patient is status post bilateral cataract surgery.    Inflammatory changes are seen involving the right mastoid and middle ear   regions.    IMPRESSION: Acute left MCA and PCA infarcts as described above.    ECHO    Summary:   1. Left ventricular ejection fraction, by visual estimation, is >75%.   2. Technically suboptimal study.   3. Hyperdynamic global left ventricular systolic function.   4. Normal left ventricular internal cavity size.   5. The mitral in-flow pattern reveals no discernable A-wave, therefore   no comment on diastolic function can be made.   6. Normal right ventricular size and function.   7. Mild to moderately enlarged left atrium.   8. There is no evidence of pericardial effusion.   9. Mild mitral annular calcification.  10. Mild to moderate mitral valve regurgitation.  11. Thickening of the anterior and posterior mitral valve leaflets.  12. Mild tricuspid regurgitation.  13. Small mobile echodencities visualized on the aortic valve. 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 Clinical correlation recommended.      01-11    140  |  100  |  69.3<H>  ----------------------------<  153<H>  4.1   |  26.0  |  2.41<H>    Ca    8.4<L>      11 Jan 2022 03:36  Phos  5.0     01-11  Mg     2.3     01-11      HGBA1c- 6.1  LDL-30  Ferritin- 339  Sat- 11%  Fe-24  AST- 34  ALT-11  APO4- 178  TSH- nl    PHYSICAL EXAM:                            10.6   8.92  )-----------( 109      ( 11 Jan 2022 03:36 )             34.4                Exam:  NAD, calm.  EO spont, not FC, perseverates, global aphasia, PERRL, L gaze preference, motor - holds b/l uppers antigravity with prompting, no drift, LLE at least 3/5, RLE some antigravity effort.   S1S2 present.  CTAb anteriorly, diminished Bibasilar sounds.  Abd soft.  BL LE and lower abd pitting edema improved   R Shiley Sub Clav  in place.     HPI:  85 y/o female from home with history of HTN, CKD baseline Cr. 1.7, COPD not on home 02, remote hx of breast cancer s/p mastectomy in 85, colon cancer s/p bowel resection 12 years ago, chronic LE edema. Patient lives alone and states shes usually independent with no assistive devices. Patient states she sustained a mechanical fall 1 week ago landing on her left side. She denies LOC, head or neck pain, She did sustain a skin tear to her left forearm. She denies being on the floor for more than a few minutes. She admits to feeling weak since the fall and has been ordering out chinese food most of the week. She has been eating won ton soup, fried rice, and chicken lo mein. She normally sees Dr. Snyder who has told her not to use any excess salt as she has chronic LE edema. She denies any hx of Afib, or CAD. She came to ED after she noted weight gain, SCHMID, and all her clothes fitting more tightly. She denies CP, fever, chills, N/V or diaphoresis. In the ED patient noted to be in afib w/ RVR, Anasarca, EDILBERTO on CKD, and with RLL PNA. She was cultured, given IV abx, started on Cardizem drip after PO cardizem and BB failed to control her HR. She was seen by Cardiology who did bedside echo showing diastolic dysfunction and dilated IVC, CTs showed anasarca and pleural effusions with RLLL infiltrate. COVID neg, U/A neg. no focal weakness. Patient given IV digoxin, tafoya placed, and IV lasix given with 600ml o/p. Currently awake in NAD. (01 Jan 2022 23:57)   Code Stroke, while waiting for a bed in ED. MRS 0 per family. NIH 25. Not a tPA candidate; underwent TICI 2b MT.  New onset afib   Arrived to [facility] intubated on 1/6/22.    ICU Vital Signs Last 24 Hrs  T(C): 36.3 (11 Jan 2022 07:33), Max: 36.6 (10 James 2022 23:37)  T(F): 97.3 (11 Jan 2022 07:33), Max: 97.8 (10 James 2022 23:37)  HR: 109 (11 Jan 2022 09:00) (94 - 128)  BP: 115/66 (11 Jan 2022 09:00) (97/53 - 134/114)  BP(mean): 82 (11 Jan 2022 09:00) (67 - 122)  RR: 12 (11 Jan 2022 09:00) (12 - 18)  SpO2: 97% (11 Jan 2022 09:00) (92% - 107%)      MEDICATIONS  (STANDING):  albuterol/ipratropium for Nebulization 3 milliLiter(s) Nebulizer every 6 hours  ferrous    sulfate 325 milliGRAM(s) Oral daily  furosemide   Injectable 40 milliGRAM(s) IV Push two times a day  glucagon  Injectable 1 milliGRAM(s) IntraMuscular once  heparin  Infusion 750 Unit(s)/Hr (7.5 mL/Hr) IV Continuous <Continuous>  metoprolol tartrate 25 milliGRAM(s) Oral every 8 hours  pantoprazole  Injectable 40 milliGRAM(s) IV Push daily    MEDICATIONS  (PRN):  ondansetron Injectable 4 milliGRAM(s) IV Push every 8 hours PRN Nausea and/or Vomiting  sodium chloride 0.9% lock flush 10 milliLiter(s) IV Push every 1 hour PRN Pre/post blood products, medications, blood draw, and to maintain line patency  y        10 James 2022 07:01  -  11 Jan 2022 07:00  --------------------------------------------------------  IN:    Heparin: 127.5 mL    Oral Fluid: 300 mL  Total IN: 427.5 mL    OUT:    Indwelling Catheter - Urethral (mL): 410 mL    Voided (mL): 1150 mL  Total OUT: 1560 mL    Total NET: -1132.5 mL      11 Jan 2022 07:01  -  11 Jan 2022 09:30  --------------------------------------------------------  IN:    Heparin: 15 mL  Total IN: 15 mL    OUT:  Total OUT: 0 mL    Total NET: 15 mL    09 Jan 2022 07:01  -  10 Jan 2022 07:00  Total NET: -890 mL      IMAGING     MR Head No Cont (01.09.22 @ 18:22) >    INTERPRETATION:  Clinical indication: Left M1 occlusion. Status post   thrombectomy.    MRI of the brain was performed using sagittal T1, axial T1 T2 T2 FLAIR   diffusion and gradient echo sequence. Coronal T1 and T2-weighted   sequences performed as well.    This exam is compared with prior head CT performed on January 7, 2022.    Parenchymal volume loss and chronic microvascular ischemic changes are   identified    Scattered areas of abnormal T2 prolongation with restricted diffusion is   seen involving the posterior left insula, left posterior temporal, left   posterior frontal, left parietal and left occipital cortical and   subcortical region. Involvement of the posterior limb of the left   internal capsule, left caudate head and left corona radiata region are   seen as well as the left pre and postcentral gyrus region. These findings   are compatible with areas of acute left MCA and PCA infarcts. No definite   evidence of hemorrhagic transformation is seen at this time. There is   localized mass effect seen consisting of sulcal effacement. No   significant shift or herniation is seen.    The large vessels appear demonstrate normal flow voids    Right maxillary sinus mucosal thickening is seen. Right frontal sinus   mucosal thickening is seen.    The patient is status post bilateral cataract surgery.    Inflammatory changes are seen involving the right mastoid and middle ear   regions.    IMPRESSION: Acute left MCA and PCA infarcts as described above.    ECHO    Summary:   1. Left ventricular ejection fraction, by visual estimation, is >75%.   2. Technically suboptimal study.   3. Hyperdynamic global left ventricular systolic function.   4. Normal left ventricular internal cavity size.   5. The mitral in-flow pattern reveals no discernable A-wave, therefore   no comment on diastolic function can be made.   6. Normal right ventricular size and function.   7. Mild to moderately enlarged left atrium.   8. There is no evidence of pericardial effusion.   9. Mild mitral annular calcification.  10. Mild to moderate mitral valve regurgitation.  11. Thickening of the anterior and posterior mitral valve leaflets.  12. Mild tricuspid regurgitation.  13. Small mobile echodencities visualized on the aortic valve. 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 Clinical correlation recommended.      01-11    140  |  100  |  69.3<H>  ----------------------------<  153<H>  4.1   |  26.0  |  2.41<H>    Ca    8.4<L>      11 Jan 2022 03:36  Phos  5.0     01-11  Mg     2.3     01-11      HGBA1c- 6.1  LDL-30  Ferritin- 339  Sat- 11%  Fe-24  AST- 34  ALT-11  APO4- 178  TSH- nl    PHYSICAL EXAM:                            10.6   8.92  )-----------( 109      ( 11 Jan 2022 03:36 )             34.4                Exam:  NAD, calm.  EO spont, not FC, perseverates, global aphasia, PERRL, L gaze preference, motor - holds b/l uppers antigravity with prompting 4-/5 yet weak proximaLY, no drift, LLE at least 3/5, RLE some antigravity effort.   S1S2 present.  CTAb anteriorly, diminished Bibasilar sounds.  Abd soft.  BL LE and lower abd pitting edema improved   R Shiley Sub Clav  in place.

## 2022-01-11 NOTE — PROGRESS NOTE ADULT - ASSESSMENT
87 y/o F with PMH Breast cancer, Mastectomy 1985, colon ca resection 2010, VATS pleurectomy drainage 2018, COPD, CKD, presents to ED with c/o Leg swelling x 1 week. States fell on monday, able to get off floor, since then worsening shortness of breath, and leg swelling. Denies palpitations, chest pains. Noted to Be afib RVR in ED, given diltiazem. Edema noted to abd.     1/11: Pt denies chest pain, palpitations, SOB. Tele-Afib HR @ 90-110s with PVCs, HR in 130s at times. Cont. Lopressor 25mg PO q 8 hours with parameters, Midodrine 5mg TID to increase BP, Keep PTT goal between 50-60. Cont Lasix 40mg BID, Bladder scan intermittently.        LMA Occlusion  -MR Head-Acute left MCA and PCA infarcts   -s/p cerebral angio on 1/6.  TICI 2b revascularization of left m1 occlusion  -Pt presents with repitive use of word "yes", unable to follow commands       Afib  -Tele-Afib HR @ 90-110s with PVCs, HR in 130s at times  -Cont telemetry monitoring   -Digoxin Level-2, hold digoxin use for now  -Cont. 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 HD for fluid management    - Bladder scan intermittently         EDILBERTO on CKD  -BUN/Creatnin-69.3/2.41   -Nephrology following

## 2022-01-12 LAB
ANION GAP SERPL CALC-SCNC: 13 MMOL/L — SIGNIFICANT CHANGE UP (ref 5–17)
APTT BLD: 38.4 SEC — HIGH (ref 27.5–35.5)
BUN SERPL-MCNC: 37.6 MG/DL — HIGH (ref 8–20)
CALCIUM SERPL-MCNC: 8.3 MG/DL — LOW (ref 8.6–10.2)
CHLORIDE SERPL-SCNC: 99 MMOL/L — SIGNIFICANT CHANGE UP (ref 98–107)
CO2 SERPL-SCNC: 28 MMOL/L — SIGNIFICANT CHANGE UP (ref 22–29)
CREAT SERPL-MCNC: 1.56 MG/DL — HIGH (ref 0.5–1.3)
DIGOXIN SERPL-MCNC: 1.4 NG/ML — SIGNIFICANT CHANGE UP (ref 0.8–2)
GLUCOSE SERPL-MCNC: 109 MG/DL — HIGH (ref 70–99)
INR BLD: 1.36 RATIO — HIGH (ref 0.88–1.16)
MAGNESIUM SERPL-MCNC: 2.1 MG/DL — SIGNIFICANT CHANGE UP (ref 1.6–2.6)
PHOSPHATE SERPL-MCNC: 3 MG/DL — SIGNIFICANT CHANGE UP (ref 2.4–4.7)
POTASSIUM SERPL-MCNC: 3.5 MMOL/L — SIGNIFICANT CHANGE UP (ref 3.5–5.3)
POTASSIUM SERPL-SCNC: 3.5 MMOL/L — SIGNIFICANT CHANGE UP (ref 3.5–5.3)
PROTHROM AB SERPL-ACNC: 15.5 SEC — HIGH (ref 10.6–13.6)
SODIUM SERPL-SCNC: 140 MMOL/L — SIGNIFICANT CHANGE UP (ref 135–145)

## 2022-01-12 PROCEDURE — 99233 SBSQ HOSP IP/OBS HIGH 50: CPT

## 2022-01-12 PROCEDURE — 70450 CT HEAD/BRAIN W/O DYE: CPT | Mod: 26

## 2022-01-12 PROCEDURE — 99232 SBSQ HOSP IP/OBS MODERATE 35: CPT

## 2022-01-12 RX ORDER — HEPARIN SODIUM 5000 [USP'U]/ML
450 INJECTION INTRAVENOUS; SUBCUTANEOUS
Qty: 25000 | Refills: 0 | Status: DISCONTINUED | OUTPATIENT
Start: 2022-01-12 | End: 2022-01-12

## 2022-01-12 RX ORDER — DESMOPRESSIN ACETATE 0.1 MG/1
20 TABLET ORAL ONCE
Refills: 0 | Status: COMPLETED | OUTPATIENT
Start: 2022-01-12 | End: 2022-01-12

## 2022-01-12 RX ORDER — IPRATROPIUM/ALBUTEROL SULFATE 18-103MCG
3 AEROSOL WITH ADAPTER (GRAM) INHALATION EVERY 6 HOURS
Refills: 0 | Status: DISCONTINUED | OUTPATIENT
Start: 2022-01-12 | End: 2022-01-25

## 2022-01-12 RX ORDER — APIXABAN 2.5 MG/1
2.5 TABLET, FILM COATED ORAL
Refills: 0 | Status: DISCONTINUED | OUTPATIENT
Start: 2022-01-12 | End: 2022-01-25

## 2022-01-12 RX ORDER — POTASSIUM CHLORIDE 20 MEQ
40 PACKET (EA) ORAL ONCE
Refills: 0 | Status: COMPLETED | OUTPATIENT
Start: 2022-01-12 | End: 2022-01-12

## 2022-01-12 RX ADMIN — PANTOPRAZOLE SODIUM 40 MILLIGRAM(S): 20 TABLET, DELAYED RELEASE ORAL at 12:33

## 2022-01-12 RX ADMIN — Medication 3 MILLILITER(S): at 08:42

## 2022-01-12 RX ADMIN — Medication 325 MILLIGRAM(S): at 12:33

## 2022-01-12 RX ADMIN — APIXABAN 2.5 MILLIGRAM(S): 2.5 TABLET, FILM COATED ORAL at 17:37

## 2022-01-12 RX ADMIN — APIXABAN 2.5 MILLIGRAM(S): 2.5 TABLET, FILM COATED ORAL at 05:24

## 2022-01-12 RX ADMIN — HEPARIN SODIUM 4.5 UNIT(S)/HR: 5000 INJECTION INTRAVENOUS; SUBCUTANEOUS at 05:23

## 2022-01-12 RX ADMIN — Medication 40 MILLIGRAM(S): at 05:24

## 2022-01-12 RX ADMIN — Medication 25 MILLIGRAM(S): at 23:39

## 2022-01-12 RX ADMIN — DESMOPRESSIN ACETATE 220 MICROGRAM(S): 0.1 TABLET ORAL at 14:27

## 2022-01-12 RX ADMIN — Medication 40 MILLIEQUIVALENT(S): at 05:24

## 2022-01-12 RX ADMIN — Medication 3 MILLILITER(S): at 03:42

## 2022-01-12 RX ADMIN — Medication 40 MILLIGRAM(S): at 17:37

## 2022-01-12 NOTE — PHYSICAL THERAPY INITIAL EVALUATION ADULT - DIAGNOSIS, PT EVAL
Impaired mobility.
Impaired functional mobility secondary to weakness, impaired motor control, decreased aerobic capacity

## 2022-01-12 NOTE — PROGRESS NOTE ADULT - SUBJECTIVE AND OBJECTIVE BOX
CC: Stroke    HPI:   The patient is a 86y Female who was admitted 1/1/22.  She had fallen 1 week prior to arrival and landed on her left side.   She had been eating a lot of take out Chinese food since the fall.   She presented secondary to weight gain, dyspnea on exertion, and edema.   Noted to be in atrial fibrillation on arrival.   She was started on treatment including anticoagulation.    On 1/6/22 she was found to be confused.   There is some suggestion of right sided weakness.  Stroke code was activated around 5:30 pm. She was last known well around 2 pm.   STAT CT and CT-A/CT-P were performed and she was found to have large vessel occlusion (left M1) and neuro-intervention team was called.    She was taken to the cath lab for thrombectomy with TICI 2b revascularization.  NIH SS score was 9 prior to intervention.    1-  Remains neurologically stable.  1- No new events.  1-12-22  Remains stable neurologically.          Vital Signs Last 24 Hrs  T(C): 36.7 (12 Jan 2022 17:30), Max: 36.7 (12 Jan 2022 17:30)  T(F): 98.1 (12 Jan 2022 17:30), Max: 98.1 (12 Jan 2022 17:30)  HR: 101 (12 Jan 2022 23:00) (85 - 111)  BP: 115/73 (12 Jan 2022 23:00) (96/52 - 137/76)  BP(mean): 102 (12 Jan 2022 14:00) (64 - 102)  RR: 18 (12 Jan 2022 23:00) (12 - 18)  SpO2: 99% (12 Jan 2022 23:00) (90% - 100%)    MEDICATIONS    albuterol/ipratropium for Nebulization 3 milliLiter(s) Nebulizer every 6 hours PRN  apixaban 2.5 milliGRAM(s) Oral two times a day  ferrous    sulfate 325 milliGRAM(s) Oral daily  furosemide   Injectable 40 milliGRAM(s) IV Push two times a day  glucagon  Injectable 1 milliGRAM(s) IntraMuscular once  metoprolol tartrate 25 milliGRAM(s) Oral every 8 hours  ondansetron Injectable 4 milliGRAM(s) IV Push every 8 hours PRN  pantoprazole    Tablet 40 milliGRAM(s) Oral daily  sodium chloride 0.9% lock flush 10 milliLiter(s) IV Push every 1 hour PRN         LABS:  CBC Full  -  ( 11 Jan 2022 19:11 )  WBC Count : 6.58 K/uL  RBC Count : 3.87 M/uL  Hemoglobin : 9.9 g/dL  Hematocrit : 31.8 %  Platelet Count - Automated : 99 K/uL  Mean Cell Volume : 82.2 fl  Mean Cell Hemoglobin : 25.6 pg  Mean Cell Hemoglobin Concentration : 31.1 gm/dL  Auto Neutrophil # : x  Auto Lymphocyte # : x  Auto Monocyte # : x  Auto Eosinophil # : x  Auto Basophil # : x  Auto Neutrophil % : x  Auto Lymphocyte % : x  Auto Monocyte % : x  Auto Eosinophil % : x  Auto Basophil % : x      01-12    140  |  99  |  37.6<H>  ----------------------------<  109<H>  3.5   |  28.0  |  1.56<H>    Ca    8.3<L>      12 Jan 2022 04:03  Phos  3.0     01-12  Mg     2.1     01-12        Hemoglobin A1C:       PT/INR - ( 12 Jan 2022 04:03 )   PT: 15.5 sec;   INR: 1.36 ratio         PTT - ( 12 Jan 2022 04:03 )  PTT:38.4 sec        Detailed Neurologic Exam:    Mental status: The patient awake and alert. She answers "YES" to most questions. She does not follow instructions.    Cranial nerves: Pupils react symmetrically to light. She blinks to threat to confrontation but less consistently on the right. She tracks bilaterally. There is a subtle depression of the right nasolabial fold.     Motor: There is normal bulk and tone.  Has a RUE drift . LUE has no drift. Both LE are antigravity.    Sensory responds to noxious stimulation bilaterally.  Cerebellar: Cannot be assessed.               Rad:   Repeat CT head 1/9 images reviewed. There is acute infarct in the left temporal-occipital area with suggestion of left caudate infarction as well. There is no hemorrhage.     MR Head No Cont (01.09.22 @ 18:22) >    IMPRESSION: Acute left MCA and PCA infarcts as described above.                                  CC: Stroke    HPI:   The patient is a 86y Female who was admitted 1/1/22.  She had fallen 1 week prior to arrival and landed on her left side.   She had been eating a lot of take out Chinese food since the fall.   She presented secondary to weight gain, dyspnea on exertion, and edema.   Noted to be in atrial fibrillation on arrival.   She was started on treatment including anticoagulation.    On 1/6/22 she was found to be confused.   There is some suggestion of right sided weakness.  Stroke code was activated around 5:30 pm. She was last known well around 2 pm.   STAT CT and CT-A/CT-P were performed and she was found to have large vessel occlusion (left M1) and neuro-intervention team was called.    She was taken to the cath lab for thrombectomy with TICI 2b revascularization.  NIH SS score was 9 prior to intervention.    1-  Remains neurologically stable.  1- No new events.  1-12-22  Remains stable neurologically.          Vital Signs Last 24 Hrs  T(C): 36.7 (12 Jan 2022 17:30), Max: 36.7 (12 Jan 2022 17:30)  T(F): 98.1 (12 Jan 2022 17:30), Max: 98.1 (12 Jan 2022 17:30)  HR: 101 (12 Jan 2022 23:00) (85 - 111)  BP: 115/73 (12 Jan 2022 23:00) (96/52 - 137/76)  BP(mean): 102 (12 Jan 2022 14:00) (64 - 102)  RR: 18 (12 Jan 2022 23:00) (12 - 18)  SpO2: 99% (12 Jan 2022 23:00) (90% - 100%)    MEDICATIONS    albuterol/ipratropium for Nebulization 3 milliLiter(s) Nebulizer every 6 hours PRN  apixaban 2.5 milliGRAM(s) Oral two times a day  ferrous    sulfate 325 milliGRAM(s) Oral daily  furosemide   Injectable 40 milliGRAM(s) IV Push two times a day  glucagon  Injectable 1 milliGRAM(s) IntraMuscular once  metoprolol tartrate 25 milliGRAM(s) Oral every 8 hours  ondansetron Injectable 4 milliGRAM(s) IV Push every 8 hours PRN  pantoprazole    Tablet 40 milliGRAM(s) Oral daily  sodium chloride 0.9% lock flush 10 milliLiter(s) IV Push every 1 hour PRN         LABS:  CBC Full  -  ( 11 Jan 2022 19:11 )  WBC Count : 6.58 K/uL  RBC Count : 3.87 M/uL  Hemoglobin : 9.9 g/dL  Hematocrit : 31.8 %  Platelet Count - Automated : 99 K/uL  Mean Cell Volume : 82.2 fl  Mean Cell Hemoglobin : 25.6 pg  Mean Cell Hemoglobin Concentration : 31.1 gm/dL  Auto Neutrophil # : x  Auto Lymphocyte # : x  Auto Monocyte # : x  Auto Eosinophil # : x  Auto Basophil # : x  Auto Neutrophil % : x  Auto Lymphocyte % : x  Auto Monocyte % : x  Auto Eosinophil % : x  Auto Basophil % : x      01-12    140  |  99  |  37.6<H>  ----------------------------<  109<H>  3.5   |  28.0  |  1.56<H>    Ca    8.3<L>      12 Jan 2022 04:03  Phos  3.0     01-12  Mg     2.1     01-12        Hemoglobin A1C:       PT/INR - ( 12 Jan 2022 04:03 )   PT: 15.5 sec;   INR: 1.36 ratio         PTT - ( 12 Jan 2022 04:03 )  PTT:38.4 sec        Detailed Neurologic Exam:    Mental status: The patient awake and alert. She answers "YES" to most questions. She does not follow instructions.    Cranial nerves: Pupils react symmetrically to light. She blinks to threat to confrontation but less consistently on the right. She tracks bilaterally. There is a subtle depression of the right nasolabial fold.     Motor: There is normal bulk and tone.  Has a RUE drift . LUE has no drift. Both LE are antigravity.    Sensory responds to noxious stimulation bilaterally.  Cerebellar: Cannot be assessed.               Rad:   Repeat CT head 1/9 images reviewed. There is acute infarct in the left temporal-occipital area with suggestion of left caudate infarction as well. There is no hemorrhage.     MR Head No Cont (01.09.22 @ 18:22) >    IMPRESSION: Acute left MCA and PCA infarcts as described above.      CT Head No Cont (01.12.22 @ 03:15) >    IMPRESSION:  Evolving acute/early subacute infarct in the left middle cerebral artery   vascular territory is grossly stable from 01/09/2022.  No hemorrhagic   conversion

## 2022-01-12 NOTE — PROGRESS NOTE ADULT - SUBJECTIVE AND OBJECTIVE BOX
NEPHROLOGY INTERVAL HPI/OVERNIGHT EVENTS:    In ICU bed post Acute CVA.    MEDICATIONS  (STANDING):  albuterol/ipratropium for Nebulization 3 milliLiter(s) Nebulizer every 6 hours  apixaban 2.5 milliGRAM(s) Oral two times a day  ferrous    sulfate 325 milliGRAM(s) Oral daily  furosemide   Injectable 40 milliGRAM(s) IV Push two times a day  glucagon  Injectable 1 milliGRAM(s) IntraMuscular once  metoprolol tartrate 25 milliGRAM(s) Oral every 8 hours  pantoprazole    Tablet 40 milliGRAM(s) Oral daily    MEDICATIONS  (PRN):  ondansetron Injectable 4 milliGRAM(s) IV Push every 8 hours PRN Nausea and/or Vomiting  sodium chloride 0.9% lock flush 10 milliLiter(s) IV Push every 1 hour PRN Pre/post blood products, medications, blood draw, and to maintain line patency      Allergies    No Known Allergies          Vital Signs Last 24 Hrs  T(C): 36.5 (2022 08:00), Max: 36.6 (2022 03:42)  T(F): 97.7 (2022 08:00), Max: 97.8 (2022 03:42)  HR: 99 (2022 07:00) (85 - 117)  BP: 117/69 (2022 07:00) (72/41 - 125/72)  BP(mean): 81 (2022 07:00) (46 - 97)  RR: 12 (2022 19:00) (12 - 14)  SpO2: 98% (2022 07:00) (96% - 100%)     Daily Weight in k.5 (2022 03:42)    PHYSICAL EXAM:    GENERAL: Lethargic in bed; cachectic  HEAD: Same   EYES: VCloseed  ENMT:   NECK: right permacath  NERVOUS SYSTEM: lethargic   CHEST/LUNG: No wheezes  HEART: IRR  ABDOMEN: Soft  EXTREMITIES:  Bloody  dressing right  upper arm, special boots both feet  LYMPH:   SKIN: pale    LABS:                        9.9    6.58  )-----------( 99       ( 2022 19:11 )             31.8     01-12    140  |  99  |  37.6<H>  ----------------------------<  109<H>  3.5   |  28.0  |  1.56<H>    Ca    8.3<L>      2022 04:03  Phos  3.0       Mg     2.1           PT/INR - ( 2022 04:03 )   PT: 15.5 sec;   INR: 1.36 ratio         PTT - ( 2022 04:03 )  PTT:38.4 sec    Magnesium, Serum: 2.1 mg/dL ( @ 04:03)  Phosphorus Level, Serum: 3.0 mg/dL ( @ 04:03)          RADIOLOGY & ADDITIONAL TESTS:

## 2022-01-12 NOTE — PROGRESS NOTE ADULT - SUBJECTIVE AND OBJECTIVE BOX
HPI:  85 y/o female from home with history of HTN, CKD baseline Cr. 1.7, COPD not on home 02, remote hx of breast cancer s/p mastectomy in 86, colon cancer s/p bowel resection 12 years ago, chronic LE edema. Patient lives alone and states shes usually independent with no assistive devices. Patient states she sustained a mechanical fall 1 week ago landing on her left side. She denies LOC, head or neck pain, She did sustain a skin tear to her left forearm. She denies being on the floor for more than a few minutes. She admits to feeling weak since the fall and has been ordering out chinese food most of the week. She has been eating won ton soup, fried rice, and chicken lo mein. She normally sees Dr. Snyder who has told her not to use any excess salt as she has chronic LE edema. She denies any hx of Afib, or CAD. She came to ED after she noted weight gain, SCHMID, and all her clothes fitting more tightly. She denies CP, fever, chills, N/V or diaphoresis. In the ED patient noted to be in afib w/ RVR, Anasarca, EDILBERTO on CKD, and with RLL PNA. She was cultured, given IV abx, started on Cardizem drip after PO cardizem and BB failed to control her HR. She was seen by Cardiology who did bedside echo showing diastolic dysfunction and dilated IVC, CTs showed anasarca and pleural effusions with RLLL infiltrate. COVID neg, U/A neg. no focal weakness. Patient given IV digoxin, tafoya placed, and IV lasix given with 600ml o/p. Currently awake in NAD. (01 Jan 2022 23:57)   Code Stroke, while waiting for a bed in ED. MRS 0 per family. NIH 25. Not a tPA candidate; underwent TICI 2b MT.  New onset afib   Arrived to [facility] intubated on 1/6/22.    ICU Vital Signs Last 24 Hrs  T(C): 36.5 (12 Jan 2022 08:00), Max: 36.6 (12 Jan 2022 03:42)  T(F): 97.7 (12 Jan 2022 08:00), Max: 97.8 (12 Jan 2022 03:42)  HR: 102 (12 Jan 2022 08:39) (85 - 117)  BP: 114/69 (12 Jan 2022 08:00) (72/41 - 125/72)  BP(mean): 82 (12 Jan 2022 08:00) (46 - 97)  RR: 12 (12 Jan 2022 08:00) (12 - 14)  SpO2: 100% (12 Jan 2022 08:00) (96% - 100%)    MEDICATIONS  (STANDING):  albuterol/ipratropium for Nebulization 3 milliLiter(s) Nebulizer every 6 hours  apixaban 2.5 milliGRAM(s) Oral two times a day  ferrous    sulfate 325 milliGRAM(s) Oral daily  furosemide   Injectable 40 milliGRAM(s) IV Push two times a day  glucagon  Injectable 1 milliGRAM(s) IntraMuscular once  metoprolol tartrate 25 milliGRAM(s) Oral every 8 hours  pantoprazole    Tablet 40 milliGRAM(s) Oral daily    MEDICATIONS  (PRN):  ondansetron Injectable 4 milliGRAM(s) IV Push every 8 hours PRN Nausea and/or Vomiting  sodium chloride 0.9% lock flush 10 milliLiter(s) IV Push every 1 hour PRN Pre/post blood products, medications, blood draw, and to maintain line patency        11 Jan 2022 07:01  -  12 Jan 2022 07:00  --------------------------------------------------------  IN:    Heparin: 67.5 mL    Heparin: 35 mL    Heparin: 9 mL  Total IN: 111.5 mL    OUT:    Other (mL): 500 mL    Voided (mL): 1300 mL  Total OUT: 1800 mL    Total NET: -1688.5 mL          10 James 2022 07:01  -  11 Jan 2022 07:00  Total NET: -1132.5 mL      09 Jan 2022 07:01  -  10 James 2022 07:00  Total NET: -890 mL     CT Head No Cont (01.12.22 @ 03:15) >  IMPRESSION:  Evolving acute/early subacute infarct in the left middle cerebral artery   vascular territory is grossly stable from 01/09/2022.  No hemorrhagic   conversion            IMAGING     MR Head No Cont (01.09.22 @ 18:22) >    INTERPRETATION:  Clinical indication: Left M1 occlusion. Status post   thrombectomy.    MRI of the brain was performed using sagittal T1, axial T1 T2 T2 FLAIR   diffusion and gradient echo sequence. Coronal T1 and T2-weighted   sequences performed as well.    This exam is compared with prior head CT performed on January 7, 2022.    Parenchymal volume loss and chronic microvascular ischemic changes are   identified    Scattered areas of abnormal T2 prolongation with restricted diffusion is   seen involving the posterior left insula, left posterior temporal, left   posterior frontal, left parietal and left occipital cortical and   subcortical region. Involvement of the posterior limb of the left   internal capsule, left caudate head and left corona radiata region are   seen as well as the left pre and postcentral gyrus region. These findings   are compatible with areas of acute left MCA and PCA infarcts. No definite   evidence of hemorrhagic transformation is seen at this time. There is   localized mass effect seen consisting of sulcal effacement. No   significant shift or herniation is seen.    The large vessels appear demonstrate normal flow voids    Right maxillary sinus mucosal thickening is seen. Right frontal sinus   mucosal thickening is seen.    The patient is status post bilateral cataract surgery.    Inflammatory changes are seen involving the right mastoid and middle ear   regions.    IMPRESSION: Acute left MCA and PCA infarcts as described above.    ECHO    Summary:   1. Left ventricular ejection fraction, by visual estimation, is >75%.   2. Technically suboptimal study.   3. Hyperdynamic global left ventricular systolic function.   4. Normal left ventricular internal cavity size.   5. The mitral in-flow pattern reveals no discernable A-wave, therefore   no comment on diastolic function can be made.   6. Normal right ventricular size and function.   7. Mild to moderately enlarged left atrium.   8. There is no evidence of pericardial effusion.   9. Mild mitral annular calcification.  10. Mild to moderate mitral valve regurgitation.  11. Thickening of the anterior and posterior mitral valve leaflets.  12. Mild tricuspid regurgitation.  13. Small mobile echodencities visualized on the aortic valve. 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 Clinical correlation recommended.    01-12    140  |  99  |  37.6<H>  ----------------------------<  109<H>  3.5   |  28.0  |  1.56<H>    Ca    8.3<L>      12 Jan 2022 04:03  Phos  3.0     01-12  Mg     2.1     01-12 01-11    140  |  100  |  69.3<H>  ----------------------------<  153<H>  4.1   |  26.0  |  2.41<H>    Ca    8.4<L>      11 Jan 2022 03:36  Phos  5.0     01-11  Mg     2.3     01-11                          9.9    6.58  )-----------( 99       ( 11 Jan 2022 19:11 )             31.8         HGBA1c- 6.1  LDL-30  Ferritin- 339  Sat- 11%  Fe-24  AST- 34  ALT-11  APO4- 178  TSH- nl    PHYSICAL EXAM:                            10.6   8.92  )-----------( 109      ( 11 Jan 2022 03:36 )             34.4                Exam:  NAD, calm.  EO spont, not FC, perseverates, global aphasia, PERRL, L gaze preference, motor - holds b/l uppers antigravity with prompting 4-/5 yet weak proximaLY, no drift, LLE at least 3/5, RLE some antigravity effort.   S1S2 present.  CTAb anteriorly, diminished Bibasilar sounds.  Abd soft.  B/L UE- edematous- ecchymosis - soft tissue edema- small localized skin breakdown in upper arm with foci of bleeding contained  BL LE and lower abd pitting edema improved   R Shiley Sub Clav  in place.     HPI:  87 y/o female from home with history of HTN, CKD baseline Cr. 1.7, COPD not on home 02, remote hx of breast cancer s/p mastectomy in 86, colon cancer s/p bowel resection 12 years ago, chronic LE edema. Patient lives alone and states shes usually independent with no assistive devices. Patient states she sustained a mechanical fall 1 week ago landing on her left side. She denies LOC, head or neck pain, She did sustain a skin tear to her left forearm. She denies being on the floor for more than a few minutes. She admits to feeling weak since the fall and has been ordering out chinese food most of the week. She has been eating won ton soup, fried rice, and chicken lo mein. She normally sees Dr. Snyder who has told her not to use any excess salt as she has chronic LE edema. She denies any hx of Afib, or CAD. She came to ED after she noted weight gain, SCHMID, and all her clothes fitting more tightly. She denies CP, fever, chills, N/V or diaphoresis. In the ED patient noted to be in afib w/ RVR, Anasarca, EDILBERTO on CKD, and with RLL PNA. She was cultured, given IV abx, started on Cardizem drip after PO cardizem and BB failed to control her HR. She was seen by Cardiology who did bedside echo showing diastolic dysfunction and dilated IVC, CTs showed anasarca and pleural effusions with RLLL infiltrate. COVID neg, U/A neg. no focal weakness. Patient given IV digoxin, tafoya placed, and IV lasix given with 600ml o/p. Currently awake in NAD. (01 Jan 2022 23:57)   Code Stroke, while waiting for a bed in ED. MRS 0 per family. NIH 25. Not a tPA candidate; underwent TICI 2b MT.  New onset afib   Arrived to [facility] intubated on 1/6/22.    Overnight - Bleeding in upper arm  Stared on eloquis    ICU Vital Signs Last 24 Hrs  T(C): 36.5 (12 Jan 2022 08:00), Max: 36.6 (12 Jan 2022 03:42)  T(F): 97.7 (12 Jan 2022 08:00), Max: 97.8 (12 Jan 2022 03:42)  HR: 102 (12 Jan 2022 08:39) (85 - 117)  BP: 114/69 (12 Jan 2022 08:00) (72/41 - 125/72)  BP(mean): 82 (12 Jan 2022 08:00) (46 - 97)  RR: 12 (12 Jan 2022 08:00) (12 - 14)  SpO2: 100% (12 Jan 2022 08:00) (96% - 100%)    MEDICATIONS  (STANDING):  albuterol/ipratropium for Nebulization 3 milliLiter(s) Nebulizer every 6 hours  apixaban 2.5 milliGRAM(s) Oral two times a day  ferrous    sulfate 325 milliGRAM(s) Oral daily  furosemide   Injectable 40 milliGRAM(s) IV Push two times a day  glucagon  Injectable 1 milliGRAM(s) IntraMuscular once  metoprolol tartrate 25 milliGRAM(s) Oral every 8 hours  pantoprazole    Tablet 40 milliGRAM(s) Oral daily    MEDICATIONS  (PRN):  ondansetron Injectable 4 milliGRAM(s) IV Push every 8 hours PRN Nausea and/or Vomiting  sodium chloride 0.9% lock flush 10 milliLiter(s) IV Push every 1 hour PRN Pre/post blood products, medications, blood draw, and to maintain line patency        11 Jan 2022 07:01  -  12 Jan 2022 07:00  --------------------------------------------------------  IN:    Heparin: 67.5 mL    Heparin: 35 mL    Heparin: 9 mL  Total IN: 111.5 mL    OUT:    Other (mL): 500 mL    Voided (mL): 1300 mL  Total OUT: 1800 mL    Total NET: -1688.5 mL          10 James 2022 07:01  -  11 Jan 2022 07:00  Total NET: -1132.5 mL      09 Jan 2022 07:01  -  10 James 2022 07:00  Total NET: -890 mL     CT Head No Cont (01.12.22 @ 03:15) >  IMPRESSION:  Evolving acute/early subacute infarct in the left middle cerebral artery   vascular territory is grossly stable from 01/09/2022.  No hemorrhagic   conversion            IMAGING     MR Head No Cont (01.09.22 @ 18:22) >    INTERPRETATION:  Clinical indication: Left M1 occlusion. Status post   thrombectomy.    MRI of the brain was performed using sagittal T1, axial T1 T2 T2 FLAIR   diffusion and gradient echo sequence. Coronal T1 and T2-weighted   sequences performed as well.    This exam is compared with prior head CT performed on January 7, 2022.    Parenchymal volume loss and chronic microvascular ischemic changes are   identified    Scattered areas of abnormal T2 prolongation with restricted diffusion is   seen involving the posterior left insula, left posterior temporal, left   posterior frontal, left parietal and left occipital cortical and   subcortical region. Involvement of the posterior limb of the left   internal capsule, left caudate head and left corona radiata region are   seen as well as the left pre and postcentral gyrus region. These findings   are compatible with areas of acute left MCA and PCA infarcts. No definite   evidence of hemorrhagic transformation is seen at this time. There is   localized mass effect seen consisting of sulcal effacement. No   significant shift or herniation is seen.    The large vessels appear demonstrate normal flow voids    Right maxillary sinus mucosal thickening is seen. Right frontal sinus   mucosal thickening is seen.    The patient is status post bilateral cataract surgery.    Inflammatory changes are seen involving the right mastoid and middle ear   regions.    IMPRESSION: Acute left MCA and PCA infarcts as described above.    ECHO    Summary:   1. Left ventricular ejection fraction, by visual estimation, is >75%.   2. Technically suboptimal study.   3. Hyperdynamic global left ventricular systolic function.   4. Normal left ventricular internal cavity size.   5. The mitral in-flow pattern reveals no discernable A-wave, therefore   no comment on diastolic function can be made.   6. Normal right ventricular size and function.   7. Mild to moderately enlarged left atrium.   8. There is no evidence of pericardial effusion.   9. Mild mitral annular calcification.  10. Mild to moderate mitral valve regurgitation.  11. Thickening of the anterior and posterior mitral valve leaflets.  12. Mild tricuspid regurgitation.  13. Small mobile echodencities visualized on the aortic valve. 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 Clinical correlation recommended.    01-12    140  |  99  |  37.6<H>  ----------------------------<  109<H>  3.5   |  28.0  |  1.56<H>    Ca    8.3<L>      12 Jan 2022 04:03  Phos  3.0     01-12  Mg     2.1     01-12 01-11    140  |  100  |  69.3<H>  ----------------------------<  153<H>  4.1   |  26.0  |  2.41<H>    Ca    8.4<L>      11 Jan 2022 03:36  Phos  5.0     01-11  Mg     2.3     01-11                          9.9    6.58  )-----------( 99       ( 11 Jan 2022 19:11 )             31.8         HGBA1c- 6.1  LDL-30  Ferritin- 339  Sat- 11%  Fe-24  AST- 34  ALT-11  APO4- 178  TSH- nl    PHYSICAL EXAM:                            10.6   8.92  )-----------( 109      ( 11 Jan 2022 03:36 )             34.4                Exam:  NAD, calm.  EO spont, not FC, perseverates, global aphasia, PERRL, L gaze preference, motor - holds b/l uppers antigravity with prompting 4-/5 yet weak proximaLY, no drift, LLE at least 3/5, RLE some antigravity effort.   S1S2 present.  CTAb anteriorly, diminished Bibasilar sounds.  Abd soft.  B/L UE- edematous- ecchymosis - soft tissue edema- small localized skin breakdown in upper arm with foci of bleeding contained  BL LE and lower abd pitting edema improved   R Shiley Sub Clav  in place.

## 2022-01-12 NOTE — PHYSICAL THERAPY INITIAL EVALUATION ADULT - ADDITIONAL COMMENTS
Patient lives alone in a private house. She states there are 4 FRANKLIN and 7 stairs to bedroom, all with railings. She was independent prior to admission without us of AD. No DME at home.
Per chart, pt lives in a house with 4 FRANKLIN with b/l rails and 7 steps with a rail to her bedroom. No DME. Drives during the day but daughter takes her to appointments. Daughter-in-law works in [employer].

## 2022-01-12 NOTE — PROGRESS NOTE ADULT - ASSESSMENT
85 y/o F with PMH Breast cancer, Mastectomy 1985, colon ca resection 2010, VATS pleurectomy drainage 2018, COPD, CKD, presents to ED with c/o Leg swelling x 1 week. States fell on monday, able to get off floor, since then worsening shortness of breath, and leg swelling. Denies palpitations, chest pains. Noted to Be afib RVR in ED, given diltiazem. Edema noted to abd.     1/11: Pt denies chest pain, palpitations, SOB. Tele-Afib HR @ 90-110s with PVCs, HR in 130s at times. Cont. Lopressor 25mg PO q 8 hours with parameters, Midodrine 5mg TID to increase BP, Keep PTT goal between 50-60. Cont Lasix 40mg BID, Bladder scan intermittently.        LMA Occlusion  -MR Head-Acute left MCA and PCA infarcts   -s/p cerebral angio on 1/6.  TICI 2b revascularization of left m1 occlusion   aspirin. on anticoagulation   likely due to afib.     Afib  - intermittetn digoxin   -Cont. Lopressor 25mg PO q 8 hours with parameters  -Midodrine 5mg TID to increase BP f needed    on noac now. lwo dose       Diastolic heart failure  -  with Rv dyscfunction. ct diuresis. intermitten bladder scan.   on hemodialysis intermittently.       EDILBERTO on CKD   DUE TO CKD and venous congestion. still makign urine. ct diruesis and intermittent hemodialysis   will switch to PO torsmide 20 BID tomorrow.

## 2022-01-12 NOTE — PHYSICAL THERAPY INITIAL EVALUATION ADULT - GENERAL OBSERVATIONS, REHAB EVAL
Pt received in bed supine, + telemetry/SpO2 monitor and prima-fit.
Patient received lying in bed, NAD, breathing 4LO2 via NC, +tafoya, +tele. Pt agreeable to Physical Therapy evaluation.

## 2022-01-12 NOTE — PROGRESS NOTE ADULT - ASSESSMENT
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 Concern for embolic event (likely cardio-).  Afib RVR.  HFpEF with LV EF >75%, normal RV size and fx. Small mobile echo densities visualized on the aortic valve.  COPD not on home o2, remote hx breast CA s/p mastectomy 1985, colon cancer s/p bowel resection 12 years ago, chronic leg edema.  Worsening CKD - now requiring HD. Possible cardiorenal syndr.  Cardiac cirrhosis with ascites.  MRS 0 per family. NIH 25.  TIANA.    PLAN:  -pending MRI brain to eval the extent of the stroke   -Neuro checks  - PT/ OT/ SLP    CV: S/P Tachycardia  Off AC for 7 days since 1/3/22 with prior stroke secondary to afib - Now at high risk of aNOTHER STROKE AND IMAGING SHOWS NO HEME THEREFORE WOULD START ac TODAY   --160; avoid hypotension to preserve cerebral perfusion  -TTE: ef >75%. mild- mod MVR, mild TR. Small mobile echodencities visualized on the aortic valve (Lambl's excrescence but can not rule out vegetation or fibroelastoma) - no plan for DAWSON from -Cardiology as pt needs AC regardless - I  - decreased lopressor to 2.5 mg q6  - Loaded digoxin 0.5 mg x1 - level 2.0 1/11/22- repeat in am     Respiratory:  -PRN duonebs for bronchospasm/ wheezing    GI:   Passed S/S - mod thick liquisds  BM regimen    : [place] - keep for strict uop monitoring in pt on HD and diuretics  Monitor Scr, Monitor I+ O  Last HD - 1/11/22  re-start Lasix 60 q12hrs as with BL LE and lower abd pitting edema  R Permacath    ID:   S/P RLL Pnu  complete course of zosyn q8  bcx neg, ucx neg 1/1    Heme:  -start heparin drip  -  due to bleeding RUE goal 45-55.  - TIANA - started on 325 iron   -  very high risk for VTE    Endo:  goal -180    Pt is critically ill and at high risk of rapid deterioration/death due to abovementioned conditions.   Still requires critical care interventions - ongoing frequent evaluations, interventions and management adjustment by the Attending and ICU team, - and included review of relevant history, clinical examination, review of data and images, discussion of treatment with the multidisciplinary team and any consultants involved in this patient’s care as well as family discussion.          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 Concern for embolic event (likely cardio-).  Afib RVR.  HFpEF with LV EF >75%, normal RV size and fx. Small mobile echo densities visualized on the aortic valve.  COPD not on home o2, remote hx breast CA s/p mastectomy 1985, colon cancer s/p bowel resection 12 years ago, chronic leg edema.  Worsening CKD - now requiring HD. Possible cardiorenal syndr.  Cardiac cirrhosis with ascites.  MRS 0 per family. NIH 25.  TIANA.    PLAN:  -MRI brain to eval the extent of the stroke   -Neuro checks  - PT/ OT/ SLP    CV: S/P Tachycardia  Off AC for 7 days since 1/3/22 with prior stroke secondary to afib - Now at high risk of aNOTHER STROKE AND IMAGING SHOWS NO HEME THEREFORE WOULD START ac TODAY   --160; avoid hypotension to preserve cerebral perfusion  -TTE: ef >75%. mild- mod MVR, mild TR. Small mobile echodencities visualized on the aortic valve (Lambl's excrescence but can not rule out vegetation or fibroelastoma) - no plan for DAWSON from -Cardiology as pt needs AC regardless - I  - decreased lopressor to 2.5 mg q6  - Loaded digoxin 0.5 mg x1 - level 2.0 1/11/22- repeat today    Respiratory:  -PRN duonebs for bronchospasm-     GI:   Passed S/S - mod thick liquids  BM regimen- 1/10/21    : No tafoya   Monitor Scr, Monitor I+ O  Last HD - 1/11/22   Lasix 60 q12hrs as with BL LE and lower abd pitting edema      ID:   S/P RLL Pnu  complete course of zosyn q8  bcx neg, ucx neg 1/1    Heme:  - started on apixiban last PM  -    - TIANA - started on 325 iron   -  very high risk for VTE    Endo:  goal -180    Pt is critically ill and at high risk of rapid deterioration/death due to abovementioned conditions.   Still requires critical care interventions - ongoing frequent evaluations, interventions and management adjustment by the Attending and ICU team, - and included review of relevant history, clinical examination, review of data and images, discussion of treatment with the multidisciplinary team and any consultants involved in this patient’s care as well as family discussion.

## 2022-01-12 NOTE — PROGRESS NOTE ADULT - SUBJECTIVE AND OBJECTIVE BOX
[facility]                                                               Office: [address] [address]                                                              Telephone: [phone]. Fax:[phone]                                                                             PROGRESS NOTE    Reason for follow up: decompensated CHF, New onset Afib  Overnight: No new events.   Update:  oozing from skin excoriations.       Subjective:  canont be obtained. patient is confused.    ROs:  inaccurate due to aphasia. not in any distress     	  Vitals:    Vital Signs Last 24 Hrs  T(C): 36.7 (01-12-22 @ 17:30), Max: 36.7 (01-12-22 @ 17:30)  T(F): 98.1 (01-12-22 @ 17:30), Max: 98.1 (01-12-22 @ 17:30)  HR: 91 (01-12-22 @ 17:30) (85 - 117)  BP: 124/70 (01-12-22 @ 17:30) (96/52 - 137/76)  BP(mean): 102 (01-12-22 @ 14:00) (64 - 102)  RR: 12 (01-12-22 @ 14:00) (12 - 14)  SpO2: 92% (01-12-22 @ 17:30) (90% - 100%)    PHYSICAL EXAM:  Appearance: Comfortable. No acute distress  HEENT:  Head and neck: Atraumatic. Normocephalic.  Normal oral mucosa, PERRL, Neck is supple.  +  JVD,    Neurologic: A & O x 0, patient is repetitively saying "yes",  +focal deficits.    Lymphatic: No cervical lymphadenopathy  Cardiovascular: irregular  S1 S2, systolic murmur,  no rubs/gallops. No JVD, No edema  Respiratory: diminished bilateral breath sounds   Gastrointestinal:  Soft, Non-tender, + BS  Lower Extremities: No edema  Psychiatry: Patient is calm. No agitation. Mood & affect appropriate  Skin: No rashes/ ecchymoses/cyanosis/ulcers visualized on the face, hands or feet.      CURRENT MEDICATIONS:   MEDICATIONS  (STANDING):  furosemide   Injectable 40 milliGRAM(s) IV Push two times a day  metoprolol tartrate 25 milliGRAM(s) Oral every 8 hours    pantoprazole    Tablet  apixaban  ferrous    sulfate  glucagon  Injectable    PRN: albuterol/ipratropium for Nebulization PRN  ondansetron Injectable PRN  sodium chloride 0.9% lock flush PRN        DIAGNOSTIC TESTING:    [ ] Echocardiogram: < from: TTE Echo Complete w/o Contrast w/ Doppler (01.02.22 @ 09:12) >  Summary:   1. Left ventricular ejection fraction, by visual estimation, is >75%.   2. Technically suboptimal study.   3. Hyperdynamic global left ventricular systolic function.   4. Normal left ventricular internal cavity size.   5. The mitral in-flow pattern reveals no discernable A-wave, therefore   no comment on diastolic function can be made.   6. Normal right ventricular size and function.  7. Mild to moderately enlarged left atrium.   8. There is no evidence of pericardial effusion.   9. Mild mitral annular calcification.  10. Mild to moderate mitral valve regurgitation.  11. Thickening of the anterior and posterior mitral valve leaflets.  12. Mild tricuspid regurgitation.  13. Small mobile echodencities visualized on the aortic valve. 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 Clinical correlation recommended.      OTHER: 	    MR:< from: MR Head No Cont (01.09.22 @ 18:22) >  IMPRESSION: Acute left MCA and PCA infarcts as described above.      LABS:	 	                                      9.9    6.58  )-----------( 99       ( 11 Jan 2022 19:11 )             31.8   N=x    ; L=x        12 Jan 2022 04:03    140    |  99     |  37.6   ----------------------------<  109    3.5     |  28.0   |  1.56     Ca    8.3        12 Jan 2022 04:03  Phos  3.0       12 Jan 2022 04:03  Mg     2.1       12 Jan 2022 04:03

## 2022-01-12 NOTE — PHYSICAL THERAPY INITIAL EVALUATION ADULT - PLANNED THERAPY INTERVENTIONS, PT EVAL
bed mobility training/gait training/strengthening/transfer training
balance training/bed mobility training/gait training/postural re-education/strengthening/transfer training

## 2022-01-12 NOTE — PHYSICAL THERAPY INITIAL EVALUATION ADULT - IMPAIRMENTS FOUND, PT EVAL
aerobic capacity/endurance/gait, locomotion, and balance/gross motor/muscle strength/ROM
gait, locomotion, and balance

## 2022-01-12 NOTE — PROGRESS NOTE ADULT - ASSESSMENT
86y Female with multiple medical issues admitted for cardiac issues including atrial fibrillation now with left hemisphere stroke.     Stroke.   Was not given t-PA as was > 3 hrs from last known well time and patient > 80 years old and in addition was on full anticoagulation dose of Lovenox.   She did go for thrombectomy and is now in ICU.  LDL: 30  At goal of < 70  She is on Eliquis for afib.   Need to watch for hemorrhagic conversion.   - Check repeat CT head 1-13-22  Continue statin.           86y Female with multiple medical issues admitted for cardiac issues including atrial fibrillation now with left hemisphere stroke.     Stroke.   Was not given t-PA as was > 3 hrs from last known well time and patient > 80 years old and in addition was on full anticoagulation dose of Lovenox.   She did go for thrombectomy and is now in ICU.  LDL: 30  At goal of < 70  She is on Eliquis for afib.   CT head 1-12-22- stable.  Continue statin.

## 2022-01-12 NOTE — CHART NOTE - NSCHARTNOTEFT_GEN_A_CORE
HPI:  87 y/o female from home with history of HTN, CKD baseline Cr. 1.7, COPD not on home 02, remote hx of breast cancer s/p mastectomy in 86, colon cancer s/p bowel resection 12 years ago, chronic LE edema presents to [facility] 1/1/22 s/p mechanical fall 1 week ago with generalized weakness and excessive salt consumption s/p fall. Patient lives alone and states shes usually independent with no assistive devices. Patient reported recent weight gain, SCHMID, incr LE edema. In the ED patient noted to be in afib w/ RVR, Anasarca, EDILBERTO on CKD, and with RLL PNA. She was cultured, given IV abx, started on Cardizem drip after PO cardizem and BB failed to control her HR. She was seen by Cardiology who did bedside echo showing diastolic dysfunction and dilated IVC, CTs showed anasarca and pleural effusions with RLL infiltrate. COVID neg, U/A neg. no focal weakness. Patient given IV digoxin, tafoya placed, and IV lasix given with 600ml o/p.     1/1: Admitted to [facility] medicine service for COPD/CHF exacerbation, found to have PNA, started on Zosyn. Found to have EDILBERTO on CKD. CT chest shows severe anasarca, b/l pleural effusions, RLL consolidation. CT ABD with generalized anasarca. ABD US shows 4 quadrant ascites, cirrhosis.   1/2: Heparin started for Afib. Hematuria in setting of heparin. Noted to have LUE cellulitis.   1/3: Started on Milrinone and Lasix.   1/4: IR failed paracentesis.   1/5: Heparin transitioned to Lovenox. Started HD per nephrology via femoral shiley.   1/6: Found confused with R sided weakness at 5:30PM, LKW 2PM. Code biplane called - patient not tPA candidate due to AC. Found on CTA to have L M1 occlusion, >50% proximal TONIO stenosis. Taken to IR suite for therapeutic cerebral angiogram - mechanical thrombectomy, TICI 2B achieved. Stroke presumed to be cardioembolic. Upgraded to NSICU post-angio. Exam improved post-angio. Failed SBT ON. TTE shows EF >75%, diastolic dysfunction, dilated IVC, valvular hypodensity   1/7: Cardiology declined need for DAWSON. Hypotensive, requiring nichelle stick and drip. HD started, Lasix D/Faizan. CTH shows L MCA CVA.  1/8: Tube feeds started. Chemical DVT prophylaxis started. Hemodialysis performed.   1/9: Extubated. Noted to be perseverating. Lasix restarted. MRI performed showing L caudate, thalamic, & MCA/PCA CVA.   1/10: Permacath placed, Shiley removed. Heparin gtt started. Digoxin 0.5 load for afib. Passed for a diet. PTT therapeutic x 2.    1/11: Wound care consulted. Hemodialysis performed - only 500cc removed due to hypotension. Albumin given. Excessive bleeding noted from skin tear on RUE. PTT goal decreased, CBC/coags sent - WNL.  1/12: CTH stable and showed no hemorrhage. Transitioned off heparin gtt to Eliquis.     Vital Signs Last 24 Hrs  T(C): 36.5 (12 Jan 2022 08:00), Max: 36.6 (12 Jan 2022 03:42)  T(F): 97.7 (12 Jan 2022 08:00), Max: 97.8 (12 Jan 2022 03:42)  HR: 102 (12 Jan 2022 08:39) (85 - 117)  BP: 114/69 (12 Jan 2022 08:00) (72/41 - 125/72)  BP(mean): 82 (12 Jan 2022 08:00) (46 - 97)  RR: 12 (12 Jan 2022 08:00) (12 - 14)  SpO2: 100% (12 Jan 2022 08:00) (96% - 100%)    PHYSICAL EXAM:  GENERAL: NAD, calm  HEAD: Atraumatic, normocephalic  MENTAL STATUS: Alert; Awake; Opens eyes spontaneously; Globally aphasic; Perseverates - answers "yes" to all questions inappropriately; Not following commands   CRANIAL NERVES: L gaze preference. PERRL. R field cut. Face grossly symmetric. Hearing grossly intact  MOTOR: B/l UE sustain antigravity with prompting, approximately 4-/5, no drift noted. LLE sustains antigravity >5 secs, RLE with some effort against gravity  SENSATION: Unable to assess sensation to light touch due to patient's mental status   CHEST/LUNG: Nonlabored breathing, no signs of respiratory distress   HEART: +S1/+S2; Rate controlled atrial fibrillation noted on monitor   ABDOMEN: Soft, nontender, nondistended. Lower abdomen with mild pitting edema   EXTREMITIES: B/l UE edema, RUE skin tear with foci of bleeding contained   SKIN: Warm, dry    LABS:                        9.9    6.58  )-----------( 99       ( 11 Jan 2022 19:11 )             31.8     01-12    140  |  99  |  37.6<H>  ----------------------------<  109<H>  3.5   |  28.0  |  1.56<H>    Ca    8.3<L>      12 Jan 2022 04:03  Phos  3.0     01-12  Mg     2.1     01-12      PT/INR - ( 12 Jan 2022 04:03 )   PT: 15.5 sec;   INR: 1.36 ratio         PTT - ( 12 Jan 2022 04:03 )  PTT:38.4 sec      01-11 @ 07:01  -  01-12 @ 07:00  --------------------------------------------------------  IN: 111.5 mL / OUT: 1800 mL / NET: -1688.5 mL        RADIOLOGY & ADDITIONAL TESTS:  CT Head No Cont (01.12.22 @ 03:15):  IMPRESSION:  Evolving acute/early subacute infarct in the left middle cerebral artery   vascular territory is grossly stable from 01/09/2022.  No hemorrhagic   conversion    MR Head No Cont (01.09.22 @ 18:22):  IMPRESSION:   Acute left MCA and PCA infarcts as described above.    CT Head No Cont (01.09.22 @ 04:34):  IMPRESSION:   Acute left temporal occipital lobe infarction. Possible acute   left caudate head infarction. No evidence of hemorrhagic transformation.   Consider MRI for further evaluation.    CT Head No Cont (01.07.22 @ 04:07):  IMPRESSION:   Abnormal areas of high attenuation seen which could be   compatible retained contrast though continued close infarct is   recommended to rule out areas of hemorrhage. Previously noted areas of   low-attenuation involving the right inferior parietal/occipital cortex is   less conspicuous as described above.    Assessment:  86 year old female with PMH HTN, CKD baseline Scr 1.7. COPD not on home o2, remote hx breast CA s/p mastectomy 1985, colon cancer s/p bowel resection 12 years ago, chronic leg edema, mechanical fall 1 week ago presents with AFIB RVR, Acute on chronic CKD, diastolic heart failure and RV dysfunction, RLL PNA, CODE STROKE IN ED on 1/6 L MCA m1 occlusion s/p mechanical thrombectomy TICI 2B likely cardio embolic event now extubated. CTH this morning 1/12 stable. Heparin gtt transitioned to Eliquis today. Clinical exam stable.     Plan:  -D/w Dr. Chávez   -Patient clinically and radiographically stable. Appropriate for downgrade to telemetry floor. Patient signed out to and accepted by Dr. Alfred - hospitalist. 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 HD for fluid management    -Nephrology following - appreciate recommendations   -Next HD session per nephrology - per note, 2 times per week should suffice for fluid   -Renal function stable   -Tafoya - keep for strict uop monitoring in pt on HD and diuretics  -Saturating well on room air   -PRN duonebs for bronchospasm/ wheezing  -Diet: Soft and bite sized with moderately thick fluids   -Zofran PRN  -Protonix 40mg daily   -Eliquis 2.5mg BID   -Ferrous sulfate 325mg daily   -SCDs b/l for DVT prophylaxis   -A1C 6.1, TSH 5.33, Thyroxine 1.3   -Goal -180  -S/P RLL Pnu  complete course of zosyn q8  -Bcx neg, ucx neg 1/1

## 2022-01-12 NOTE — PHYSICAL THERAPY INITIAL EVALUATION ADULT - THERAPY FREQUENCY, PT EVAL
3-5x/week
3-5x/week
Implemented All Fall Risk Interventions:  [place] to call system. Call bell, personal items and telephone within reach. Instruct patient to call for assistance. Room bathroom lighting operational. Non-slip footwear when patient is off stretcher. Physically safe environment: no spills, clutter or unnecessary equipment. Stretcher in lowest position, wheels locked, appropriate side rails in place. Provide visual cue, wrist band, yellow gown, etc. Monitor gait and stability. Monitor for mental status changes and reorient to person, place, and time. Review medications for side effects contributing to fall risk. Reinforce activity limits and safety measures with patient and family.

## 2022-01-12 NOTE — PHYSICAL THERAPY INITIAL EVALUATION ADULT - PERTINENT HX OF CURRENT PROBLEM, REHAB EVAL
Pt is an 85 y/o female who presented with CHF exacerbation, new onset A-fib. Hospital stay complicated by CVA - M1 occlusion s/p thrombectomy on 1/6/22.
87 y/o female with new onset Afib with RVR, CHF exacerbation, EDILBERTO on CKD, RLL w/ sepsis, left UE cellulitis, Hx of COPD, CKD, HTN.

## 2022-01-12 NOTE — PROGRESS NOTE ADULT - ASSESSMENT
1)  Acute left MCA and PCA infarcts in setting of Afib  - s/p  s/p cerebral angio on 1/6.  TICI 2b revascularization of left m1 occlusion & thrombectomy   - neuro following  - A/C changed Eliquis   - neuro checks  - LDL: 30, At goal of < 70  - PT/OT    2) EDILBERTO on CKD now on HD  - renal following  - HD as needed per renal  - monitor renal function    3) Acute Diastolic CHF with EF>75% with Anasarca   - on lasix 40mg BID  - cardio following  - monitor in/out and electrolytes  - cardio following    4) Afib RVR:   - cardio following  - on metoprolol and Eliquis  - per cardio: Digoxin Level-2, hold digoxin use for now    5) Right arm skin tear  - xerofom applied and wrapped with gauze applied to control with bleeding    6) Right LL PNA  - s/p completion of abx    7) Prophylactic measure  - DVT ppx: already on Eliquis BID  - GI ppx: on PPI    Called Eloisa Acuña and requested to call her brother Manjinder vazquez [phone] to discuss pt care. I called and we spoke, In answered all his questions, he understands and agrees to above plan.

## 2022-01-12 NOTE — PROGRESS NOTE ADULT - SUBJECTIVE AND OBJECTIVE BOX
ROYER LONGLO    [id number]    86y      Female    CC: stroke    INTERVAL HPI/OVERNIGHT EVENTS:  85 y/o female from home with history of HTN, CKD baseline Cr. 1.7, COPD not on home 02, remote hx of breast cancer s/p mastectomy in 86, colon cancer s/p bowel resection 12 years ago, chronic LE edema presents to [facility] 1/1/22 s/p mechanical fall 1 week prior with generalized weakness and excessive salt consumption s/p fall and recent weight gain. Pt admitted with afib w/ RVR, Anasarca, EDILBERTO on CKD, and with RLL PNA.  Given IV abx, started on Cardizem drip after PO Cardizem and BB failed to control her HR. She was seen by Cardiology who did bedside echo showing diastolic dysfunction and dilated IVC, CTs showed anasarca and pleural effusions with RLL infiltrate. COVID neg, U/A neg. no focal weakness. Patient given IV digoxin, tafoya placed, and IV lasix given with 600ml o/p.     1/1: Admitted to [facility] medicine service for COPD/CHF exacerbation, found to have PNA, started on Zosyn. Found to have EDILBERTO on CKD. CT chest shows severe anasarca, b/l pleural effusions, RLL consolidation. CT ABD with generalized anasarca. ABD US shows 4 quadrant ascites, cirrhosis.   1/2: Heparin started for Afib. Hematuria in setting of heparin. Noted to have LUE cellulitis.   1/3: Started on Milrinone and Lasix.   1/4: IR failed paracentesis.   1/5: Heparin transitioned to Lovenox. Started HD per nephrology via femoral shiley.   1/6: Found confused with R sided weakness at 5:30PM, LKW 2PM. Code biplane called - patient not tPA candidate due to AC. Found on CTA to have L M1 occlusion, >50% proximal TONIO stenosis. Taken to IR suite for therapeutic cerebral angiogram - mechanical thrombectomy, TICI 2B achieved. Stroke presumed to be cardioembolic. Upgraded to NSICU post-angio. Exam improved post-angio. Failed SBT ON. TTE shows EF >75%, diastolic dysfunction, dilated IVC, valvular hypodensity   1/7: Cardiology declined need for DAWSON. Hypotensive, requiring nichelle stick and drip. HD started, Lasix D/Faizan. CTH shows L MCA CVA.  1/8: Tube feeds started. Chemical DVT prophylaxis started. Hemodialysis performed.   1/9: Extubated. Noted to be perseverating. Lasix restarted. MRI performed showing L caudate, thalamic, & MCA/PCA CVA.   1/10: Permacath placed, Shiley removed. Heparin gtt started. Digoxin 0.5 load for afib. Passed for a diet. PTT therapeutic x 2.    1/11: Wound care consulted. Hemodialysis performed - only 500cc removed due to hypotension. Albumin given. Excessive bleeding noted from skin tear on RUE. PTT goal decreased, CBC/coags sent - WNL.  1/12: CTH stable and showed no hemorrhage. Transitioned off heparin gtt to Eliquis.     Pt transferred back to medical service on 1/12/22    Pt only says with word yes, unable to communicate or express her self on exam    REVIEW OF SYSTEMS:  unable to obtain due aphasia       Vital Signs Last 24 Hrs  T(C): 36.5 (12 Jan 2022 08:00), Max: 36.6 (12 Jan 2022 03:42)  T(F): 97.7 (12 Jan 2022 08:00), Max: 97.8 (12 Jan 2022 03:42)  HR: 101 (12 Jan 2022 10:00) (85 - 117)  BP: 137/76 (12 Jan 2022 10:00) (96/52 - 137/76)  BP(mean): 89 (12 Jan 2022 10:00) (64 - 97)  RR: 12 (12 Jan 2022 08:00) (12 - 14)  SpO2: 100% (12 Jan 2022 10:00) (96% - 100%)    PHYSICAL EXAM:  GENERAL: NAD, well-groomed  HEENT: PERRL, +EOMI  CHEST/LUNG: decreased BS bilaterally; No wheezing  HEART: S1S2+, Regular rate and rhythm; No murmurs  ABDOMEN: Soft, Nontender, Nondistended; Bowel sounds present  EXTREMITIES:  No clubbing, cyanosis, positive LE edema, right arm skin tear wrapped in gauze  NEURO: does not follow commands, sensory intact, spontaneous motor movement       LABS:                        9.9    6.58  )-----------( 99       ( 11 Jan 2022 19:11 )             31.8     01-12    140  |  99  |  37.6<H>  ----------------------------<  109<H>  3.5   |  28.0  |  1.56<H>    Ca    8.3<L>      12 Jan 2022 04:03  Phos  3.0     01-12  Mg     2.1     01-12      PT/INR - ( 12 Jan 2022 04:03 )   PT: 15.5 sec;   INR: 1.36 ratio         PTT - ( 12 Jan 2022 04:03 )  PTT:38.4 sec        MEDICATIONS  (STANDING):  albuterol/ipratropium for Nebulization 3 milliLiter(s) Nebulizer every 6 hours  apixaban 2.5 milliGRAM(s) Oral two times a day  ferrous    sulfate 325 milliGRAM(s) Oral daily  furosemide   Injectable 40 milliGRAM(s) IV Push two times a day  glucagon  Injectable 1 milliGRAM(s) IntraMuscular once  metoprolol tartrate 25 milliGRAM(s) Oral every 8 hours  pantoprazole    Tablet 40 milliGRAM(s) Oral daily    MEDICATIONS  (PRN):  ondansetron Injectable 4 milliGRAM(s) IV Push every 8 hours PRN Nausea and/or Vomiting  sodium chloride 0.9% lock flush 10 milliLiter(s) IV Push every 1 hour PRN Pre/post blood products, medications, blood draw, and to maintain line patency

## 2022-01-13 LAB
ALBUMIN SERPL ELPH-MCNC: 2.6 G/DL — LOW (ref 3.3–5.2)
ALP SERPL-CCNC: 133 U/L — HIGH (ref 40–120)
ALT FLD-CCNC: 11 U/L — SIGNIFICANT CHANGE UP
ANION GAP SERPL CALC-SCNC: 15 MMOL/L — SIGNIFICANT CHANGE UP (ref 5–17)
AST SERPL-CCNC: 39 U/L — HIGH
BILIRUB SERPL-MCNC: 1.3 MG/DL — SIGNIFICANT CHANGE UP (ref 0.4–2)
BUN SERPL-MCNC: 43.7 MG/DL — HIGH (ref 8–20)
CALCIUM SERPL-MCNC: 8.3 MG/DL — LOW (ref 8.6–10.2)
CHLORIDE SERPL-SCNC: 101 MMOL/L — SIGNIFICANT CHANGE UP (ref 98–107)
CO2 SERPL-SCNC: 25 MMOL/L — SIGNIFICANT CHANGE UP (ref 22–29)
CREAT SERPL-MCNC: 1.74 MG/DL — HIGH (ref 0.5–1.3)
GLUCOSE SERPL-MCNC: 92 MG/DL — SIGNIFICANT CHANGE UP (ref 70–99)
HCT VFR BLD CALC: 30.5 % — LOW (ref 34.5–45)
HGB BLD-MCNC: 9.4 G/DL — LOW (ref 11.5–15.5)
MAGNESIUM SERPL-MCNC: 2 MG/DL — SIGNIFICANT CHANGE UP (ref 1.6–2.6)
MCHC RBC-ENTMCNC: 25.3 PG — LOW (ref 27–34)
MCHC RBC-ENTMCNC: 30.8 GM/DL — LOW (ref 32–36)
MCV RBC AUTO: 82 FL — SIGNIFICANT CHANGE UP (ref 80–100)
PHOSPHATE SERPL-MCNC: 3 MG/DL — SIGNIFICANT CHANGE UP (ref 2.4–4.7)
PLATELET # BLD AUTO: 115 K/UL — LOW (ref 150–400)
POTASSIUM SERPL-MCNC: 4.1 MMOL/L — SIGNIFICANT CHANGE UP (ref 3.5–5.3)
POTASSIUM SERPL-SCNC: 4.1 MMOL/L — SIGNIFICANT CHANGE UP (ref 3.5–5.3)
PROT SERPL-MCNC: 5.8 G/DL — LOW (ref 6.6–8.7)
RBC # BLD: 3.72 M/UL — LOW (ref 3.8–5.2)
RBC # FLD: 18.6 % — HIGH (ref 10.3–14.5)
SODIUM SERPL-SCNC: 141 MMOL/L — SIGNIFICANT CHANGE UP (ref 135–145)
WBC # BLD: 6.87 K/UL — SIGNIFICANT CHANGE UP (ref 3.8–10.5)
WBC # FLD AUTO: 6.87 K/UL — SIGNIFICANT CHANGE UP (ref 3.8–10.5)

## 2022-01-13 PROCEDURE — 99232 SBSQ HOSP IP/OBS MODERATE 35: CPT

## 2022-01-13 PROCEDURE — 99233 SBSQ HOSP IP/OBS HIGH 50: CPT

## 2022-01-13 RX ORDER — CHLORHEXIDINE GLUCONATE 213 G/1000ML
1 SOLUTION TOPICAL DAILY
Refills: 0 | Status: DISCONTINUED | OUTPATIENT
Start: 2022-01-13 | End: 2022-01-24

## 2022-01-13 RX ADMIN — APIXABAN 2.5 MILLIGRAM(S): 2.5 TABLET, FILM COATED ORAL at 17:13

## 2022-01-13 RX ADMIN — Medication 25 MILLIGRAM(S): at 13:05

## 2022-01-13 RX ADMIN — Medication 25 MILLIGRAM(S): at 22:29

## 2022-01-13 RX ADMIN — Medication 40 MILLIGRAM(S): at 17:11

## 2022-01-13 RX ADMIN — Medication 325 MILLIGRAM(S): at 13:05

## 2022-01-13 RX ADMIN — CHLORHEXIDINE GLUCONATE 1 APPLICATION(S): 213 SOLUTION TOPICAL at 12:17

## 2022-01-13 RX ADMIN — APIXABAN 2.5 MILLIGRAM(S): 2.5 TABLET, FILM COATED ORAL at 06:43

## 2022-01-13 RX ADMIN — Medication 40 MILLIGRAM(S): at 06:43

## 2022-01-13 RX ADMIN — PANTOPRAZOLE SODIUM 40 MILLIGRAM(S): 20 TABLET, DELAYED RELEASE ORAL at 13:05

## 2022-01-13 NOTE — PROGRESS NOTE ADULT - SUBJECTIVE AND OBJECTIVE BOX
NEPHROLOGY INTERVAL HPI/OVERNIGHT EVENTS:    no new events.    MEDICATIONS  (STANDING):  albuterol/ipratropium for Nebulization 3 milliLiter(s) Nebulizer every 6 hours  apixaban 2.5 milliGRAM(s) Oral two times a day  ferrous    sulfate 325 milliGRAM(s) Oral daily  furosemide   Injectable 40 milliGRAM(s) IV Push two times a day  glucagon  Injectable 1 milliGRAM(s) IntraMuscular once  metoprolol tartrate 25 milliGRAM(s) Oral every 8 hours  pantoprazole    Tablet 40 milliGRAM(s) Oral daily    MEDICATIONS  (PRN):  ondansetron Injectable 4 milliGRAM(s) IV Push every 8 hours PRN Nausea and/or Vomiting  sodium chloride 0.9% lock flush 10 milliLiter(s) IV Push every 1 hour PRN Pre/post blood products, medications, blood draw, and to maintain line patency      Allergies    No Known Allergies          Vital Signs Last 24 Hrs  T(C): 36.8 (13 Jan 2022 06:02), Max: 36.8 (13 Jan 2022 06:02)  T(F): 98.2 (13 Jan 2022 06:02), Max: 98.2 (13 Jan 2022 06:02)  HR: 101 (13 Jan 2022 06:02) (91 - 109)  BP: 102/48 (13 Jan 2022 06:02) (96/58 - 124/70)  BP(mean): 102 (12 Jan 2022 14:00) (70 - 102)  RR: 18 (13 Jan 2022 06:02) (12 - 18)  SpO2: 98% (13 Jan 2022 06:02) (90% - 100%)  T(C): 36.5 (12 Jan 2022 08:00), Max: 36.6 (12 Jan 2022 03:42)  T(F): 97.7 (12 Jan 2022 08:00), Max: 97.8 (12 Jan 2022 03:42)  HR: 99 (12 Jan 2022 07:00) (85 - 117)    PHYSICAL EXAM:    GENERAL: Comfortable in bed; cachectic  HEAD: Same   EYES: Same  ENMT:   NECK: veins full  NERVOUS SYSTEM: Same  CHEST/LUNG: No wheezes  HEART: IRR  ABDOMEN: Soft  EXTREMITIES:  Dressing right  upper arm, special boots both feet  LYMPH:   SKIN: pale    LABS:    01-13    141  |  101  |  43.7<H>  ----------------------------<  92  4.1   |  25.0  |  1.74<H>    Ca    8.3<L>      13 Jan 2022 07:31  Phos  3.0     01-13  Mg     2.0     01-13    TPro  5.8<L>  /  Alb  2.6<L>  /  TBili  1.3  /  DBili  x   /  AST  39<H>  /  ALT  11  /  AlkPhos  133<H>  01-13                          9.9    6.58  )-----------( 99       ( 11 Jan 2022 19:11 )             31.8     01-12    140  |  99  |  37.6<H>  ----------------------------<  109<H>  3.5   |  28.0  |  1.56<H>    Ca    8.3<L>      12 Jan 2022 04:03  Phos  3.0     01-12  Mg     2.1     01-12      PT/INR - ( 12 Jan 2022 04:03 )   PT: 15.5 sec;   INR: 1.36 ratio         PTT - ( 12 Jan 2022 04:03 )  PTT:38.4 sec    Magnesium, Serum: 2.1 mg/dL (01-12 @ 04:03)  Phosphorus Level, Serum: 3.0 mg/dL (01-12 @ 04:03)          RADIOLOGY & ADDITIONAL TESTS:

## 2022-01-13 NOTE — PROGRESS NOTE ADULT - SUBJECTIVE AND OBJECTIVE BOX
[place] [facility]                                                               Office: [address] [address]                                                              Telephone: [phone]. Fax:[phone]                                                                             PROGRESS NOTE    Reason for follow up: decompensated CHF, New onset Afib  Overnight: No new events.   Update:   transferred to floor.  comfortable       Subjective:  canont be obtained. patient is confused.    ROs:  inaccurate due to aphasia. not in any distress     	  Vitals:   Vital Signs Last 24 Hrs  T(C): 37.4 (01-13-22 @ 17:48), Max: 37.4 (01-13-22 @ 17:48)  T(F): 99.3 (01-13-22 @ 17:48), Max: 99.3 (01-13-22 @ 17:48)  HR: 101 (01-13-22 @ 17:48) (81 - 101)  BP: 122/70 (01-13-22 @ 17:48) (102/48 - 127/84)  BP(mean): --  RR: 18 (01-13-22 @ 17:48) (18 - 19)  SpO2: 98% (01-13-22 @ 11:11) (98% - 99%)    PHYSICAL EXAM:  Appearance: Comfortable. No acute distress  HEENT:  Head and neck: Atraumatic. Normocephalic.  Normal oral mucosa, PERRL, Neck is supple.  +  JVD,    Neurologic: A & O x 0, patient is repetitively saying "yes",  +focal deficits.    Lymphatic: No cervical lymphadenopathy  Cardiovascular: irregular  S1 S2, systolic murmur,  no rubs/gallops. No JVD, No edema  Respiratory: diminished bilateral breath sounds   Gastrointestinal:  Soft, Non-tender, + BS  Lower Extremities: No edema  Psychiatry: Patient is calm. No agitation. Mood & affect appropriate  Skin: No rashes/ ecchymoses/cyanosis/ulcers visualized on the face, hands or feet.      CURRENT MEDICATIONS:    MEDICATIONS  (STANDING):  furosemide   Injectable 40 milliGRAM(s) IV Push two times a day  metoprolol tartrate 25 milliGRAM(s) Oral every 8 hours    pantoprazole    Tablet  apixaban  chlorhexidine 2% Cloths  ferrous    sulfate  glucagon  Injectable    PRN: albuterol/ipratropium for Nebulization PRN  ondansetron Injectable PRN  sodium chloride 0.9% lock flush PRN          DIAGNOSTIC TESTING:    [ ] Echocardiogram: < from: TTE Echo Complete w/o Contrast w/ Doppler (01.02.22 @ 09:12) >  Summary:   1. Left ventricular ejection fraction, by visual estimation, is >75%.   2. Technically suboptimal study.   3. Hyperdynamic global left ventricular systolic function.   4. Normal left ventricular internal cavity size.   5. The mitral in-flow pattern reveals no discernable A-wave, therefore   no comment on diastolic function can be made.   6. Normal right ventricular size and function.  7. Mild to moderately enlarged left atrium.   8. There is no evidence of pericardial effusion.   9. Mild mitral annular calcification.  10. Mild to moderate mitral valve regurgitation.  11. Thickening of the anterior and posterior mitral valve leaflets.  12. Mild tricuspid regurgitation.  13. Small mobile echodencities visualized on the aortic valve. 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 Clinical correlation recommended.      OTHER: 	    MR:< from: MR Head No Cont (01.09.22 @ 18:22) >  IMPRESSION: Acute left MCA and PCA infarcts as described above.      LABS:	 	                           9.4    6.87  )-----------( 115      ( 13 Jan 2022 09:37 )             30.5   N=x    ; L=x        13 Jan 2022 07:31    141    |  101    |  43.7   ----------------------------<  92     4.1     |  25.0   |  1.74     Ca    8.3        13 Jan 2022 07:31  Phos  3.0       13 Jan 2022 07:31  Mg     2.0       13 Jan 2022 07:31    TPro  5.8    /  Alb  2.6    /  TBili  1.3    /  DBili  x      /  AST  39     /  ALT  11     /  AlkPhos  133    13 Jan 2022 07:31      Hepatic panel: 13 Jan 2022 07:31  5.8   | 2.6                            1.3   | 1.3  /x                              39    | 11                                /133  \par

## 2022-01-13 NOTE — PROGRESS NOTE ADULT - ASSESSMENT
85 y/o F with PMH Breast cancer, Mastectomy 1985, colon ca resection 2010, VATS pleurectomy drainage 2018, COPD, CKD, presents to ED with c/o Leg swelling x 1 week. States fell on monday, able to get off floor, since then worsening shortness of breath, and leg swelling. Denies palpitations, chest pains. Noted to Be afib RVR in ED, given diltiazem. Edema noted to abd.     1/11: Pt denies chest pain, palpitations, SOB. Tele-Afib HR @ 90-110s with PVCs, HR in 130s at times. Cont. Lopressor 25mg PO q 8 hours with parameters, Midodrine 5mg TID to increase BP, Keep PTT goal between 50-60. Cont Lasix 40mg BID, Bladder scan intermittently.        LMA Occlusion  -MR Head-Acute left MCA and PCA infarcts   -s/p cerebral angio on 1/6.  TICI 2b revascularization of left m1 occlusion   aspirin.   on anticoagulation  with eliquis  likely due to afib.     Afib  - intermittetn digoxin   -Cont. Lopressor 25mg PO q 8 hours with parameters  -Midodrine 5mg TID to increase BP f needed    on noac now renal adjusted.       Diastolic heart failure  -  with Rv dyscfunction. ct diuresis. intermitten bladder scan.   on hemodialysis intermittently.       EDILBERTO on CKD   DUE TO CKD and venous congestion.   ct diruesis and intermittent hemodialysis     PO torsmide 20 BID   intermittent hemodialysis

## 2022-01-13 NOTE — PROGRESS NOTE ADULT - ASSESSMENT
1)  Acute left MCA and PCA infarcts in setting of Afib  - s/p  s/p cerebral angio on 1/6.  TICI 2b revascularization of left m1 occlusion & thrombectomy   - neuro following  - No neurointerventional plan at this time   - A/C changed Eliquis   - neuro checks  - LDL: 30, At goal of < 70  - PT/OT    2) EDILBERTO on CKD now on HD  - renal following  - HD as needed per renal  - monitor renal function    3) Acute Diastolic CHF with EF>75% with Anasarca   - on lasix 40mg BID  - cardio following  - monitor in/out and electrolytes  - cardio following    4) Afib RVR:   - cardio following  - on metoprolol and Eliquis  - per cardio: Digoxin Level-2, hold digoxin use for now    5) Right arm skin tear  - xerofom applied and wrapped with gauze applied to control with bleeding    6) Right LL PNA  - s/p completion of abx    7) Prophylactic measure  - DVT ppx: already on Eliquis BID  - GI ppx: on PPI    Poor prognosis   Pts son : Manjinder [phone]

## 2022-01-13 NOTE — PROGRESS NOTE ADULT - SUBJECTIVE AND OBJECTIVE BOX
[facility] Division of Hospital Medicine    Chief Complaint:  CVA    SUBJECTIVE / OVERNIGHT EVENTS:  Pt examined lying in bed  Sleepy, not in any distress  ROS not obtained clinical status     Brief HPI  87 y/o female from home with history of HTN, CKD baseline Cr. 1.7, COPD not on home 02, remote hx of breast cancer s/p mastectomy in 86, colon cancer s/p bowel resection 12 years ago, chronic LE edema presents to [facility] 1/1/22 s/p mechanical fall 1 week prior with generalized weakness and excessive salt consumption s/p fall and recent weight gain. Pt admitted with afib w/ RVR, Anasarca, EDILBERTO on CKD, and with RLL PNA.  Given IV abx, started on Cardizem drip after PO Cardizem and BB failed to control her HR. She was seen by Cardiology who did bedside echo showing diastolic dysfunction and dilated IVC, CTs showed anasarca and pleural effusions with RLL infiltrate. COVID neg, U/A neg. no focal weakness. Patient given IV digoxin, tafoya placed, and IV lasix given with 600ml o/p.     1/1: Admitted to [facility] medicine service for COPD/CHF exacerbation, found to have PNA, started on Zosyn. Found to have EDILBERTO on CKD. CT chest shows severe anasarca, b/l pleural effusions, RLL consolidation. CT ABD with generalized anasarca. ABD US shows 4 quadrant ascites, cirrhosis.   1/2: Heparin started for Afib. Hematuria in setting of heparin. Noted to have LUE cellulitis.   1/3: Started on Milrinone and Lasix.   1/4: IR failed paracentesis.   1/5: Heparin transitioned to Lovenox. Started HD per nephrology via femoral shiley.   1/6: Found confused with R sided weakness at 5:30PM, LKW 2PM. Code biplane called - patient not tPA candidate due to AC. Found on CTA to have L M1 occlusion, >50% proximal TONIO stenosis. Taken to IR suite for therapeutic cerebral angiogram - mechanical thrombectomy, TICI 2B achieved. Stroke presumed to be cardioembolic. Upgraded to NSICU post-angio. Exam improved post-angio. Failed SBT ON. TTE shows EF >75%, diastolic dysfunction, dilated IVC, valvular hypodensity   1/7: Cardiology declined need for DAWSON. Hypotensive, requiring nichelle stick and drip. HD started, Lasix D/Faizan. CTH shows L MCA CVA.  1/8: Tube feeds started. Chemical DVT prophylaxis started. Hemodialysis performed.   1/9: Extubated. Noted to be perseverating. Lasix restarted. MRI performed showing L caudate, thalamic, & MCA/PCA CVA.   1/10: Permacath placed, Shiley removed. Heparin gtt started. Digoxin 0.5 load for afib. Passed for a diet. PTT therapeutic x 2.    1/11: Wound care consulted. Hemodialysis performed - only 500cc removed due to hypotension. Albumin given. Excessive bleeding noted from skin tear on RUE. PTT goal decreased, CBC/coags sent - WNL.  1/12: CTH stable and showed no hemorrhage. Transitioned off heparin gtt to Eliquis.     Pt transferred back to medical service on 1/12/22    Pt only says with word yes, unable to communicate or express her self on exam    Patient denies chest pain, SOB, abd pain, N/V, fever, chills, dysuria or any other complaints. All remainder ROS negative.     MEDICATIONS  (STANDING):  apixaban 2.5 milliGRAM(s) Oral two times a day  chlorhexidine 2% Cloths 1 Application(s) Topical daily  ferrous    sulfate 325 milliGRAM(s) Oral daily  furosemide   Injectable 40 milliGRAM(s) IV Push two times a day  glucagon  Injectable 1 milliGRAM(s) IntraMuscular once  metoprolol tartrate 25 milliGRAM(s) Oral every 8 hours  pantoprazole    Tablet 40 milliGRAM(s) Oral daily    MEDICATIONS  (PRN):  albuterol/ipratropium for Nebulization 3 milliLiter(s) Nebulizer every 6 hours PRN Shortness of Breath and/or Wheezing  ondansetron Injectable 4 milliGRAM(s) IV Push every 8 hours PRN Nausea and/or Vomiting  sodium chloride 0.9% lock flush 10 milliLiter(s) IV Push every 1 hour PRN Pre/post blood products, medications, blood draw, and to maintain line patency        I&O's Summary    12 Jan 2022 07:01  -  13 Jan 2022 07:00  --------------------------------------------------------  IN: 120 mL / OUT: 700 mL / NET: -580 mL        PHYSICAL EXAM:  Vital Signs Last 24 Hrs  T(C): 37.4 (13 Jan 2022 17:48), Max: 37.4 (13 Jan 2022 17:48)  T(F): 99.3 (13 Jan 2022 17:48), Max: 99.3 (13 Jan 2022 17:48)  HR: 101 (13 Jan 2022 17:48) (81 - 101)  BP: 122/70 (13 Jan 2022 17:48) (102/48 - 127/84)  BP(mean): --  RR: 18 (13 Jan 2022 17:48) (18 - 19)  SpO2: 98% (13 Jan 2022 11:11) (98% - 99%)        CONSTITUTIONAL: Non toxic appearing, lying in bed  ENMT: Moist oral mucosa, no JVD  RESPIRATORY: Decreasd BS b/l   CARDIOVASCULAR: Regular rate and rhythm, normal S1 and S2, no murmur/rub/gallop; No lower extremity edema; Peripheral pulses are 2+ bilaterally  ABDOMEN: Nontender to palpation, normoactive bowel sounds, no rebound/guarding; No hepatosplenomegaly  MUSCLOSKELETAL:  no clubbing or cyanosis of digits; no joint swelling or tenderness to palpation  PSYCH: Awake only  EXTREMITIES:  No clubbing, cyanosis, positive LE edema, right arm dressing   NEURO: does not follow commands, sensory intact, spontaneous motor movement   SKIN: No rashes; no palpable lesions    LABS:                        9.4    6.87  )-----------( 115      ( 13 Jan 2022 09:37 )             30.5     01-13    141  |  101  |  43.7<H>  ----------------------------<  92  4.1   |  25.0  |  1.74<H>    Ca    8.3<L>      13 Jan 2022 07:31  Phos  3.0     01-13  Mg     2.0     01-13    TPro  5.8<L>  /  Alb  2.6<L>  /  TBili  1.3  /  DBili  x   /  AST  39<H>  /  ALT  11  /  AlkPhos  133<H>  01-13    PT/INR - ( 12 Jan 2022 04:03 )   PT: 15.5 sec;   INR: 1.36 ratio         PTT - ( 12 Jan 2022 04:03 )  PTT:38.4 sec

## 2022-01-13 NOTE — PROGRESS NOTE ADULT - ASSESSMENT
86y Female with multiple medical issues admitted for cardiac issues including atrial fibrillation now with left hemisphere stroke.     Stroke.   Was not given t-PA as was > 3 hrs from last known well time and patient > 80 years old and in addition was on full anticoagulation dose of Lovenox.   She did go for thrombectomy and is now in ICU.  LDL: 30  At goal of < 70  She is on Eliquis for afib.   CT head 1-12-22- stable.  Continue statin.

## 2022-01-13 NOTE — PROGRESS NOTE ADULT - SUBJECTIVE AND OBJECTIVE BOX
CC: Stroke    HPI:   The patient is a 86y Female who was admitted 1/1/22.  She had fallen 1 week prior to arrival and landed on her left side.   She had been eating a lot of take out Chinese food since the fall.   She presented secondary to weight gain, dyspnea on exertion, and edema.   Noted to be in atrial fibrillation on arrival.   She was started on treatment including anticoagulation.    On 1/6/22 she was found to be confused.   There is some suggestion of right sided weakness.  Stroke code was activated around 5:30 pm. She was last known well around 2 pm.   STAT CT and CT-A/CT-P were performed and she was found to have large vessel occlusion (left M1) and neuro-intervention team was called.    She was taken to the cath lab for thrombectomy with TICI 2b revascularization.  NIH SS score was 9 prior to intervention.    1-  Remains neurologically stable.  1- No new events.  1-12-22  Remains stable neurologically.  1-13-22   Neurologically stable        Vital Signs Last 24 Hrs  T(C): 37.4 (13 Jan 2022 17:48), Max: 37.4 (13 Jan 2022 17:48)  T(F): 99.3 (13 Jan 2022 17:48), Max: 99.3 (13 Jan 2022 17:48)  HR: 101 (13 Jan 2022 17:48) (81 - 101)  BP: 122/70 (13 Jan 2022 17:48) (102/48 - 127/84)  BP(mean): --  RR: 18 (13 Jan 2022 17:48) (18 - 19)  SpO2: 98% (13 Jan 2022 11:11) (98% - 99%)    MEDICATIONS    albuterol/ipratropium for Nebulization 3 milliLiter(s) Nebulizer every 6 hours PRN  apixaban 2.5 milliGRAM(s) Oral two times a day  chlorhexidine 2% Cloths 1 Application(s) Topical daily  ferrous    sulfate 325 milliGRAM(s) Oral daily  furosemide   Injectable 40 milliGRAM(s) IV Push two times a day  glucagon  Injectable 1 milliGRAM(s) IntraMuscular once  metoprolol tartrate 25 milliGRAM(s) Oral every 8 hours  ondansetron Injectable 4 milliGRAM(s) IV Push every 8 hours PRN  pantoprazole    Tablet 40 milliGRAM(s) Oral daily  sodium chloride 0.9% lock flush 10 milliLiter(s) IV Push every 1 hour PRN         LABS:  CBC Full  -  ( 13 Jan 2022 09:37 )  WBC Count : 6.87 K/uL  RBC Count : 3.72 M/uL  Hemoglobin : 9.4 g/dL  Hematocrit : 30.5 %  Platelet Count - Automated : 115 K/uL  Mean Cell Volume : 82.0 fl  Mean Cell Hemoglobin : 25.3 pg  Mean Cell Hemoglobin Concentration : 30.8 gm/dL  Auto Neutrophil # : x  Auto Lymphocyte # : x  Auto Monocyte # : x  Auto Eosinophil # : x  Auto Basophil # : x  Auto Neutrophil % : x  Auto Lymphocyte % : x  Auto Monocyte % : x  Auto Eosinophil % : x  Auto Basophil % : x      01-13    141  |  101  |  43.7<H>  ----------------------------<  92  4.1   |  25.0  |  1.74<H>    Ca    8.3<L>      13 Jan 2022 07:31  Phos  3.0     01-13  Mg     2.0     01-13    TPro  5.8<L>  /  Alb  2.6<L>  /  TBili  1.3  /  DBili  x   /  AST  39<H>  /  ALT  11  /  AlkPhos  133<H>  01-13    LIVER FUNCTIONS - ( 13 Jan 2022 07:31 )  Alb: 2.6 g/dL / Pro: 5.8 g/dL / ALK PHOS: 133 U/L / ALT: 11 U/L / AST: 39 U/L / GGT: x           Hemoglobin A1C:       PT/INR - ( 12 Jan 2022 04:03 )   PT: 15.5 sec;   INR: 1.36 ratio         PTT - ( 12 Jan 2022 04:03 )  PTT:38.4 sec      Detailed Neurologic Exam:    Mental status: The patient awake and alert. She answers "YES" to most questions. She does not follow instructions.    Cranial nerves: Pupils react symmetrically to light. She blinks to threat to confrontation but less consistently on the right. She tracks bilaterally. There is a subtle depression of the right nasolabial fold.     Motor: There is normal bulk and tone.  Has a RUE drift . LUE has no drift. Both LE are antigravity.    Sensory responds to noxious stimulation bilaterally.  Cerebellar: Cannot be assessed.       Rad:   Repeat CT head 1/9 images reviewed. There is acute infarct in the left temporal-occipital area with suggestion of left caudate infarction as well. There is no hemorrhage.     MR Head No Cont (01.09.22 @ 18:22) >    IMPRESSION: Acute left MCA and PCA infarcts as described above.      CT Head No Cont (01.12.22 @ 03:15) >    IMPRESSION:  Evolving acute/early subacute infarct in the left middle cerebral artery   vascular territory is grossly stable from 01/09/2022.  No hemorrhagic   conversion    `

## 2022-01-13 NOTE — ADVANCED PRACTICE NURSE CONSULT - RECOMMEDATIONS
Cleanse w/ NS  Pat dry  Moisturize intact skin w/ Sween cream  ADAPTIC/ Medihoney paste / AQUACEL  Cover w/ ABD pad/ gauze  Secure w/ Kerlix roll gauze  ACE WRAP from base of toes to knee in figure of 8 pattern (remove ACE at HS)  Measure wound size minimum 1xweek - Wednesdays  Dressing change daily

## 2022-01-13 NOTE — PROGRESS NOTE ADULT - SUBJECTIVE AND OBJECTIVE BOX
Interval History:  - No acute events in the last 24 hours  - Patient is s/p cerebral angio on 1/6.  TICI 2b revascularization of left m1 occlusion.    - Patient remains on eliquis 2.5mg BID  - MRI from 1/9 showed left caudate, thalamic, MCA and PCA CVA.  CTH from 1/12 stable      Constitutional: NAD    Neuro  * Mental Status:  Opens eyes spontaneously.  Receptive and expressive aphasia  * Cranial Nerves: Cnii-Cnxii grossly intact. PERRL, EOMI, tongue midline, no gaze deviation  * Motor: Bilateral uppers mimic and are both antigravity  Right upper +Drift.  Bilateral lowers antigravity  * Sensory: Above motor exam to gentle noxious stimulation  * Reflexes: Not assessed  * Gait: Not assessed    No groin hematoma      Vitals:  Vital Signs Last 24 Hrs  T(C): 36.8 (13 Jan 2022 06:02), Max: 36.8 (13 Jan 2022 06:02)  T(F): 98.2 (13 Jan 2022 06:02), Max: 98.2 (13 Jan 2022 06:02)  HR: 101 (13 Jan 2022 06:02) (91 - 109)  BP: 102/48 (13 Jan 2022 06:02) (96/58 - 137/76)  BP(mean): 102 (12 Jan 2022 14:00) (70 - 102)  RR: 18 (13 Jan 2022 06:02) (12 - 18)  SpO2: 98% (13 Jan 2022 06:02) (90% - 100%)    I&O:  I&O's Summary    12 Jan 2022 07:01  -  13 Jan 2022 07:00  --------------------------------------------------------  IN: 120 mL / OUT: 700 mL / NET: -580 mL        Labs:                         9.9    6.58  )-----------( 99       ( 11 Jan 2022 19:11 )             31.8       01-12    140  |  99  |  37.6<H>  ----------------------------<  109<H>  3.5   |  28.0  |  1.56<H>    Ca    8.3<L>      12 Jan 2022 04:03  Phos  3.0     01-12  Mg     2.1     01-12            PT/INR - ( 12 Jan 2022 04:03 )   PT: 15.5 sec;   INR: 1.36 ratio         PTT - ( 12 Jan 2022 04:03 )  PTT:38.4 sec      MEDICATIONS  (STANDING):  apixaban 2.5 milliGRAM(s) Oral two times a day  ferrous    sulfate 325 milliGRAM(s) Oral daily  furosemide   Injectable 40 milliGRAM(s) IV Push two times a day  glucagon  Injectable 1 milliGRAM(s) IntraMuscular once  metoprolol tartrate 25 milliGRAM(s) Oral every 8 hours  pantoprazole    Tablet 40 milliGRAM(s) Oral daily    MEDICATIONS  (PRN):  albuterol/ipratropium for Nebulization 3 milliLiter(s) Nebulizer every 6 hours PRN Shortness of Breath and/or Wheezing  ondansetron Injectable 4 milliGRAM(s) IV Push every 8 hours PRN Nausea and/or Vomiting  sodium chloride 0.9% lock flush 10 milliLiter(s) IV Push every 1 hour PRN Pre/post blood products, medications, blood draw, and to maintain line patency            Neurosurgical Imaging:  I attest that I personally reviewed all pertinent neurosurgical imaging performed in the last 24 hours  Left MCA distribution infarct          Assessment and Plan:   · Assessment	  Assessment & Plan:  85 y/o female from home with history of HTN, CKD baseline Cr. 1.7, COPD not on home 02, remote hx of breast cancer s/p mastectomy in 85, colon cancer s/p bowel resection 12 years ago, chronic LE edema. Patient lives alone and states shes usually independent with no assistive devices. Patient states she sustained a mechanical fall 1 week ago landing on her left side. She denies LOC, head or neck pain, She did sustain a skin tear to her left forearm. She denies being on the floor for more than a few minutes. She admits to feeling weak since the fall and has been ordering out chinese food most of the week. She has been eating won ton soup, fried rice, and chicken lo mein. She normally sees Dr. Snyder who has told her not to use any excess salt as she has chronic LE edema. She denies any hx of Afib, or CAD. She came to ED after she noted weight gain, SCHMID, and all her clothes fitting more tightly. She denies CP, fever, chills, N/V or diaphoresis. In the ED patient noted to be in afib w/ RVR, Anasarca, EDILBERTO on CKD, and with RLL PNA. She was cultured, given IV abx, started on Cardizem drip after PO cardizem and BB failed to control her HR. She was seen by Cardiology who did bedside echo showing diastolic dysfunction and dilated IVC, CTs showed anasarca and pleural effusions with RLLL infiltrate. COVID neg, U/A neg. no focal weakness. Patient given IV digoxin, tafoya placed, and IV lasix given with 600ml o/p. Currently awake in NAD.     Left MCA Infarction 2/2 Left M1 occlusion TICI 2b revascularization.     (01 Jan 2022 23:57)      Continue neuro checks  Repeat CT Scan as needed for change in exam  Maintain SBP within desired range, normotension desired  Eliquis for anticoagulation  s/p permacath placement  Passed for diet  The patient does not require urgent neurointerventional procedures  We will continue to follow the patient's clinical course

## 2022-01-14 LAB
ANION GAP SERPL CALC-SCNC: 13 MMOL/L — SIGNIFICANT CHANGE UP (ref 5–17)
BUN SERPL-MCNC: 43.8 MG/DL — HIGH (ref 8–20)
CALCIUM SERPL-MCNC: 8.3 MG/DL — LOW (ref 8.6–10.2)
CHLORIDE SERPL-SCNC: 99 MMOL/L — SIGNIFICANT CHANGE UP (ref 98–107)
CO2 SERPL-SCNC: 32 MMOL/L — HIGH (ref 22–29)
CREAT SERPL-MCNC: 1.87 MG/DL — HIGH (ref 0.5–1.3)
DIGOXIN SERPL-MCNC: 1.1 NG/ML — SIGNIFICANT CHANGE UP (ref 0.8–2)
GLUCOSE SERPL-MCNC: 96 MG/DL — SIGNIFICANT CHANGE UP (ref 70–99)
HCT VFR BLD CALC: 32.2 % — LOW (ref 34.5–45)
HGB BLD-MCNC: 9.8 G/DL — LOW (ref 11.5–15.5)
MCHC RBC-ENTMCNC: 25.3 PG — LOW (ref 27–34)
MCHC RBC-ENTMCNC: 30.4 GM/DL — LOW (ref 32–36)
MCV RBC AUTO: 83 FL — SIGNIFICANT CHANGE UP (ref 80–100)
PLATELET # BLD AUTO: 135 K/UL — LOW (ref 150–400)
POTASSIUM SERPL-MCNC: 3.1 MMOL/L — LOW (ref 3.5–5.3)
POTASSIUM SERPL-SCNC: 3.1 MMOL/L — LOW (ref 3.5–5.3)
RBC # BLD: 3.88 M/UL — SIGNIFICANT CHANGE UP (ref 3.8–5.2)
RBC # FLD: 18.6 % — HIGH (ref 10.3–14.5)
SARS-COV-2 RNA SPEC QL NAA+PROBE: SIGNIFICANT CHANGE UP
SODIUM SERPL-SCNC: 143 MMOL/L — SIGNIFICANT CHANGE UP (ref 135–145)
WBC # BLD: 6.45 K/UL — SIGNIFICANT CHANGE UP (ref 3.8–10.5)
WBC # FLD AUTO: 6.45 K/UL — SIGNIFICANT CHANGE UP (ref 3.8–10.5)

## 2022-01-14 PROCEDURE — 99233 SBSQ HOSP IP/OBS HIGH 50: CPT

## 2022-01-14 PROCEDURE — 99232 SBSQ HOSP IP/OBS MODERATE 35: CPT

## 2022-01-14 RX ORDER — POTASSIUM CHLORIDE 20 MEQ
20 PACKET (EA) ORAL EVERY 4 HOURS
Refills: 0 | Status: COMPLETED | OUTPATIENT
Start: 2022-01-14 | End: 2022-01-14

## 2022-01-14 RX ORDER — POTASSIUM CHLORIDE 20 MEQ
20 PACKET (EA) ORAL EVERY 4 HOURS
Refills: 0 | Status: DISCONTINUED | OUTPATIENT
Start: 2022-01-14 | End: 2022-01-14

## 2022-01-14 RX ORDER — MAGNESIUM SULFATE 500 MG/ML
1 VIAL (ML) INJECTION ONCE
Refills: 0 | Status: COMPLETED | OUTPATIENT
Start: 2022-01-14 | End: 2022-01-14

## 2022-01-14 RX ORDER — MAGNESIUM SULFATE 500 MG/ML
1 VIAL (ML) INJECTION ONCE
Refills: 0 | Status: DISCONTINUED | OUTPATIENT
Start: 2022-01-14 | End: 2022-01-14

## 2022-01-14 RX ORDER — POTASSIUM CHLORIDE 20 MEQ
10 PACKET (EA) ORAL
Refills: 0 | Status: COMPLETED | OUTPATIENT
Start: 2022-01-14 | End: 2022-01-14

## 2022-01-14 RX ORDER — METOPROLOL TARTRATE 50 MG
12.5 TABLET ORAL EVERY 8 HOURS
Refills: 0 | Status: DISCONTINUED | OUTPATIENT
Start: 2022-01-14 | End: 2022-01-20

## 2022-01-14 RX ADMIN — Medication 100 MILLIEQUIVALENT(S): at 13:37

## 2022-01-14 RX ADMIN — Medication 325 MILLIGRAM(S): at 12:36

## 2022-01-14 RX ADMIN — APIXABAN 2.5 MILLIGRAM(S): 2.5 TABLET, FILM COATED ORAL at 05:47

## 2022-01-14 RX ADMIN — APIXABAN 2.5 MILLIGRAM(S): 2.5 TABLET, FILM COATED ORAL at 17:24

## 2022-01-14 RX ADMIN — Medication 100 MILLIEQUIVALENT(S): at 15:14

## 2022-01-14 RX ADMIN — Medication 100 GRAM(S): at 12:35

## 2022-01-14 RX ADMIN — CHLORHEXIDINE GLUCONATE 1 APPLICATION(S): 213 SOLUTION TOPICAL at 12:40

## 2022-01-14 RX ADMIN — PANTOPRAZOLE SODIUM 40 MILLIGRAM(S): 20 TABLET, DELAYED RELEASE ORAL at 12:36

## 2022-01-14 RX ADMIN — Medication 20 MILLIGRAM(S): at 17:24

## 2022-01-14 RX ADMIN — Medication 20 MILLIEQUIVALENT(S): at 15:23

## 2022-01-14 RX ADMIN — Medication 20 MILLIEQUIVALENT(S): at 17:23

## 2022-01-14 RX ADMIN — Medication 40 MILLIGRAM(S): at 05:47

## 2022-01-14 RX ADMIN — Medication 100 MILLIEQUIVALENT(S): at 17:19

## 2022-01-14 NOTE — PROGRESS NOTE ADULT - SUBJECTIVE AND OBJECTIVE BOX
CC: Stroke    HPI:   The patient is a 86y Female who was admitted 1/1/22.  She had fallen 1 week prior to arrival and landed on her left side.   She had been eating a lot of take out Chinese food since the fall.   She presented secondary to weight gain, dyspnea on exertion, and edema.   Noted to be in atrial fibrillation on arrival.   She was started on treatment including anticoagulation.    On 1/6/22 she was found to be confused.   There is some suggestion of right sided weakness.  Stroke code was activated around 5:30 pm. She was last known well around 2 pm.   STAT CT and CT-A/CT-P were performed and she was found to have large vessel occlusion (left M1) and neuro-intervention team was called.    She was taken to the cath lab for thrombectomy with TICI 2b revascularization.  NIH SS score was 9 prior to intervention.    1-  Remains neurologically stable.  1- No new events.  1-12-22  Remains stable neurologically.  1-13-22   Neurologically stable        Vital Signs Last 24 Hrs  T(C): 36.4 (14 Jan 2022 04:49), Max: 36.4 (14 Jan 2022 04:49)  T(F): 97.6 (14 Jan 2022 04:49), Max: 97.6 (14 Jan 2022 04:49)  HR: 98 (14 Jan 2022 04:49) (78 - 98)  BP: 105/66 (14 Jan 2022 04:49) (105/66 - 113/63)  BP(mean): --  RR: 18 (14 Jan 2022 04:49) (18 - 18)  SpO2: 100% (14 Jan 2022 04:49) (98% - 100%)    MEDICATIONS    albuterol/ipratropium for Nebulization 3 milliLiter(s) Nebulizer every 6 hours PRN  apixaban 2.5 milliGRAM(s) Oral two times a day  chlorhexidine 2% Cloths 1 Application(s) Topical daily  ferrous    sulfate 325 milliGRAM(s) Oral daily  glucagon  Injectable 1 milliGRAM(s) IntraMuscular once  metoprolol tartrate 25 milliGRAM(s) Oral every 8 hours  ondansetron Injectable 4 milliGRAM(s) IV Push every 8 hours PRN  pantoprazole    Tablet 40 milliGRAM(s) Oral daily  sodium chloride 0.9% lock flush 10 milliLiter(s) IV Push every 1 hour PRN  torsemide 20 milliGRAM(s) Oral two times a day         LABS:  CBC Full  -  ( 14 Jan 2022 05:55 )  WBC Count : 6.45 K/uL  RBC Count : 3.88 M/uL  Hemoglobin : 9.8 g/dL  Hematocrit : 32.2 %  Platelet Count - Automated : 135 K/uL  Mean Cell Volume : 83.0 fl  Mean Cell Hemoglobin : 25.3 pg  Mean Cell Hemoglobin Concentration : 30.4 gm/dL  Auto Neutrophil # : x  Auto Lymphocyte # : x  Auto Monocyte # : x  Auto Eosinophil # : x  Auto Basophil # : x  Auto Neutrophil % : x  Auto Lymphocyte % : x  Auto Monocyte % : x  Auto Eosinophil % : x  Auto Basophil % : x      01-14    143  |  99  |  43.8<H>  ----------------------------<  96  3.1<L>   |  32.0<H>  |  1.87<H>    Ca    8.3<L>      14 Jan 2022 05:55  Phos  3.0     01-13  Mg     2.0     01-13    TPro  5.8<L>  /  Alb  2.6<L>  /  TBili  1.3  /  DBili  x   /  AST  39<H>  /  ALT  11  /  AlkPhos  133<H>  01-13    LIVER FUNCTIONS - ( 13 Jan 2022 07:31 )  Alb: 2.6 g/dL / Pro: 5.8 g/dL / ALK PHOS: 133 U/L / ALT: 11 U/L / AST: 39 U/L / GGT: x           Hemoglobin A1C:         Detailed Neurologic Exam:    Mental status: The patient awake and alert. She answers "YES" to most questions. She does not follow instructions.    Cranial nerves: Pupils react symmetrically to light. She blinks to threat to confrontation but less consistently on the right. She tracks bilaterally. There is a subtle depression of the right nasolabial fold.     Motor: There is normal bulk and tone.  Has a RUE drift . LUE has no drift. Both LE are antigravity.    Sensory responds to noxious stimulation bilaterally.  Cerebellar: Cannot be assessed.       Rad:   Repeat CT head 1/9 images reviewed. There is acute infarct in the left temporal-occipital area with suggestion of left caudate infarction as well. There is no hemorrhage.     MR Head No Cont (01.09.22 @ 18:22) >    IMPRESSION: Acute left MCA and PCA infarcts as described above.      CT Head No Cont (01.12.22 @ 03:15) >    IMPRESSION:  Evolving acute/early subacute infarct in the left middle cerebral artery   vascular territory is grossly stable from 01/09/2022.  No hemorrhagic   conversion    `

## 2022-01-14 NOTE — PROGRESS NOTE ADULT - ASSESSMENT
86 year old female with HTN, COPD (not on home O2), CKD, admitted with new onset A fib with RVR, acute on chronic diastolic HF decompensation, acute on chronic renal failure requiring HD. Hospital course complicated by acute CVA now s/p TICI 2b revascularization of left m1 occlusion & thrombectomy (1/6).       Continue Telemetry monitoring for now    Acute CVA   Likely thromboembolic in setting of Afib & ICA stenosis   s/p revascularization and thrombectomy by Neuro IR  Aphasic, only able to say yes to questions  A/C changed Eliquis   Will neuro checks  PT/OT    EDILBERTO on CKD now on HD  Renal following   monitor renal function    Acute Diastolic CHF with EF>75% with Anasarca   Monitor in/out and electrolytes  Cardio following    Afib RVR  Cardio following  on metoprolol and Eliquis  per cardio: Digoxin Level-2, hold digoxin use for now    Right arm skin tear  Xeroform applied and wrapped with gauze applied to control with bleeding    Right LL PNA  s/p completion of Abx    Prophylactic measure  DVT ppx: already on Eliquis BID  GI ppx: on PPI    Poor prognosis   Pts son : Manjinder [phone]

## 2022-01-14 NOTE — PROGRESS NOTE ADULT - ASSESSMENT
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

## 2022-01-14 NOTE — CHART NOTE - NSCHARTNOTEFT_GEN_A_CORE
Case d/w Eloisa (daughter) and son (Manjinder) as well as Maranda (HALEY who is RN in cath lab). GOC discussed and all three agree that CPR and/or intubation would be against pts wishes and SHOULD a cardiac arrest occur, that she should be DNR. Officially MOLST will be filled out in am, family aware . FOr now in event of unanticipated cardiopulmonary arrest will immediately call Manjinder ([phone]) or Eloisa ([phone]) who will officially make call for DNr or not at that time

## 2022-01-14 NOTE — PROGRESS NOTE ADULT - SUBJECTIVE AND OBJECTIVE BOX
NEPHROLOGY INTERVAL HPI/OVERNIGHT EVENTS:    Seen earlier   More alert  palliative evaluating   No SOB or CP   Meds noted    ml + on Lasix 40 IV Q 12   K+ and mag supplemented today         MEDICATIONS  (STANDING):  apixaban 2.5 milliGRAM(s) Oral two times a day  chlorhexidine 2% Cloths 1 Application(s) Topical daily  ferrous    sulfate 325 milliGRAM(s) Oral daily  furosemide   Injectable 40 milliGRAM(s) IV Push two times a day  glucagon  Injectable 1 milliGRAM(s) IntraMuscular once  magnesium sulfate Injectable 1 Gram(s) IV Push once  metoprolol tartrate 25 milliGRAM(s) Oral every 8 hours  pantoprazole    Tablet 40 milliGRAM(s) Oral daily  potassium chloride   Powder 20 milliEquivalent(s) Oral every 4 hours  potassium chloride  10 mEq/100 mL IVPB 10 milliEquivalent(s) IV Intermittent every 1 hour    MEDICATIONS  (PRN):  albuterol/ipratropium for Nebulization 3 milliLiter(s) Nebulizer every 6 hours PRN Shortness of Breath and/or Wheezing  ondansetron Injectable 4 milliGRAM(s) IV Push every 8 hours PRN Nausea and/or Vomiting  sodium chloride 0.9% lock flush 10 milliLiter(s) IV Push every 1 hour PRN Pre/post blood products, medications, blood draw, and to maintain line patency      Allergies    No Known Allergies    Intolerances          Vital Signs Last 24 Hrs  T(C): 36.4 (14 Jan 2022 04:49), Max: 37.4 (13 Jan 2022 17:48)  T(F): 97.6 (14 Jan 2022 04:49), Max: 99.3 (13 Jan 2022 17:48)  HR: 98 (14 Jan 2022 04:49) (78 - 101)  BP: 105/66 (14 Jan 2022 04:49) (105/66 - 122/70)  BP(mean): --  RR: 18 (14 Jan 2022 04:49) (18 - 18)  SpO2: 100% (14 Jan 2022 04:49) (98% - 100%)  Daily     Daily   I&O's Detail    I&O's Summary      PHYSICAL EXAM:  GENERAL: awake , talking   HEAD:  no edema   NECK: Supple, R permacath +   CHEST/LUNG: EAE, Bibasilar crackles   HEART: Regular rate and rhythm; No rub   ABDOMEN: Soft, Nontender, Nondistended;   EXTREMITIES:  edema better       LABS:                        9.8    6.45  )-----------( 135      ( 14 Jan 2022 05:55 )             32.2     01-14    143  |  99  |  43.8<H>  ----------------------------<  96  3.1<L>   |  32.0<H>  |  1.87<H>    Ca    8.3<L>      14 Jan 2022 05:55  Phos  3.0     01-13  Mg     2.0     01-13    TPro  5.8<L>  /  Alb  2.6<L>  /  TBili  1.3  /  DBili  x   /  AST  39<H>  /  ALT  11  /  AlkPhos  133<H>  01-13                RADIOLOGY & ADDITIONAL TESTS:

## 2022-01-14 NOTE — PROGRESS NOTE ADULT - ATTENDING COMMENTS
ON there was a concern about paus eand les events.  tele reviewed by me. No significant pauses or les episodes. Tele events are mis read as les.  likely due to low voltage.  repeat ECG tomorrow.  atrial fibrillation : ct beta-blocker . ct eleiwuis renal dose  PO diuresis. bladder scan.   Out of Bed to Chair .  rehab intermittent hemodialysis   daily weights.  not sure if she would respond to PO torsemide 20 BID . if increase in weight gain as outpatient, then may add metolazone 2.5 mg Every other day.

## 2022-01-14 NOTE — CHART NOTE - NSCHARTNOTEFT_GEN_A_CORE
Neurointerventional Note:    Patient continues to progress from a neurologic perspective and is recovering well s/p mechanical thrombectomy.  The patient does not require any neurointerventional procedures at this time.  Neurointerventional will sign off.  Please arrange for follow up with Dr. Kendall as an outpatient 2-4 weeks post discharge.

## 2022-01-14 NOTE — PROGRESS NOTE ADULT - SUBJECTIVE AND OBJECTIVE BOX
[place] [facility]                                                               Office: [address] [address]                                                              Telephone: [phone]. Fax:[phone]                                                                             PROGRESS NOTE  Reason for follow up: decompensated CHF,   New onset Afib  Overnight: No new events.   Update:     Subjective: No new events   	  Vitals:  T(C): 36.4 (01-14-22 @ 04:49), Max: 37.4 (01-13-22 @ 17:48)  HR: 98 (01-14-22 @ 04:49) (78 - 101)  BP: 105/66 (01-14-22 @ 04:49) (105/66 - 122/70)  RR: 18 (01-14-22 @ 04:49) (18 - 18)  SpO2: 100% (01-14-22 @ 04:49) (98% - 100%)    I&O's Summary      PHYSICAL EXAM:  Appearance: Comfortable. No acute distress  HEENT:  Head and neck: Atraumatic. Normocephalic.  Normal oral mucosa, PERRL, Neck is supple. No JVD, No carotid bruit.   Neurologic: A & O x 3, no focal deficits. EOMI.  Lymphatic: No cervical lymphadenopathy  Cardiovascular: Normal S1 S2, No murmur, rubs/gallops. No JVD, No edema  Respiratory: Lungs clear to auscultation  Gastrointestinal:  Soft, Non-tender, + BS  Lower Extremities: No edema  Psychiatry: Patient is calm. No agitation. Mood & affect appropriate  Skin: No rashes/ ecchymoses/cyanosis/ulcers visualized on the face, hands or feet.      CURRENT MEDICATIONS:  furosemide   Injectable 40 milliGRAM(s) IV Push two times a day  metoprolol tartrate 25 milliGRAM(s) Oral every 8 hours  pantoprazole    Tablet  glucagon  Injectable  apixaban  chlorhexidine 2% Cloths  ferrous    sulfate  potassium chloride   Powder  potassium chloride  10 mEq/100 mL IVPB      DIAGNOSTIC TESTING:    [ ] Echocardiogram: < from: TTE Echo Complete w/o Contrast w/ Doppler (01.02.22 @ 09:12) >    Summary:   1. Left ventricular ejection fraction, by visual estimation, is >75%.   2. Technically suboptimal study.   3. Hyperdynamic global left ventricular systolic function.   4. Normal left ventricular internal cavity size.   5. The mitral in-flow pattern reveals no discernable A-wave, therefore   no comment on diastolic function can be made.   6. Normal right ventricular size and function.  7. Mild to moderately enlarged left atrium.   8. There is no evidence of pericardial effusion.   9. Mild mitral annular calcification.  10. Mild to moderate mitral valve regurgitation.  11. Thickening of the anterior and posterior mitral valve leaflets.  12. Mild tricuspid regurgitation.  13. Small mobile echodencities visualized on the aortic valve. 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 Clinical correlation recommended.       OTHER: 	    CT:< from: CT Head No Cont (01.12.22 @ 03:15) >  IMPRESSION:  Evolving acute/early subacute infarct in the left middle cerebral artery   vascular territory is grossly stable from 01/09/2022.  No hemorrhagic   conversion      LABS:	 	                            9.8    6.45  )-----------( 135      ( 14 Jan 2022 05:55 )             32.2     01-14    143  |  99  |  43.8<H>  ----------------------------<  96  3.1<L>   |  32.0<H>  |  1.87<H>    Ca    8.3<L>      14 Jan 2022 05:55  Phos  3.0     01-13  Mg     2.0     01-13    TPro  5.8<L>  /  Alb  2.6<L>  /  TBili  1.3  /  DBili  x   /  AST  39<H>  /  ALT  11  /  AlkPhos  133<H>  01-13      TELEMETRY:                                                                           [place] [facility]                                                       [facility]                                                               Office: [address] [address]                                                              Telephone: [phone]. Fax:[phone]                                                                             PROGRESS NOTE  Reason for follow up: decompensated CHF,   New onset Afib  Overnight: No new events.   Update: 1/14: Patient repeatedly states "yes" to every question, unable to follow commands. Resp exam-CTAB. Transition to PO Torsemide. Cont. to monitor bicarb to avoid metabolic alkalosis.     Subjective: No new events   	  Vitals:  T(C): 36.4 (01-14-22 @ 04:49), Max: 37.4 (01-13-22 @ 17:48)  HR: 98 (01-14-22 @ 04:49) (78 - 101)  BP: 105/66 (01-14-22 @ 04:49) (105/66 - 122/70)  RR: 18 (01-14-22 @ 04:49) (18 - 18)  SpO2: 100% (01-14-22 @ 04:49) (98% - 100%)    I&O's Summary      PHYSICAL EXAM:  PHYSICAL EXAM:  Appearance: Comfortable. No acute distress  HEENT:  Head and neck: Atraumatic. Normocephalic.  Normal oral mucosa, PERRL, Neck is supple. No JVD, No carotid bruit.   Neurologic: A & O x 0, patient is repetitively saying "yes",  +focal deficits. EOMI.  Lymphatic: No cervical lymphadenopathy  Cardiovascular: Normal S1 S2, No murmur, rubs/gallops. No JVD, No edema  Respiratory: diminished bilateral breath sounds   Gastrointestinal:  +abd distention, Non-tender, + BS  Lower Extremities: No edema  Psychiatry: Patient is calm. No agitation. Mood & affect appropriate  Skin: No rashes/ ecchymoses/cyanosis/ulcers visualized on the face, hands or feet.        CURRENT MEDICATIONS:  furosemide   Injectable 40 milliGRAM(s) IV Push two times a day  metoprolol tartrate 25 milliGRAM(s) Oral every 8 hours  pantoprazole    Tablet  glucagon  Injectable  apixaban  chlorhexidine 2% Cloths  ferrous    sulfate  potassium chloride   Powder  potassium chloride  10 mEq/100 mL IVPB      DIAGNOSTIC TESTING:    [ ] Echocardiogram: < from: TTE Echo Complete w/o Contrast w/ Doppler (01.02.22 @ 09:12) >    Summary:   1. Left ventricular ejection fraction, by visual estimation, is >75%.   2. Technically suboptimal study.   3. Hyperdynamic global left ventricular systolic function.   4. Normal left ventricular internal cavity size.   5. The mitral in-flow pattern reveals no discernable A-wave, therefore   no comment on diastolic function can be made.   6. Normal right ventricular size and function.  7. Mild to moderately enlarged left atrium.   8. There is no evidence of pericardial effusion.   9. Mild mitral annular calcification.  10. Mild to moderate mitral valve regurgitation.  11. Thickening of the anterior and posterior mitral valve leaflets.  12. Mild tricuspid regurgitation.  13. Small mobile echodencities visualized on the aortic valve. 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 Clinical correlation recommended.       OTHER: 	    CT:< from: CT Head No Cont (01.12.22 @ 03:15) >  IMPRESSION:  Evolving acute/early subacute infarct in the left middle cerebral artery   vascular territory is grossly stable from 01/09/2022.  No hemorrhagic   conversion      LABS:	 	                            9.8    6.45  )-----------( 135      ( 14 Jan 2022 05:55 )             32.2     01-14    143  |  99  |  43.8<H>  ----------------------------<  96  3.1<L>   |  32.0<H>  |  1.87<H>    Ca    8.3<L>      14 Jan 2022 05:55  Phos  3.0     01-13  Mg     2.0     01-13    TPro  5.8<L>  /  Alb  2.6<L>  /  TBili  1.3  /  DBili  x   /  AST  39<H>  /  ALT  11  /  AlkPhos  133<H>  01-13      TELEMETRY:  Afib HR @ 70-80s

## 2022-01-14 NOTE — CHART NOTE - NSCHARTNOTEFT_GEN_A_CORE
Palliative care rubén work note.    SW reviewed clinical and spoke with bedside VALERI Holder as well as hospitalist Dr Abdullahi to gather hx. SW aware patient has hx of COPD on home oxygen with hx of breast Ca s/p mastectomy as well as hx of colon ca. SW also aware patient with decompensated heart failure , s/p fall, PNA and CVA. Many multiple medical issues impacting overall condition and potentially would warrant Hospice care if family in agreement. SW explored with hospitalist any prior family discussions regarding next steps. Hospitalist advised of his plan to contact son Manjinder and daughter in law who is a RN in cath lab to discuss further. Patient also being followed by renal regarding dialysis. SW met with patient who is resting in bed but did not engage with SW. No symptom concerns at this time but patients overall condition concerning and hospitalist would like palliative care assistance further with family after physician calls later today. Concern at present from hospitalist is cardiac issues which is presenting need for possible pacemaker. Multiple complex medical  issues need to be reviewed with family to establish goals of care further. palliative care to follow.

## 2022-01-14 NOTE — PROGRESS NOTE ADULT - ASSESSMENT
85 y/o F with PMH Breast cancer, Mastectomy 1985, colon ca resection 2010, VATS pleurectomy drainage 2018, COPD, CKD, presents to ED with c/o Leg swelling x 1 week. States fell on monday, able to get off floor, since then worsening shortness of breath, and leg swelling. Denies palpitations, chest pains. Noted to Be afib RVR in ED, given diltiazem. Edema noted to abd.     1/14:     LMA Occlusion  -MR Head-Acute left MCA and PCA infarcts   -s/p cerebral angio on 1/6.  TICI 2b revascularization of left m1 occlusion   aspirin.   on anticoagulation  with eliquis  likely due to afib.     Afib  - intermittetn digoxin   -Cont. Lopressor 25mg PO q 8 hours with parameters  -Midodrine 5mg TID to increase BP f needed    on noac now renal adjusted.       Diastolic heart failure  -  with Rv dyscfunction. ct diuresis. intermitten bladder scan.   on hemodialysis intermittently.       EDILBERTO on CKD   DUE TO CKD and venous congestion.   ct diruesis and intermittent hemodialysis     PO torsmide 20 BID   intermittent hemodialysis          85 y/o F with PMH Breast cancer, Mastectomy 1985, colon ca resection 2010, VATS pleurectomy drainage 2018, COPD, CKD, presents to ED with c/o Leg swelling x 1 week. States fell on monday, able to get off floor, since then worsening shortness of breath, and leg swelling. Denies palpitations, chest pains. Noted to Be afib RVR in ED, given diltiazem. Edema noted to abd.     1/14: Patient repeatedly states "yes" to every question, unable to follow commands. Resp exam-CTAB. Transition to PO Torsemide. Cont. to monitor bicarb to avoid metabolic alkalosis.     LMA Occlusion  -CT Head-Evolving acute/early subacute infarct in the left middle cerebral artery vascular territory is grossly stable from 01/09/2022.  No hemorrhagic conversion  -Cont. Eliquis    Afib  -intermittent digoxin   -Cont. Lopressor   -Cont Eliquis     Diastolic heart failure  - With Rv dyscfunction.   -Transition to PO diuresis  -on hemodialysis intermittently       EDILBERTO on CKD  BUN/Creatinin-43.8/1.87  Start PO Torsemide 20 BID   intermittent hemodialysis

## 2022-01-14 NOTE — PROGRESS NOTE ADULT - SUBJECTIVE AND OBJECTIVE BOX
[facility] Division of Hospital Medicine    Chief Complaint:  CVA    SUBJECTIVE / OVERNIGHT EVENTS:  Pt examined lying in bed  Awake and alert. Only able to say yes to all qs   ROS not obtained clinical status     Brief HPI   86 year old female with HTN, COPD (not on home O2), CKD, admitted with new onset A fib with RVR and acute CHF decompensation found to have acute on chronic renal failure. Empirically treated for PNA & LUE cellulitis She was also started on IV Milrinone and Lasix. Due to renal decompensation a permacath was placed and HD was initiated. On 1/6 her mental status changed with new onset of Rt sided weakness. CTA head noted Left M1 occlusion, > 50 % proximal TONIO stenosis. As she was not a tPA candidate she was transferred ot [facility] and underwent a cerebral angiogram with mechanical thrombectomy TICI 2B achieved. Stroke presumed to be cardioembolic. On 1/9 she was extubated and restarted on lasix. MR imaging showed a Left Caudate, thalamic and MCA/PCA CVA.         MEDICATIONS  (STANDING):  apixaban 2.5 milliGRAM(s) Oral two times a day  chlorhexidine 2% Cloths 1 Application(s) Topical daily  ferrous    sulfate 325 milliGRAM(s) Oral daily  glucagon  Injectable 1 milliGRAM(s) IntraMuscular once  metoprolol tartrate 25 milliGRAM(s) Oral every 8 hours  pantoprazole    Tablet 40 milliGRAM(s) Oral daily  potassium chloride   Powder 20 milliEquivalent(s) Oral every 4 hours  potassium chloride  10 mEq/100 mL IVPB 10 milliEquivalent(s) IV Intermittent every 1 hour  torsemide 20 milliGRAM(s) Oral two times a day    MEDICATIONS  (PRN):  albuterol/ipratropium for Nebulization 3 milliLiter(s) Nebulizer every 6 hours PRN Shortness of Breath and/or Wheezing  ondansetron Injectable 4 milliGRAM(s) IV Push every 8 hours PRN Nausea and/or Vomiting  sodium chloride 0.9% lock flush 10 milliLiter(s) IV Push every 1 hour PRN Pre/post blood products, medications, blood draw, and to maintain line patency          PHYSICAL EXAM:  Vital Signs Last 24 Hrs  T(C): 36.4 (14 Jan 2022 04:49), Max: 37.4 (13 Jan 2022 17:48)  T(F): 97.6 (14 Jan 2022 04:49), Max: 99.3 (13 Jan 2022 17:48)  HR: 98 (14 Jan 2022 04:49) (78 - 101)  BP: 105/66 (14 Jan 2022 04:49) (105/66 - 122/70)  BP(mean): --  RR: 18 (14 Jan 2022 04:49) (18 - 18)  SpO2: 100% (14 Jan 2022 04:49) (98% - 100%)    CONSTITUTIONAL: Non toxic appearing, lying in bed  ENMT: Moist oral mucosa, no JVD  RESPIRATORY: Decreased BS b/l   CARDIOVASCULAR: Regular rate and rhythm, normal S1 and S2, no murmur/rub/gallop; No lower extremity edema; Peripheral pulses are 2+ bilaterally  ABDOMEN: Nontender to palpation, normoactive bowel sounds, no rebound/guarding; No hepatosplenomegaly  MUSCLOSKELETAL:  no clubbing or cyanosis of digits; no joint swelling or tenderness to palpation  PSYCH: Awake only  EXTREMITIES:  No clubbing, cyanosis, positive LE edema, right arm dressing   NEURO: does not follow commands, sensory intact, spontaneous motor movement   SKIN: No rashes; no palpable lesions      LABS:                                   9.8    6.45  )-----------( 135      ( 14 Jan 2022 05:55 )             32.2   01-14    143  |  99  |  43.8<H>  ----------------------------<  96  3.1<L>   |  32.0<H>  |  1.87<H>    Ca    8.3<L>      14 Jan 2022 05:55  Phos  3.0     01-13  Mg     2.0     01-13    TPro  5.8<L>  /  Alb  2.6<L>  /  TBili  1.3  /  DBili  x   /  AST  39<H>  /  ALT  11  /  AlkPhos  133<H>  01-13          CT Head 1/12/22  IMPRESSION:  Evolving acute/early subacute infarct in the left middle cerebral artery   vascular territory is grossly stable from 01/09/2022.  No hemorrhagic   conversion

## 2022-01-15 LAB
ANION GAP SERPL CALC-SCNC: 12 MMOL/L — SIGNIFICANT CHANGE UP (ref 5–17)
BUN SERPL-MCNC: 42 MG/DL — HIGH (ref 8–20)
CALCIUM SERPL-MCNC: 8.6 MG/DL — SIGNIFICANT CHANGE UP (ref 8.6–10.2)
CHLORIDE SERPL-SCNC: 99 MMOL/L — SIGNIFICANT CHANGE UP (ref 98–107)
CO2 SERPL-SCNC: 33 MMOL/L — HIGH (ref 22–29)
CREAT SERPL-MCNC: 1.92 MG/DL — HIGH (ref 0.5–1.3)
GLUCOSE SERPL-MCNC: 106 MG/DL — HIGH (ref 70–99)
HCT VFR BLD CALC: 30.2 % — LOW (ref 34.5–45)
HGB BLD-MCNC: 9.2 G/DL — LOW (ref 11.5–15.5)
MAGNESIUM SERPL-MCNC: 2 MG/DL — SIGNIFICANT CHANGE UP (ref 1.6–2.6)
MCHC RBC-ENTMCNC: 25.3 PG — LOW (ref 27–34)
MCHC RBC-ENTMCNC: 30.5 GM/DL — LOW (ref 32–36)
MCV RBC AUTO: 83.2 FL — SIGNIFICANT CHANGE UP (ref 80–100)
PLATELET # BLD AUTO: 165 K/UL — SIGNIFICANT CHANGE UP (ref 150–400)
POTASSIUM SERPL-MCNC: 3.2 MMOL/L — LOW (ref 3.5–5.3)
POTASSIUM SERPL-SCNC: 3.2 MMOL/L — LOW (ref 3.5–5.3)
RBC # BLD: 3.63 M/UL — LOW (ref 3.8–5.2)
RBC # FLD: 18.8 % — HIGH (ref 10.3–14.5)
SODIUM SERPL-SCNC: 144 MMOL/L — SIGNIFICANT CHANGE UP (ref 135–145)
WBC # BLD: 6.37 K/UL — SIGNIFICANT CHANGE UP (ref 3.8–10.5)
WBC # FLD AUTO: 6.37 K/UL — SIGNIFICANT CHANGE UP (ref 3.8–10.5)

## 2022-01-15 PROCEDURE — 99498 ADVNCD CARE PLAN ADDL 30 MIN: CPT

## 2022-01-15 PROCEDURE — 99232 SBSQ HOSP IP/OBS MODERATE 35: CPT

## 2022-01-15 PROCEDURE — 99497 ADVNCD CARE PLAN 30 MIN: CPT

## 2022-01-15 RX ORDER — ERYTHROPOIETIN 10000 [IU]/ML
20000 INJECTION, SOLUTION INTRAVENOUS; SUBCUTANEOUS
Refills: 0 | Status: DISCONTINUED | OUTPATIENT
Start: 2022-01-15 | End: 2022-01-25

## 2022-01-15 RX ORDER — POTASSIUM CHLORIDE 20 MEQ
40 PACKET (EA) ORAL ONCE
Refills: 0 | Status: COMPLETED | OUTPATIENT
Start: 2022-01-15 | End: 2022-01-16

## 2022-01-15 RX ORDER — POTASSIUM CHLORIDE 20 MEQ
20 PACKET (EA) ORAL ONCE
Refills: 0 | Status: COMPLETED | OUTPATIENT
Start: 2022-01-15 | End: 2022-01-15

## 2022-01-15 RX ORDER — FOLIC ACID 0.8 MG
1 TABLET ORAL DAILY
Refills: 0 | Status: DISCONTINUED | OUTPATIENT
Start: 2022-01-15 | End: 2022-01-25

## 2022-01-15 RX ADMIN — CHLORHEXIDINE GLUCONATE 1 APPLICATION(S): 213 SOLUTION TOPICAL at 17:08

## 2022-01-15 RX ADMIN — Medication 12.5 MILLIGRAM(S): at 22:02

## 2022-01-15 RX ADMIN — ERYTHROPOIETIN 20000 UNIT(S): 10000 INJECTION, SOLUTION INTRAVENOUS; SUBCUTANEOUS at 16:55

## 2022-01-15 RX ADMIN — Medication 50 MILLIEQUIVALENT(S): at 10:54

## 2022-01-15 RX ADMIN — Medication 325 MILLIGRAM(S): at 16:55

## 2022-01-15 RX ADMIN — Medication 20 MILLIGRAM(S): at 07:12

## 2022-01-15 RX ADMIN — PANTOPRAZOLE SODIUM 40 MILLIGRAM(S): 20 TABLET, DELAYED RELEASE ORAL at 16:54

## 2022-01-15 RX ADMIN — APIXABAN 2.5 MILLIGRAM(S): 2.5 TABLET, FILM COATED ORAL at 16:55

## 2022-01-15 RX ADMIN — APIXABAN 2.5 MILLIGRAM(S): 2.5 TABLET, FILM COATED ORAL at 07:12

## 2022-01-15 RX ADMIN — Medication 20 MILLIGRAM(S): at 17:09

## 2022-01-15 RX ADMIN — Medication 1 MILLIGRAM(S): at 17:09

## 2022-01-15 RX ADMIN — Medication 12.5 MILLIGRAM(S): at 07:12

## 2022-01-15 RX ADMIN — Medication 12.5 MILLIGRAM(S): at 00:11

## 2022-01-15 NOTE — PROGRESS NOTE ADULT - CONVERSATION DETAILS
Extensive discussions with family regarding GOC. Initially Manjinder (son) stated that pt had a DNR/DNI and it never carried forward, (pts mother passed from CVA and per son pt repeatedly stated that she never wanted to be in that state) and was in agreement about continuing medical management, however, if , due to natural causes pt had cardiopulmonary arrest, that she should not be resuscitated and NOT intubated. Eloisa (daughter) was in agreement, however, due to gravity of decision, she would like some time to discuss with the family. It has been explained that DNR/DNI does not mean withdrawing or denying care, it means simply that if in the course of pts life, when she has a cardiopulmomary event, that natural death, interventions which maybe traumatic would not be performed.   At this time the pt remains full code, IF she has CP arrest, then family to be contacted immediately and at that time they would decide about DNR/DNI    Total time spent on ACP : 105 minutes

## 2022-01-15 NOTE — PROGRESS NOTE ADULT - ASSESSMENT
86 year old female with HTN, COPD (not on home O2), CKD, admitted with new onset A fib with RVR, acute on chronic diastolic HF decompensation, acute on chronic renal failure requiring HD. Hospital course complicated by acute CVA now s/p TICI 2b revascularization of left m1 occlusion & thrombectomy (1/6).     Continue Telemetry monitoring for now    Acute CVA   Likely thromboembolic in setting of Afib & ICA stenosis   s/p revascularization and thrombectomy by Neuro IR  Aphasic, only able to say yes to questions  A/C changed Eliquis   Continue neuro checks, change to q6  PT/OT    EDILBERTO on CKD now on HD  Renal following   Will monitor off HD. If renal function remains stable will have permacath removed   monitor renal function    Acute Diastolic CHF with EF>75% with Anasarca   Monitor in/out and electrolytes  \Now on Torsemide 20 mg BID,   Cardio following    Afib RVR  Cardio following  on metoprolol and Eliquis  per cardio: Digoxin Level-2, hold digoxin use for now    Right arm skin tear  Xeroform applied and wrapped with gauze applied to control with bleeding    Right LL PNA  s/p completion of Abx    Prophylactic measure  DVT ppx: already on Eliquis BID  GI ppx: on PPI    Poor prognosis   Pts son : Manjinder [phone]

## 2022-01-15 NOTE — PROGRESS NOTE ADULT - ASSESSMENT
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

## 2022-01-15 NOTE — PROGRESS NOTE ADULT - SUBJECTIVE AND OBJECTIVE BOX
NEPHROLOGY INTERVAL HPI/OVERNIGHT EVENTS:    Neuro follow up noted   palliative care discussions with the family noted   Meds the same   Torsemide bid     MEDICATIONS  (STANDING):  apixaban 2.5 milliGRAM(s) Oral two times a day  chlorhexidine 2% Cloths 1 Application(s) Topical daily  ferrous    sulfate 325 milliGRAM(s) Oral daily  glucagon  Injectable 1 milliGRAM(s) IntraMuscular once  metoprolol tartrate 12.5 milliGRAM(s) Oral every 8 hours  pantoprazole    Tablet 40 milliGRAM(s) Oral daily  torsemide 20 milliGRAM(s) Oral two times a day    MEDICATIONS  (PRN):  albuterol/ipratropium for Nebulization 3 milliLiter(s) Nebulizer every 6 hours PRN Shortness of Breath and/or Wheezing  ondansetron Injectable 4 milliGRAM(s) IV Push every 8 hours PRN Nausea and/or Vomiting  sodium chloride 0.9% lock flush 10 milliLiter(s) IV Push every 1 hour PRN Pre/post blood products, medications, blood draw, and to maintain line patency      Allergies    No Known Allergies    Intolerances    Vital Signs Last 24 Hrs  T(C): 36.8 (14 Jan 2022 18:00), Max: 36.8 (14 Jan 2022 18:00)  T(F): 98.3 (14 Jan 2022 18:00), Max: 98.3 (14 Jan 2022 18:00)  HR: 101 (14 Jan 2022 18:00) (101 - 101)  BP: 153/104 (14 Jan 2022 18:00) (153/104 - 153/104)  BP(mean): --  RR: 18 (14 Jan 2022 18:00) (18 - 18)  SpO2: --  Daily     Daily   I&O's Detail    I&O's Summary    PHYSICAL EXAM:  GENERAL: awake , talking   HEAD:  no edema   NECK: Supple, R permacath +   CHEST/LUNG: EAE, Bibasilar crackles   HEART: Regular rate and rhythm; No rub   ABDOMEN: Soft, Nontender, Nondistended;   EXTREMITIES:  edema better   LABS:                        9.2    6.37  )-----------( 165      ( 15 James 2022 06:41 )             30.2     01-15    144  |  99  |  42.0<H>  ----------------------------<  106<H>  3.2<L>   |  33.0<H>  |  1.92<H>    Ca    8.6      15 James 2022 06:41  Mg     2.0     01-15          Magnesium, Serum: 2.0 mg/dL (01-15 @ 06:41)          RADIOLOGY & ADDITIONAL TESTS:

## 2022-01-15 NOTE — PROGRESS NOTE ADULT - SUBJECTIVE AND OBJECTIVE BOX
[facility] Division of Hospital Medicine    Chief Complaint:  CVA    SUBJECTIVE / OVERNIGHT EVENTS:  Pt examined lying in bed  Awake and alert. Only able to say yes to all qs   Extensive d/w family regarding GOC.   ROS not obtained clinical status     Brief HPI   86 year old female with HTN, COPD (not on home O2), CKD, admitted with new onset A fib with RVR and acute CHF decompensation found to have acute on chronic renal failure. Empirically treated for PNA & LUE cellulitis She was also started on IV Milrinone and Lasix. Due to renal decompensation a permacath was placed and HD was initiated. On 1/6 her mental status changed with new onset of Rt sided weakness. CTA head noted Left M1 occlusion, > 50 % proximal TONIO stenosis. As she was not a tPA candidate she was transferred ot NICU and underwent a cerebral angiogram with mechanical thrombectomy TICI 2B achieved. Stroke presumed to be cardioembolic. On 1/9 she was extubated and restarted on lasix. MR imaging showed a Left Caudate, thalamic and MCA/PCA CVA.         MEDICATIONS  (STANDING):  apixaban 2.5 milliGRAM(s) Oral two times a day  chlorhexidine 2% Cloths 1 Application(s) Topical daily  ferrous    sulfate 325 milliGRAM(s) Oral daily  glucagon  Injectable 1 milliGRAM(s) IntraMuscular once  metoprolol tartrate 25 milliGRAM(s) Oral every 8 hours  pantoprazole    Tablet 40 milliGRAM(s) Oral daily  potassium chloride   Powder 20 milliEquivalent(s) Oral every 4 hours  potassium chloride  10 mEq/100 mL IVPB 10 milliEquivalent(s) IV Intermittent every 1 hour  torsemide 20 milliGRAM(s) Oral two times a day    MEDICATIONS  (PRN):  albuterol/ipratropium for Nebulization 3 milliLiter(s) Nebulizer every 6 hours PRN Shortness of Breath and/or Wheezing  ondansetron Injectable 4 milliGRAM(s) IV Push every 8 hours PRN Nausea and/or Vomiting  sodium chloride 0.9% lock flush 10 milliLiter(s) IV Push every 1 hour PRN Pre/post blood products, medications, blood draw, and to maintain line patency          PHYSICAL EXAM:  Vital Signs Last 24 Hrs  T(C): 36.4 (15 James 2022 12:25), Max: 36.8 (14 Jan 2022 18:00)  T(F): 97.5 (15 James 2022 12:25), Max: 98.3 (14 Jan 2022 18:00)  HR: 85 (15 Jamse 2022 12:25) (82 - 101)  BP: 119/78 (15 James 2022 12:25) (107/- - 153/104)  BP(mean): --  RR: 18 (15 James 2022 12:25) (16 - 18)  SpO2: 96% (15 James 2022 12:25) (95% - 96%)    CONSTITUTIONAL: Non toxic appearing, lying in bed  ENMT: Moist oral mucosa, no JVD  RESPIRATORY: Decreased BS b/l , ant chest walll permacath  CARDIOVASCULAR: Regular rate and rhythm, normal S1 and S2, no murmur/rub/gallop; No lower extremity edema; Peripheral pulses are 2+ bilaterally  ABDOMEN: Nontender to palpation, normoactive bowel sounds, no rebound/guarding; No hepatosplenomegaly  MUSCLOSKELETAL:  no clubbing or cyanosis of digits; no joint swelling or tenderness to palpation  PSYCH: Awake only  EXTREMITIES:  No clubbing, cyanosis, positive LE edema, right arm dressing   NEURO: Awake and alert, aphasic but able to speak few words, follows 75% commands, RUE/LUE 3/5, RLE/LLE 2-3/5  SKIN: No rashes; no palpable lesions      LABS:                                              9.2    6.37  )-----------( 165      ( 15 James 2022 06:41 )             30.2   01-15    144  |  99  |  42.0<H>  ----------------------------<  106<H>  3.2<L>   |  33.0<H>  |  1.92<H>    Ca    8.6      15 James 2022 06:41  Mg     2.0     01-15              CT Head 1/12/22  IMPRESSION:  Evolving acute/early subacute infarct in the left middle cerebral artery   vascular territory is grossly stable from 01/09/2022.  No hemorrhagic   conversion

## 2022-01-16 LAB
ANION GAP SERPL CALC-SCNC: 11 MMOL/L — SIGNIFICANT CHANGE UP (ref 5–17)
BUN SERPL-MCNC: 44.8 MG/DL — HIGH (ref 8–20)
CALCIUM SERPL-MCNC: 8.9 MG/DL — SIGNIFICANT CHANGE UP (ref 8.6–10.2)
CHLORIDE SERPL-SCNC: 99 MMOL/L — SIGNIFICANT CHANGE UP (ref 98–107)
CO2 SERPL-SCNC: 35 MMOL/L — HIGH (ref 22–29)
CREAT SERPL-MCNC: 1.71 MG/DL — HIGH (ref 0.5–1.3)
GLUCOSE SERPL-MCNC: 106 MG/DL — HIGH (ref 70–99)
HCT VFR BLD CALC: 32.2 % — LOW (ref 34.5–45)
HGB BLD-MCNC: 9.7 G/DL — LOW (ref 11.5–15.5)
MAGNESIUM SERPL-MCNC: 2.1 MG/DL — SIGNIFICANT CHANGE UP (ref 1.6–2.6)
MCHC RBC-ENTMCNC: 25.2 PG — LOW (ref 27–34)
MCHC RBC-ENTMCNC: 30.1 GM/DL — LOW (ref 32–36)
MCV RBC AUTO: 83.6 FL — SIGNIFICANT CHANGE UP (ref 80–100)
PLATELET # BLD AUTO: 177 K/UL — SIGNIFICANT CHANGE UP (ref 150–400)
POTASSIUM SERPL-MCNC: 3.3 MMOL/L — LOW (ref 3.5–5.3)
POTASSIUM SERPL-SCNC: 3.3 MMOL/L — LOW (ref 3.5–5.3)
RBC # BLD: 3.85 M/UL — SIGNIFICANT CHANGE UP (ref 3.8–5.2)
RBC # FLD: 19.2 % — HIGH (ref 10.3–14.5)
SODIUM SERPL-SCNC: 145 MMOL/L — SIGNIFICANT CHANGE UP (ref 135–145)
WBC # BLD: 6.94 K/UL — SIGNIFICANT CHANGE UP (ref 3.8–10.5)
WBC # FLD AUTO: 6.94 K/UL — SIGNIFICANT CHANGE UP (ref 3.8–10.5)

## 2022-01-16 PROCEDURE — 99232 SBSQ HOSP IP/OBS MODERATE 35: CPT

## 2022-01-16 RX ORDER — POTASSIUM CHLORIDE 20 MEQ
40 PACKET (EA) ORAL EVERY 6 HOURS
Refills: 0 | Status: DISCONTINUED | OUTPATIENT
Start: 2022-01-16 | End: 2022-01-16

## 2022-01-16 RX ORDER — POTASSIUM CHLORIDE 20 MEQ
40 PACKET (EA) ORAL EVERY 6 HOURS
Refills: 0 | Status: COMPLETED | OUTPATIENT
Start: 2022-01-16 | End: 2022-01-16

## 2022-01-16 RX ORDER — POTASSIUM CHLORIDE 20 MEQ
10 PACKET (EA) ORAL ONCE
Refills: 0 | Status: COMPLETED | OUTPATIENT
Start: 2022-01-16 | End: 2022-01-16

## 2022-01-16 RX ADMIN — PANTOPRAZOLE SODIUM 40 MILLIGRAM(S): 20 TABLET, DELAYED RELEASE ORAL at 11:54

## 2022-01-16 RX ADMIN — Medication 1 MILLIGRAM(S): at 11:54

## 2022-01-16 RX ADMIN — Medication 20 MILLIGRAM(S): at 17:52

## 2022-01-16 RX ADMIN — Medication 12.5 MILLIGRAM(S): at 06:43

## 2022-01-16 RX ADMIN — CHLORHEXIDINE GLUCONATE 1 APPLICATION(S): 213 SOLUTION TOPICAL at 11:55

## 2022-01-16 RX ADMIN — Medication 40 MILLIEQUIVALENT(S): at 11:54

## 2022-01-16 RX ADMIN — APIXABAN 2.5 MILLIGRAM(S): 2.5 TABLET, FILM COATED ORAL at 17:52

## 2022-01-16 RX ADMIN — Medication 325 MILLIGRAM(S): at 11:54

## 2022-01-16 RX ADMIN — APIXABAN 2.5 MILLIGRAM(S): 2.5 TABLET, FILM COATED ORAL at 06:13

## 2022-01-16 RX ADMIN — Medication 12.5 MILLIGRAM(S): at 22:09

## 2022-01-16 RX ADMIN — Medication 12.5 MILLIGRAM(S): at 12:57

## 2022-01-16 RX ADMIN — Medication 100 MILLIEQUIVALENT(S): at 17:54

## 2022-01-16 RX ADMIN — Medication 40 MILLIEQUIVALENT(S): at 22:08

## 2022-01-16 RX ADMIN — Medication 40 MILLIEQUIVALENT(S): at 17:55

## 2022-01-16 NOTE — PROGRESS NOTE ADULT - ASSESSMENT
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

## 2022-01-16 NOTE — PROGRESS NOTE ADULT - SUBJECTIVE AND OBJECTIVE BOX
NEPHROLOGY INTERVAL HPI/OVERNIGHT EVENTS:    GOC noted   No new events   Meds the same   Afeb / VSS   + Oral Torsemide    ml    MEDICATIONS  (STANDING):  apixaban 2.5 milliGRAM(s) Oral two times a day  chlorhexidine 2% Cloths 1 Application(s) Topical daily  epoetin clara-epbx (RETACRIT) Injectable 03522 Unit(s) SubCutaneous every 7 days  ferrous    sulfate 325 milliGRAM(s) Oral daily  folic acid 1 milliGRAM(s) Oral daily  glucagon  Injectable 1 milliGRAM(s) IntraMuscular once  metoprolol tartrate 12.5 milliGRAM(s) Oral every 8 hours  pantoprazole    Tablet 40 milliGRAM(s) Oral daily  potassium chloride    Tablet ER 40 milliEquivalent(s) Oral once  torsemide 20 milliGRAM(s) Oral two times a day    MEDICATIONS  (PRN):  albuterol/ipratropium for Nebulization 3 milliLiter(s) Nebulizer every 6 hours PRN Shortness of Breath and/or Wheezing  ondansetron Injectable 4 milliGRAM(s) IV Push every 8 hours PRN Nausea and/or Vomiting  sodium chloride 0.9% lock flush 10 milliLiter(s) IV Push every 1 hour PRN Pre/post blood products, medications, blood draw, and to maintain line patency      Allergies    No Known Allergies    Intolerances        Vital Signs Last 24 Hrs  T(C): 36.3 (16 Jan 2022 05:31), Max: 36.6 (15 James 2022 17:32)  T(F): 97.3 (16 Jan 2022 05:31), Max: 97.9 (15 James 2022 17:32)  HR: 52 (16 Jan 2022 05:31) (52 - 85)  BP: 97/64 (16 Jan 2022 05:31) (97/64 - 119/78)  BP(mean): --  RR: 19 (16 Jan 2022 05:31) (18 - 19)  SpO2: 99% (16 Jan 2022 05:31) (91% - 99%)  Daily     Daily   I&O's Detail    15 James 2022 07:01  -  16 Jan 2022 07:00  --------------------------------------------------------  IN:  Total IN: 0 mL    OUT:    Voided (mL): 700 mL  Total OUT: 700 mL    Total NET: -700 mL        I&O's Summary    15 James 2022 07:01  -  16 Jan 2022 07:00  --------------------------------------------------------  IN: 0 mL / OUT: 700 mL / NET: -700 mL      PHYSICAL EXAM:  GENERAL: awake , talking   HEAD:  no edema   NECK: Supple, R permacath +   CHEST/LUNG: EAE, Bibasilar crackles   HEART: Regular rate and rhythm; No rub   ABDOMEN: Soft, Nontender, Nondistended;   EXTREMITIES:  edema better   LABS:                        9.7    6.94  )-----------( 177      ( 16 Jan 2022 08:24 )             32.2     01-15    144  |  99  |  42.0<H>  ----------------------------<  106<H>  3.2<L>   |  33.0<H>  |  1.92<H>    Ca    8.6      15 James 2022 06:41  Mg     2.0     01-15                  RADIOLOGY & ADDITIONAL TESTS:

## 2022-01-16 NOTE — PROGRESS NOTE ADULT - ASSESSMENT
86 year old female with HTN, COPD (not on home O2), CKD, admitted with new onset A fib with RVR, acute on chronic diastolic HF decompensation, acute on chronic renal failure requiring HD. Hospital course complicated by acute CVA now s/p TICI 2b revascularization of left m1 occlusion & thrombectomy (1/6).     Continue Telemetry monitoring for now    Acute CVA   Likely thromboembolic in setting of Afib & ICA stenosis   s/p revascularization and thrombectomy by Neuro IR  Aphasic, only able to say yes to questions  A/C changed Eliquis   Continue neuro checks, change to q6  PT/OT    EDILBERTO on CKD now on HD  Renal following   Will monitor off HD. If renal function remains stable will have permacath removed   monitor renal function    Acute Diastolic CHF with EF>75% with Anasarca   Monitor in/out and electrolytes  \Now on Torsemide 20 mg BID,   Cardio following    Afib RVR  Cardio following  on metoprolol 12.5 mg Q8 and Eliquis  per cardio: Digoxin Level-2, hold digoxin use for now    Right arm skin tear  Xeroform applied and wrapped with gauze applied to control with bleeding    Right LL PNA  s/p completion of Abx    Hypokalemia  KCl supplemented     Prophylactic measure  DVT ppx: already on Eliquis BID  GI ppx: on PPI    Poor prognosis   HCP Eloisa (Daughter) : [phone]  Pts son : Manjinder [phone]

## 2022-01-16 NOTE — PROGRESS NOTE ADULT - SUBJECTIVE AND OBJECTIVE BOX
[facility] Division of Hospital Medicine    Chief Complaint:  CVA    SUBJECTIVE / OVERNIGHT EVENTS:  Pt examined lying in bed  Awake and alert.    ROS not obtained clinical status     Brief HPI   86 year old female with HTN, COPD (not on home O2), CKD, admitted with new onset A fib with RVR and acute CHF decompensation found to have acute on chronic renal failure. Empirically treated for PNA & LUE cellulitis She was also started on IV Milrinone and Lasix. Due to renal decompensation a permacath was placed and HD was initiated. On 1/6 her mental status changed with new onset of Rt sided weakness. CTA head noted Left M1 occlusion, > 50 % proximal TONIO stenosis. As she was not a tPA candidate she was transferred ot [facility] and underwent a cerebral angiogram with mechanical thrombectomy TICI 2B achieved. Stroke presumed to be cardioembolic. On 1/9 she was extubated and restarted on lasix. MR imaging showed a Left Caudate, thalamic and MCA/PCA CVA.         MEDICATIONS  (STANDING):  apixaban 2.5 milliGRAM(s) Oral two times a day  chlorhexidine 2% Cloths 1 Application(s) Topical daily  epoetin clara-epbx (RETACRIT) Injectable 55958 Unit(s) SubCutaneous every 7 days  ferrous    sulfate 325 milliGRAM(s) Oral daily  folic acid 1 milliGRAM(s) Oral daily  glucagon  Injectable 1 milliGRAM(s) IntraMuscular once  metoprolol tartrate 12.5 milliGRAM(s) Oral every 8 hours  pantoprazole    Tablet 40 milliGRAM(s) Oral daily  torsemide 20 milliGRAM(s) Oral two times a day    MEDICATIONS  (PRN):  albuterol/ipratropium for Nebulization 3 milliLiter(s) Nebulizer every 6 hours PRN Shortness of Breath and/or Wheezing  ondansetron Injectable 4 milliGRAM(s) IV Push every 8 hours PRN Nausea and/or Vomiting  sodium chloride 0.9% lock flush 10 milliLiter(s) IV Push every 1 hour PRN Pre/post blood products, medications, blood draw, and to maintain line patency      PHYSICAL EXAM:  Vital Signs Last 24 Hrs  T(C): 37.2 (16 Jan 2022 12:05), Max: 37.2 (16 Jan 2022 12:05)  T(F): 99 (16 Jan 2022 12:05), Max: 99 (16 Jan 2022 12:05)  HR: 78 (16 Jan 2022 12:05) (52 - 84)  BP: 110/69 (16 Jan 2022 12:05) (97/64 - 118/50)  BP(mean): --  RR: 20 (16 Jan 2022 12:05) (19 - 20)  SpO2: 98% (16 Jan 2022 12:05) (91% - 99%)    CONSTITUTIONAL: Non toxic appearing, lying in bed  ENMT: Moist oral mucosa, no JVD  RESPIRATORY: Decreased BS b/l , ant chest walll permacath  CARDIOVASCULAR: Regular rate and rhythm, normal S1 and S2, no murmur/rub/gallop; No lower extremity edema; Peripheral pulses are 2+ bilaterally  ABDOMEN: Nontender to palpation, normoactive bowel sounds, no rebound/guarding; No hepatosplenomegaly  MUSCLOSKELETAL:  no clubbing or cyanosis of digits; no joint swelling or tenderness to palpation  PSYCH: Awake and alert only  EXTREMITIES:  No clubbing, cyanosis, positive LE edema, right arm dressing   NEURO: Awake and alert, aphasic but able to speak few words, follows 75% commands, RUE/LUE 3/5, RLE/LLE 2-3/5  SKIN: No rashes; no palpable lesions      LABS:                                              9.7    6.94  )-----------( 177      ( 16 Jan 2022 08:24 )             32.2   01-16    145  |  99  |  44.8<H>  ----------------------------<  106<H>  3.3<L>   |  35.0<H>  |  1.71<H>    Ca    8.9      16 Jan 2022 08:24  Mg     2.1     01-16              CT Head 1/12/22  IMPRESSION:  Evolving acute/early subacute infarct in the left middle cerebral artery   vascular territory is grossly stable from 01/09/2022.  No hemorrhagic   conversion

## 2022-01-17 LAB
ANION GAP SERPL CALC-SCNC: 9 MMOL/L — SIGNIFICANT CHANGE UP (ref 5–17)
BUN SERPL-MCNC: 46 MG/DL — HIGH (ref 8–20)
CALCIUM SERPL-MCNC: 9 MG/DL — SIGNIFICANT CHANGE UP (ref 8.6–10.2)
CHLORIDE SERPL-SCNC: 103 MMOL/L — SIGNIFICANT CHANGE UP (ref 98–107)
CO2 SERPL-SCNC: 34 MMOL/L — HIGH (ref 22–29)
CREAT SERPL-MCNC: 1.76 MG/DL — HIGH (ref 0.5–1.3)
DIGOXIN SERPL-MCNC: 1.2 NG/ML — SIGNIFICANT CHANGE UP (ref 0.8–2)
GLUCOSE SERPL-MCNC: 106 MG/DL — HIGH (ref 70–99)
POTASSIUM SERPL-MCNC: 4.1 MMOL/L — SIGNIFICANT CHANGE UP (ref 3.5–5.3)
POTASSIUM SERPL-SCNC: 4.1 MMOL/L — SIGNIFICANT CHANGE UP (ref 3.5–5.3)
SODIUM SERPL-SCNC: 146 MMOL/L — HIGH (ref 135–145)

## 2022-01-17 PROCEDURE — 99233 SBSQ HOSP IP/OBS HIGH 50: CPT

## 2022-01-17 RX ADMIN — Medication 325 MILLIGRAM(S): at 09:58

## 2022-01-17 RX ADMIN — Medication 20 MILLIGRAM(S): at 05:35

## 2022-01-17 RX ADMIN — APIXABAN 2.5 MILLIGRAM(S): 2.5 TABLET, FILM COATED ORAL at 17:18

## 2022-01-17 RX ADMIN — Medication 20 MILLIGRAM(S): at 17:19

## 2022-01-17 RX ADMIN — Medication 12.5 MILLIGRAM(S): at 14:56

## 2022-01-17 RX ADMIN — CHLORHEXIDINE GLUCONATE 1 APPLICATION(S): 213 SOLUTION TOPICAL at 09:59

## 2022-01-17 RX ADMIN — PANTOPRAZOLE SODIUM 40 MILLIGRAM(S): 20 TABLET, DELAYED RELEASE ORAL at 09:58

## 2022-01-17 RX ADMIN — APIXABAN 2.5 MILLIGRAM(S): 2.5 TABLET, FILM COATED ORAL at 05:35

## 2022-01-17 RX ADMIN — Medication 1 MILLIGRAM(S): at 09:59

## 2022-01-17 RX ADMIN — Medication 12.5 MILLIGRAM(S): at 05:35

## 2022-01-17 RX ADMIN — Medication 12.5 MILLIGRAM(S): at 21:45

## 2022-01-17 NOTE — PROGRESS NOTE ADULT - ASSESSMENT
86 year old female with HTN, COPD (not on home O2), CKD, admitted with new onset A fib with RVR, acute on chronic diastolic HF decompensation, acute on chronic renal failure requiring HD. Hospital course complicated by acute CVA now s/p TICI 2b revascularization of left m1 occlusion & thrombectomy (1/6).     Acute CVA   Likely thromboembolic in setting of Afib & ICA stenosis   s/p revascularization and thrombectomy by Neuro IR  Aphasic, only able to say yes to questions  A/C changed Eliquis   Continue neuro checks, change to q6  PT/OT    EDILBERTO on CKD now on HD  Renal following   Will monitor off HD. If renal function remains stable will have permacath removed   monitor renal function    Acute Diastolic CHF with EF>75% with Anasarca   Monitor in/out and electrolytes  Now on Torsemide 20 mg BID,   Cardio following    Afib RVR  Cardio following  on metoprolol 12.5 mg Q8 and Eliquis  per cardio: Digoxin Level-2, hold digoxin use for now    Right arm skin tear  Xeroform applied and wrapped with gauze applied to control with bleeding    Right LL PNA  s/p completion of Abx    Hypokalemia  KCl supplemented     Prophylactic measure  DVT ppx: already on Eliquis BID  GI ppx: on PPI    Poor prognosis   HCP Eloisa (Daughter) : [phone]  Pts son : Manjinder [phone]      family wants [place]   palliative consult

## 2022-01-17 NOTE — PROGRESS NOTE ADULT - SUBJECTIVE AND OBJECTIVE BOX
NEPHROLOGY INTERVAL HPI/OVERNIGHT EVENTS:    No new events   Meds the same   Remains on Torsemide bid     MEDICATIONS  (STANDING):  apixaban 2.5 milliGRAM(s) Oral two times a day  chlorhexidine 2% Cloths 1 Application(s) Topical daily  epoetin clara-epbx (RETACRIT) Injectable 92464 Unit(s) SubCutaneous every 7 days  ferrous    sulfate 325 milliGRAM(s) Oral daily  folic acid 1 milliGRAM(s) Oral daily  glucagon  Injectable 1 milliGRAM(s) IntraMuscular once  metoprolol tartrate 12.5 milliGRAM(s) Oral every 8 hours  pantoprazole    Tablet 40 milliGRAM(s) Oral daily  torsemide 20 milliGRAM(s) Oral two times a day    MEDICATIONS  (PRN):  albuterol/ipratropium for Nebulization 3 milliLiter(s) Nebulizer every 6 hours PRN Shortness of Breath and/or Wheezing  ondansetron Injectable 4 milliGRAM(s) IV Push every 8 hours PRN Nausea and/or Vomiting  sodium chloride 0.9% lock flush 10 milliLiter(s) IV Push every 1 hour PRN Pre/post blood products, medications, blood draw, and to maintain line patency      Allergies    No Known Allergies    Intolerances          Vital Signs Last 24 Hrs  T(C): 36.7 (17 Jan 2022 11:12), Max: 36.9 (16 Jan 2022 17:22)  T(F): 98 (17 Jan 2022 11:12), Max: 98.4 (16 Jan 2022 17:22)  HR: 81 (17 Jan 2022 11:12) (81 - 102)  BP: 120/61 (17 Jan 2022 11:12) (120/61 - 133/71)  BP(mean): --  RR: 18 (17 Jan 2022 11:12) (18 - 18)  SpO2: 97% (17 Jan 2022 11:12) (95% - 99%)  Daily     Daily   I&O's Detail    I&O's Summary        PHYSICAL EXAM:  GENERAL: awake , talking   HEAD:  no edema   NECK: Supple, R permacath +   CHEST/LUNG: EAE, Bibasilar crackles   HEART: Regular rate and rhythm; No rub   ABDOMEN: Soft, Nontender, Nondistended;   EXTREMITIES:  edema better     LABS:                        9.7    6.94  )-----------( 177      ( 16 Jan 2022 08:24 )             32.2     01-17    146<H>  |  103  |  46.0<H>  ----------------------------<  106<H>  4.1   |  34.0<H>  |  1.76<H>    Ca    9.0      17 Jan 2022 07:25  Mg     2.1     01-16                  RADIOLOGY & ADDITIONAL TESTS:

## 2022-01-17 NOTE — PROGRESS NOTE ADULT - SUBJECTIVE AND OBJECTIVE BOX
seen for HD    no acute complaints  feels weak  ros negative     MEDICATIONS  (STANDING):  apixaban 2.5 milliGRAM(s) Oral two times a day  chlorhexidine 2% Cloths 1 Application(s) Topical daily  epoetin clara-epbx (RETACRIT) Injectable 05009 Unit(s) SubCutaneous every 7 days  ferrous    sulfate 325 milliGRAM(s) Oral daily  folic acid 1 milliGRAM(s) Oral daily  glucagon  Injectable 1 milliGRAM(s) IntraMuscular once  metoprolol tartrate 12.5 milliGRAM(s) Oral every 8 hours  pantoprazole    Tablet 40 milliGRAM(s) Oral daily  torsemide 20 milliGRAM(s) Oral two times a day    MEDICATIONS  (PRN):  albuterol/ipratropium for Nebulization 3 milliLiter(s) Nebulizer every 6 hours PRN Shortness of Breath and/or Wheezing  ondansetron Injectable 4 milliGRAM(s) IV Push every 8 hours PRN Nausea and/or Vomiting  sodium chloride 0.9% lock flush 10 milliLiter(s) IV Push every 1 hour PRN Pre/post blood products, medications, blood draw, and to maintain line patency      Allergies    No Known Allergies      Vital Signs Last 24 Hrs  T(C): 36.7 (17 Jan 2022 11:12), Max: 36.9 (16 Jan 2022 17:22)  T(F): 98 (17 Jan 2022 11:12), Max: 98.4 (16 Jan 2022 17:22)  HR: 81 (17 Jan 2022 11:12) (81 - 102)  BP: 120/61 (17 Jan 2022 11:12) (120/61 - 133/71)  BP(mean): --  RR: 18 (17 Jan 2022 11:12) (18 - 18)  SpO2: 97% (17 Jan 2022 11:12) (95% - 99%)    PHYSICAL EXAM:    GENERAL: NAD frail   CHEST/LUNG: Clear to ausculation bilaterally  HEART: Regular rate and rhythm; S1 S2  ABDOMEN: Soft, Bowel sounds present  EXTREMITIES:  no edema       LABS:                        9.7    6.94  )-----------( 177      ( 16 Jan 2022 08:24 )             32.2     01-17    146<H>  |  103  |  46.0<H>  ----------------------------<  106<H>  4.1   |  34.0<H>  |  1.76<H>    Ca    9.0      17 Jan 2022 07:25  Mg     2.1     01-16            CAPILLARY BLOOD GLUCOSE            RADIOLOGY & ADDITIONAL TESTS:

## 2022-01-17 NOTE — PROGRESS NOTE ADULT - ASSESSMENT
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

## 2022-01-18 LAB
ALBUMIN SERPL ELPH-MCNC: 2.6 G/DL — LOW (ref 3.3–5.2)
ALP SERPL-CCNC: 138 U/L — HIGH (ref 40–120)
ALT FLD-CCNC: 8 U/L — SIGNIFICANT CHANGE UP
ANION GAP SERPL CALC-SCNC: 12 MMOL/L — SIGNIFICANT CHANGE UP (ref 5–17)
AST SERPL-CCNC: 22 U/L — SIGNIFICANT CHANGE UP
BILIRUB DIRECT SERPL-MCNC: 0.9 MG/DL — HIGH (ref 0–0.3)
BILIRUB INDIRECT FLD-MCNC: 0.7 MG/DL — SIGNIFICANT CHANGE UP (ref 0.2–1)
BILIRUB SERPL-MCNC: 1.5 MG/DL — SIGNIFICANT CHANGE UP (ref 0.4–2)
BUN SERPL-MCNC: 50.9 MG/DL — HIGH (ref 8–20)
CALCIUM SERPL-MCNC: 9 MG/DL — SIGNIFICANT CHANGE UP (ref 8.6–10.2)
CHLORIDE SERPL-SCNC: 103 MMOL/L — SIGNIFICANT CHANGE UP (ref 98–107)
CO2 SERPL-SCNC: 37 MMOL/L — HIGH (ref 22–29)
CREAT SERPL-MCNC: 1.8 MG/DL — HIGH (ref 0.5–1.3)
GLUCOSE SERPL-MCNC: 105 MG/DL — HIGH (ref 70–99)
HCT VFR BLD CALC: 34.7 % — SIGNIFICANT CHANGE UP (ref 34.5–45)
HGB BLD-MCNC: 10.4 G/DL — LOW (ref 11.5–15.5)
MAGNESIUM SERPL-MCNC: 1.9 MG/DL — SIGNIFICANT CHANGE UP (ref 1.6–2.6)
MCHC RBC-ENTMCNC: 25.3 PG — LOW (ref 27–34)
MCHC RBC-ENTMCNC: 30 GM/DL — LOW (ref 32–36)
MCV RBC AUTO: 84.4 FL — SIGNIFICANT CHANGE UP (ref 80–100)
PHOSPHATE SERPL-MCNC: 2.7 MG/DL — SIGNIFICANT CHANGE UP (ref 2.4–4.7)
PLATELET # BLD AUTO: 185 K/UL — SIGNIFICANT CHANGE UP (ref 150–400)
POTASSIUM SERPL-MCNC: 3.3 MMOL/L — LOW (ref 3.5–5.3)
POTASSIUM SERPL-SCNC: 3.3 MMOL/L — LOW (ref 3.5–5.3)
PROT SERPL-MCNC: 6.3 G/DL — LOW (ref 6.6–8.7)
RBC # BLD: 4.11 M/UL — SIGNIFICANT CHANGE UP (ref 3.8–5.2)
RBC # FLD: 19.1 % — HIGH (ref 10.3–14.5)
SODIUM SERPL-SCNC: 152 MMOL/L — HIGH (ref 135–145)
WBC # BLD: 6.02 K/UL — SIGNIFICANT CHANGE UP (ref 3.8–10.5)
WBC # FLD AUTO: 6.02 K/UL — SIGNIFICANT CHANGE UP (ref 3.8–10.5)

## 2022-01-18 PROCEDURE — 99223 1ST HOSP IP/OBS HIGH 75: CPT

## 2022-01-18 PROCEDURE — 99233 SBSQ HOSP IP/OBS HIGH 50: CPT

## 2022-01-18 RX ORDER — POTASSIUM PHOSPHATE, MONOBASIC POTASSIUM PHOSPHATE, DIBASIC 236; 224 MG/ML; MG/ML
15 INJECTION, SOLUTION INTRAVENOUS ONCE
Refills: 0 | Status: COMPLETED | OUTPATIENT
Start: 2022-01-18 | End: 2022-01-18

## 2022-01-18 RX ADMIN — APIXABAN 2.5 MILLIGRAM(S): 2.5 TABLET, FILM COATED ORAL at 05:28

## 2022-01-18 RX ADMIN — Medication 1 MILLIGRAM(S): at 11:00

## 2022-01-18 RX ADMIN — POTASSIUM PHOSPHATE, MONOBASIC POTASSIUM PHOSPHATE, DIBASIC 62.5 MILLIMOLE(S): 236; 224 INJECTION, SOLUTION INTRAVENOUS at 11:00

## 2022-01-18 RX ADMIN — Medication 12.5 MILLIGRAM(S): at 22:11

## 2022-01-18 RX ADMIN — Medication 325 MILLIGRAM(S): at 11:00

## 2022-01-18 RX ADMIN — Medication 12.5 MILLIGRAM(S): at 15:29

## 2022-01-18 RX ADMIN — APIXABAN 2.5 MILLIGRAM(S): 2.5 TABLET, FILM COATED ORAL at 17:11

## 2022-01-18 RX ADMIN — CHLORHEXIDINE GLUCONATE 1 APPLICATION(S): 213 SOLUTION TOPICAL at 11:00

## 2022-01-18 RX ADMIN — PANTOPRAZOLE SODIUM 40 MILLIGRAM(S): 20 TABLET, DELAYED RELEASE ORAL at 11:00

## 2022-01-18 NOTE — PROGRESS NOTE ADULT - ASSESSMENT
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 supplement potassium --> she was given KPhos    Hold Torsemide       Anemia - Added weekly Epogen and folate     Follow labs and GOC conversation

## 2022-01-18 NOTE — PROGRESS NOTE ADULT - ASSESSMENT
86 year old female with HTN, COPD (not on home O2), CKD, admitted with new onset A fib with RVR, acute on chronic diastolic HF decompensation, acute on chronic renal failure requiring HD. Hospital course complicated by acute CVA now s/p TICI 2b revascularization of left m1 occlusion & thrombectomy (1/6).     Acute CVA   Likely thromboembolic in setting of Afib & ICA stenosis   s/p revascularization and thrombectomy by Neuro IR  Aphasic, only able to say yes to questions  A/C changed Eliquis   Continue neuro checks, change to q6  PT/OT    EDILBERTO on CKD now on HD  Renal following   Will monitor off HD. If renal function remains stable will have permacath removed   monitor renal function    Acute Diastolic CHF with EF>75% with Anasarca   Monitor in/out and electrolytes  on hold Torsemide 20 mg BID,   Cardio following    hypernatremia:   hold torsemide. encourage po intake    Afib RVR  Cardio following  on metoprolol 12.5 mg Q8 and Eliquis  per cardio: Digoxin Level-2, hold digoxin use for now    Right arm skin tear  Xeroform applied and wrapped with gauze applied to control with bleeding    Right LL PNA  s/p completion of Abx    Hypokalemia  KCl supplemented     Prophylactic measure  DVT ppx: already on Eliquis BID  GI ppx: on PPI    Poor prognosis   HCP Eloisa (Daughter) : [phone]  Pts son : Manjinder [phone]      family [place]   palliative consult pending.

## 2022-01-18 NOTE — ADVANCED PRACTICE NURSE CONSULT - ASSESSMENT
Chart Review: Patient A&Ox1  , 1 person assist with turning. Current Angelo Score of 13 , documented history of IAD, fecal/urinary incontinence.    ·	Right Heel DTi (<0.5cm in diameter) - persistent purple, intact, nonblanching skin, no drainage., periwound clean dry intact.

## 2022-01-18 NOTE — CONSULT NOTE ADULT - REASON FOR ADMISSION
decompensated CHF  New onset Afib  RLL PNA

## 2022-01-18 NOTE — CONSULT NOTE ADULT - CONSULT REASON
HD access
Afib
Stroke
CKD(III): EDILBERTO
CVA
CVA [facility] co-morbidities  GOC
L M1 occlusion

## 2022-01-18 NOTE — PROGRESS NOTE ADULT - SUBJECTIVE AND OBJECTIVE BOX
NEPHROLOGY INTERVAL HPI/OVERNIGHT EVENTS:    Intake poor   Awake / NAD   Remains  Torsemide bid   BP trending down     MEDICATIONS  (STANDING):  apixaban 2.5 milliGRAM(s) Oral two times a day  chlorhexidine 2% Cloths 1 Application(s) Topical daily  epoetin clara-epbx (RETACRIT) Injectable 64539 Unit(s) SubCutaneous every 7 days  ferrous    sulfate 325 milliGRAM(s) Oral daily  folic acid 1 milliGRAM(s) Oral daily  glucagon  Injectable 1 milliGRAM(s) IntraMuscular once  metoprolol tartrate 12.5 milliGRAM(s) Oral every 8 hours  pantoprazole    Tablet 40 milliGRAM(s) Oral daily  potassium phosphate IVPB 15 milliMole(s) IV Intermittent once  torsemide 20 milliGRAM(s) Oral two times a day    MEDICATIONS  (PRN):  albuterol/ipratropium for Nebulization 3 milliLiter(s) Nebulizer every 6 hours PRN Shortness of Breath and/or Wheezing  ondansetron Injectable 4 milliGRAM(s) IV Push every 8 hours PRN Nausea and/or Vomiting  sodium chloride 0.9% lock flush 10 milliLiter(s) IV Push every 1 hour PRN Pre/post blood products, medications, blood draw, and to maintain line patency      Allergies    No Known Allergies    Intolerances          Vital Signs Last 24 Hrs  T(C): 36.3 (18 Jan 2022 05:26), Max: 36.7 (17 Jan 2022 11:12)  T(F): 97.3 (18 Jan 2022 05:26), Max: 98 (17 Jan 2022 11:12)  HR: 91 (18 Jan 2022 05:26) (81 - 91)  BP: 95/62 (18 Jan 2022 05:26) (95/62 - 120/61)  BP(mean): --  RR: 18 (18 Jan 2022 05:26) (18 - 18)  SpO2: 98% (18 Jan 2022 05:26) (97% - 98%)  Daily     Daily   I&O's Detail    I&O's Summary      PHYSICAL EXAM:  GENERAL: awake ,comfortable , responds to every question saying yes   HEAD:  no edema   NECK: Supple, R permacath +   CHEST/LUNG: EAE,improved aeration   HEART: Regular rate and rhythm; No rub   ABDOMEN: Soft, Nontender, Nondistended;   EXTREMITIES:  edema better     LABS:                        10.4   6.02  )-----------( 185      ( 18 Jan 2022 06:11 )             34.7     01-18    152<H>  |  103  |  50.9<H>  ----------------------------<  105<H>  3.3<L>   |  37.0<H>  |  1.80<H>    Ca    9.0      18 Jan 2022 06:11  Phos  2.7     01-18  Mg     1.9     01-18    TPro  6.3<L>  /  Alb  2.6<L>  /  TBili  1.5  /  DBili  0.9<H>  /  AST  22  /  ALT  8   /  AlkPhos  138<H>  01-18        Magnesium, Serum: 1.9 mg/dL (01-18 @ 06:11)  Phosphorus Level, Serum: 2.7 mg/dL (01-18 @ 06:11)      `    RADIOLOGY & ADDITIONAL TESTS:

## 2022-01-18 NOTE — CONSULT NOTE ADULT - ASSESSMENT
Acute CVA  Acute  L MCA and PCA infarctsIn setting of AFIB with RVR  S/P Cerebral angio on 1/6/22  TICI2B  revascularization of L m1 occlusion and thrombectomy  Aphasic  Diet limited to soft bute size and thickened liquids  Very weak and tired    Acute on Chronic Systolic Heart Failure Decompensated  Torsemide   metropolol    EDILBERTO on CKD  Needed HD treatment  Renal recovery will allow patient to forgo continuing HD  Chest dialysis catheter to be DC'd  Avoid Nephrotoxic medications  Nephrology following      GOC  Evaluate for Acute Rehab at discharge  Wound care  Full code at this time  Will reachg out to daughter, discuss GOC after Acute rehab  Patient was living alone-will not be able to return to independent living  Family [facility]

## 2022-01-18 NOTE — CONSULT NOTE ADULT - SUBJECTIVE AND OBJECTIVE BOX
HPI: The patient is an 87 y/o female PMH +CKD (baseline Screat 1.7mg/dL) + COPD who presents to the ED with several weeks of progressive edema and weight gain; pt also has SOB and dry cough.  Upon arrival to the ED was found to have new onset rapid Afib and decompensated CHF.  We are called  regarding worsened renal function. Pt denies any new nor OTC medications.      PAST MEDICAL & SURGICAL HISTORY:  Hypertension    Breast cancer    Colon cancer    Renal insufficiency  baseline Cr. 1.7    Macular degeneration    History of COPD    History of hip replacement, total, left    History of colon resection    History of cholecystectomy    History of right mastectomy    History of tonsillectomy    Status post thoracentesis  Left Lung        FAMILY HISTORY:  Family history of hypertension (Child)        Social History:  No current tobacco; no etoh nor drug abuse      MEDICATIONS  (STANDING):  diltiazem Infusion 5 mG/Hr (5 mL/Hr) IV Continuous <Continuous>  furosemide   Injectable 40 milliGRAM(s) IV Push two times a day  heparin  Infusion.  Unit(s)/Hr (12 mL/Hr) IV Continuous <Continuous>  metoprolol tartrate 25 milliGRAM(s) Oral three times a day  piperacillin/tazobactam IVPB.. 3.375 Gram(s) IV Intermittent every 12 hours  tiotropium 18 MICROgram(s) Capsule 1 Capsule(s) Inhalation daily    MEDICATIONS  (PRN):  acetaminophen     Tablet .. 650 milliGRAM(s) Oral every 6 hours PRN Temp greater or equal to 38C (100.4F), Mild Pain (1 - 3)  aluminum hydroxide/magnesium hydroxide/simethicone Suspension 30 milliLiter(s) Oral every 4 hours PRN Dyspepsia  heparin   Injectable 5500 Unit(s) IV Push every 6 hours PRN For aPTT less than 40  heparin   Injectable 2500 Unit(s) IV Push every 6 hours PRN For aPTT between 40 - 57  melatonin 3 milliGRAM(s) Oral at bedtime PRN Insomnia  ondansetron Injectable 4 milliGRAM(s) IV Push every 8 hours PRN Nausea and/or Vomiting      Allergies    No Known Allergies    Intolerances        REVIEW OF SYSTEMS:    CONSTITUTIONAL: No fever, weight loss, + fatigue  EYES: No eye pain, visual disturbances, or discharge  ENMT:  No difficulty hearing, tinnitus, vertigo; No sinus or throat pain  NECK: No pain or stiffness  BREASTS: No pain, masses, or nipple discharge  RESPIRATORY: + cough No wheezing, chills or hemoptysis; + shortness of breath  CARDIOVASCULAR: No chest pain, palpitations, dizziness, + leg swelling  GASTROINTESTINAL: No abdominal or epigastric pain. No nausea, vomiting, or hematemesis; No diarrhea or constipation. No melena or hematochezia.  GENITOURINARY: No dysuria, frequency, hematuria, or incontinence  NEUROLOGICAL: No headaches, memory loss, loss of strength, numbness, or tremors  SKIN: No itching, burning, rashes, or lesions   MUSCULOSKELETAL: No joint pain or swelling; No muscle, back, or extremity pain  PSYCHIATRIC: No depression, anxiety, mood swings, or difficulty sleeping        Vital Signs Last 24 Hrs  T(C): 36.4 (2022 05:13), Max: 36.7 (2022 23:57)  T(F): 97.5 (2022 05:13), Max: 98 (2022 23:57)  HR: 102 (2022 05:13) (93 - 152)  BP: 94/65 (2022 05:13) (83/64 - 130/60)  BP(mean): 73 (2022 23:57) (63 - 81)  RR: 20 (2022 05:13) (16 - 22)  SpO2: 95% (2022 05:13) (92% - 98%)    PHYSICAL EXAM:    GENERAL: Elderly, weak  HEAD:  Atraumatic, Normocephalic  EYES: Conjunctiva and sclera clear  ENMT:  Moist mucous membranes, Good dentition, No lesions  NECK: Supple, No JVD  NERVOUS SYSTEM:  Alert & Oriented X3, intact and symmetric  CHEST/LUNG: Diminished B/L breath sounds  HEART: No rub  ABDOMEN: Soft, Nontender, Nondistended; BS+  EXTREMITIES:  2+ Peripheral Pulses, + dependent LE edema  LYMPH: No lymphadenopathy noted  SKIN: No rashes or lesions      LABS:                        13.6   11.74 )-----------( 121      ( 2022 07:17 )             41.9         132<L>  |  93<L>  |  97.7<H>  ----------------------------<  163<H>  5.2   |  24.0  |  2.68<H>    Ca    8.6      2022 11:45  Phos  4.5       Mg     2.8         TPro  5.7<L>  /  Alb  3.0<L>  /  TBili  1.5  /  DBili  x   /  AST  33<H>  /  ALT  22  /  AlkPhos  172<H>      PT/INR - ( 2022 11:45 )   PT: 15.0 sec;   INR: 1.31 ratio         PTT - ( 2022 07:25 )  PTT:75.8 sec  Urinalysis Basic - ( 2022 13:49 )    Color: Yellow / Appearance: Clear / S.020 / pH: x  Gluc: x / Ketone: Negative  / Bili: Negative / Urobili: Negative mg/dL   Blood: x / Protein: 15 / Nitrite: Negative   Leuk Esterase: Negative / RBC: 0-2 /HPF / WBC 0-2   Sq Epi: x / Non Sq Epi: Occasional / Bacteria: Occasional      Magnesium, Serum: 2.8 mg/dL ( @ 07:17)  Phosphorus Level, Serum: 4.5 mg/dL ( @ 07:17)  Magnesium, Serum: 2.9 mg/dL ( @ 11:45)    Respiratory Viral Panel with COVID-19 by CHIDI (22 @ 11:47)   Rapid RVP Result: [facility]   SARS-CoV-2: NotDete: This Respiratory Panel uses polymerase chain reaction (PCR) to detect for   adenovirus; coronavirus (HKU1, NL63, 229E, OC43); human metapneumovirus   (hMPV); human enterovirus/rhinovirus (Entero/RV); influenza A; influenza   A/H1; influenza A/H3; influenza A/H1-2009; influenza B; parainfluenza   viruses 1, 2, 3, 4; respiratory syncytial virus; Mycoplasma pneumoniae;   Chlamydophila pneumoniae; and SARS-CoV-2.     RADIOLOGY & ADDITIONAL TESTS:  < from: CT Abdomen and Pelvis No Cont (22 @ 18:50) >    ACC: [id number] EXAM:  CT ABDOMEN AND PELVIS                        ACC: [id number] EXAM:  CT CHEST                          PROCEDURE DATE:  2022          INTERPRETATION:  CLINICAL INFORMATION: 86-year-old female with history   breast cancer, colon cancer, and CHF with increased lower extremity   swelling and history of recent fall    COMPARISON: CT chest 2018    CONTRAST/COMPLICATIONS:  IV Contrast: NONE  Oral Contrast: NONE  Complications: None reported at time of study completion    PROCEDURE:  CT of the Chest, Abdomen and Pelvis was performed.  Sagittal and coronal reformats were performed.    FINDINGS:  CHEST:  LUNGS AND LARGE AIRWAYS: Patent central airways. Biapical   pleural-parenchymal scarring unchanged. Multiple subcentimeter pulmonary   cysts. Bibasilar atelectasis. Groundglass opacity and solid consolidation   right lower lobe which may be secondary to pneumonia.  PLEURA: Small bilateral pleural effusions.  VESSELS: Within normal limits.  HEART: Heart size is normal. No pericardial effusion.  MEDIASTINUM AND JESS: Mildly enlarged mediastinal lymph nodes.  CHEST WALL AND LOWER NECK: Anasarca.    ABDOMEN AND PELVIS:  LIVER: Cirrhosis.  BILE DUCTS: Normal caliber.  GALLBLADDER: Cholecystectomy.  SPLEEN: Within normal limits.  PANCREAS: Within normal limits.  ADRENALS: Within normal limits.  KIDNEYS/URETERS: Atrophic right kidney.    BLADDER: Indwelling Latham catheter.  REPRODUCTIVE ORGANS: Uterus and adnexa within normal limits.    BOWEL: No bowel obstruction.Appendix not visualized.  PERITONEUM: Moderate ascites.  VESSELS: Distended IVC likely secondary to CHF.  RETROPERITONEUM/LYMPH NODES: No lymphadenopathy.  ABDOMINAL WALL: Superior midline hernia containing fluid. Right-sided   lower abdominal herniacontaining fluid and nonobstructed small bowel.   Severe anasarca.  BONES: Left hip replacement. Degenerative change.    IMPRESSION:  Severe generalized anasarca  Small bilateral pleural effusions.  Right lower lobe consolidation which may be infectious.  No traumatic injury.    < end of copied text >  
Vascular Attending:  Ella joya    Vascular Surgery HPi:    Admission HPi reviewed.  Request for immediate HD access for Initiation of HD.  Patient with advanced ckd, exacerbated by CHF.  Patient was originally slated for permacath placement in IR , but unfortunately due to miscommunication the procedure was canceled or never scheduled.  Plan to place HDE catheter at bedside. INformed consent obtained from the patient.  + SOB, unable to lay flat. On active heparin  ggt.       HPI:  87 y/o female from home with history of HTN, CKD baseline Cr. 1.7, COPD not on home 02, remote hx of breast cancer s/p mastectomy in 86, colon cancer s/p bowel resection 12 years ago, chronic LE edema. Patient lives alone and states shes usually independent with no assistive devices. Patient states she sustained a mechanical fall 1 week ago landing on her left side. She denies LOC, head or neck pain, She did sustain a skin tear to her left forearm. She denies being on the floor for more than a few minutes. She admits to feeling weak since the fall and has been ordering out chinese food most of the week. She has been eating [employer] soup, fried rice, and chicken lo mein. She normally sees Dr. Snyder who has told her not to use any excess salt as she has chronic LE edema. She denies any hx of Afib, or CAD. She came to ED after she noted weight gain, SCHMID, and all her clothes fitting more tightly. She denies CP, fever, chills, N/V or diaphoresis. In the ED patient noted to be in afib w/ RVR, Anasarca, EDILBERTO on CKD, and with RLL PNA. She was cultured, given IV abx, started on Cardizem drip after PO cardizem and BB failed to control her HR. She was seen by Cardiology who did bedside echo showing diastolic dysfunction and dilated IVC, CTs showed anasarca and pleural effusions with RLLL infiltrate. COVID neg, U/A neg. no focal weakness. Patient given IV digoxin, tafoya placed, and IV lasix given with 600ml o/p. Currently awake in NAD.        (01 Jan 2022 23:57)      PAST MEDICAL & SURGICAL HISTORY:  Hypertension    Breast cancer    Colon cancer    Renal insufficiency  baseline Cr. 1.7    Macular degeneration    History of COPD    History of hip replacement, total, left    History of colon resection    History of cholecystectomy    History of right mastectomy    History of tonsillectomy    Status post thoracentesis  Left Lung        REVIEW OF SYSTEMS      General:	    Skin/Breast:  	  Ophthalmologic:  	  ENMT:	    Respiratory and Thorax:  	  Cardiovascular:	    Gastrointestinal:	    Genitourinary:	    Musculoskeletal:	    Neurological:	    Psychiatric:	    Hematology/Lymphatics:	    Endocrine:	    Allergic/Immunologic:	    MEDICATIONS  (STANDING):  chlorhexidine 4% Liquid 1 Application(s) Topical <User Schedule>  diltiazem    Tablet 60 milliGRAM(s) Oral every 6 hours  furosemide   Injectable 60 milliGRAM(s) IV Push two times a day  heparin  Infusion.  Unit(s)/Hr (12 mL/Hr) IV Continuous <Continuous>  metoprolol tartrate 25 milliGRAM(s) Oral three times a day  milrinone Infusion 0.25 MICROgram(s)/kG/Min (5.1 mL/Hr) IV Continuous <Continuous>  piperacillin/tazobactam IVPB.. 3.375 Gram(s) IV Intermittent every 12 hours  tiotropium 18 MICROgram(s) Capsule 1 Capsule(s) Inhalation daily    MEDICATIONS  (PRN):  acetaminophen     Tablet .. 650 milliGRAM(s) Oral every 6 hours PRN Temp greater or equal to 38C (100.4F), Mild Pain (1 - 3)  aluminum hydroxide/magnesium hydroxide/simethicone Suspension 30 milliLiter(s) Oral every 4 hours PRN Dyspepsia  diltiazem Injectable 10 milliGRAM(s) IV Push every 6 hours PRN HR > 130  heparin   Injectable 5500 Unit(s) IV Push every 6 hours PRN For aPTT less than 40  heparin   Injectable 2500 Unit(s) IV Push every 6 hours PRN For aPTT between 40 - 57  melatonin 3 milliGRAM(s) Oral at bedtime PRN Insomnia  ondansetron Injectable 4 milliGRAM(s) IV Push every 8 hours PRN Nausea and/or Vomiting  sodium chloride 0.9% lock flush 10 milliLiter(s) IV Push every 1 hour PRN Pre/post blood products, medications, blood draw, and to maintain line patency      Allergies    No Known Allergies    Intolerances        SOCIAL HISTORY: non contributory       Vital Signs Last 24 Hrs  T(C): 36.5 (04 Jan 2022 15:17), Max: 36.6 (03 Jan 2022 20:25)  T(F): 97.7 (04 Jan 2022 15:17), Max: 97.8 (03 Jan 2022 20:25)  HR: 116 (04 Jan 2022 15:17) (84 - 137)  BP: 107/64 (04 Jan 2022 15:17) (104/58 - 125/76)  BP(mean): --  RR: 19 (04 Jan 2022 15:17) (18 - 20)  SpO2: 96% (04 Jan 2022 15:17) (92% - 96%)    PHYSICAL EXAM:      Constitutional: ill appearing, no acute distress, anasarca     Eyes: no scleral icterus     ENMT: atraumatic     Neck: + jvd     Breasts: not examined       Respiratory: non labored     Cardiovascular: s1/s2     Gastrointestinal: soft, non tender, + acites     Genitourinary: tafoya with bloody return     Rectal: not examined     Extremities: 4+ edema pitting     Vascular: bedside ultrasound performed, no right internal jugular seen.  Patent right femoral noted     Neurological: no obvious motor or sensory deficits         Psychiatric: good affect             LABS:                        12.3   11.50 )-----------( 118      ( 04 Jan 2022 09:47 )             39.6     01-04    129<L>  |  89<L>  |  97.2<H>  ----------------------------<  190<H>  3.5   |  24.0  |  2.84<H>    Ca    7.9<L>      04 Jan 2022 09:47    TPro  5.2<L>  /  Alb  2.3<L>  /  TBili  1.2  /  DBili  x   /  AST  23  /  ALT  11  /  AlkPhos  159<H>  01-04    PT/INR - ( 02 Jan 2022 18:25 )   PT: 14.0 sec;   INR: 1.22 ratio         PTT - ( 04 Jan 2022 10:10 )  PTT:136.9 sec      RADIOLOGY & ADDITIONAL STUDIES    Impression and Plan:    Need for immediate HD  Unable to place to the jugulars (Right IJ not seen, Left IJ is patent, francis there are currently no 20cm catheters available)    - Access to be placed to the right femoral Vein 
                                                 [facility]                                                       [facility]                                                           Office:  [address] [place] [address]                                                          Telephone: [phone]. Fax:[phone]                                                                        CARDIOLOGY CONSULTATION NOTE                                                                                             Consult requested by:    Reason for Consultation:   History obtained by: Patient and medical record   obtained: No  Cardiologist: Lillian    Chief complaint:    Patient is a 86y old  Female who presents with a chief complaint of fall, edema      HPI: 85 y/o F with PMH Breast cancer, Mastectomy 1985, colon ca resection 2010, VATS pleurectomy drainage 2018, COPD, CKD, presents to ED with c/o Leg swelling x 1 week. States fell on monday, able to get off floor, since then worsening shortness of breath, and leg swelling. Denies palpitations, chest pains. Noted to Be afib RVR in ED, given diltiazem. Edema noted to abd.         REVIEW OF SYMPTOMS:     CONSTITUTIONAL: No fever, no weight loss, no fatigue, no weight gain   ENMT:  No difficulty hearing, tinnitus, vertigo; No sinus or throat pain  NECK: No pain or stiffness  CARDIOVASCULAR: No chest pain, no dyspnea, no syncope, no palpitations, no dizziness, no Orthopnea, no Paroxsymal nocturnal dyspnea +EDEMA  RESPIRATORY: No Dyspnea on exertion, no Shortness of breath, no cough, no wheezing  : No dysuria, no hematuria   GI: No dark color stool, no melena, no diarrhea, no constipation, no abdominal pain   NEURO: No headache, no dizziness, no slurred speech   MUSCULOSKELETAL: No joint pain or swelling; No muscle, back, or extremity pain  PSYCH: No agitation, no anxiety.    ALL OTHER REVIEW OF SYSTEMS ARE NEGATIVE.      PREVIOUS DIAGNOSTIC TESTING  ECHO FINDINGS:  < from: TTE Echo Complete w/Doppler (01.05.18 @ 09:14) >   Summary:   1. Technically difficult study.   2. Normal left ventricular internal cavity size.   3. Normal global left ventricular systolic function.   4. Left ventricular ejection fraction, by visual estimation, is 60 to   65%.   5. Spectral Doppler shows impaired relaxation pattern of left   ventricular myocardial filling (Grade I diastolic dysfunction).   6. Rheumatic mitral valve.   7. Mild to moderate mitral valve regurgitation.   8. Sclerotic aortic valve with normal opening.   9. Intra-atrial septal aneurysm.  10. Trivial pericardial effusion.     MD Ravindra Electronically signed on 1/5/2018 at 11:26:30 AM       < end of copied text >        ALLERGIES: Allergies  No Known Allergies  Intolerances        PAST MEDICAL HISTORY  Hypertension  Breast cancer  Colon cancer  Renal insufficiency  Macular degeneration        PAST SURGICAL HISTORY  History of hip replacement, total, left  History of colon cancer  History of colon resection  History of cholecystectomy  History of right mastectomy  History of tonsillectomy  Status post thoracentesis        FAMILY HISTORY:  Family history of hypertension (Child)      SOCIAL HISTORY:  Denies alcohol/drugs  CIGARETTES:   former       CURRENT MEDICATIONS:         HOME MEDICATIONS:      Vital Signs Last 24 Hrs  T(C): 36.3 (01 Jan 2022 12:00), Max: 36.3 (01 Jan 2022 12:00)  T(F): 97.4 (01 Jan 2022 12:00), Max: 97.4 (01 Jan 2022 12:00)  HR: 152 (01 Jan 2022 13:53) (115 - 152)  BP: 96/63 (01 Jan 2022 13:53) (91/54 - 130/60)  BP(mean): 75 (01 Jan 2022 13:53) (66 - 75)  RR: 22 (01 Jan 2022 13:53) (18 - 22)  SpO2: 96% (01 Jan 2022 13:53) (93% - 98%)        PHYSICAL EXAM:  Constitutional: Comfortable . No acute distress.   HEENT: Atraumatic and normocephalic , neck is supple   CNS: A&Ox3. No focal deficits. EOMI.   Respiratory: CTAB, unlabored   Cardiovascular: S1S2 irreg irreg rapid No murmur/rubs or gallop.  Gastrointestinal: Soft non-tender and non distended .   Extremities: +++ edema.   Psychiatric: Calm . no agitation.  Skin: No skin rash/ulcers visualized to face, hands or feet.      LABS:                        14.5   9.34  )-----------( 114      ( 01 Jan 2022 11:45 )             45.3     01-01    132<L>  |  93<L>  |  97.7<H>  ----------------------------<  163<H>  5.2   |  24.0  |  2.68<H>    Ca    8.6      01 Jan 2022 11:45  Mg     2.9     01-01    TPro  5.7<L>  /  Alb  3.0<L>  /  TBili  1.5  /  DBili  x   /  AST  33<H>  /  ALT  22  /  AlkPhos  172<H>  01-01    CARDIAC MARKERS ( 01 Jan 2022 11:45 )  x     / 0.04 ng/mL / x     / x     / x        ;p-BNP=Serum Pro-Brain Natriuretic Peptide: 5458 pg/mL (01-01 @ 11:45)    PT/INR - ( 01 Jan 2022 11:45 )   PT: 15.0 sec;   INR: 1.31 ratio         PTT - ( 01 Jan 2022 11:45 )  PTT:26.3 sec      INTERPRETATION OF TELEMETRY: Afib rvr   ECG: Reviewed by me.     
HPI:  85 y/o female from home with history of HTN, CKD baseline Cr. 1.7, COPD not on home 02, remote hx of breast cancer s/p mastectomy in 86, colon cancer s/p bowel resection 12 years ago, chronic LE edema. Patient lives alone and states shes usually independent with no assistive devices. Patient states she sustained a mechanical fall 1 week ago landing on her left side. She denies LOC, head or neck pain, She did sustain a skin tear to her left forearm. She denies being on the floor for more than a few minutes. She admits to feeling weak since the fall and has been ordering out chinese food most of the week. She has been eating [employer] soup, fried rice, and chicken lo mein. She normally sees Dr. Snyder who has told her not to use any excess salt as she has chronic LE edema. She denies any hx of Afib, or CAD. She came to ED after she noted weight gain, SCHMID, and all her clothes fitting more tightly. She denies CP, fever, chills, N/V or diaphoresis. In the ED patient noted to be in afib w/ RVR, Anasarca, EDILBERTO on CKD, and with RLL PNA. She was cultured, given IV abx, started on Cardizem drip after PO cardizem and BB failed to control her HR. She was seen by Cardiology who did bedside echo showing diastolic dysfunction and dilated IVC, CTs showed anasarca and pleural effusions with RLLL infiltrate. COVID neg, U/A neg. no focal weakness. Patient given IV digoxin, tafoya placed, and IV lasix given with 600ml o/p. Currently awake in NAD.        (01 Jan 2022 23:57)      INTERVAL HPI/OVERNIGHT EVENTS:  L MCA m1 segment occlusion    Vital Signs Last 24 Hrs  T(C): 37 (06 Jan 2022 16:38), Max: 37.2 (05 Jan 2022 19:00)  T(F): 98.6 (06 Jan 2022 16:38), Max: 99 (05 Jan 2022 19:00)  HR: 127 (06 Jan 2022 16:38) (98 - 127)  BP: 121/70 (06 Jan 2022 16:38) (115/72 - 145/74)  BP(mean): --  RR: 19 (06 Jan 2022 16:38) (18 - 20)  SpO2: 93% (06 Jan 2022 16:38) (93% - 98%)    PHYSICAL EXAM:  Constitutional: NAD    Neuro  * Mental Status:  GCS 11:  E(4), V(2), M(5).  Awake, Spontaneous EO, expressive/ receptive aphasia, not following commands, not mimicking  * Cranial Nerves: PERRL, L gaze preference, does not cross midline, R facial.  * Motor: RUE 0/5, LUE 5/5, RLE 0/5, LLE 4/5  * Sensory: right hemineglect  * Reflexes: Not assessed  * Gait: Not assessed  Cardiovascular:  S1, S2 no murmurs appreciated.  Regular rate and rhythm.  Eyes: See neurologic examination with detailed examination of eyes.  ENT: No JVD, Trachea Midline.  Respiratory: Clear to auscultation.  Gastrointestinal: Soft, nontender, nondistended.  Genitourinary: [X ] Tafoya,   Musculoskeletal: No muscle wasting noted, (See neurologic assessment for full muscle strength assessment) No pretibial edema appreciated, no appreciable calf tenderness.  Skin:  Wound inspected, no redness, bleeding or drainage noted.    Hematologic / Lymph / Immunologic: No bleeding from IV sites or wounds, No lymphadenopathy, No Hives or allergic type skin lesions    LABS:                        12.8   11.75 )-----------( 98       ( 06 Jan 2022 17:47 )             39.9     01-06    131<L>  |  92<L>  |  74.2<H>  ----------------------------<  102<H>  3.8   |  27.0  |  2.31<H>    Ca    8.3<L>      06 Jan 2022 03:33  Mg     2.6     01-05    TPro  5.7<L>  /  Alb  3.1<L>  /  TBili  1.2  /  DBili  x   /  AST  31  /  ALT  10  /  AlkPhos  149<H>  01-05    PT/INR - ( 06 Jan 2022 17:47 )   PT: 14.0 sec;   INR: 1.22 ratio         PTT - ( 06 Jan 2022 17:47 )  PTT:32.9 sec      01-05 @ 07:01  -  01-06 @ 07:00  --------------------------------------------------------  IN: 0 mL / OUT: 1250 mL / NET: -1250 mL    01-06 @ 07:01  -  01-06 @ 18:53  --------------------------------------------------------  IN: 0 mL / OUT: 1500 mL / NET: -1500 mL        RADIOLOGY & ADDITIONAL TESTS:  < from: CT Angio Head w/ IV Cont (01.06.22 @ 17:51) >  IMPRESSION:  CTA:  Occlusion of the left middle cerebral artery M1 segment with a paucity of   distal MCA branches.    Approximately 50% stenosis of the proximal right internal carotid artery.    No other significant stenosis, vessel occlusion or vessel abnormality is   seen.    CT brain:  Motion degraded exam.    Area of lucency and gray-white matter indistinctness in the right   occipital lobe. This is likely artifactual as no perfusion abnormality is   seen in this area. Clinical correlation will determine the need for MR of   the brain for further evaluation or continued follow-up.    No intracranial hemorrhage.    CT perfusion  Focus of decreased blood flow within the left frontoparietal region   compatible with a core infarction with a large surrounding ischemic   penumbra with decreased perfusion compatible with parenchyma at risk.    Extensive right upper lobe consolidation. Chest CT advised for further   evaluation as clinically warranted.    Noncontrast CT findings were discussed with physicians assistant: NELSON CARNEY  on 1/6/2022 5:45 PM with read back.    CTA and perfusion findings were discussed with physicians assistant: NELSON CARNEY  on 1/6/2022 6:06 PM with read back.    < end of copied text >          CAPRINI SCORE [CLOT]:  Patient has an estimated Caprini score of greater than 5.  However, the patient's unique clinical situation will be addressed in an individual manner to determine appropriate anticoagulation treatment, if any.
86yF was admitted on 01-01 with decompensated CHF, found to have AFIB/RVR. Hospital course was complicated by ARF placed on HD, an acute CVA and s/p TICI revascularization of left M1 occlusion/thrombectomy 1/6.     Imaging performed:  MRI HEAD 1/9 -  Acute left MCA and PCA infarcts as described above.    HEAD CT 1/12 - Evolving acute/early subacute infarct in the left middle cerebral artery vascular territory is grossly stable from 01/09/2022.  No hemorrhagic conversion    Patient awake, only able to state "ok" yeah".     REVIEW OF SYSTEMS - unable to provide due to aphasia    VITALS  T(C): 36.3 (01-18-22 @ 05:26), Max: 36.7 (01-17-22 @ 11:12)  HR: 91 (01-18-22 @ 05:26) (81 - 91)  BP: 95/62 (01-18-22 @ 05:26) (95/62 - 120/61)  RR: 18 (01-18-22 @ 05:26) (18 - 18)  SpO2: 98% (01-18-22 @ 05:26) (97% - 98%)  Wt(kg): --    PAST MEDICAL & SURGICAL HISTORY  Hypertension    Breast cancer    Colon cancer    Renal insufficiency    Macular degeneration    History of COPD    History of hip replacement, total, left    History of colon cancer    History of colon resection    History of cholecystectomy    History of right mastectomy    History of tonsillectomy    Status post thoracentesis        FUNCTIONAL HISTORY  Lives alone, 4 [place]  Independent    CURRENT FUNCTIONAL STATUS  1/12 PT  Bed Mobility: Scooting/Bridging:     · Level of Cost	minimum assist (75% patients effort)  · Physical Assist/Nonphysical Assist	1 person assist    Bed Mobility: Supine to Sit:     · Level of Cost	minimum assist (75% patients effort)  · Physical Assist/Nonphysical Assist	1 person assist  · Assistive Device	bed rails    Transfer: Sit to Stand:     · Level of Cost	minimum assist (75% patients effort)  · Physical Assist/Nonphysical Assist	1 person assist    Transfer: Stand to Sit:     · Level of Cost	minimum assist (75% patients effort)  · Physical Assist/Nonphysical Assist	1 person assist  · Assistive Device	hand held    Sit/Stand Transfer Safety Analysis:     · Impairments Contributing to Impaired Transfers	decreased strength    Gait Skills:     · Level of Cost	minimum assist (75% patients effort)  · Physical Assist/Nonphysical Assist	1 person + 1 person to manage equipment  · Assistive Device	hand held  · Gait Distance	5 feet; bed to chair    Gait Analysis:     · Gait Pattern Used	3-point gait  · Impairments Contributing to Gait Deviations	decreased strength; impaired balance    Stair Negotiation:     · Level of Cost	unable to perform    1/10 SLP  Speech Language Pathology Recommendations: 1. Soft & bite-sized solids, moderately thick fluids, as tolerated 2. STRICT aspiration precautions: if overt s/s noted, discontinue PO & resume NPO status 3. Oral care 4. Upright with PO 5. Crush meds 6. 1:1 assist with PO7. Small bites/sips 8. Speech-language eval  9. Will follow     SOCIAL HISTORY - as per documentation/history  Smoking - Quit  EtOH - None  Drugs - None    FAMILY HISTORY   Family history of hypertension (Child)        RECENT LABS - Reviewed  CBC Full  -  ( 18 Jan 2022 06:11 )  WBC Count : 6.02 K/uL  RBC Count : 4.11 M/uL  Hemoglobin : 10.4 g/dL  Hematocrit : 34.7 %  Platelet Count - Automated : 185 K/uL  Mean Cell Volume : 84.4 fl  Mean Cell Hemoglobin : 25.3 pg  Mean Cell Hemoglobin Concentration : 30.0 gm/dL  Auto Neutrophil # : x  Auto Lymphocyte # : x  Auto Monocyte # : x  Auto Eosinophil # : x  Auto Basophil # : x  Auto Neutrophil % : x  Auto Lymphocyte % : x  Auto Monocyte % : x  Auto Eosinophil % : x  Auto Basophil % : x    01-18    152<H>  |  103  |  50.9<H>  ----------------------------<  105<H>  3.3<L>   |  37.0<H>  |  1.80<H>    Ca    9.0      18 Jan 2022 06:11  Phos  2.7     01-18  Mg     1.9     01-18    TPro  6.3<L>  /  Alb  2.6<L>  /  TBili  1.5  /  DBili  0.9<H>  /  AST  22  /  ALT  8   /  AlkPhos  138<H>  01-18        ALLERGIES  No Known Allergies      MEDICATIONS   albuterol/ipratropium for Nebulization 3 milliLiter(s) Nebulizer every 6 hours PRN  apixaban 2.5 milliGRAM(s) Oral two times a day  chlorhexidine 2% Cloths 1 Application(s) Topical daily  epoetin lcara-epbx (RETACRIT) Injectable 55699 Unit(s) SubCutaneous every 7 days  ferrous    sulfate 325 milliGRAM(s) Oral daily  folic acid 1 milliGRAM(s) Oral daily  glucagon  Injectable 1 milliGRAM(s) IntraMuscular once  metoprolol tartrate 12.5 milliGRAM(s) Oral every 8 hours  ondansetron Injectable 4 milliGRAM(s) IV Push every 8 hours PRN  pantoprazole    Tablet 40 milliGRAM(s) Oral daily  potassium phosphate IVPB 15 milliMole(s) IV Intermittent once  sodium chloride 0.9% lock flush 10 milliLiter(s) IV Push every 1 hour PRN  torsemide 20 milliGRAM(s) Oral two times a day      ----------------------------------------------------------------------------------------  PHYSICAL EXAM  Constitutional - NAD, Appears Comfortable  HEENT - NCAT, EOMI  Neck - Supple, No limited ROM  Chest - Breathing comfortably, No wheezing  Cardiovascular - S1S2   Abdomen - Soft   Extremities - No C/C/E, No calf tenderness   Neurologic Exam -                    Cognitive - Awake/Alert     Communication - Severe receptive/expressive aphasia, Unable to follow commands with cues     Cranial Nerves - Unable to fully assess, left lip asymmetric      Motor - Unable to fully assess, but has right sided UE weakness/drift     Sensory - Withdraws to Babinski   Psychiatric - Calm   ----------------------------------------------------------------------------------------  ASSESSMENT/PLAN  86yFemale with functional deficits after hospitalization complicated by acute CVA  Left Acute MCA CVA s/p thrombectomy - Eliquis   HTN - Lopressor  CHF - Demadex, Duoneb  Pain - Tylenol  DVT PPX - SCDs  Rehab - Patient lives alone and now with severe aphasia. Given extent of deficits, prior living arrangements and potential for recovery, recommend DARY, patient DOES NOT meet acute inpatient rehabilitation criteria. Patient needs a more prolonged stay to achieve transition to community living and would not be able to tolerate a comprehensive/intense rehab program of 3hours/day.     Will continue to follow. Rehab recommendations are dependent on how functional progress changes as well as how patient continues to participate and tolerate therapeutic interventions, which may change. Recommend ongoing mobilization by staff to maintain cardiopulmonary function and prevention of secondary complications related to debility. Discussed with rehab team.   
                                 [facility]                                        [facility] [place]                                  [facility]                                      [address]                                           [place], [place], [address]                                                [phone]        CC: Stroke    HISTORY:  The patient is a 86y Female who was admitted 1/1/22.  She had fallen 1 week prior to arrival and landed on her left side.   She had been eating a lot of take out Chinese food since the fall.   She presented secondary to weight gain, dyspnea on exertion, and edema.   Noted to be in atrial fibrillation on arrival.   She was started on treatment including anticoagulation.    On 1/6/22 she was found to be confused.   There is some suggestion of right sided weakness.  Stroke code was activated around 5:30 pm. She was last known well around 2 pm.   STAT CT and CT-A/CT-P were performed and she was found to have large vessel occlusion (left M1) and neuro-intervention team was called.    She was taken to the cath lab for thrombectomy with TICI 2b revascularization.  NIH SS score was 9 prior to intervention.    She was transferred to neuro-ICU and remains on mechanical ventilator.     PAST MEDICAL & SURGICAL HISTORY:  Hypertension  Breast cancer  Colon cancer  Renal insufficiency  baseline Cr. 1.7  Macular degeneration  History of COPD  History of hip replacement, total, left  History of colon resection  History of cholecystectomy  History of right mastectomy  History of tonsillectomy  Status post thoracentesis  Left Lung    MEDICATIONS  (STANDING):  atorvastatin 40 milliGRAM(s) Oral at bedtime  chlorhexidine 0.12% Liquid 15 milliLiter(s) Oral Mucosa every 12 hours  chlorhexidine 4% Liquid 1 Application(s) Topical <User Schedule>  dexMEDEtomidine Infusion 0.1 MICROgram(s)/kG/Hr (1.7 mL/Hr) IV Continuous <Continuous>  dextrose 40% Gel 15 Gram(s) Oral once  dextrose 5%. 1000 milliLiter(s) (50 mL/Hr) IV Continuous <Continuous>  diltiazem    Tablet 60 milliGRAM(s) Oral every 6 hours  furosemide   Injectable 60 milliGRAM(s) IV Push two times a day  glucagon  Injectable 1 milliGRAM(s) IntraMuscular once  insulin lispro (ADMELOG) corrective regimen sliding scale   SubCutaneous every 6 hours  metoprolol tartrate 25 milliGRAM(s) Oral three times a day  pantoprazole  Injectable 40 milliGRAM(s) IV Push daily  piperacillin/tazobactam IVPB.. 3.375 Gram(s) IV Intermittent every 12 hours  sodium chloride 0.9%. 1000 milliLiter(s) (30 mL/Hr) IV Continuous <Continuous>    MEDICATIONS  (PRN):  acetaminophen     Tablet .. 650 milliGRAM(s) Oral every 6 hours PRN Temp greater or equal to 38C (100.4F), Mild Pain (1 - 3)  albuterol/ipratropium for Nebulization 3 milliLiter(s) Nebulizer every 6 hours PRN Shortness of Breath and/or Wheezing  diltiazem Injectable 10 milliGRAM(s) IV Push every 6 hours PRN HR > 130  ondansetron Injectable 4 milliGRAM(s) IV Push every 8 hours PRN Nausea and/or Vomiting  sodium chloride 0.9% lock flush 10 milliLiter(s) IV Push every 1 hour PRN Pre/post blood products, medications, blood draw, and to maintain line patency    Allergies  No Known Allergies    SOCIAL HISTORY:  Former smoker.     FAMILY HISTORY:  Family history of hypertension (Child)    ROS:  Constitutional: Unobtainable due to patient's condition.   Neuro: Unobtainable due to patient's condition.   Eyes: Unobtainable due to patient's condition.   Ears/nose/throat: Unobtainable due to patient's condition.   Cardiac: Unobtainable due to patient's condition.   Respiratory: Unobtainable due to patient's condition.   GI: Unobtainable due to patient's condition.   : Unobtainable due to patient's condition..  Integumentary: Unobtainable due to patient's condition.  Psych: Unobtainable due to patient's condition.  Heme: Unobtainable due to patient's condition.     Exam:  Vital Signs Last 24 Hrs  T(C): 37.8 (07 Jan 2022 08:40), Max: 37.8 (07 Jan 2022 07:37)  T(F): 100 (07 Jan 2022 08:40), Max: 100 (07 Jan 2022 07:37)  HR: 103 (07 Jan 2022 08:40) (70 - 134)  BP: 136/86 (06 Jan 2022 21:25) (118/72 - 145/74)  BP(mean): 99 (06 Jan 2022 21:25) (99 - 99)  RR: 18 (07 Jan 2022 08:40) (12 - 22)  SpO2: 100% (07 Jan 2022 08:40) (93% - 100%)  General: NAD.   Carotid bruits absent.     Mental status: The patient opens eyes to voice. She follows very simple instructions.     Cranial nerves: There is no papilledema. Pupils react symmetrically to light. She blinks to threat to confrontation. She tracks with gaze. An endotracheal tube is in place. Palate and tongue cannot be assessed.     Motor/sensory: There is normal bulk and tone.  Moves extremities to stimuli although left slightly more than right.    Reflexes: 1+ throughout and plantar responses are flexor.    Cerebellar: Cannot be assessed.     NIH SS score 18 now although likely elevated secondary to intubated status on mechanical ventilator.    LABS:                         10.9   9.35  )-----------( 78       ( 07 Jan 2022 03:02 )             33.8       01-07    134<L>  |  95<L>  |  46.5<H>  ----------------------------<  91  3.1<L>   |  26.0  |  1.70<H>    Ca    7.9<L>      07 Jan 2022 03:02  Phos  3.2     01-07  Mg     2.2     01-07    TPro  6.2<L>  /  Alb  3.2<L>  /  TBili  1.5  /  DBili  x   /  AST  34<H>  /  ALT  11  /  AlkPhos  178<H>  01-06      PT/INR - ( 06 Jan 2022 17:47 )   PT: 14.0 sec;   INR: 1.22 ratio    PTT - ( 06 Jan 2022 17:47 )  PTT:32.9 sec    RADIOLOGY   Initial code stroke CT showed no clear infarct but motion artifact degraded study.   CT-A showed occlusion of left M1 segment with paucity of branches.   There was ~50% stenosis in the right ICA.   CT-P showed core infarct with large surrounding penumbra.    New CT 1/7/22 reviewed. There is infarct in the left MCA territory seen (not present on prior).        
87 y/o female from home with history of HTN, CKD baseline Cr. 1.7, COPD not on home 02, remote hx of breast cancer s/p mastectomy in 86, colon cancer s/p bowel resection 12 years ago, chronic LE edema. She came to ED after she noted weight gain, SCHMID, and all her clothes fitting more tightly. She denies CP, fever, chills, N/V or diaphoresis. In the ED patient notedCTs showed anasarca and pleural effusions with RLLL infiltrate. COVID neg to be in afib w/ RVR, Anasarca, EDILBERTO on CKD, and with RLL PNA. She has been treated for PNA, and AFIB with RVR treated with Cardizem. See bedside Echo-Diastolic Dysfunction and dilated IVC. Today she is lethargic, unable to stay awake for simple Q & A. She denies CP, Palpitations, N/V/D SOB-poor appetite ate very little.  HPI:  87 y/o female from home with history of HTN, CKD baseline Cr. 1.7, COPD not on home 02, remote hx of breast cancer s/p mastectomy in 86, colon cancer s/p bowel resection 12 years ago, chronic LE edema. Patient lives alone and states shes usually independent with no assistive devices. Patient states she sustained a mechanical fall 1 week ago landing on her left side. She denies LOC, head or neck pain, She did sustain a skin tear to her left forearm. She denies being on the floor for more than a few minutes. She admits to feeling weak since the fall and has been ordering out chinese food most of the week. She has been eating [employer] soup, fried rice, and chicken lo mein. She normally sees Dr. Snyder who has told her not to use any excess salt as she has chronic LE edema. She denies any hx of Afib, or CAD. She came to ED after she noted weight gain, SCHMID, and all her clothes fitting more tightly. She denies CP, fever, chills, N/V or diaphoresis. In the ED patient noted to be in afib w/ RVR, Anasarca, EDILBERTO on CKD, and with RLL PNA. She was cultured, given IV abx, started on Cardizem drip after PO cardizem and BB failed to control her HR. She was seen by Cardiology who did bedside echo showing diastolic dysfunction and dilated IVC, , U/A neg. no focal weakness. Patient given IV digoxin, tafoya placed, and IV lasix given with 600ml o/p. Currently awake in NAD.     (01 Jan 2022 23:57)    PERTINENT PMH REVIEWED: Yes     PAST MEDICAL & SURGICAL HISTORY:  Hypertension    Breast cancer    Colon cancer    Renal insufficiency  baseline Cr. 1.7    Macular degeneration    History of COPD    History of hip replacement, total, left    History of colon resection    History of cholecystectomy    History of right mastectomy    History of tonsillectomy    Status post thoracentesis  Left Lung    SOCIAL HISTORY:                      Substance history: none                    Admitted from:  home                      Druze/spirituality:                    Cultural concerns: none                      HCP Eloisa Acuña: Phone# [phone]:    FAMILY HISTORY:  Family history of hypertension (Child)      No Known Allergies    Intolerances    ADVANCE DIRECTIVES/TREATMENT PREFERENCES:  Full code, all aggressive measures desired     Baseline ADLs (prior to admission):  Independent/      Karnofsky/Palliative Performance Status Version 20-30%      Present Symptoms:     Dyspnea: none  Nausea/Vomiting:  No  Anxiety:   No  Depression:  No  Fatigue: Yes   Loss of appetite: Yes   Constipation: ?    Pain: Denies            Character-            Duration-            Effect-            Factors-            Frequency-            Location-            Severity-    Review of Systems: Reviewed                                       Unable to obtain due to poor mentation                     Not attentive      MEDICATIONS  (STANDING):  apixaban 2.5 milliGRAM(s) Oral two times a day  chlorhexidine 2% Cloths 1 Application(s) Topical daily  epoetin clara-epbx (RETACRIT) Injectable 26632 Unit(s) SubCutaneous every 7 days  ferrous    sulfate 325 milliGRAM(s) Oral daily  folic acid 1 milliGRAM(s) Oral daily  glucagon  Injectable 1 milliGRAM(s) IntraMuscular once  metoprolol tartrate 12.5 milliGRAM(s) Oral every 8 hours  pantoprazole    Tablet 40 milliGRAM(s) Oral daily    MEDICATIONS  (PRN):  albuterol/ipratropium for Nebulization 3 milliLiter(s) Nebulizer every 6 hours PRN Shortness of Breath and/or Wheezing  ondansetron Injectable 4 milliGRAM(s) IV Push every 8 hours PRN Nausea and/or Vomiting  sodium chloride 0.9% lock flush 10 milliLiter(s) IV Push every 1 hour PRN Pre/post blood products, medications, blood draw, and to maintain line patency      PHYSICAL EXAM:    Vital Signs Last 24 Hrs  T(C): 36.8 (18 Jan 2022 11:04), Max: 36.8 (18 Jan 2022 11:04)  T(F): 98.3 (18 Jan 2022 11:04), Max: 98.3 (18 Jan 2022 11:04)  HR: 95 (18 Jan 2022 11:04) (91 - 95)  BP: 138/86 (18 Jan 2022 11:04) (95/62 - 138/86)  BP(mean): --  RR: 18 (18 Jan 2022 11:04) (18 - 18)  SpO2: 94% (18 Jan 2022 11:04) (94% - 98%)    General: ____ lethargic                    few words yes/no    HEENT:   dry mouth      Lungs: comfortable      CV: normal     GI: normal                  constipation  last BM:     :   tafoya> Primafit    MSK:  weakness  edema              bedbound/wheelchair bound    Neuro: no focal deficits cognitive impairment dysphagia dysarthria hemiplegia     Skin:  no rash    LABS:                        10.4   6.02  )-----------( 185      ( 18 Jan 2022 06:11 )             34.7     01-18    152<H>  |  103  |  50.9<H>  ----------------------------<  105<H>  3.3<L>   |  37.0<H>  |  1.80<H>    Ca    9.0      18 Jan 2022 06:11  Phos  2.7     01-18  Mg     1.9     01-18    TPro  6.3<L>  /  Alb  2.6<L>  /  TBili  1.5  /  DBili  0.9<H>  /  AST  22  /  ALT  8   /  AlkPhos  138<H>  01-18    I&O's Summary    RADIOLOGY & ADDITIONAL STUDIES:  < from: CT Head No Cont (01.12.22 @ 03:15) >  IMPRESSION:  Evolving acute/early subacute infarct in the left middle cerebral artery   vascular territory is grossly stable from 01/09/2022.  No hemorrhagic   conversion    < end of copied text >

## 2022-01-18 NOTE — ADVANCED PRACTICE NURSE CONSULT - RECOMMEDATIONS
·	Cavilon Skin barrier to Right Heel -   ·	Apply Heel-Offloading cAIRboots at all times while in bed. - provide skin checks and foot placement Q8H

## 2022-01-18 NOTE — PROGRESS NOTE ADULT - SUBJECTIVE AND OBJECTIVE BOX
seen for renal failure    no acute complaints/events.    no chest pain/sob.    MEDICATIONS  (STANDING):  apixaban 2.5 milliGRAM(s) Oral two times a day  chlorhexidine 2% Cloths 1 Application(s) Topical daily  epoetin clara-epbx (RETACRIT) Injectable 56926 Unit(s) SubCutaneous every 7 days  ferrous    sulfate 325 milliGRAM(s) Oral daily  folic acid 1 milliGRAM(s) Oral daily  glucagon  Injectable 1 milliGRAM(s) IntraMuscular once  metoprolol tartrate 12.5 milliGRAM(s) Oral every 8 hours  pantoprazole    Tablet 40 milliGRAM(s) Oral daily    MEDICATIONS  (PRN):  albuterol/ipratropium for Nebulization 3 milliLiter(s) Nebulizer every 6 hours PRN Shortness of Breath and/or Wheezing  ondansetron Injectable 4 milliGRAM(s) IV Push every 8 hours PRN Nausea and/or Vomiting  sodium chloride 0.9% lock flush 10 milliLiter(s) IV Push every 1 hour PRN Pre/post blood products, medications, blood draw, and to maintain line patency      Allergies    No Known Allergies    Vital Signs Last 24 Hrs  T(C): 36.8 (18 Jan 2022 11:04), Max: 36.8 (18 Jan 2022 11:04)  T(F): 98.3 (18 Jan 2022 11:04), Max: 98.3 (18 Jan 2022 11:04)  HR: 95 (18 Jan 2022 11:04) (84 - 95)  BP: 138/86 (18 Jan 2022 11:04) (95/62 - 138/86)  BP(mean): --  RR: 18 (18 Jan 2022 11:04) (18 - 18)  SpO2: 94% (18 Jan 2022 11:04) (94% - 98%)    PHYSICAL EXAM:    GENERAL: NAD frail.  CHEST/LUNG: Clear to ausculation bilaterally  HEART: Regular rate and rhythm; S1 S2  ABDOMEN: Soft, Nontender,  Bowel sounds present  EXTREMITIES: no pitting LE edema   +edema ue's  NERVOUS SYSTEM:  awake, follows simple commands. generalized weakness.    LABS:                        10.4   6.02  )-----------( 185      ( 18 Jan 2022 06:11 )             34.7     01-18    152<H>  |  103  |  50.9<H>  ----------------------------<  105<H>  3.3<L>   |  37.0<H>  |  1.80<H>    Ca    9.0      18 Jan 2022 06:11  Phos  2.7     01-18  Mg     1.9     01-18    TPro  6.3<L>  /  Alb  2.6<L>  /  TBili  1.5  /  DBili  0.9<H>  /  AST  22  /  ALT  8   /  AlkPhos  138<H>  01-18          CAPILLARY BLOOD GLUCOSE            RADIOLOGY & ADDITIONAL TESTS:

## 2022-01-18 NOTE — CONSULT NOTE ADULT - NSCONSULTADDITIONALINFOA_GEN_ALL_CORE
Total Time Spent__50__ minutes  This includes chart review, patient assessment, discussion and collaboration with interdisciplinary team members, ACP planning    COUNSELING:  Face to face meeting to discuss Advanced Care Planning - Time Spent ______Minutes.      Thank you for the opportunity to assist with the care of this patient.   [facility] Palliative Medicine Consult Service [phone].

## 2022-01-18 NOTE — CONSULT NOTE ADULT - CONSULT REQUESTED DATE/TIME
07-Jan-2022 09:48
18-Jan-2022 17:15
18-Jan-2022 09:11
06-Jan-2022 18:46
01-Jan-2022 14:01
04-Jan-2022 18:15
02-Jan-2022 10:02

## 2022-01-18 NOTE — CHART NOTE - NSCHARTNOTEFT_GEN_A_CORE
Source: Patient [ ]  Family [ ]   other [ x]    Current Diet: Soft & Bite Sized, moderately thick     Patient reports [ ] nausea  [ ] vomiting [ ] diarrhea [ ] constipation  [ x]chewing problems [x ] swallowing issues  [ ] other:     PO intake:  < 50% [x ]   50-75%  [ ]   %  [ ]  other :    Source for PO intake [ ] Patient [ ] family [ x] chart [x ] staff [ ] other    Current Weight:   (1/12)  157.6 lbs  (1/11)  175.2 lbs  (1/10)  162.2 lbs  (1/7)   165.7 lbs     % Weight Change: Unclear accuracy of weights, will continue to monitor     Pertinent Medications: MEDICATIONS  (STANDING):  apixaban 2.5 milliGRAM(s) Oral two times a day  chlorhexidine 2% Cloths 1 Application(s) Topical daily  epoetin clara-epbx (RETACRIT) Injectable 32439 Unit(s) SubCutaneous every 7 days  ferrous    sulfate 325 milliGRAM(s) Oral daily  folic acid 1 milliGRAM(s) Oral daily  glucagon  Injectable 1 milliGRAM(s) IntraMuscular once  metoprolol tartrate 12.5 milliGRAM(s) Oral every 8 hours  pantoprazole    Tablet 40 milliGRAM(s) Oral daily    MEDICATIONS  (PRN):  albuterol/ipratropium for Nebulization 3 milliLiter(s) Nebulizer every 6 hours PRN Shortness of Breath and/or Wheezing  ondansetron Injectable 4 milliGRAM(s) IV Push every 8 hours PRN Nausea and/or Vomiting  sodium chloride 0.9% lock flush 10 milliLiter(s) IV Push every 1 hour PRN Pre/post blood products, medications, blood draw, and to maintain line patency    Pertinent Labs: CBC Full  -  ( 18 Jan 2022 06:11 )  WBC Count : 6.02 K/uL  RBC Count : 4.11 M/uL  Hemoglobin : 10.4 g/dL  Hematocrit : 34.7 %  Platelet Count - Automated : 185 K/uL  Mean Cell Volume : 84.4 fl  Mean Cell Hemoglobin : 25.3 pg  Mean Cell Hemoglobin Concentration : 30.0 gm/dL  01-18 Na152 mmol/L<H> Glu 105 mg/dL<H> K+ 3.3 mmol/L<L> Cr  1.80 mg/dL<H> BUN 50.9 mg/dL<H> Phos 2.7 mg/dL Alb 2.6 g/dL<L> PAB n/a       Skin: skin tear RUE, skin tear LFA, wound B/L buttocks, wound R heel, IAD, Moisture Associated Dermatitis     Nutrition focused physical exam previously conducted - found signs of malnutrition [ ]absent [x ]present    Subcutaneous fat loss: Mod [x ] Orbital fat pads region, [x ]Buccal fat region, [ ]Triceps region,  [ ]Ribs region    Muscle wasting: Mod [ x]Temples region, [x ]Clavicle region, [x ]Shoulder region, [ ]Scapula region, [ ]Interosseous region,  [ ]thigh region, [ ]Calf region    Estimated Needs:   [ x] no change since previous assessment  [ ] recalculated:     Current Nutrition Diagnosis: Pt remains at high nutrition risk secondary to moderate, (acute on chronic) protein calorie malnutrition related to inability to meet sufficient protein-energy needs in setting of COPD, CHF, skin breakdown, renal failure requiring HD, RLL PNA, LUE cellulitis, left hemisphere stroke as evidenced by physicals signs of moderate muscle/fat loss and suspect meeting </75% nutrient needs >/1 month. Pt s/p SLP eval 1/10 recommending Soft & Bite Sized, moderately thick, Pt continues with poor PO intake completing <50% of meals with assistance. HD on hold, monitoring renal function.     Recommendations:   1) Add Nepro TID   2) Rx Nephro-gustavo, vit C 500mg and folic acid daily   3) Check weight daily to monitor trends   4) Monitor weights daily for trend/accuracy   5) Consider appetite stimulant     Monitoring and Evaluation:   [ x] PO intake [ x] Tolerance to diet prescription [X] Weights  [X] Follow up per protocol [X] Labs: Source: Patient [ ]  Family [ ]   other [ x]    Current Diet: Soft & Bite Sized, moderately thick     Patient reports [ ] nausea  [ ] vomiting [ ] diarrhea [ ] constipation  [ x]chewing problems [x ] swallowing issues  [ ] other:     PO intake:  < 50% [x ]   50-75%  [ ]   %  [ ]  other :    Source for PO intake [ ] Patient [ ] family [ x] chart [x ] staff [ ] other    Current Weight:   (1/12)  157.6 lbs  (1/11)  175.2 lbs  (1/10)  162.2 lbs  (1/7)   165.7 lbs     % Weight Change: Unclear accuracy of weights, will continue to monitor     Pertinent Medications: MEDICATIONS  (STANDING):  apixaban 2.5 milliGRAM(s) Oral two times a day  chlorhexidine 2% Cloths 1 Application(s) Topical daily  epoetin clara-epbx (RETACRIT) Injectable 13282 Unit(s) SubCutaneous every 7 days  ferrous    sulfate 325 milliGRAM(s) Oral daily  folic acid 1 milliGRAM(s) Oral daily  glucagon  Injectable 1 milliGRAM(s) IntraMuscular once  metoprolol tartrate 12.5 milliGRAM(s) Oral every 8 hours  pantoprazole    Tablet 40 milliGRAM(s) Oral daily    MEDICATIONS  (PRN):  albuterol/ipratropium for Nebulization 3 milliLiter(s) Nebulizer every 6 hours PRN Shortness of Breath and/or Wheezing  ondansetron Injectable 4 milliGRAM(s) IV Push every 8 hours PRN Nausea and/or Vomiting  sodium chloride 0.9% lock flush 10 milliLiter(s) IV Push every 1 hour PRN Pre/post blood products, medications, blood draw, and to maintain line patency    Pertinent Labs: CBC Full  -  ( 18 Jan 2022 06:11 )  WBC Count : 6.02 K/uL  RBC Count : 4.11 M/uL  Hemoglobin : 10.4 g/dL  Hematocrit : 34.7 %  Platelet Count - Automated : 185 K/uL  Mean Cell Volume : 84.4 fl  Mean Cell Hemoglobin : 25.3 pg  Mean Cell Hemoglobin Concentration : 30.0 gm/dL  01-18 Na152 mmol/L<H> Glu 105 mg/dL<H> K+ 3.3 mmol/L<L> Cr  1.80 mg/dL<H> BUN 50.9 mg/dL<H> Phos 2.7 mg/dL Alb 2.6 g/dL<L> PAB n/a       Skin: skin tear RUE, skin tear LFA, wound B/L buttocks, wound R heel, IAD, Moisture Associated Dermatitis   Edema: 2+ generalized edema     Nutrition focused physical exam previously conducted - found signs of malnutrition [ ]absent [x ]present    Subcutaneous fat loss: Mod [x ] Orbital fat pads region, [x ]Buccal fat region, [ ]Triceps region,  [ ]Ribs region    Muscle wasting: Mod [ x]Temples region, [x ]Clavicle region, [x ]Shoulder region, [ ]Scapula region, [ ]Interosseous region,  [ ]thigh region, [ ]Calf region    Estimated Needs:   [ x] no change since previous assessment  [ ] recalculated:     Current Nutrition Diagnosis: Pt remains at high nutrition risk secondary to moderate, (acute on chronic) protein calorie malnutrition related to inability to meet sufficient protein-energy needs in setting of COPD, CHF, skin breakdown, renal failure requiring HD, RLL PNA, LUE cellulitis, left hemisphere stroke as evidenced by physicals signs of moderate muscle/fat loss and suspect meeting </75% nutrient needs >/1 month. Pt s/p SLP eval 1/10 recommending Soft & Bite Sized, moderately thick, Pt continues with poor PO intake completing <50% of meals with assistance. HD on hold, monitoring renal function.     Recommendations:   1) Add Nepro TID   2) Rx Nephro-gustavo, vit C 500mg and folic acid daily   3) Check weight daily to monitor trends   4) Monitor weights daily for trend/accuracy   5) Consider appetite stimulant     Monitoring and Evaluation:   [ x] PO intake [ x] Tolerance to diet prescription [X] Weights  [X] Follow up per protocol [X] Labs:

## 2022-01-19 LAB
ANION GAP SERPL CALC-SCNC: 10 MMOL/L — SIGNIFICANT CHANGE UP (ref 5–17)
ANION GAP SERPL CALC-SCNC: 12 MMOL/L — SIGNIFICANT CHANGE UP (ref 5–17)
ANION GAP SERPL CALC-SCNC: 12 MMOL/L — SIGNIFICANT CHANGE UP (ref 5–17)
BUN SERPL-MCNC: 47.3 MG/DL — HIGH (ref 8–20)
BUN SERPL-MCNC: 49.6 MG/DL — HIGH (ref 8–20)
BUN SERPL-MCNC: 51.8 MG/DL — HIGH (ref 8–20)
CALCIUM SERPL-MCNC: 8.5 MG/DL — LOW (ref 8.6–10.2)
CALCIUM SERPL-MCNC: 8.8 MG/DL — SIGNIFICANT CHANGE UP (ref 8.6–10.2)
CALCIUM SERPL-MCNC: 9 MG/DL — SIGNIFICANT CHANGE UP (ref 8.6–10.2)
CHLORIDE SERPL-SCNC: 104 MMOL/L — SIGNIFICANT CHANGE UP (ref 98–107)
CHLORIDE SERPL-SCNC: 105 MMOL/L — SIGNIFICANT CHANGE UP (ref 98–107)
CHLORIDE SERPL-SCNC: 106 MMOL/L — SIGNIFICANT CHANGE UP (ref 98–107)
CO2 SERPL-SCNC: 32 MMOL/L — HIGH (ref 22–29)
CO2 SERPL-SCNC: 36 MMOL/L — HIGH (ref 22–29)
CO2 SERPL-SCNC: 38 MMOL/L — HIGH (ref 22–29)
CREAT SERPL-MCNC: 1.54 MG/DL — HIGH (ref 0.5–1.3)
CREAT SERPL-MCNC: 1.64 MG/DL — HIGH (ref 0.5–1.3)
CREAT SERPL-MCNC: 1.79 MG/DL — HIGH (ref 0.5–1.3)
GLUCOSE SERPL-MCNC: 123 MG/DL — HIGH (ref 70–99)
GLUCOSE SERPL-MCNC: 126 MG/DL — HIGH (ref 70–99)
GLUCOSE SERPL-MCNC: 173 MG/DL — HIGH (ref 70–99)
HCT VFR BLD CALC: 35 % — SIGNIFICANT CHANGE UP (ref 34.5–45)
HGB BLD-MCNC: 10.2 G/DL — LOW (ref 11.5–15.5)
MAGNESIUM SERPL-MCNC: 2.2 MG/DL — SIGNIFICANT CHANGE UP (ref 1.6–2.6)
MCHC RBC-ENTMCNC: 24.8 PG — LOW (ref 27–34)
MCHC RBC-ENTMCNC: 29.1 GM/DL — LOW (ref 32–36)
MCV RBC AUTO: 85.2 FL — SIGNIFICANT CHANGE UP (ref 80–100)
PHOSPHATE SERPL-MCNC: 3.3 MG/DL — SIGNIFICANT CHANGE UP (ref 2.4–4.7)
PLATELET # BLD AUTO: 203 K/UL — SIGNIFICANT CHANGE UP (ref 150–400)
POTASSIUM SERPL-MCNC: 3.4 MMOL/L — LOW (ref 3.5–5.3)
POTASSIUM SERPL-MCNC: 4.2 MMOL/L — SIGNIFICANT CHANGE UP (ref 3.5–5.3)
POTASSIUM SERPL-MCNC: 4.7 MMOL/L — SIGNIFICANT CHANGE UP (ref 3.5–5.3)
POTASSIUM SERPL-SCNC: 3.4 MMOL/L — LOW (ref 3.5–5.3)
POTASSIUM SERPL-SCNC: 4.2 MMOL/L — SIGNIFICANT CHANGE UP (ref 3.5–5.3)
POTASSIUM SERPL-SCNC: 4.7 MMOL/L — SIGNIFICANT CHANGE UP (ref 3.5–5.3)
RBC # BLD: 4.11 M/UL — SIGNIFICANT CHANGE UP (ref 3.8–5.2)
RBC # FLD: 19.1 % — HIGH (ref 10.3–14.5)
SARS-COV-2 RNA SPEC QL NAA+PROBE: SIGNIFICANT CHANGE UP
SODIUM SERPL-SCNC: 148 MMOL/L — HIGH (ref 135–145)
SODIUM SERPL-SCNC: 153 MMOL/L — HIGH (ref 135–145)
SODIUM SERPL-SCNC: 153 MMOL/L — HIGH (ref 135–145)
WBC # BLD: 6.48 K/UL — SIGNIFICANT CHANGE UP (ref 3.8–10.5)
WBC # FLD AUTO: 6.48 K/UL — SIGNIFICANT CHANGE UP (ref 3.8–10.5)

## 2022-01-19 PROCEDURE — 99232 SBSQ HOSP IP/OBS MODERATE 35: CPT

## 2022-01-19 PROCEDURE — 99233 SBSQ HOSP IP/OBS HIGH 50: CPT

## 2022-01-19 RX ORDER — SODIUM CHLORIDE 9 MG/ML
1000 INJECTION, SOLUTION INTRAVENOUS
Refills: 0 | Status: DISCONTINUED | OUTPATIENT
Start: 2022-01-19 | End: 2022-01-20

## 2022-01-19 RX ORDER — SODIUM CHLORIDE 9 MG/ML
1000 INJECTION INTRAMUSCULAR; INTRAVENOUS; SUBCUTANEOUS
Refills: 0 | Status: DISCONTINUED | OUTPATIENT
Start: 2022-01-19 | End: 2022-01-20

## 2022-01-19 RX ORDER — POTASSIUM CHLORIDE 20 MEQ
40 PACKET (EA) ORAL
Refills: 0 | Status: COMPLETED | OUTPATIENT
Start: 2022-01-19 | End: 2022-01-19

## 2022-01-19 RX ADMIN — Medication 12.5 MILLIGRAM(S): at 06:12

## 2022-01-19 RX ADMIN — Medication 12.5 MILLIGRAM(S): at 16:20

## 2022-01-19 RX ADMIN — Medication 1 MILLIGRAM(S): at 12:56

## 2022-01-19 RX ADMIN — SODIUM CHLORIDE 50 MILLILITER(S): 9 INJECTION, SOLUTION INTRAVENOUS at 12:55

## 2022-01-19 RX ADMIN — SODIUM CHLORIDE 80 MILLILITER(S): 9 INJECTION INTRAMUSCULAR; INTRAVENOUS; SUBCUTANEOUS at 17:22

## 2022-01-19 RX ADMIN — Medication 325 MILLIGRAM(S): at 12:56

## 2022-01-19 RX ADMIN — CHLORHEXIDINE GLUCONATE 1 APPLICATION(S): 213 SOLUTION TOPICAL at 12:55

## 2022-01-19 RX ADMIN — APIXABAN 2.5 MILLIGRAM(S): 2.5 TABLET, FILM COATED ORAL at 06:12

## 2022-01-19 RX ADMIN — PANTOPRAZOLE SODIUM 40 MILLIGRAM(S): 20 TABLET, DELAYED RELEASE ORAL at 12:57

## 2022-01-19 RX ADMIN — APIXABAN 2.5 MILLIGRAM(S): 2.5 TABLET, FILM COATED ORAL at 16:22

## 2022-01-19 RX ADMIN — Medication 40 MILLIEQUIVALENT(S): at 12:55

## 2022-01-19 RX ADMIN — Medication 40 MILLIEQUIVALENT(S): at 16:19

## 2022-01-19 RX ADMIN — Medication 12.5 MILLIGRAM(S): at 21:31

## 2022-01-19 NOTE — PROGRESS NOTE ADULT - SUBJECTIVE AND OBJECTIVE BOX
[facility]                                                               Office: [address] [address]                                                              Telephone: [phone]. Fax:[phone]                                                                             PROGRESS NOTE  Reason for follow up: decompensated CHF,   New onset Afib  Overnight: No new events.   Update:  patient has been on the bed.  was last seen by PT OT on 1/12 .    she had a bowel movement today.  she is on prima fit.     Subjective:  cannot be obtained.  due to aphasia.     ROS:  inaccurate due to aphasia.    howeve. denie any signifiacnt complains. no ches tpain.   Cardiac. No chest pain  Resp: no cough  Gi: no diarrhea.   	  Vitals:   Vital Signs Last 24 Hrs  T(C): 36.8 (01-19-22 @ 18:16), Max: 36.9 (01-19-22 @ 04:28)  T(F): 98.2 (01-19-22 @ 18:16), Max: 98.4 (01-19-22 @ 04:28)  HR: 99 (01-19-22 @ 18:16) (80 - 99)  BP: 143/80 (01-19-22 @ 18:16) (127/81 - 143/80)  BP(mean): --  RR: 20 (01-19-22 @ 18:16) (17 - 20)  SpO2: 96% (01-19-22 @ 18:16) (96% - 97%)      PHYSICAL EXAM:  PHYSICAL EXAM:  Appearance: Comfortable. No acute distress  HEENT:  Head and neck: Atraumatic. Normocephalic.  Normal oral mucosa, PERRL, Neck is supple. No JVD, No carotid bruit.   Neurologic: A & O x 0, patient is repetitively saying "yes",  +focal deficits.   Lymphatic: No cervical lymphadenopathy  Cardiovascular: Normal S1 S2, No murmur, rubs/gallops. No JVD, No edema  Respiratory: diminished bilateral breath sounds   Gastrointestinal:  +abd distention, Non-tender, + BS  Lower Extremities: No edema  Psychiatry: Patient is calm. No agitation. Mood & affect appropriate  Skin: No rashes/ ecchymoses/cyanosis/ulcers visualized on the face, hands or feet.        CURRENT MEDICATIONS:   MEDICATIONS  (STANDING):  metoprolol tartrate 12.5 milliGRAM(s) Oral every 8 hours    pantoprazole    Tablet  apixaban  chlorhexidine 2% Cloths  dextrose 5% + sodium chloride 0.45%.  epoetin clara-epbx (RETACRIT) Injectable  ferrous    sulfate  folic acid  glucagon  Injectable  sodium chloride 0.225%    PRN: albuterol/ipratropium for Nebulization PRN  ondansetron Injectable PRN  sodium chloride 0.9% lock flush PRN      DIAGNOSTIC TESTING:    [ ] Echocardiogram: < from: TTE Echo Complete w/o Contrast w/ Doppler (01.02.22 @ 09:12) >    Summary:   1. Left ventricular ejection fraction, by visual estimation, is >75%.   2. Technically suboptimal study.   3. Hyperdynamic global left ventricular systolic function.   4. Normal left ventricular internal cavity size.   5. The mitral in-flow pattern reveals no discernable A-wave, therefore   no comment on diastolic function can be made.   6. Normal right ventricular size and function.  7. Mild to moderately enlarged left atrium.   8. There is no evidence of pericardial effusion.   9. Mild mitral annular calcification.  10. Mild to moderate mitral valve regurgitation.  11. Thickening of the anterior and posterior mitral valve leaflets.  12. Mild tricuspid regurgitation.  13. Small mobile echodencities visualized on the aortic valve. 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 Clinical correlation recommended.       OTHER: 	    CT:< from: CT Head No Cont (01.12.22 @ 03:15) >  IMPRESSION:  Evolving acute/early subacute infarct in the left middle cerebral artery   vascular territory is grossly stable from 01/09/2022.  No hemorrhagic   conversion      LABS:	 	                           10.2   6.48  )-----------( 203      ( 19 Jan 2022 08:29 )             35.0   N=x    ; L=x        19 Jan 2022 08:29    153    |  105    |  47.3   ----------------------------<  123    3.4     |  38.0   |  1.79     Ca    9.0        19 Jan 2022 08:29  Phos  3.3       19 Jan 2022 08:29  Mg     2.2       19 Jan 2022 08:29    TPro  6.3    /  Alb  2.6    /  TBili  1.5    /  DBili  0.9    /  AST  22     /  ALT  8      /  AlkPhos  138    18 Jan 2022 06:11      Hepatic panel: 18 Jan 2022 06:11  6.3   | 2.6                            1.5   | 1.5  /0.9                            22    | 8                                 /138  \par

## 2022-01-19 NOTE — PROGRESS NOTE ADULT - ASSESSMENT
86y/oF PMH HTN, COPD (not on home o2), CKD, admitted with new onset afib with RVR, acute on chronic diastolic HF decompensation, acute on chronic rneal failure requiring HD. hospital course complicated by acute CVA now s/p TICI 2b revascularization of L M1 occlusion and thrombectomy (1/6)     Acute CVA   -s/p revascularization and thrombectomy by NeuroIR   -on eliquis   -PT/OT     EDILBERTO on CKD   -s/p HD   -monitoring renal function off HD   -nephro following   -if stable, will have permcath removed     Hypernatremia   -holding diuretic   -poor PO intake   -encourage PO  -gentle IVF     Acute diastolic CHF EF >75% with anasarca   -I/Os  -holding torsemide     chronic afib RVR   -cardio recs appreciated   -metoprolol, eliquis   -holding digoxin     RUE skin t ear   -xeroform, wrapped with gauze     RLL PNA   -s/p abx     Hypokalemia   -replete  -cont to monitor     DVT ppx: eliquis     will need laila on dc

## 2022-01-19 NOTE — PROGRESS NOTE ADULT - SUBJECTIVE AND OBJECTIVE BOX
ROYER LONGLO    [id number]    86y      Female    CC: cva     INTERVAL HPI/OVERNIGHT EVENTS: pt seen and examined. no acute events o/n     REVIEW OF SYSTEMS:    CONSTITUTIONAL: No fever, weight loss  RESPIRATORY: No cough, wheezing, hemoptysis; No shortness of breath  CARDIOVASCULAR: No chest pain, palpitations  GASTROINTESTINAL: No abdominal or epigastric pain. No nausea, vomiting  NEUROLOGICAL: No headaches    Vital Signs Last 24 Hrs  T(C): 36.3 (19 Jan 2022 12:18), Max: 36.9 (19 Jan 2022 04:28)  T(F): 97.4 (19 Jan 2022 12:18), Max: 98.4 (19 Jan 2022 04:28)  HR: 80 (19 Jan 2022 12:18) (80 - 95)  BP: 127/81 (19 Jan 2022 12:18) (127/81 - 137/69)  BP(mean): --  RR: 20 (19 Jan 2022 12:18) (17 - 20)  SpO2: 97% (19 Jan 2022 12:18) (95% - 97%)    PHYSICAL EXAM:    GENERAL: NAD  HEENT: PERRL  NECK: soft, supple  CHEST/LUNG: Clear to auscultation bilaterally; No wheezing  HEART: S1S2+, Regular rate and rhythm  ABDOMEN: Soft, Nontender, Nondistended; Bowel sounds present  SKIN: warm, dry  NEURO: Awake, alert; non focal   PSYCH: normal affect     LABS:                        10.2   6.48  )-----------( 203      ( 19 Jan 2022 08:29 )             35.0     01-19    153<H>  |  105  |  47.3<H>  ----------------------------<  123<H>  3.4<L>   |  38.0<H>  |  1.79<H>    Ca    9.0      19 Jan 2022 08:29  Phos  3.3     01-19  Mg     2.2     01-19    TPro  6.3<L>  /  Alb  2.6<L>  /  TBili  1.5  /  DBili  0.9<H>  /  AST  22  /  ALT  8   /  AlkPhos  138<H>  01-18            MEDICATIONS  (STANDING):  apixaban 2.5 milliGRAM(s) Oral two times a day  chlorhexidine 2% Cloths 1 Application(s) Topical daily  dextrose 5% + sodium chloride 0.45%. 1000 milliLiter(s) (50 mL/Hr) IV Continuous <Continuous>  epoetin clara-epbx (RETACRIT) Injectable 02081 Unit(s) SubCutaneous every 7 days  ferrous    sulfate 325 milliGRAM(s) Oral daily  folic acid 1 milliGRAM(s) Oral daily  glucagon  Injectable 1 milliGRAM(s) IntraMuscular once  metoprolol tartrate 12.5 milliGRAM(s) Oral every 8 hours  pantoprazole    Tablet 40 milliGRAM(s) Oral daily  potassium chloride   Powder 40 milliEquivalent(s) Oral every 2 hours    MEDICATIONS  (PRN):  albuterol/ipratropium for Nebulization 3 milliLiter(s) Nebulizer every 6 hours PRN Shortness of Breath and/or Wheezing  ondansetron Injectable 4 milliGRAM(s) IV Push every 8 hours PRN Nausea and/or Vomiting  sodium chloride 0.9% lock flush 10 milliLiter(s) IV Push every 1 hour PRN Pre/post blood products, medications, blood draw, and to maintain line patency      RADIOLOGY & ADDITIONAL TESTS:

## 2022-01-19 NOTE — CHART NOTE - NSCHARTNOTEFT_GEN_A_CORE
Na 148 at 19:09  Sodium chloride 0.225% @ 80cc/hr started by nephrology at 17:50  Follow up BMP at 23:00

## 2022-01-19 NOTE — PROGRESS NOTE ADULT - SUBJECTIVE AND OBJECTIVE BOX
Patient fatigued.  Limited participation.  Opens eyes and closes them.     REVIEW OF SYSTEMS  Constitutional - No fever,  +fatigue  Neurological - +loss of strength    FUNCTIONAL PROGRESS  1/12 PT  Bed Mobility: Scooting/Bridging:     · Level of Holt	minimum assist (75% patients effort)  · Physical Assist/Nonphysical Assist	1 person assist    Bed Mobility: Supine to Sit:     · Level of Holt	minimum assist (75% patients effort)  · Physical Assist/Nonphysical Assist	1 person assist  · Assistive Device	bed rails    Transfer: Sit to Stand:     · Level of Holt	minimum assist (75% patients effort)  · Physical Assist/Nonphysical Assist	1 person assist    Transfer: Stand to Sit:     · Level of Holt	minimum assist (75% patients effort)  · Physical Assist/Nonphysical Assist	1 person assist  · Assistive Device	hand held    Sit/Stand Transfer Safety Analysis:     · Impairments Contributing to Impaired Transfers	decreased strength    Gait Skills:     · Level of Holt	minimum assist (75% patients effort)  · Physical Assist/Nonphysical Assist	1 person + 1 person to manage equipment  · Assistive Device	hand held  · Gait Distance	5 feet; bed to chair    Gait Analysis:     · Gait Pattern Used	3-point gait  · Impairments Contributing to Gait Deviations	decreased strength; impaired balance    Stair Negotiation:     · Level of Holt	unable to perform    1/10 SLP  Speech Language Pathology Recommendations: 1. Soft & bite-sized solids, moderately thick fluids, as tolerated 2. STRICT aspiration precautions: if overt s/s noted, discontinue PO & resume NPO status 3. Oral care 4. Upright with PO 5. Crush meds 6. 1:1 assist with PO7. Small bites/sips 8. Speech-language eval  9. Will follow         VITALS  T(C): 36.9 (01-19-22 @ 04:28), Max: 36.9 (01-19-22 @ 04:28)  HR: 95 (01-19-22 @ 04:28) (94 - 95)  BP: 137/69 (01-19-22 @ 04:28) (127/83 - 138/86)  RR: 17 (01-19-22 @ 04:28) (17 - 18)  SpO2: 97% (01-19-22 @ 04:28) (94% - 97%)  Wt(kg): --    MEDICATIONS   albuterol/ipratropium for Nebulization 3 milliLiter(s) every 6 hours PRN  apixaban 2.5 milliGRAM(s) two times a day  chlorhexidine 2% Cloths 1 Application(s) daily  epoetin clara-epbx (RETACRIT) Injectable 94456 Unit(s) every 7 days  ferrous    sulfate 325 milliGRAM(s) daily  folic acid 1 milliGRAM(s) daily  glucagon  Injectable 1 milliGRAM(s) once  metoprolol tartrate 12.5 milliGRAM(s) every 8 hours  ondansetron Injectable 4 milliGRAM(s) every 8 hours PRN  pantoprazole    Tablet 40 milliGRAM(s) daily  sodium chloride 0.9% lock flush 10 milliLiter(s) every 1 hour PRN      RECENT LABS/IMAGING                          10.4   6.02  )-----------( 185      ( 18 Jan 2022 06:11 )             34.7     01-18    152<H>  |  103  |  50.9<H>  ----------------------------<  105<H>  3.3<L>   |  37.0<H>  |  1.80<H>    Ca    9.0      18 Jan 2022 06:11  Phos  2.7     01-18  Mg     1.9     01-18    TPro  6.3<L>  /  Alb  2.6<L>  /  TBili  1.5  /  DBili  0.9<H>  /  AST  22  /  ALT  8   /  AlkPhos  138<H>  01-18                ----------------------------------------------------------------------------------------  PHYSICAL EXAM  Constitutional - NAD, Appears Comfortable  Neurologic Exam -                    Cognitive - Briefly awakens, as of 1:18:     Communication - Severe receptive/expressive aphasia, Unable to follow commands with cues     Cranial Nerves - Unable to fully assess, left lip asymmetric      Motor - Unable to fully assess, but has right sided UE weakness/drift     Sensory - Withdraws to Babinski   Psychiatric - Calm, Fatigued   ----------------------------------------------------------------------------------------  ASSESSMENT/PLAN  86yFemale with functional deficits after hospitalization complicated by acute CVA  Left Acute MCA CVA s/p thrombectomy - Eliquis   HTN - Lopressor  CHF - Demadex, Duoneb  Oropharyngeal Dysphagia - Soft/Mod thick liquids  DVT PPX - SCDs  Rehab - Although patient functionally is a minimal assist,she lives alone and now has severe aphasia. Given extent of deficits, prior living arrangements and potential for recovery, recommend DARY.  Patient needs a more prolonged stay to achieve transition to community living and would not be able to tolerate a comprehensive/intense rehab program of 3hours/day.     Agree with Palliative Care to assist with GOC.     Recommend ongoing mobilization by staff to maintain cardiopulmonary function and prevention of secondary complications related to debility. Discussed with rehab team.

## 2022-01-19 NOTE — PROGRESS NOTE ADULT - ASSESSMENT
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 supplement potassium with 1/4 NS  Hold Torsemide       Anemia - Added weekly Epogen and folate - Hb better 10.2    Follow labs and GOC conversation

## 2022-01-19 NOTE — PROGRESS NOTE ADULT - ASSESSMENT
87 y/o F with PMH Breast cancer, Mastectomy 1985, colon ca resection 2010, VATS pleurectomy drainage 2018, COPD, CKD, presents to ED with c/o Leg swelling x 1 week. States fell on monday, able to get off floor, since then worsening shortness of breath, and leg swelling. Denies palpitations, chest pains. Noted to Be afib RVR in ED, given diltiazem. Edema noted to abd.       LMA Occlusion   s/p thrombectomy,.  No hemorrhagic conversion  -Cont. Eliquis    Afib  -intermittent digoxin PRN  increased lopressor to 25 Q8.   -Cont Eliquis     Diastolic heart failure - With Rv dyscfunction.     PO diuresis  not on hemodialysis any more.   euvolemic.  anasarca resolved.   off diuresis due to  hypovolumia.  Bladder scan to evaluate for obnstrucitve uropathy.       EDILBERTO on CKD   improvedm.  off diuresis due to hypovoelmia.  proabbly poluriic phase and increase diuresis and decrease PO intake   now on maintaince fluids.   other possibility is iobstrucitve uropathy. bladder scan to evaluate for urinary retention.    CKD stable.

## 2022-01-19 NOTE — PROGRESS NOTE ADULT - SUBJECTIVE AND OBJECTIVE BOX
Patient seen and examined    Feels better, OOB/Ch  Follows simple VC  no c/o CP SOB NV   No swelling feet    Vital Signs Last 24 Hrs  T(C): 36.3 (19 Jan 2022 12:18), Max: 36.9 (19 Jan 2022 04:28)  T(F): 97.4 (19 Jan 2022 12:18), Max: 98.4 (19 Jan 2022 04:28)  HR: 80 (19 Jan 2022 12:18) (80 - 95)  BP: 127/81 (19 Jan 2022 12:18) (127/81 - 137/69)  BP(mean): --  RR: 20 (19 Jan 2022 12:18) (17 - 20)  SpO2: 97% (19 Jan 2022 12:18) (95% - 97%)    PHYSICAL EXAM    GENERAL: NAD,   EYES:  conjunctiva and sclera clear  NECK: Supple, No JVD/Bruit  NERVOUS SYSTEM:  A/O Power 3/5, R > L   CHEST:  No rales, No rhonchi  HEART:  RRR, No murmur  ABDOMEN: Soft, NT/ND BS+  EXTREMITIES:  mild Edema;  SKIN: No rashes    19 Jan 2022 08:29    153    |  105    |  47.3   ----------------------------<  123    3.4     |  38.0   |  1.79     Ca    9.0        19 Jan 2022 08:29  Phos  3.3       19 Jan 2022 08:29  Mg     2.2       19 Jan 2022 08:29    TPro  6.3    /  Alb  2.6    /  TBili  1.5    /  DBili  0.9    /  AST  22     /  ALT  8      /  AlkPhos  138    18 Jan 2022 06:11                          10.2   6.48  )-----------( 203      ( 19 Jan 2022 08:29 )             35.0

## 2022-01-20 LAB
ALBUMIN SERPL ELPH-MCNC: 2.6 G/DL — LOW (ref 3.3–5.2)
ALP SERPL-CCNC: 151 U/L — HIGH (ref 40–120)
ALT FLD-CCNC: 8 U/L — SIGNIFICANT CHANGE UP
ANION GAP SERPL CALC-SCNC: 10 MMOL/L — SIGNIFICANT CHANGE UP (ref 5–17)
AST SERPL-CCNC: 29 U/L — SIGNIFICANT CHANGE UP
BILIRUB SERPL-MCNC: 2.3 MG/DL — HIGH (ref 0.4–2)
BUN SERPL-MCNC: 48.3 MG/DL — HIGH (ref 8–20)
CALCIUM SERPL-MCNC: 8.8 MG/DL — SIGNIFICANT CHANGE UP (ref 8.6–10.2)
CHLORIDE SERPL-SCNC: 106 MMOL/L — SIGNIFICANT CHANGE UP (ref 98–107)
CO2 SERPL-SCNC: 36 MMOL/L — HIGH (ref 22–29)
CREAT SERPL-MCNC: 1.6 MG/DL — HIGH (ref 0.5–1.3)
GLUCOSE SERPL-MCNC: 110 MG/DL — HIGH (ref 70–99)
HCT VFR BLD CALC: 35.2 % — SIGNIFICANT CHANGE UP (ref 34.5–45)
HGB BLD-MCNC: 10.4 G/DL — LOW (ref 11.5–15.5)
MAGNESIUM SERPL-MCNC: 2.1 MG/DL — SIGNIFICANT CHANGE UP (ref 1.6–2.6)
MCHC RBC-ENTMCNC: 25.4 PG — LOW (ref 27–34)
MCHC RBC-ENTMCNC: 29.5 GM/DL — LOW (ref 32–36)
MCV RBC AUTO: 85.9 FL — SIGNIFICANT CHANGE UP (ref 80–100)
PHOSPHATE SERPL-MCNC: 2.3 MG/DL — LOW (ref 2.4–4.7)
PLATELET # BLD AUTO: 188 K/UL — SIGNIFICANT CHANGE UP (ref 150–400)
POTASSIUM SERPL-MCNC: 4.1 MMOL/L — SIGNIFICANT CHANGE UP (ref 3.5–5.3)
POTASSIUM SERPL-SCNC: 4.1 MMOL/L — SIGNIFICANT CHANGE UP (ref 3.5–5.3)
PROT SERPL-MCNC: 6.6 G/DL — SIGNIFICANT CHANGE UP (ref 6.6–8.7)
RBC # BLD: 4.1 M/UL — SIGNIFICANT CHANGE UP (ref 3.8–5.2)
RBC # FLD: 19.4 % — HIGH (ref 10.3–14.5)
SODIUM SERPL-SCNC: 152 MMOL/L — HIGH (ref 135–145)
WBC # BLD: 8.26 K/UL — SIGNIFICANT CHANGE UP (ref 3.8–10.5)
WBC # FLD AUTO: 8.26 K/UL — SIGNIFICANT CHANGE UP (ref 3.8–10.5)

## 2022-01-20 PROCEDURE — 99233 SBSQ HOSP IP/OBS HIGH 50: CPT

## 2022-01-20 PROCEDURE — 99232 SBSQ HOSP IP/OBS MODERATE 35: CPT

## 2022-01-20 PROCEDURE — 71045 X-RAY EXAM CHEST 1 VIEW: CPT | Mod: 26

## 2022-01-20 RX ORDER — SODIUM CHLORIDE 9 MG/ML
1000 INJECTION INTRAMUSCULAR; INTRAVENOUS; SUBCUTANEOUS
Refills: 0 | Status: DISCONTINUED | OUTPATIENT
Start: 2022-01-20 | End: 2022-01-20

## 2022-01-20 RX ORDER — METOPROLOL TARTRATE 50 MG
25 TABLET ORAL EVERY 8 HOURS
Refills: 0 | Status: DISCONTINUED | OUTPATIENT
Start: 2022-01-20 | End: 2022-01-25

## 2022-01-20 RX ADMIN — Medication 25 MILLIGRAM(S): at 16:45

## 2022-01-20 RX ADMIN — PANTOPRAZOLE SODIUM 40 MILLIGRAM(S): 20 TABLET, DELAYED RELEASE ORAL at 11:23

## 2022-01-20 RX ADMIN — SODIUM CHLORIDE 80 MILLILITER(S): 9 INJECTION INTRAMUSCULAR; INTRAVENOUS; SUBCUTANEOUS at 05:03

## 2022-01-20 RX ADMIN — APIXABAN 2.5 MILLIGRAM(S): 2.5 TABLET, FILM COATED ORAL at 17:33

## 2022-01-20 RX ADMIN — Medication 325 MILLIGRAM(S): at 11:23

## 2022-01-20 RX ADMIN — Medication 1 MILLIGRAM(S): at 11:23

## 2022-01-20 RX ADMIN — Medication 25 MILLIGRAM(S): at 21:41

## 2022-01-20 RX ADMIN — Medication 12.5 MILLIGRAM(S): at 05:04

## 2022-01-20 RX ADMIN — CHLORHEXIDINE GLUCONATE 1 APPLICATION(S): 213 SOLUTION TOPICAL at 16:25

## 2022-01-20 RX ADMIN — APIXABAN 2.5 MILLIGRAM(S): 2.5 TABLET, FILM COATED ORAL at 05:04

## 2022-01-20 NOTE — PROGRESS NOTE ADULT - ASSESSMENT
86y/oF PMH HTN, COPD (not on home o2), CKD, admitted with new onset afib with RVR, acute on chronic diastolic HF decompensation, acute on chronic rneal failure requiring HD. hospital course complicated by acute CVA now s/p TICI 2b revascularization of L M1 occlusion and thrombectomy (1/6)     Acute CVA   -s/p revascularization and thrombectomy by NeuroIR   -on eliquis   -PT/OT     EDILBERTO on CKD   -s/p HD   -monitoring renal function off HD   -nephro following   -if stable, will have permcath removed     Hypernatremia   -holding diuretic   -poor PO intake   -encourage PO    Acute diastolic CHF EF >75% with anasarca   -I/Os  -holding torsemide   -cxr 1/20 with central pulmonary congestion    chronic afib RVR   -cardio recs appreciated   -metoprolol, eliquis   -metoprolol increased 1/20  -holding digoxin     RUE skin tear   -xeroform, wrapped with gauze     RLL PNA   -s/p abx     Hypokalemia   -replete  -cont to monitor     DVT ppx: eliquis     will need laila on dc     Poor prognosis   HCP Eloisa (Daughter) : [phone], updated

## 2022-01-20 NOTE — PROGRESS NOTE ADULT - SUBJECTIVE AND OBJECTIVE BOX
Patient seen and examined    Feels better, OOB/Ch  Follows simple VC  no c/o CP SOB NV   No swelling feet    Vital Signs Last 24 Hrs  T(C): 36.7 (20 Jan 2022 04:22), Max: 37 (19 Jan 2022 21:29)  T(F): 98 (20 Jan 2022 04:22), Max: 98.6 (19 Jan 2022 21:29)  HR: 130 (20 Jan 2022 13:04) (99 - 130)  BP: 146/82 (20 Jan 2022 13:04) (116/65 - 146/82)  BP(mean): --  RR: 18 (20 Jan 2022 04:22) (18 - 20)  SpO2: 95% (20 Jan 2022 04:22) (95% - 99%)  PHYSICAL EXAM    GENERAL: NAD, NS change  EYES:  conjunctiva and sclera clear  NECK: Supple, No JVD/Bruit  NERVOUS SYSTEM:  A/O Power 3/5, R > L ; Apraxia/dysphasia + - says no to every question  CHEST:  No rales, No rhonchi  HEART:  RRR, No murmur  ABDOMEN: Soft, NT/ND BS+  EXTREMITIES:  mild Edema;  SKIN: No rashes    19 Jan 2022 08:29    153    |  105    |  47.3   ----------------------------<  123    3.4     |  38.0   |  1.79     Ca    9.0        19 Jan 2022 08:29  Phos  3.3       19 Jan 2022 08:29  Mg     2.2       19 Jan 2022 08:29    TPro  6.3    /  Alb  2.6    /  TBili  1.5    /  DBili  0.9    /  AST  22     /  ALT  8      /  AlkPhos  138    18 Jan 2022 06:11  20 Jan 2022 09:02    152    |  106    |  48.3   ----------------------------<  110    4.1     |  36.0   |  1.60     Ca    8.8        20 Jan 2022 09:02  Phos  2.3       20 Jan 2022 09:02  Mg     2.1       20 Jan 2022 09:02    TPro  6.6    /  Alb  2.6    /  TBili  2.3    /  DBili  x      /  AST  29     /  ALT  8      /  AlkPhos  151    20 Jan 2022 09:02                          10.2   6.48  )-----------( 203      ( 19 Jan 2022 08:29 )             35.0                           10.4   8.26  )-----------( 188      ( 20 Jan 2022 09:02 )             35.2

## 2022-01-20 NOTE — PROGRESS NOTE ADULT - SUBJECTIVE AND OBJECTIVE BOX
OVERNIGHT EVENTS:  F/U Note  BP stable Tachycardia  NA elevated; IV fluids ordered by Hospitalist  Slow cognitively, dot answering me when asked simple questions  Not tracking with her eyes to non dominant side  Poor appetite, not eating well  OOB yesterday to chair  Acute Rehab eval earlier today-recommend DARY when stable for discharge  No fever of chills No N/V/D CP Palpitations, No SOB dysuria    Present Symptoms:   Dyspnea:None O2 Sat 95% RA  Nausea/Vomiting:  No poor appetite  Anxiety:   No  Depression: Yes ?  Fatigue: Yes   Loss of appetite: Yes   Constipation:     Pain: no pain behaviors observed            Character-            Duration-            Effect-            Factors-            Frequency-            Location-            Severity-    Pain AD Score:  0/10    Review of Systems: Reviewed                    Negative:                     Positive:  Unable to obtain due to poor mentation       MEDICATIONS  (STANDING):  apixaban 2.5 milliGRAM(s) Oral two times a day  chlorhexidine 2% Cloths 1 Application(s) Topical daily  dextrose 5% + sodium chloride 0.45%. 1000 milliLiter(s) (50 mL/Hr) IV Continuous <Continuous>  epoetin clara-epbx (RETACRIT) Injectable 69840 Unit(s) SubCutaneous every 7 days  ferrous    sulfate 325 milliGRAM(s) Oral daily  folic acid 1 milliGRAM(s) Oral daily  glucagon  Injectable 1 milliGRAM(s) IntraMuscular once  metoprolol tartrate 12.5 milliGRAM(s) Oral every 8 hours  pantoprazole    Tablet 40 milliGRAM(s) Oral daily  sodium chloride 0.225% 1000 milliLiter(s) (80 mL/Hr) IV Continuous <Continuous>    MEDICATIONS  (PRN):  albuterol/ipratropium for Nebulization 3 milliLiter(s) Nebulizer every 6 hours PRN Shortness of Breath and/or Wheezing  ondansetron Injectable 4 milliGRAM(s) IV Push every 8 hours PRN Nausea and/or Vomiting  sodium chloride 0.9% lock flush 10 milliLiter(s) IV Push every 1 hour PRN Pre/post blood products, medications, blood draw, and to maintain line patency    PHYSICAL EXAM:    Vital Signs Last 24 Hrs  T(C): 36.7 (20 Jan 2022 04:22), Max: 37 (19 Jan 2022 21:29)  T(F): 98 (20 Jan 2022 04:22), Max: 98.6 (19 Jan 2022 21:29)  HR: 105 (20 Jan 2022 04:22) (99 - 110)  BP: 116/65 (20 Jan 2022 04:22) (116/65 - 143/80)  BP(mean): --  RR: 18 (20 Jan 2022 04:22) (18 - 20)  SpO2: 95% (20 Jan 2022 04:22) (95% - 99%)    General: alert  oriented x  self?____more alert and awake for longer periods of time                  cachexia  nonverbal      Karnofsky:   20%    HEENT: normal      Lungs: comfortable    CV:   tachycardia    GI: normal              constipation  last BM:     : normal urine retention- bladder scan's periodically    MSK: normal  weakness             bedbound/wheelchair bound    Skin: _  no rash    LABS:                          10.4   8.26  )-----------( 188      ( 20 Jan 2022 09:02 )             35.2     01-20    152<H>  |  106  |  48.3<H>  ----------------------------<  110<H>  4.1   |  36.0<H>  |  1.60<H>    Ca    8.8      20 Jan 2022 09:02  Phos  2.3     01-20  Mg     2.1     01-20    TPro  6.6  /  Alb  2.6<L>  /  TBili  2.3<H>  /  DBili  x   /  AST  29  /  ALT  8   /  AlkPhos  151<H>  01-20    I&O's Summary    19 Jan 2022 07:01  -  20 Jan 2022 07:00  --------------------------------------------------------  IN: 0 mL / OUT: 1200 mL / NET: -1200 mL    20 Jan 2022 07:01  -  20 Jan 2022 12:27  --------------------------------------------------------  IN: 240 mL / OUT: 0 mL / NET: 240 mL    RADIOLOGY & ADDITIONAL STUDIES:  < from: MR Head No Cont (01.09.22 @ 18:22) >  Inflammatory changes are seen involving the right mastoid and middle ear   regions.    IMPRESSION: Acute left MCA and PCA infarcts as described above.    --- End of Report ---    < end of copied text >    ADVANCE DIRECTIVES/TREATMENT PREFERENCES:  DNR NO Completed on:                     MOLST  NO    Completed on:  Living Will   NO   Completed on:

## 2022-01-20 NOTE — PROGRESS NOTE ADULT - ASSESSMENT
87 y/o F with PMH Breast cancer, Mastectomy 1985, colon ca resection 2010, VATS pleurectomy drainage 2018, COPD, CKD, presents to ED with c/o Leg swelling x 1 week. States fell on monday, able to get off floor, since then worsening shortness of breath, and leg swelling. Denies palpitations, chest pains. Noted to Be afib RVR in ED, given diltiazem. Edema noted to abd.     Dyspnea:  ON oxygen.  right lung crackles.   Unclear if aspiration pneumonia or fluid overlaod.    Elevated JVD. d/c IV fluids.  holding diuresis for now.   Recommedn PO water intake instead of IV fluids.  evaluate for other causes of hypernatremia.  recommedn DDAVP if needed   Urine lytes.     LMA Occlusion   s/p thrombectomy,.  No hemorrhagic conversion  -Cont. Eliquis    Afib  -intermittent digoxin PRN  increased lopressor to 25 Q8.   -Cont Eliquis     Diastolic heart failure - With Rv dyscfunction.    On IV fluids.   not on hemodialysis any more.     anasarca resolved.   off diuresis due to  hypovolumia. however, today has JVD and abnormal lung findings and on oxygen   Bladder scan to evaluate for obnstrucitve uropathy.   xray chest.          EDILBERTO on CKD   improvedm.    recommend d.c fluids due to dyspena nad abnoraml lung findings. xray chest    repeat bladder scan.    CKD stable.   recommedn DDAVP if needed   Urine lytes.

## 2022-01-20 NOTE — PROGRESS NOTE ADULT - SUBJECTIVE AND OBJECTIVE BOX
Patient is less responsive.   Does not participate in exam.   Overnight Na 148 and now on IVF.     REVIEW OF SYSTEMS  Constitutional - No fever,  +fatigue  Neurological - +loss of strength    FUNCTIONAL PROGRESS  Total A    VITALS  T(C): 36.7 (01-20-22 @ 04:22), Max: 37 (01-19-22 @ 21:29)  HR: 105 (01-20-22 @ 04:22) (80 - 110)  BP: 116/65 (01-20-22 @ 04:22) (116/65 - 143/80)  RR: 18 (01-20-22 @ 04:22) (18 - 20)  SpO2: 95% (01-20-22 @ 04:22) (95% - 99%)  Wt(kg): --    MEDICATIONS   albuterol/ipratropium for Nebulization 3 milliLiter(s) every 6 hours PRN  apixaban 2.5 milliGRAM(s) two times a day  chlorhexidine 2% Cloths 1 Application(s) daily  dextrose 5% + sodium chloride 0.45%. 1000 milliLiter(s) <Continuous>  epoetin clara-epbx (RETACRIT) Injectable 70284 Unit(s) every 7 days  ferrous    sulfate 325 milliGRAM(s) daily  folic acid 1 milliGRAM(s) daily  glucagon  Injectable 1 milliGRAM(s) once  metoprolol tartrate 12.5 milliGRAM(s) every 8 hours  ondansetron Injectable 4 milliGRAM(s) every 8 hours PRN  pantoprazole    Tablet 40 milliGRAM(s) daily  sodium chloride 0.225% 1000 milliLiter(s) <Continuous>  sodium chloride 0.9% lock flush 10 milliLiter(s) every 1 hour PRN      RECENT LABS/IMAGING                          10.2   6.48  )-----------( 203      ( 19 Jan 2022 08:29 )             35.0     01-19    153<H>  |  106  |  49.6<H>  ----------------------------<  126<H>  4.2   |  36.0<H>  |  1.64<H>    Ca    8.8      19 Jan 2022 23:31  Phos  3.3     01-19  Mg     2.2     01-19    MRI HEAD 1/9 -  Acute left MCA and PCA infarcts as described above.    HEAD CT 1/12 - Evolving acute/early subacute infarct in the left middle cerebral artery vascular territory is grossly stable from 01/09/2022.  No hemorrhagic conversion    ----------------------------------------------------------------------------------------  PHYSICAL EXAM  Constitutional - NAD, Appears Comfortable  Neurologic Exam -                    Cognitive - Eyes closed     Communication - Nonverbal     Motor - Unable to fully assess   Psychiatric - Lethargic   ----------------------------------------------------------------------------------------  ASSESSMENT/PLAN  86yFemale with functional deficits after hospitalization complicated by acute CVA  Left Acute MCA CVA s/p thrombectomy - Eliquis   HTN - Lopressor  CHF - Duoneb  Oropharyngeal Dysphagia - Soft/Mod thick liquids *may need downgrade if continues to be this lethargic, suggest calorie count given electrolyte abnormalities  DVT PPX - SCDs  Rehab - Appears to have declined clinically, may not be eating/drinking for nutritional support. Expect patient to have limited recovery given ongoing decline. Recommend Palliative Care and consideration into Hospice. At this time plan is DARY.     Given medical and functional decline, will sign off at this time. Thank you for allowing me to be part of your patient's care. Please reconsult PMR for additional rehab recommendations or dispo needs if functional status changes.     Discussed with rehab clinical care team.  Patient is less responsive.   Does not participate in exam.   Overnight Na 148 and now on IVF.     REVIEW OF SYSTEMS  Constitutional - No fever,  +fatigue  Neurological - +loss of strength    FUNCTIONAL PROGRESS  Total A    VITALS  T(C): 36.7 (01-20-22 @ 04:22), Max: 37 (01-19-22 @ 21:29)  HR: 105 (01-20-22 @ 04:22) (80 - 110)  BP: 116/65 (01-20-22 @ 04:22) (116/65 - 143/80)  RR: 18 (01-20-22 @ 04:22) (18 - 20)  SpO2: 95% (01-20-22 @ 04:22) (95% - 99%)  Wt(kg): --    MEDICATIONS   albuterol/ipratropium for Nebulization 3 milliLiter(s) every 6 hours PRN  apixaban 2.5 milliGRAM(s) two times a day  chlorhexidine 2% Cloths 1 Application(s) daily  dextrose 5% + sodium chloride 0.45%. 1000 milliLiter(s) <Continuous>  epoetin clara-epbx (RETACRIT) Injectable 18177 Unit(s) every 7 days  ferrous    sulfate 325 milliGRAM(s) daily  folic acid 1 milliGRAM(s) daily  glucagon  Injectable 1 milliGRAM(s) once  metoprolol tartrate 12.5 milliGRAM(s) every 8 hours  ondansetron Injectable 4 milliGRAM(s) every 8 hours PRN  pantoprazole    Tablet 40 milliGRAM(s) daily  sodium chloride 0.225% 1000 milliLiter(s) <Continuous>  sodium chloride 0.9% lock flush 10 milliLiter(s) every 1 hour PRN      RECENT LABS/IMAGING                          10.2   6.48  )-----------( 203      ( 19 Jan 2022 08:29 )             35.0     01-19    153<H>  |  106  |  49.6<H>  ----------------------------<  126<H>  4.2   |  36.0<H>  |  1.64<H>    Ca    8.8      19 Jan 2022 23:31  Phos  3.3     01-19  Mg     2.2     01-19    MRI HEAD 1/9 -  Acute left MCA and PCA infarcts as described above.    HEAD CT 1/12 - Evolving acute/early subacute infarct in the left middle cerebral artery vascular territory is grossly stable from 01/09/2022.  No hemorrhagic conversion    ----------------------------------------------------------------------------------------  PHYSICAL EXAM  Constitutional - NAD, Appears Comfortable  Neurologic Exam -                    Cognitive - Eyes closed     Communication - Nonverbal     Motor - Unable to fully assess   Psychiatric - Lethargic   ----------------------------------------------------------------------------------------  ASSESSMENT/PLAN  86yFemale with functional deficits after hospitalization complicated by acute CVA  Left Acute MCA CVA s/p thrombectomy - Eliquis   HTN - Lopressor  CHF - Duoneb  Oropharyngeal Dysphagia - Soft/Mod thick liquids *may need downgrade if continues to be this lethargic, suggest calorie count given electrolyte abnormalities  DVT PPX - SCDs  Rehab - Appears to have declined clinically, may not be eating/drinking for nutritional support. Expect patient to have limited recovery given ongoing decline. Recommend Palliative Care and consideration into Comfort/Hospice. At this time plan is DARY.     Given medical and functional decline, will sign off at this time. Thank you for allowing me to be part of your patient's care. Please reconsult PMR for additional rehab recommendations or dispo needs if functional status changes.     Discussed with rehab clinical care team.

## 2022-01-20 NOTE — PROGRESS NOTE ADULT - SUBJECTIVE AND OBJECTIVE BOX
[facility]                                                               Office: [address] [address]                                                              Telephone: [phone]. Fax:[phone]                                                                             PROGRESS NOTE  Reason for follow up: decompensated CHF,   New onset Afib  Overnight: No new events.   Update:   patietn on oxygen. Out of Bed to Chair yesterday.  Still aphasic. intermittently follows actions. Not verbal commands.        Subjective:  cannot be obtained.  due to aphasia.     ROS:  inaccurate due to aphasia.    howeve. denie any signifiacnt complains. no ches tpain.   Cardiac. No chest pain  Resp: no cough  Gi: no diarrhea.   	  Vitals:    Vital Signs Last 24 Hrs  T(C): 36.7 (01-20-22 @ 04:22), Max: 37 (01-19-22 @ 21:29)  T(F): 98 (01-20-22 @ 04:22), Max: 98.6 (01-19-22 @ 21:29)  HR: 130 (01-20-22 @ 13:04) (99 - 130)  BP: 146/82 (01-20-22 @ 13:04) (116/65 - 146/82)  BP(mean): --  RR: 18 (01-20-22 @ 04:22) (18 - 20)  SpO2: 95% (01-20-22 @ 04:22) (95% - 99%)      PHYSICAL EXAM:  PHYSICAL EXAM:  Appearance: Comfortable. No acute distress  HEENT:  Head and neck: Atraumatic. Normocephalic.  Normal oral mucosa, PERRL, Neck is supple + JVD   Neurologic: A & O x 0, patient is repetitively saying "yes",  +focal deficits.   Lymphatic: No cervical lymphadenopathy  Cardiovascular: Normal S1 S2, No murmur, rubs/gallops. + JVD, No edema  Respiratory: diminished bilateral breath sounds  right lung  crepts bases and mid lung   Gastrointestinal:  +abd distention, Non-tender, + BS  Lower Extremities: No edema  Psychiatry: Patient is calm. No agitation. Mood & affect appropriate  Skin: No rashes/ ecchymoses/cyanosis/ulcers visualized on the face, hands or feet.        CURRENT MEDICATIONS:   MEDICATIONS  (STANDING):  metoprolol tartrate 12.5 milliGRAM(s) Oral every 8 hours    pantoprazole    Tablet  apixaban  chlorhexidine 2% Cloths  epoetin clara-epbx (RETACRIT) Injectable  ferrous    sulfate  folic acid  glucagon  Injectable    PRN: albuterol/ipratropium for Nebulization PRN  ondansetron Injectable PRN  sodium chloride 0.9% lock flush PRN        DIAGNOSTIC TESTING:    [ ] Echocardiogram: < from: TTE Echo Complete w/o Contrast w/ Doppler (01.02.22 @ 09:12) >    Summary:   1. Left ventricular ejection fraction, by visual estimation, is >75%.   2. Technically suboptimal study.   3. Hyperdynamic global left ventricular systolic function.   4. Normal left ventricular internal cavity size.   5. The mitral in-flow pattern reveals no discernable A-wave, therefore   no comment on diastolic function can be made.   6. Normal right ventricular size and function.  7. Mild to moderately enlarged left atrium.   8. There is no evidence of pericardial effusion.   9. Mild mitral annular calcification.  10. Mild to moderate mitral valve regurgitation.  11. Thickening of the anterior and posterior mitral valve leaflets.  12. Mild tricuspid regurgitation.  13. Small mobile echodencities visualized on the aortic valve. 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 Clinical correlation recommended.       OTHER: 	    CT:< from: CT Head No Cont (01.12.22 @ 03:15) >  IMPRESSION:  Evolving acute/early subacute infarct in the left middle cerebral artery   vascular territory is grossly stable from 01/09/2022.  No hemorrhagic   conversion      LABS:	 	                                  10.4   8.26  )-----------( 188      ( 20 Jan 2022 09:02 )             35.2   N=x    ; L=x        20 Jan 2022 09:02    152    |  106    |  48.3   ----------------------------<  110    4.1     |  36.0   |  1.60     Ca    8.8        20 Jan 2022 09:02  Phos  2.3       20 Jan 2022 09:02  Mg     2.1       20 Jan 2022 09:02    TPro  6.6    /  Alb  2.6    /  TBili  2.3    /  DBili  x      /  AST  29     /  ALT  8      /  AlkPhos  151    20 Jan 2022 09:02      Hepatic panel: 20 Jan 2022 09:02  6.6   | 2.6                            2.3   | 2.3  /x                              29    | 8                                 /151  \par

## 2022-01-20 NOTE — PROGRESS NOTE ADULT - SUBJECTIVE AND OBJECTIVE BOX
ROYER LONGLO    [id number]    86y      Female    CC: cva    INTERVAL HPI/OVERNIGHT EVENTS: pt seen and examined    REVIEW OF SYSTEMS:  unable to obtain    Vital Signs Last 24 Hrs  T(C): 36.7 (20 Jan 2022 04:22), Max: 37 (19 Jan 2022 21:29)  T(F): 98 (20 Jan 2022 04:22), Max: 98.6 (19 Jan 2022 21:29)  HR: 130 (20 Jan 2022 13:04) (99 - 130)  BP: 146/82 (20 Jan 2022 13:04) (116/65 - 146/82)  BP(mean): --  RR: 18 (20 Jan 2022 04:22) (18 - 20)  SpO2: 95% (20 Jan 2022 04:22) (95% - 99%)    PHYSICAL EXAM:    GENERAL: NAD  HEENT: PERRL  NECK: soft, supple  CHEST/LUNG: anterior crackles R>L  HEART: S1S2+, Regular rate and rhythm  ABDOMEN: Soft, Nontender, Nondistended; Bowel sounds present  SKIN: warm, dry  NEURO: Awake, alert  PSYCH: calm, cooperative    LABS:                        10.4   8.26  )-----------( 188      ( 20 Jan 2022 09:02 )             35.2     01-20    152<H>  |  106  |  48.3<H>  ----------------------------<  110<H>  4.1   |  36.0<H>  |  1.60<H>    Ca    8.8      20 Jan 2022 09:02  Phos  2.3     01-20  Mg     2.1     01-20    TPro  6.6  /  Alb  2.6<L>  /  TBili  2.3<H>  /  DBili  x   /  AST  29  /  ALT  8   /  AlkPhos  151<H>  01-20            MEDICATIONS  (STANDING):  apixaban 2.5 milliGRAM(s) Oral two times a day  chlorhexidine 2% Cloths 1 Application(s) Topical daily  epoetin clara-epbx (RETACRIT) Injectable 40634 Unit(s) SubCutaneous every 7 days  ferrous    sulfate 325 milliGRAM(s) Oral daily  folic acid 1 milliGRAM(s) Oral daily  glucagon  Injectable 1 milliGRAM(s) IntraMuscular once  metoprolol tartrate 12.5 milliGRAM(s) Oral every 8 hours  pantoprazole    Tablet 40 milliGRAM(s) Oral daily    MEDICATIONS  (PRN):  albuterol/ipratropium for Nebulization 3 milliLiter(s) Nebulizer every 6 hours PRN Shortness of Breath and/or Wheezing  ondansetron Injectable 4 milliGRAM(s) IV Push every 8 hours PRN Nausea and/or Vomiting  sodium chloride 0.9% lock flush 10 milliLiter(s) IV Push every 1 hour PRN Pre/post blood products, medications, blood draw, and to maintain line patency      RADIOLOGY & ADDITIONAL TESTS:  < from: Xray Chest 1 View- PORTABLE-Routine (Xray Chest 1 View- PORTABLE-Routine .) (01.20.22 @ 12:32) >  IMPRESSION:  Central pulmonary congestion. Patchy infiltrates.        Thank you for the courtesy of this referral.    < end of copied text >

## 2022-01-20 NOTE — PROGRESS NOTE ADULT - NSPROGADDITIONALINFOA_GEN_ALL_CORE
Total Time Spent_15___ minutes  This includes chart review, patient assessment, discussion and collaboration with interdisciplinary team members, ACP planning    COUNSELING:  Face to face meeting to discuss Advanced Care Planning - Time Spent ______Minutes.      Thank you for the opportunity to assist with the care of this patient.   [facility] Palliative Medicine Consult Service [phone].

## 2022-01-20 NOTE — PROGRESS NOTE ADULT - ASSESSMENT
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 supplement potassium with 1/4 NS K and Na better - continue  Hold Torsemide       Anemia - Added weekly Epogen and folate - Hb better 10.2    Follow labs and GOC conversation

## 2022-01-20 NOTE — PROGRESS NOTE ADULT - ASSESSMENT
86y/oF PMH HTN, COPD (not on home o2), CKD, admitted with new onset AFIB with RVR, Acute on Chronic Diastolic HF Decompensation,   Acute on Chronic Renal failure requiring HD.Now discontinued.  Hospital course complicated by acute CVA /6)     Acute CVA   s/p TICI 2b revascularization of L M1 occlusion and thrombectomy (1  On Eliquis   PT/OT   RLL PNA  s/p abx    EDILBERTO on CKD   Dialyzed earlier in hospitalization   monitoring renal function off HD   Nephro following   Will be , removing dialysis access    Hypernatremia   Holding diuretic   Continues to have poor  PO intake   Must be fed  Limited diet  IV fluids ordered    Acute Diastolic CHF   EF >75% with anasarca   Holding Torsemide   Chronic AFIB RVR   Metoprolol, Eliquis   HOLDING  digoxin     GOC  Full code  Hoping she would be elligible for Acute Rehab  Assessed today, recommending DARY when stable  PT-to revisit help get her to stand and get OOB/CH  I will reach out to family to discuss code status

## 2022-01-20 NOTE — PROGRESS NOTE ADULT - NUTRITIONAL ASSESSMENT
This patient has been assessed with a concern for Malnutrition and has been determined to have a diagnosis/diagnoses of Moderate protein-calorie malnutrition.    This patient is being managed with:   Diet Soft and Bite Sized-  Moderately Thick Liquids (MODTHICKLIQS)  Entered: James 10 2022  1:51PM     MILD

## 2022-01-21 LAB
ANION GAP SERPL CALC-SCNC: 10 MMOL/L — SIGNIFICANT CHANGE UP (ref 5–17)
BUN SERPL-MCNC: 49.8 MG/DL — HIGH (ref 8–20)
CALCIUM SERPL-MCNC: 8.7 MG/DL — SIGNIFICANT CHANGE UP (ref 8.6–10.2)
CHLORIDE SERPL-SCNC: 107 MMOL/L — SIGNIFICANT CHANGE UP (ref 98–107)
CO2 SERPL-SCNC: 36 MMOL/L — HIGH (ref 22–29)
CREAT SERPL-MCNC: 1.77 MG/DL — HIGH (ref 0.5–1.3)
GLUCOSE SERPL-MCNC: 96 MG/DL — SIGNIFICANT CHANGE UP (ref 70–99)
HCT VFR BLD CALC: 35.6 % — SIGNIFICANT CHANGE UP (ref 34.5–45)
HGB BLD-MCNC: 10.4 G/DL — LOW (ref 11.5–15.5)
MAGNESIUM SERPL-MCNC: 2.1 MG/DL — SIGNIFICANT CHANGE UP (ref 1.6–2.6)
MCHC RBC-ENTMCNC: 25.2 PG — LOW (ref 27–34)
MCHC RBC-ENTMCNC: 29.2 GM/DL — LOW (ref 32–36)
MCV RBC AUTO: 86.2 FL — SIGNIFICANT CHANGE UP (ref 80–100)
PLATELET # BLD AUTO: 179 K/UL — SIGNIFICANT CHANGE UP (ref 150–400)
POTASSIUM SERPL-MCNC: 4.2 MMOL/L — SIGNIFICANT CHANGE UP (ref 3.5–5.3)
POTASSIUM SERPL-SCNC: 4.2 MMOL/L — SIGNIFICANT CHANGE UP (ref 3.5–5.3)
RBC # BLD: 4.13 M/UL — SIGNIFICANT CHANGE UP (ref 3.8–5.2)
RBC # FLD: 19.6 % — HIGH (ref 10.3–14.5)
SODIUM SERPL-SCNC: 153 MMOL/L — HIGH (ref 135–145)
WBC # BLD: 7.79 K/UL — SIGNIFICANT CHANGE UP (ref 3.8–10.5)
WBC # FLD AUTO: 7.79 K/UL — SIGNIFICANT CHANGE UP (ref 3.8–10.5)

## 2022-01-21 PROCEDURE — 99232 SBSQ HOSP IP/OBS MODERATE 35: CPT

## 2022-01-21 PROCEDURE — 99233 SBSQ HOSP IP/OBS HIGH 50: CPT

## 2022-01-21 RX ORDER — DEXTROSE MONOHYDRATE, SODIUM CHLORIDE, AND POTASSIUM CHLORIDE 50; .745; 4.5 G/1000ML; G/1000ML; G/1000ML
1000 INJECTION, SOLUTION INTRAVENOUS
Refills: 0 | Status: DISCONTINUED | OUTPATIENT
Start: 2022-01-21 | End: 2022-01-21

## 2022-01-21 RX ORDER — DEXTROSE MONOHYDRATE, SODIUM CHLORIDE, AND POTASSIUM CHLORIDE 50; .745; 4.5 G/1000ML; G/1000ML; G/1000ML
1000 INJECTION, SOLUTION INTRAVENOUS
Refills: 0 | Status: DISCONTINUED | OUTPATIENT
Start: 2022-01-21 | End: 2022-01-23

## 2022-01-21 RX ADMIN — APIXABAN 2.5 MILLIGRAM(S): 2.5 TABLET, FILM COATED ORAL at 17:20

## 2022-01-21 RX ADMIN — Medication 25 MILLIGRAM(S): at 05:16

## 2022-01-21 RX ADMIN — Medication 25 MILLIGRAM(S): at 21:07

## 2022-01-21 RX ADMIN — Medication 1 MILLIGRAM(S): at 11:16

## 2022-01-21 RX ADMIN — Medication 25 MILLIGRAM(S): at 13:16

## 2022-01-21 RX ADMIN — Medication 325 MILLIGRAM(S): at 11:16

## 2022-01-21 RX ADMIN — CHLORHEXIDINE GLUCONATE 1 APPLICATION(S): 213 SOLUTION TOPICAL at 13:15

## 2022-01-21 RX ADMIN — DEXTROSE MONOHYDRATE, SODIUM CHLORIDE, AND POTASSIUM CHLORIDE 70 MILLILITER(S): 50; .745; 4.5 INJECTION, SOLUTION INTRAVENOUS at 15:25

## 2022-01-21 RX ADMIN — PANTOPRAZOLE SODIUM 40 MILLIGRAM(S): 20 TABLET, DELAYED RELEASE ORAL at 11:15

## 2022-01-21 RX ADMIN — APIXABAN 2.5 MILLIGRAM(S): 2.5 TABLET, FILM COATED ORAL at 05:16

## 2022-01-21 NOTE — PROGRESS NOTE ADULT - NUTRITIONAL ASSESSMENT
-- DO NOT REPLY / DO NOT REPLY ALL --  -- Message is from the [facility]--    Provider paged via byUs Documentation - The below message was copied and pasted from a PerfectServe page:    Silvia Arceo   Secure Text   [phone] PATIENT NUMBER -------------------------------- ACC NURSE LINE (IF QUESTIONS ONLY - [phone]) FROM: KARIS ARCEO PATIENT COMPLAINS OF ABD PRESSURE TODAY PAIN MODERATE AND CONSTANT; SHE MAY HAVE A FEVER; SHE IS ON ANTIBIOTIC FOR UTI BUT THIS SYMPTOMS IS DIFFERENT; SHE DECLINED [facility] URGENT DISPO; SHE IS REQUESTING TO PAGE MD TO SEE IF SHE IS ON THE 'RIGHT' ANTIBIOTIC; PLEASE CALL THANK YOU ACC RN ELIN       This patient has been assessed with a concern for Malnutrition and has been determined to have a diagnosis/diagnoses of Moderate protein-calorie malnutrition.    This patient is being managed with:   Diet Soft and Bite Sized-  Moderately Thick Liquids (MODTHICKLIQS)  Entered: James 10 2022  1:51PM      This patient has been assessed with a concern for Malnutrition and has been determined to have a diagnosis/diagnoses of Moderate protein-calorie malnutrition.    This patient is being managed with:   Diet Soft and Bite Sized-  Moderately Thick Liquids (MODTHICKLIQS)  Entered: James 10 2022  1:51PM     This patient has been assessed with a concern for Malnutrition and has been determined to have a diagnosis/diagnoses of Moderate protein-calorie malnutrition.    This patient is being managed with:   Diet Soft and Bite Sized-  Moderately Thick Liquids (MODTHICKLIQS)  Entered: James 10 2022  1:51PM

## 2022-01-21 NOTE — PROGRESS NOTE ADULT - SUBJECTIVE AND OBJECTIVE BOX
ROYER RAMOS    [id number]    86y      Female    CC: cva    INTERVAL HPI/OVERNIGHT EVENTS: pt seen and examined. no acute events o/n    REVIEW OF SYSTEMS:  unable to obtain    Vital Signs Last 24 Hrs  T(C): 36.5 (21 Jan 2022 11:00), Max: 37.2 (20 Jan 2022 17:54)  T(F): 97.7 (21 Jan 2022 11:00), Max: 99 (20 Jan 2022 17:54)  HR: 90 (21 Jan 2022 11:00) (90 - 101)  BP: 144/80 (21 Jan 2022 11:00) (110/83 - 144/80)  BP(mean): --  RR: 18 (21 Jan 2022 11:00) (18 - 18)  SpO2: 97% (21 Jan 2022 11:00) (91% - 97%)    PHYSICAL EXAM:    GENERAL: NAD  HEENT: PERRL  NECK: soft, supple  CHEST/LUNG: anterior crackles R>L  HEART: S1S2+, Regular rate and rhythm  ABDOMEN: Soft, Nontender, Nondistended; Bowel sounds present  SKIN: warm, dry  NEURO: Awake, alert  PSYCH: calm, cooperative    LABS:                        10.4   7.79  )-----------( 179      ( 21 Jan 2022 09:08 )             35.6     01-21    153<H>  |  107  |  49.8<H>  ----------------------------<  96  4.2   |  36.0<H>  |  1.77<H>    Ca    8.7      21 Jan 2022 09:08  Phos  2.3     01-20  Mg     2.1     01-21    TPro  6.6  /  Alb  2.6<L>  /  TBili  2.3<H>  /  DBili  x   /  AST  29  /  ALT  8   /  AlkPhos  151<H>  01-20        MEDICATIONS  (STANDING):  apixaban 2.5 milliGRAM(s) Oral two times a day  chlorhexidine 2% Cloths 1 Application(s) Topical daily  dextrose 5% + sodium chloride 0.225% with potassium chloride 10 mEq/L 1000 milliLiter(s) (70 mL/Hr) IV Continuous <Continuous>  epoetin clara-epbx (RETACRIT) Injectable 41657 Unit(s) SubCutaneous every 7 days  ferrous    sulfate 325 milliGRAM(s) Oral daily  folic acid 1 milliGRAM(s) Oral daily  glucagon  Injectable 1 milliGRAM(s) IntraMuscular once  metoprolol tartrate 25 milliGRAM(s) Oral every 8 hours  pantoprazole    Tablet 40 milliGRAM(s) Oral daily    MEDICATIONS  (PRN):  albuterol/ipratropium for Nebulization 3 milliLiter(s) Nebulizer every 6 hours PRN Shortness of Breath and/or Wheezing  ondansetron Injectable 4 milliGRAM(s) IV Push every 8 hours PRN Nausea and/or Vomiting  sodium chloride 0.9% lock flush 10 milliLiter(s) IV Push every 1 hour PRN Pre/post blood products, medications, blood draw, and to maintain line patency      RADIOLOGY & ADDITIONAL TESTS:   ROYER RAMOS    [id number]    86y      Female    CC: cva    INTERVAL HPI/OVERNIGHT EVENTS: pt seen and examined. no acute events o/n    REVIEW OF SYSTEMS:  unable to obtain    Vital Signs Last 24 Hrs  T(C): 36.5 (21 Jan 2022 11:00), Max: 37.2 (20 Jan 2022 17:54)  T(F): 97.7 (21 Jan 2022 11:00), Max: 99 (20 Jan 2022 17:54)  HR: 90 (21 Jan 2022 11:00) (90 - 101)  BP: 144/80 (21 Jan 2022 11:00) (110/83 - 144/80)  BP(mean): --  RR: 18 (21 Jan 2022 11:00) (18 - 18)  SpO2: 97% (21 Jan 2022 11:00) (91% - 97%)    PHYSICAL EXAM:    GENERAL: NAD  HEENT: PERRL  NECK: soft, supple  CHEST/LUNG: anterior crackles R>L   HEART: S1S2+, Regular rate and rhythm  ABDOMEN: Soft, Nontender, Nondistended; Bowel sounds present  SKIN: warm, dry  NEURO: Awake, alert  PSYCH: calm, cooperative    LABS:                        10.4   7.79  )-----------( 179      ( 21 Jan 2022 09:08 )             35.6     01-21    153<H>  |  107  |  49.8<H>  ----------------------------<  96  4.2   |  36.0<H>  |  1.77<H>    Ca    8.7      21 Jan 2022 09:08  Phos  2.3     01-20  Mg     2.1     01-21    TPro  6.6  /  Alb  2.6<L>  /  TBili  2.3<H>  /  DBili  x   /  AST  29  /  ALT  8   /  AlkPhos  151<H>  01-20        MEDICATIONS  (STANDING):  apixaban 2.5 milliGRAM(s) Oral two times a day  chlorhexidine 2% Cloths 1 Application(s) Topical daily  dextrose 5% + sodium chloride 0.225% with potassium chloride 10 mEq/L 1000 milliLiter(s) (70 mL/Hr) IV Continuous <Continuous>  epoetin clara-epbx (RETACRIT) Injectable 03301 Unit(s) SubCutaneous every 7 days  ferrous    sulfate 325 milliGRAM(s) Oral daily  folic acid 1 milliGRAM(s) Oral daily  glucagon  Injectable 1 milliGRAM(s) IntraMuscular once  metoprolol tartrate 25 milliGRAM(s) Oral every 8 hours  pantoprazole    Tablet 40 milliGRAM(s) Oral daily    MEDICATIONS  (PRN):  albuterol/ipratropium for Nebulization 3 milliLiter(s) Nebulizer every 6 hours PRN Shortness of Breath and/or Wheezing  ondansetron Injectable 4 milliGRAM(s) IV Push every 8 hours PRN Nausea and/or Vomiting  sodium chloride 0.9% lock flush 10 milliLiter(s) IV Push every 1 hour PRN Pre/post blood products, medications, blood draw, and to maintain line patency      RADIOLOGY & ADDITIONAL TESTS:

## 2022-01-21 NOTE — PROGRESS NOTE ADULT - SUBJECTIVE AND OBJECTIVE BOX
[facility]                                                               Office: [address] [address]                                                              Telephone: [phone]. Fax:[phone]                                                                             PROGRESS NOTE  Reason for follow up: decompensated CHF,  atrial fibrillation   Overnight: No new events.   Update:   patietn on oxygen. Out of Bed to Chair yesterday.  Still aphasic. intermittently follows actions. Not verbal commands.    yesterday she had 600 ml residual. got straight cath.     Subjective:  cannot be obtained.  due to aphasia.   She is sleepy.  woke her up. no new symptoms. comfortable     ROS:  inaccurate due to aphasia.    however. denies any significant complains. no chest pain.   Cardiac. No chest pain  Resp: no cough  Gi: no diarrhea.   	  Vitals:    Vital Signs Last 24 Hrs  T(C): 36.3 (01-21-22 @ 04:52), Max: 37.2 (01-20-22 @ 17:54)  T(F): 97.4 (01-21-22 @ 04:52), Max: 99 (01-20-22 @ 17:54)  HR: 90 (01-21-22 @ 04:52) (90 - 130)  BP: 114/61 (01-21-22 @ 04:52) (110/83 - 146/82)  BP(mean): --  RR: 18 (01-21-22 @ 04:52) (18 - 18)  SpO2: 91% (01-21-22 @ 04:52) (91% - 97%)      PHYSICAL EXAM:  PHYSICAL EXAM:  Appearance: Comfortable. No acute distress  HEENT:  Head and neck: Atraumatic. Normocephalic.  Normal oral mucosa, PERRL, Neck is supple + JVD   Neurologic: A & O x 0, comfortable . sleepy   Lymphatic: No cervical lymphadenopathy  Cardiovascular: Normal S1 S2, No murmur, rubs/gallops. + JVD, No edema  Respiratory: diminished bilateral breath sounds  right lung  crepts bases and mid lung   Gastrointestinal:  +abd distention, Non-tender, + BS  Lower Extremities: No edema  Psychiatry: Patient is calm. No agitation. Mood & affect appropriate  Skin: No rashes/ ecchymoses/cyanosis/ulcers visualized on the face, hands or feet.        CURRENT MEDICATIONS:   MEDICATIONS  (STANDING):  metoprolol tartrate 25 milliGRAM(s) Oral every 8 hours    pantoprazole    Tablet  apixaban  chlorhexidine 2% Cloths  epoetin clara-epbx (RETACRIT) Injectable  ferrous    sulfate  folic acid  glucagon  Injectable    PRN: albuterol/ipratropium for Nebulization PRN  ondansetron Injectable PRN  sodium chloride 0.9% lock flush PRN          DIAGNOSTIC TESTING:    [ ] Echocardiogram: < from: TTE Echo Complete w/o Contrast w/ Doppler (01.02.22 @ 09:12) >    Summary:   1. Left ventricular ejection fraction, by visual estimation, is >75%.   2. Technically suboptimal study.   3. Hyperdynamic global left ventricular systolic function.   4. Normal left ventricular internal cavity size.   5. The mitral in-flow pattern reveals no discernable A-wave, therefore   no comment on diastolic function can be made.   6. Normal right ventricular size and function.  7. Mild to moderately enlarged left atrium.   8. There is no evidence of pericardial effusion.   9. Mild mitral annular calcification.  10. Mild to moderate mitral valve regurgitation.  11. Thickening of the anterior and posterior mitral valve leaflets.  12. Mild tricuspid regurgitation.  13. Small mobile echodencities visualized on the aortic valve. 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 Clinical correlation recommended.       OTHER: 	    CT:< from: CT Head No Cont (01.12.22 @ 03:15) >  IMPRESSION:  Evolving acute/early subacute infarct in the left middle cerebral artery   vascular territory is grossly stable from 01/09/2022.  No hemorrhagic   conversion      LABS:	 	                                       10.4   7.79  )-----------( 179      ( 21 Jan 2022 09:08 )             35.6   N=x    ; L=x        21 Jan 2022 09:08    153    |  107    |  49.8   ----------------------------<  96     4.2     |  36.0   |  1.77     Ca    8.7        21 Jan 2022 09:08  Phos  2.3       20 Jan 2022 09:02  Mg     2.1       21 Jan 2022 09:08    TPro  6.6    /  Alb  2.6    /  TBili  2.3    /  DBili  x      /  AST  29     /  ALT  8      /  AlkPhos  151    20 Jan 2022 09:02      Hepatic panel: 20 Jan 2022 09:02  6.6   | 2.6                            2.3   | 2.3  /x                              29    | 8                                 /151  \par                                        < from: Xray Chest 1 View- PORTABLE-Routine (Xray Chest 1 View- PORTABLE-Routine .) (01.20.22 @ 12:32) >  IMPRESSION:  Central pulmonary congestion. Patchy infiltrates.    < end of copied text >

## 2022-01-21 NOTE — CHART NOTE - NSCHARTNOTEFT_GEN_A_CORE
Permacath right chest removed at bedside. Pressure held for approximately 10 minutes. Patient tolerated procedure well. Dressing with Allevyn dressing over permacath insertion site.

## 2022-01-21 NOTE — PROGRESS NOTE ADULT - ASSESSMENT
85 y/o F with PMH Breast cancer, Mastectomy 1985, colon ca resection 2010, VATS pleurectomy drainage 2018, COPD, CKD, presents to ED with c/o Leg swelling x 1 week. States fell on monday, able to get off floor, since then worsening shortness of breath, and leg swelling. Denies palpitations, chest pains. Noted to Be afib RVR in ED, given diltiazem. Edema noted to abd.     Dyspnea:  On oxygen.    Xray Abnormal.   Elevated JVD.  off IV fluids.  holding diuresis for now.   Recommend PO water intake instead of IV fluids for Hypernatremia.   evaluate for other causes of hypernatremia.  recommend DDAVP if needed.  Urine lytes.   watch for fluid overload.   Out of Bed to Chair . incentive spiromter.     LMA Occlusion   s/p thrombectomy,.  No hemorrhagic conversion  -Cont. Eliquis    Afib  -intermittent digoxin PRN  increased lopressor to 25 Q8.   -Cont Eliquis     Diastolic heart failure - With Rv dysfunction    IV fluids d./janelle yesetrday.   not on hemodialysis any more.     anasarca resolved.   off diuresis due to  hypovolemia.             EDILBERTO on CKD   improvedm.    Off IV fluids.   Intermittent  bladder scan.    CKD stable.     Hypernatremia: recommedn DDAVP if needed . Recommend d5W low dose if needed as patietn has poor oral intake.   encourage oral intake if possible.

## 2022-01-21 NOTE — PROGRESS NOTE ADULT - SUBJECTIVE AND OBJECTIVE BOX
NEPHROLOGY INTERVAL HPI/OVERNIGHT EVENTS:    Examined  Frail elderly  No dyspnea laying flat  permcath --> chest    MEDICATIONS  (STANDING):  apixaban 2.5 milliGRAM(s) Oral two times a day  chlorhexidine 2% Cloths 1 Application(s) Topical daily  dextrose 5% + sodium chloride 0.225% with potassium chloride 10 mEq/L 1000 milliLiter(s) (70 mL/Hr) IV Continuous <Continuous>  epoetin clara-epbx (RETACRIT) Injectable 39219 Unit(s) SubCutaneous every 7 days  ferrous    sulfate 325 milliGRAM(s) Oral daily  folic acid 1 milliGRAM(s) Oral daily  glucagon  Injectable 1 milliGRAM(s) IntraMuscular once  metoprolol tartrate 25 milliGRAM(s) Oral every 8 hours  pantoprazole    Tablet 40 milliGRAM(s) Oral daily    MEDICATIONS  (PRN):  albuterol/ipratropium for Nebulization 3 milliLiter(s) Nebulizer every 6 hours PRN Shortness of Breath and/or Wheezing  ondansetron Injectable 4 milliGRAM(s) IV Push every 8 hours PRN Nausea and/or Vomiting  sodium chloride 0.9% lock flush 10 milliLiter(s) IV Push every 1 hour PRN Pre/post blood products, medications, blood draw, and to maintain line patency      Allergies    No Known Allergies    Intolerances        Vital Signs Last 24 Hrs  T(C): 36.5 (21 Jan 2022 11:00), Max: 37.2 (20 Jan 2022 17:54)  T(F): 97.7 (21 Jan 2022 11:00), Max: 99 (20 Jan 2022 17:54)  HR: 90 (21 Jan 2022 11:00) (90 - 130)  BP: 144/80 (21 Jan 2022 11:00) (110/83 - 146/82)  BP(mean): --  RR: 18 (21 Jan 2022 11:00) (18 - 18)  SpO2: 97% (21 Jan 2022 11:00) (91% - 97%)  Daily     Daily     PHYSICAL EXAM:  GENERAL: NAD  EYES:  conjunctiva and sclera clear  NECK: Supple, No JVD/Bruit  NERVOUS SYSTEM:  Arousable confused  CHEST:  No rales, No rhonchi  HEART:  RRR, No murmur  ABDOMEN: Soft, NT/ND BS+  EXTREMITIES:  Trace Edema      LABS:                        10.4   7.79  )-----------( 179      ( 21 Jan 2022 09:08 )             35.6     01-21    153<H>  |  107  |  49.8<H>  ----------------------------<  96  4.2   |  36.0<H>  |  1.77<H>    Ca    8.7      21 Jan 2022 09:08  Phos  2.3     01-20  Mg     2.1     01-21    TPro  6.6  /  Alb  2.6<L>  /  TBili  2.3<H>  /  DBili  x   /  AST  29  /  ALT  8   /  AlkPhos  151<H>  01-20        Magnesium, Serum: 2.1 mg/dL (01-21 @ 09:08)          RADIOLOGY & ADDITIONAL TESTS:

## 2022-01-21 NOTE — PROGRESS NOTE ADULT - ASSESSMENT
86y/oF PMH HTN, COPD (not on home o2), CKD, admitted with new onset afib with RVR, acute on chronic diastolic HF decompensation, acute on chronic rneal failure requiring HD. hospital course complicated by acute CVA now s/p TICI 2b revascularization of L M1 occlusion and thrombectomy (1/6)     Acute CVA   -s/p revascularization and thrombectomy by NeuroIR   -on eliquis   -PT/OT     EDILBERTO on CKD   -s/p HD   -monitoring renal function off HD   -nephro following   -can d/c permcath    Hypernatremia   -holding diuretic   -poor PO intake   -encourage PO    Acute diastolic CHF EF >75% with anasarca   -I/Os  -holding torsemide   -cxr 1/20 with central pulmonary congestion    chronic afib RVR   -cardio recs appreciated   -metoprolol, eliquis   -metoprolol increased 1/20  -holding digoxin     RUE skin tear   -xeroform, wrapped with gauze     RLL PNA   -s/p abx     Hypokalemia   -replete  -cont to monitor     DVT ppx: eliquis     will need laila on dc     Poor prognosis   HCP Eloisa (Daughter) : [phone], updated

## 2022-01-21 NOTE — PROGRESS NOTE ADULT - TIME BILLING
as above.
hematuria.  started on hemodialysis .   plan for US chest today.     D/c milrinone. ct diuresis for now.   rate controlled. received a dose of dig yesterday. getting intermitten dig  ct cardizem and lopressor  received potassiuma dn mag supplemetn yesterday
as above.

## 2022-01-21 NOTE — PROGRESS NOTE ADULT - ASSESSMENT
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

## 2022-01-22 LAB
ANION GAP SERPL CALC-SCNC: 8 MMOL/L — SIGNIFICANT CHANGE UP (ref 5–17)
BUN SERPL-MCNC: 44.9 MG/DL — HIGH (ref 8–20)
CALCIUM SERPL-MCNC: 8.5 MG/DL — LOW (ref 8.6–10.2)
CHLORIDE SERPL-SCNC: 105 MMOL/L — SIGNIFICANT CHANGE UP (ref 98–107)
CO2 SERPL-SCNC: 36 MMOL/L — HIGH (ref 22–29)
CREAT SERPL-MCNC: 1.75 MG/DL — HIGH (ref 0.5–1.3)
GLUCOSE BLDC GLUCOMTR-MCNC: 132 MG/DL — HIGH (ref 70–99)
GLUCOSE SERPL-MCNC: 132 MG/DL — HIGH (ref 70–99)
HCT VFR BLD CALC: 34.7 % — SIGNIFICANT CHANGE UP (ref 34.5–45)
HGB BLD-MCNC: 10.2 G/DL — LOW (ref 11.5–15.5)
MAGNESIUM SERPL-MCNC: 2.2 MG/DL — SIGNIFICANT CHANGE UP (ref 1.6–2.6)
MCHC RBC-ENTMCNC: 25.4 PG — LOW (ref 27–34)
MCHC RBC-ENTMCNC: 29.4 GM/DL — LOW (ref 32–36)
MCV RBC AUTO: 86.5 FL — SIGNIFICANT CHANGE UP (ref 80–100)
PLATELET # BLD AUTO: 173 K/UL — SIGNIFICANT CHANGE UP (ref 150–400)
POTASSIUM SERPL-MCNC: 3.7 MMOL/L — SIGNIFICANT CHANGE UP (ref 3.5–5.3)
POTASSIUM SERPL-SCNC: 3.7 MMOL/L — SIGNIFICANT CHANGE UP (ref 3.5–5.3)
RBC # BLD: 4.01 M/UL — SIGNIFICANT CHANGE UP (ref 3.8–5.2)
RBC # FLD: 19.6 % — HIGH (ref 10.3–14.5)
SODIUM SERPL-SCNC: 149 MMOL/L — HIGH (ref 135–145)
WBC # BLD: 7.99 K/UL — SIGNIFICANT CHANGE UP (ref 3.8–10.5)
WBC # FLD AUTO: 7.99 K/UL — SIGNIFICANT CHANGE UP (ref 3.8–10.5)

## 2022-01-22 PROCEDURE — 99232 SBSQ HOSP IP/OBS MODERATE 35: CPT

## 2022-01-22 RX ADMIN — CHLORHEXIDINE GLUCONATE 1 APPLICATION(S): 213 SOLUTION TOPICAL at 13:10

## 2022-01-22 RX ADMIN — APIXABAN 2.5 MILLIGRAM(S): 2.5 TABLET, FILM COATED ORAL at 18:15

## 2022-01-22 RX ADMIN — Medication 25 MILLIGRAM(S): at 13:09

## 2022-01-22 RX ADMIN — Medication 25 MILLIGRAM(S): at 05:13

## 2022-01-22 RX ADMIN — Medication 325 MILLIGRAM(S): at 13:08

## 2022-01-22 RX ADMIN — DEXTROSE MONOHYDRATE, SODIUM CHLORIDE, AND POTASSIUM CHLORIDE 70 MILLILITER(S): 50; .745; 4.5 INJECTION, SOLUTION INTRAVENOUS at 18:15

## 2022-01-22 RX ADMIN — Medication 1 MILLIGRAM(S): at 13:08

## 2022-01-22 RX ADMIN — DEXTROSE MONOHYDRATE, SODIUM CHLORIDE, AND POTASSIUM CHLORIDE 70 MILLILITER(S): 50; .745; 4.5 INJECTION, SOLUTION INTRAVENOUS at 05:13

## 2022-01-22 RX ADMIN — Medication 25 MILLIGRAM(S): at 21:31

## 2022-01-22 RX ADMIN — APIXABAN 2.5 MILLIGRAM(S): 2.5 TABLET, FILM COATED ORAL at 05:13

## 2022-01-22 RX ADMIN — PANTOPRAZOLE SODIUM 40 MILLIGRAM(S): 20 TABLET, DELAYED RELEASE ORAL at 13:10

## 2022-01-22 NOTE — PROGRESS NOTE ADULT - SUBJECTIVE AND OBJECTIVE BOX
ROYER LONGLO    [id number]    86y      Female    CC: cva    INTERVAL HPI/OVERNIGHT EVENTS: pt seen and examined. no acute events     REVIEW OF SYSTEMS:  unable to obtain     Vital Signs Last 24 Hrs  T(C): 36.5 (22 Jan 2022 11:50), Max: 36.5 (22 Jan 2022 11:50)  T(F): 97.7 (22 Jan 2022 11:50), Max: 97.7 (22 Jan 2022 11:50)  HR: 86 (22 Jan 2022 11:50) (63 - 98)  BP: 120/72 (22 Jan 2022 11:50) (115/72 - 142/93)  BP(mean): --  RR: 18 (22 Jan 2022 11:50) (18 - 19)  SpO2: 97% (22 Jan 2022 11:50) (92% - 97%)    PHYSICAL EXAM:    GENERAL: NAD  HEENT: PERRL  NECK: soft, supple  CHEST/LUNG: Clear to auscultation bilaterally  HEART: S1S2+, irregularly irregular   ABDOMEN: Soft, Nontender, Nondistended  SKIN: warm, dry  NEURO: Awake, alert; aphasic   PSYCH: calm, cooperative     LABS:                        10.2   7.99  )-----------( 173      ( 22 Jan 2022 09:23 )             34.7     01-22    149<H>  |  105  |  44.9<H>  ----------------------------<  132<H>  3.7   |  36.0<H>  |  1.75<H>    Ca    8.5<L>      22 Jan 2022 09:23  Mg     2.2     01-22              MEDICATIONS  (STANDING):  apixaban 2.5 milliGRAM(s) Oral two times a day  chlorhexidine 2% Cloths 1 Application(s) Topical daily  dextrose 5% + sodium chloride 0.225% with potassium chloride 10 mEq/L 1000 milliLiter(s) (70 mL/Hr) IV Continuous <Continuous>  epoetin clara-epbx (RETACRIT) Injectable 70433 Unit(s) SubCutaneous every 7 days  ferrous    sulfate 325 milliGRAM(s) Oral daily  folic acid 1 milliGRAM(s) Oral daily  glucagon  Injectable 1 milliGRAM(s) IntraMuscular once  metoprolol tartrate 25 milliGRAM(s) Oral every 8 hours  pantoprazole    Tablet 40 milliGRAM(s) Oral daily    MEDICATIONS  (PRN):  albuterol/ipratropium for Nebulization 3 milliLiter(s) Nebulizer every 6 hours PRN Shortness of Breath and/or Wheezing  ondansetron Injectable 4 milliGRAM(s) IV Push every 8 hours PRN Nausea and/or Vomiting  sodium chloride 0.9% lock flush 10 milliLiter(s) IV Push every 1 hour PRN Pre/post blood products, medications, blood draw, and to maintain line patency      RADIOLOGY & ADDITIONAL TESTS:

## 2022-01-22 NOTE — PROGRESS NOTE ADULT - ASSESSMENT
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

## 2022-01-22 NOTE — PROGRESS NOTE ADULT - SUBJECTIVE AND OBJECTIVE BOX
NEPHROLOGY INTERVAL HPI/OVERNIGHT EVENTS:    Examined  Frail elderly  No dyspnea laying flat  permcath --> removed    MEDICATIONS  (STANDING):  apixaban 2.5 milliGRAM(s) Oral two times a day  chlorhexidine 2% Cloths 1 Application(s) Topical daily  dextrose 5% + sodium chloride 0.225% with potassium chloride 10 mEq/L 1000 milliLiter(s) (70 mL/Hr) IV Continuous <Continuous>  epoetin clara-epbx (RETACRIT) Injectable 65433 Unit(s) SubCutaneous every 7 days  ferrous    sulfate 325 milliGRAM(s) Oral daily  folic acid 1 milliGRAM(s) Oral daily  glucagon  Injectable 1 milliGRAM(s) IntraMuscular once  metoprolol tartrate 25 milliGRAM(s) Oral every 8 hours  pantoprazole    Tablet 40 milliGRAM(s) Oral daily    MEDICATIONS  (PRN):  albuterol/ipratropium for Nebulization 3 milliLiter(s) Nebulizer every 6 hours PRN Shortness of Breath and/or Wheezing  ondansetron Injectable 4 milliGRAM(s) IV Push every 8 hours PRN Nausea and/or Vomiting  sodium chloride 0.9% lock flush 10 milliLiter(s) IV Push every 1 hour PRN Pre/post blood products, medications, blood draw, and to maintain line patency      Allergies    No Known Allergies    Intolerances        Vital Signs Last 24 Hrs  T(C): 36.4 (22 Jan 2022 05:10), Max: 36.5 (21 Jan 2022 11:00)  T(F): 97.6 (22 Jan 2022 05:10), Max: 97.7 (21 Jan 2022 11:00)  HR: 88 (22 Jan 2022 05:10) (63 - 98)  BP: 130/69 (22 Jan 2022 05:10) (115/72 - 144/80)  BP(mean): --  RR: 19 (22 Jan 2022 05:10) (18 - 19)  SpO2: 92% (22 Jan 2022 05:10) (92% - 97%)  Daily     Daily     PHYSICAL EXAM:  GENERAL: NAD  EYES:  conjunctiva and sclera clear  NECK: Supple, No JVD/Bruit  NERVOUS SYSTEM:  Arousable confused  CHEST:  No rales, No rhonchi  HEART:  RRR, No murmur  ABDOMEN: Soft, NT/ND BS+  EXTREMITIES:  Trace Edema    LABS:                        10.4   7.79  )-----------( 179      ( 21 Jan 2022 09:08 )             35.6     01-21    153<H>  |  107  |  49.8<H>  ----------------------------<  96  4.2   |  36.0<H>  |  1.77<H>    Ca    8.7      21 Jan 2022 09:08  Phos  2.3     01-20  Mg     2.1     01-21    TPro  6.6  /  Alb  2.6<L>  /  TBili  2.3<H>  /  DBili  x   /  AST  29  /  ALT  8   /  AlkPhos  151<H>  01-20        Magnesium, Serum: 2.1 mg/dL (01-21 @ 09:08)          RADIOLOGY & ADDITIONAL TESTS:

## 2022-01-22 NOTE — PROGRESS NOTE ADULT - ASSESSMENT
86y/oF PMH HTN, COPD (not on home o2), CKD, admitted with new onset afib with RVR, acute on chronic diastolic HF decompensation, acute on chronic rneal failure requiring HD. hospital course complicated by acute CVA now s/p TICI 2b revascularization of L M1 occlusion and thrombectomy (1/6)     Acute CVA   -s/p revascularization and thrombectomy by NeuroIR   -on eliquis   -PT/OT     EDILBERTO on CKD   -s/p HD   -monitoring renal function off HD   -nephro following   -permcath d/c'ed    Hypernatremia   -holding diuretic   -poor PO intake   -encourage PO    Acute diastolic CHF EF >75% with anasarca   -I/Os  -holding torsemide   -cxr 1/20 with central pulmonary congestion    chronic afib RVR   -cardio recs appreciated   -metoprolol, eliquis   -metoprolol increased 1/20  -holding digoxin     RUE skin tear   -xeroform, wrapped with gauze     RLL PNA   -s/p abx     Hypokalemia   -replete  -cont to monitor     DVT ppx: eliquis     will need laila on dc     Poor prognosis

## 2022-01-22 NOTE — PROGRESS NOTE ADULT - NUTRITIONAL ASSESSMENT
This patient has been assessed with a concern for Malnutrition and has been determined to have a diagnosis/diagnoses of Moderate protein-calorie malnutrition.    This patient is being managed with:   Diet Soft and Bite Sized-  Moderately Thick Liquids (MODTHICKLIQS)  Supplement Feeding Modality:  Oral  Ensure Pudding Cans or Servings Per Day:  3       Frequency:  Daily  Entered: Jan 22 2022  8:32AM

## 2022-01-23 LAB
ALBUMIN SERPL ELPH-MCNC: 2.1 G/DL — LOW (ref 3.3–5.2)
ALP SERPL-CCNC: 147 U/L — HIGH (ref 40–120)
ALT FLD-CCNC: 7 U/L — SIGNIFICANT CHANGE UP
ANION GAP SERPL CALC-SCNC: 10 MMOL/L — SIGNIFICANT CHANGE UP (ref 5–17)
AST SERPL-CCNC: 27 U/L — SIGNIFICANT CHANGE UP
BILIRUB SERPL-MCNC: 1.7 MG/DL — SIGNIFICANT CHANGE UP (ref 0.4–2)
BUN SERPL-MCNC: 39.9 MG/DL — HIGH (ref 8–20)
CALCIUM SERPL-MCNC: 8.2 MG/DL — LOW (ref 8.6–10.2)
CHLORIDE SERPL-SCNC: 103 MMOL/L — SIGNIFICANT CHANGE UP (ref 98–107)
CO2 SERPL-SCNC: 33 MMOL/L — HIGH (ref 22–29)
CREAT SERPL-MCNC: 1.66 MG/DL — HIGH (ref 0.5–1.3)
GLUCOSE SERPL-MCNC: 104 MG/DL — HIGH (ref 70–99)
MAGNESIUM SERPL-MCNC: 2.2 MG/DL — SIGNIFICANT CHANGE UP (ref 1.8–2.6)
POTASSIUM SERPL-MCNC: 3.9 MMOL/L — SIGNIFICANT CHANGE UP (ref 3.5–5.3)
POTASSIUM SERPL-SCNC: 3.9 MMOL/L — SIGNIFICANT CHANGE UP (ref 3.5–5.3)
PROT SERPL-MCNC: 5.7 G/DL — LOW (ref 6.6–8.7)
SODIUM SERPL-SCNC: 146 MMOL/L — HIGH (ref 135–145)

## 2022-01-23 PROCEDURE — 99232 SBSQ HOSP IP/OBS MODERATE 35: CPT

## 2022-01-23 PROCEDURE — 71045 X-RAY EXAM CHEST 1 VIEW: CPT | Mod: 26

## 2022-01-23 RX ORDER — DEXTROSE MONOHYDRATE, SODIUM CHLORIDE, AND POTASSIUM CHLORIDE 50; .745; 4.5 G/1000ML; G/1000ML; G/1000ML
1000 INJECTION, SOLUTION INTRAVENOUS
Refills: 0 | Status: DISCONTINUED | OUTPATIENT
Start: 2022-01-23 | End: 2022-01-24

## 2022-01-23 RX ADMIN — Medication 325 MILLIGRAM(S): at 12:24

## 2022-01-23 RX ADMIN — APIXABAN 2.5 MILLIGRAM(S): 2.5 TABLET, FILM COATED ORAL at 17:51

## 2022-01-23 RX ADMIN — Medication 25 MILLIGRAM(S): at 21:47

## 2022-01-23 RX ADMIN — Medication 1 MILLIGRAM(S): at 12:24

## 2022-01-23 RX ADMIN — Medication 25 MILLIGRAM(S): at 12:38

## 2022-01-23 RX ADMIN — CHLORHEXIDINE GLUCONATE 1 APPLICATION(S): 213 SOLUTION TOPICAL at 12:24

## 2022-01-23 RX ADMIN — PANTOPRAZOLE SODIUM 40 MILLIGRAM(S): 20 TABLET, DELAYED RELEASE ORAL at 12:24

## 2022-01-23 RX ADMIN — APIXABAN 2.5 MILLIGRAM(S): 2.5 TABLET, FILM COATED ORAL at 05:14

## 2022-01-23 NOTE — PROGRESS NOTE ADULT - SUBJECTIVE AND OBJECTIVE BOX
ROYER RAMOS    [id number]    86y      Female    CC: cva    INTERVAL HPI/OVERNIGHT EVENTS: pt seen and examined. no acute events o/n    REVIEW OF SYSTEMS:  unable to obtain     Vital Signs Last 24 Hrs  T(C): 36.4 (23 Jan 2022 04:28), Max: 36.5 (22 Jan 2022 11:50)  T(F): 97.5 (23 Jan 2022 04:28), Max: 97.7 (22 Jan 2022 11:50)  HR: 75 (23 Jan 2022 04:28) (75 - 86)  BP: 99/51 (23 Jan 2022 04:28) (99/51 - 120/72)  BP(mean): --  RR: 18 (23 Jan 2022 04:28) (18 - 18)  SpO2: 98% (23 Jan 2022 04:28) (97% - 98%)    PHYSICAL EXAM:    GENERAL: NAD  HEENT: PERRL  NECK: soft, supple  CHEST/LUNG: Clear to auscultation bilaterally  HEART: S1S2+, irregularly irregular   ABDOMEN: Soft, Nontender, Nondistended  SKIN: warm, dry  NEURO: Awake, alert; aphasic   PSYCH: calm, cooperative     LABS:                        10.2   7.99  )-----------( 173      ( 22 Jan 2022 09:23 )             34.7     01-23    146<H>  |  103  |  39.9<H>  ----------------------------<  104<H>  3.9   |  33.0<H>  |  1.66<H>    Ca    8.2<L>      23 Jan 2022 08:55  Mg     2.2     01-23    TPro  5.7<L>  /  Alb  2.1<L>  /  TBili  1.7  /  DBili  x   /  AST  27  /  ALT  7   /  AlkPhos  147<H>  01-23            MEDICATIONS  (STANDING):  apixaban 2.5 milliGRAM(s) Oral two times a day  chlorhexidine 2% Cloths 1 Application(s) Topical daily  dextrose 5% + sodium chloride 0.225% with potassium chloride 10 mEq/L 1000 milliLiter(s) (70 mL/Hr) IV Continuous <Continuous>  epoetin clara-epbx (RETACRIT) Injectable 07353 Unit(s) SubCutaneous every 7 days  ferrous    sulfate 325 milliGRAM(s) Oral daily  folic acid 1 milliGRAM(s) Oral daily  glucagon  Injectable 1 milliGRAM(s) IntraMuscular once  metoprolol tartrate 25 milliGRAM(s) Oral every 8 hours  pantoprazole    Tablet 40 milliGRAM(s) Oral daily    MEDICATIONS  (PRN):  albuterol/ipratropium for Nebulization 3 milliLiter(s) Nebulizer every 6 hours PRN Shortness of Breath and/or Wheezing  ondansetron Injectable 4 milliGRAM(s) IV Push every 8 hours PRN Nausea and/or Vomiting  sodium chloride 0.9% lock flush 10 milliLiter(s) IV Push every 1 hour PRN Pre/post blood products, medications, blood draw, and to maintain line patency      RADIOLOGY & ADDITIONAL TESTS:   No

## 2022-01-23 NOTE — PROGRESS NOTE ADULT - ASSESSMENT
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

## 2022-01-23 NOTE — PROGRESS NOTE ADULT - SUBJECTIVE AND OBJECTIVE BOX
NEPHROLOGY INTERVAL HPI/OVERNIGHT EVENTS:    Examined  Frail elderly  No dyspnea laying flat  No distress noted    MEDICATIONS  (STANDING):  apixaban 2.5 milliGRAM(s) Oral two times a day  chlorhexidine 2% Cloths 1 Application(s) Topical daily  dextrose 5% + sodium chloride 0.225% with potassium chloride 10 mEq/L 1000 milliLiter(s) (70 mL/Hr) IV Continuous <Continuous>  epoetin clara-epbx (RETACRIT) Injectable 64470 Unit(s) SubCutaneous every 7 days  ferrous    sulfate 325 milliGRAM(s) Oral daily  folic acid 1 milliGRAM(s) Oral daily  glucagon  Injectable 1 milliGRAM(s) IntraMuscular once  metoprolol tartrate 25 milliGRAM(s) Oral every 8 hours  pantoprazole    Tablet 40 milliGRAM(s) Oral daily    MEDICATIONS  (PRN):  albuterol/ipratropium for Nebulization 3 milliLiter(s) Nebulizer every 6 hours PRN Shortness of Breath and/or Wheezing  ondansetron Injectable 4 milliGRAM(s) IV Push every 8 hours PRN Nausea and/or Vomiting  sodium chloride 0.9% lock flush 10 milliLiter(s) IV Push every 1 hour PRN Pre/post blood products, medications, blood draw, and to maintain line patency      Allergies    No Known Allergies    Intolerances        Vital Signs Last 24 Hrs  T(C): 36.4 (23 Jan 2022 04:28), Max: 36.5 (22 Jan 2022 11:50)  T(F): 97.5 (23 Jan 2022 04:28), Max: 97.7 (22 Jan 2022 11:50)  HR: 75 (23 Jan 2022 04:28) (75 - 86)  BP: 99/51 (23 Jan 2022 04:28) (99/51 - 120/72)  BP(mean): --  RR: 18 (23 Jan 2022 04:28) (18 - 18)  SpO2: 98% (23 Jan 2022 04:28) (97% - 98%)  Daily     Daily     PHYSICAL EXAM:  GENERAL: NAD  EYES: EOMI  NECK: Supple, No JVD/Bruit  NERVOUS SYSTEM:  Arousable confused  CHEST:  No rales, No rhonchi  HEART:  RRR, No murmur  ABDOMEN: Soft, NT/ND BS+  EXTREMITIES:  Trace Edema    LABS:                        10.2   7.99  )-----------( 173      ( 22 Jan 2022 09:23 )             34.7     01-22    149<H>  |  105  |  44.9<H>  ----------------------------<  132<H>  3.7   |  36.0<H>  |  1.75<H>    Ca    8.5<L>      22 Jan 2022 09:23  Mg     2.2     01-22          Magnesium, Serum: 2.2 mg/dL (01-22 @ 09:23)          RADIOLOGY & ADDITIONAL TESTS:

## 2022-01-24 LAB
ANION GAP SERPL CALC-SCNC: 9 MMOL/L — SIGNIFICANT CHANGE UP (ref 5–17)
BUN SERPL-MCNC: 36.9 MG/DL — HIGH (ref 8–20)
CALCIUM SERPL-MCNC: 8.3 MG/DL — LOW (ref 8.6–10.2)
CHLORIDE SERPL-SCNC: 102 MMOL/L — SIGNIFICANT CHANGE UP (ref 98–107)
CO2 SERPL-SCNC: 32 MMOL/L — HIGH (ref 22–29)
CREAT SERPL-MCNC: 1.59 MG/DL — HIGH (ref 0.5–1.3)
GLUCOSE SERPL-MCNC: 98 MG/DL — SIGNIFICANT CHANGE UP (ref 70–99)
HCT VFR BLD CALC: 32.6 % — LOW (ref 34.5–45)
HGB BLD-MCNC: 9.5 G/DL — LOW (ref 11.5–15.5)
MCHC RBC-ENTMCNC: 24.9 PG — LOW (ref 27–34)
MCHC RBC-ENTMCNC: 29.1 GM/DL — LOW (ref 32–36)
MCV RBC AUTO: 85.3 FL — SIGNIFICANT CHANGE UP (ref 80–100)
PLATELET # BLD AUTO: 158 K/UL — SIGNIFICANT CHANGE UP (ref 150–400)
POTASSIUM SERPL-MCNC: 3.9 MMOL/L — SIGNIFICANT CHANGE UP (ref 3.5–5.3)
POTASSIUM SERPL-SCNC: 3.9 MMOL/L — SIGNIFICANT CHANGE UP (ref 3.5–5.3)
RBC # BLD: 3.82 M/UL — SIGNIFICANT CHANGE UP (ref 3.8–5.2)
RBC # FLD: 19.7 % — HIGH (ref 10.3–14.5)
SODIUM SERPL-SCNC: 143 MMOL/L — SIGNIFICANT CHANGE UP (ref 135–145)
WBC # BLD: 6.99 K/UL — SIGNIFICANT CHANGE UP (ref 3.8–10.5)
WBC # FLD AUTO: 6.99 K/UL — SIGNIFICANT CHANGE UP (ref 3.8–10.5)

## 2022-01-24 PROCEDURE — 99232 SBSQ HOSP IP/OBS MODERATE 35: CPT

## 2022-01-24 RX ADMIN — Medication 325 MILLIGRAM(S): at 12:35

## 2022-01-24 RX ADMIN — Medication 25 MILLIGRAM(S): at 05:03

## 2022-01-24 RX ADMIN — APIXABAN 2.5 MILLIGRAM(S): 2.5 TABLET, FILM COATED ORAL at 05:03

## 2022-01-24 RX ADMIN — Medication 1 MILLIGRAM(S): at 12:35

## 2022-01-24 RX ADMIN — APIXABAN 2.5 MILLIGRAM(S): 2.5 TABLET, FILM COATED ORAL at 16:43

## 2022-01-24 RX ADMIN — Medication 25 MILLIGRAM(S): at 12:34

## 2022-01-24 RX ADMIN — Medication 25 MILLIGRAM(S): at 22:59

## 2022-01-24 RX ADMIN — DEXTROSE MONOHYDRATE, SODIUM CHLORIDE, AND POTASSIUM CHLORIDE 50 MILLILITER(S): 50; .745; 4.5 INJECTION, SOLUTION INTRAVENOUS at 05:02

## 2022-01-24 RX ADMIN — PANTOPRAZOLE SODIUM 40 MILLIGRAM(S): 20 TABLET, DELAYED RELEASE ORAL at 12:34

## 2022-01-24 NOTE — PROGRESS NOTE ADULT - SUBJECTIVE AND OBJECTIVE BOX
ROYER RAMOS    [id number]    86y      Female    CC: cva    INTERVAL HPI/OVERNIGHT EVENTS: pt seen and examined. no acute events o/n    REVIEW OF SYSTEMS:  unable to obtain     Vital Signs Last 24 Hrs  T(C): 36.4 (24 Jan 2022 04:08), Max: 36.4 (23 Jan 2022 12:00)  T(F): 97.5 (24 Jan 2022 04:08), Max: 97.5 (23 Jan 2022 12:00)  HR: 93 (24 Jan 2022 04:08) (93 - 101)  BP: 124/68 (24 Jan 2022 04:08) (105/48 - 124/68)  BP(mean): --  RR: 18 (24 Jan 2022 04:08) (18 - 18)  SpO2: 98% (24 Jan 2022 04:08) (94% - 98%)    PHYSICAL EXAM:    GENERAL: NAD  HEENT: PERRL  NECK: soft, supple  CHEST/LUNG: Clear to auscultation bilaterally  HEART: S1S2+, irregularly irregular   ABDOMEN: Soft, Nontender, Nondistended  SKIN: warm, dry  NEURO: Awake, alert; aphasic   PSYCH: calm, cooperative     LABS:                        9.5    6.99  )-----------( 158      ( 24 Jan 2022 08:21 )             32.6     01-24    143  |  102  |  36.9<H>  ----------------------------<  98  3.9   |  32.0<H>  |  1.59<H>    Ca    8.3<L>      24 Jan 2022 08:21  Mg     2.2     01-23    TPro  5.7<L>  /  Alb  2.1<L>  /  TBili  1.7  /  DBili  x   /  AST  27  /  ALT  7   /  AlkPhos  147<H>  01-23            MEDICATIONS  (STANDING):  apixaban 2.5 milliGRAM(s) Oral two times a day  dextrose 5% + sodium chloride 0.225% with potassium chloride 10 mEq/L 1000 milliLiter(s) (50 mL/Hr) IV Continuous <Continuous>  epoetin clara-epbx (RETACRIT) Injectable 19929 Unit(s) SubCutaneous every 7 days  ferrous    sulfate 325 milliGRAM(s) Oral daily  folic acid 1 milliGRAM(s) Oral daily  metoprolol tartrate 25 milliGRAM(s) Oral every 8 hours  pantoprazole    Tablet 40 milliGRAM(s) Oral daily    MEDICATIONS  (PRN):  albuterol/ipratropium for Nebulization 3 milliLiter(s) Nebulizer every 6 hours PRN Shortness of Breath and/or Wheezing  ondansetron Injectable 4 milliGRAM(s) IV Push every 8 hours PRN Nausea and/or Vomiting      RADIOLOGY & ADDITIONAL TESTS:

## 2022-01-24 NOTE — PROGRESS NOTE ADULT - NUTRITIONAL ASSESSMENT
Called pt to inform of her preop meds and how to apply them. Pt was not available.      This patient has been assessed with a concern for Malnutrition and has been determined to have a diagnosis/diagnoses of Moderate protein-calorie malnutrition.    This patient is being managed with:   Diet Soft and Bite Sized-  Moderately Thick Liquids (MODTHICKLIQS)  Supplement Feeding Modality:  Oral  Ensure Pudding Cans or Servings Per Day:  3       Frequency:  Daily  Entered: Jan 22 2022  8:32AM

## 2022-01-24 NOTE — PROGRESS NOTE ADULT - ASSESSMENT
86y/oF PMH HTN, COPD (not on home o2), CKD, admitted with new onset afib with RVR, acute on chronic diastolic HF decompensation, acute on chronic rneal failure requiring HD. hospital course complicated by acute CVA now s/p TICI 2b revascularization of L M1 occlusion and thrombectomy (1/6)     Acute CVA   -s/p revascularization and thrombectomy by NeuroIR   -on eliquis   -PT/OT     EDILBERTO on CKD   -s/p HD   -monitoring renal function off HD   -nephro following   -permcath d/c'ed     Hypernatremia , improving   -holding diuretic   -poor PO intake   -encourage PO   -gently ivf as per nephro    Acute diastolic CHF EF >75% with anasarca   -I/Os  -holding torsemide   -cxr 1/20 with central pulmonary congestion    chronic afib RVR   -cardio recs appreciated   -metoprolol, eliquis   -metoprolol increased 1/20  -holding digoxin     RUE skin tear   -xeroform, wrapped with gauze     RLL PNA   -s/p abx     Hypokalemia   -replete  -cont to monitor     DVT ppx: eliquis     will need laila on dc, poss dc next 24-48hrs    Poor prognosis

## 2022-01-24 NOTE — CHART NOTE - NSCHARTNOTEFT_GEN_A_CORE
Palliative Care NP Note: Chart reviewed, Patient seen, lethargic sleeping, not eating aphasic. Plan is to transition to [facility], and remains a Full Code as per family. Palliative team signing off her case;  please reconsult if condition or goals change.  Dr. Sigala made aware.  Queta Garcia NP

## 2022-01-24 NOTE — PROGRESS NOTE ADULT - SUBJECTIVE AND OBJECTIVE BOX
NEPHROLOGY INTERVAL HPI/OVERNIGHT EVENTS:    Examined  Frail elderly  No dyspnea laying flat  No distress noted    MEDICATIONS  (STANDING):  apixaban 2.5 milliGRAM(s) Oral two times a day  dextrose 5% + sodium chloride 0.225% with potassium chloride 10 mEq/L 1000 milliLiter(s) (50 mL/Hr) IV Continuous <Continuous>  epoetin clara-epbx (RETACRIT) Injectable 19120 Unit(s) SubCutaneous every 7 days  ferrous    sulfate 325 milliGRAM(s) Oral daily  folic acid 1 milliGRAM(s) Oral daily  metoprolol tartrate 25 milliGRAM(s) Oral every 8 hours  pantoprazole    Tablet 40 milliGRAM(s) Oral daily    MEDICATIONS  (PRN):  albuterol/ipratropium for Nebulization 3 milliLiter(s) Nebulizer every 6 hours PRN Shortness of Breath and/or Wheezing  ondansetron Injectable 4 milliGRAM(s) IV Push every 8 hours PRN Nausea and/or Vomiting      Allergies    No Known Allergies    Intolerances        Vital Signs Last 24 Hrs  T(C): 36.6 (24 Jan 2022 11:49), Max: 36.6 (24 Jan 2022 11:49)  T(F): 97.8 (24 Jan 2022 11:49), Max: 97.8 (24 Jan 2022 11:49)  HR: 98 (24 Jan 2022 11:49) (93 - 101)  BP: 106/69 (24 Jan 2022 11:49) (105/48 - 124/68)  BP(mean): --  RR: 18 (24 Jan 2022 11:49) (18 - 18)  SpO2: 95% (24 Jan 2022 11:49) (94% - 98%)  Daily     Daily     PHYSICAL EXAM:  GENERAL: NAD  EYES: EOMI  NECK: Supple, No JVD/Bruit  NERVOUS SYSTEM:  Arousable confused  CHEST:  No rales, No rhonchi  HEART:  RRR, No murmur  ABDOMEN: Soft, NT/ND BS+  EXTREMITIES:  Trace Edema    LABS:                        9.5    6.99  )-----------( 158      ( 24 Jan 2022 08:21 )             32.6     01-24    143  |  102  |  36.9<H>  ----------------------------<  98  3.9   |  32.0<H>  |  1.59<H>    Ca    8.3<L>      24 Jan 2022 08:21  Mg     2.2     01-23    TPro  5.7<L>  /  Alb  2.1<L>  /  TBili  1.7  /  DBili  x   /  AST  27  /  ALT  7   /  AlkPhos  147<H>  01-23                RADIOLOGY & ADDITIONAL TESTS:

## 2022-01-24 NOTE — PROGRESS NOTE ADULT - ASSESSMENT
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  CT scan no hydronephrosis; atrophic R kidney---> confirmed on sonogram  Resp failure - now extubated   S/p CVA --> Angio noted from prior ---> she is on renal dose Eliquis   We are watching off HD now  ---->dialysis no longer needed- PermCath removed 1/21    HyperNatremia w poor po intake  Serum Na 153--> 149--> 143 improved--> will stop IVF has h/o HF last CXR w PVC  Torsemide --> on hold for now    Anemia - cont weekly Epogen and folate - Hb better     AM labs, will follow What Type Of Note Output Would You Prefer (Optional)?: Standard Output How Severe Is Your Acne?: mild Is This A New Presentation, Or A Follow-Up?: Acne Females Only: When Was Your Last Menstrual Period?: 12/18/2019 Additional Comments (Use Complete Sentences): Pt also presents for dark lesions on her face.

## 2022-01-25 ENCOUNTER — TRANSCRIPTION ENCOUNTER (OUTPATIENT)
Age: 87
End: 2022-01-25

## 2022-01-25 VITALS
HEART RATE: 106 BPM | DIASTOLIC BLOOD PRESSURE: 75 MMHG | SYSTOLIC BLOOD PRESSURE: 114 MMHG | TEMPERATURE: 98 F | RESPIRATION RATE: 18 BRPM | OXYGEN SATURATION: 95 %

## 2022-01-25 LAB
ANION GAP SERPL CALC-SCNC: 7 MMOL/L — SIGNIFICANT CHANGE UP (ref 5–17)
BUN SERPL-MCNC: 37.6 MG/DL — HIGH (ref 8–20)
CALCIUM SERPL-MCNC: 8.2 MG/DL — LOW (ref 8.6–10.2)
CHLORIDE SERPL-SCNC: 102 MMOL/L — SIGNIFICANT CHANGE UP (ref 98–107)
CO2 SERPL-SCNC: 34 MMOL/L — HIGH (ref 22–29)
CREAT SERPL-MCNC: 1.66 MG/DL — HIGH (ref 0.5–1.3)
GLUCOSE SERPL-MCNC: 91 MG/DL — SIGNIFICANT CHANGE UP (ref 70–99)
MAGNESIUM SERPL-MCNC: 2.3 MG/DL — SIGNIFICANT CHANGE UP (ref 1.6–2.6)
POTASSIUM SERPL-MCNC: 4.3 MMOL/L — SIGNIFICANT CHANGE UP (ref 3.5–5.3)
POTASSIUM SERPL-SCNC: 4.3 MMOL/L — SIGNIFICANT CHANGE UP (ref 3.5–5.3)
SARS-COV-2 RNA SPEC QL NAA+PROBE: SIGNIFICANT CHANGE UP
SODIUM SERPL-SCNC: 143 MMOL/L — SIGNIFICANT CHANGE UP (ref 135–145)

## 2022-01-25 PROCEDURE — 84439 ASSAY OF FREE THYROXINE: CPT

## 2022-01-25 PROCEDURE — 96375 TX/PRO/DX INJ NEW DRUG ADDON: CPT

## 2022-01-25 PROCEDURE — 84156 ASSAY OF PROTEIN URINE: CPT

## 2022-01-25 PROCEDURE — 87040 BLOOD CULTURE FOR BACTERIA: CPT

## 2022-01-25 PROCEDURE — 97530 THERAPEUTIC ACTIVITIES: CPT

## 2022-01-25 PROCEDURE — 96361 HYDRATE IV INFUSION ADD-ON: CPT

## 2022-01-25 PROCEDURE — 71250 CT THORAX DX C-: CPT | Mod: MA

## 2022-01-25 PROCEDURE — C1769: CPT

## 2022-01-25 PROCEDURE — 82330 ASSAY OF CALCIUM: CPT

## 2022-01-25 PROCEDURE — 82728 ASSAY OF FERRITIN: CPT

## 2022-01-25 PROCEDURE — 82607 VITAMIN B-12: CPT

## 2022-01-25 PROCEDURE — 77001 FLUOROGUIDE FOR VEIN DEVICE: CPT

## 2022-01-25 PROCEDURE — 86706 HEP B SURFACE ANTIBODY: CPT

## 2022-01-25 PROCEDURE — 97163 PT EVAL HIGH COMPLEX 45 MIN: CPT

## 2022-01-25 PROCEDURE — 92610 EVALUATE SWALLOWING FUNCTION: CPT

## 2022-01-25 PROCEDURE — 76604 US EXAM CHEST: CPT

## 2022-01-25 PROCEDURE — 70498 CT ANGIOGRAPHY NECK: CPT

## 2022-01-25 PROCEDURE — 0042T: CPT

## 2022-01-25 PROCEDURE — 82803 BLOOD GASES ANY COMBINATION: CPT

## 2022-01-25 PROCEDURE — 74176 CT ABD & PELVIS W/O CONTRAST: CPT | Mod: MA

## 2022-01-25 PROCEDURE — 85018 HEMOGLOBIN: CPT

## 2022-01-25 PROCEDURE — 99239 HOSP IP/OBS DSCHRG MGMT >30: CPT

## 2022-01-25 PROCEDURE — 85027 COMPLETE CBC AUTOMATED: CPT

## 2022-01-25 PROCEDURE — 93975 VASCULAR STUDY: CPT

## 2022-01-25 PROCEDURE — 84295 ASSAY OF SERUM SODIUM: CPT

## 2022-01-25 PROCEDURE — C1760: CPT

## 2022-01-25 PROCEDURE — 76775 US EXAM ABDO BACK WALL LIM: CPT

## 2022-01-25 PROCEDURE — 87086 URINE CULTURE/COLONY COUNT: CPT

## 2022-01-25 PROCEDURE — 80076 HEPATIC FUNCTION PANEL: CPT

## 2022-01-25 PROCEDURE — C1887: CPT

## 2022-01-25 PROCEDURE — 86803 HEPATITIS C AB TEST: CPT

## 2022-01-25 PROCEDURE — 94003 VENT MGMT INPAT SUBQ DAY: CPT

## 2022-01-25 PROCEDURE — 80061 LIPID PANEL: CPT

## 2022-01-25 PROCEDURE — 36600 WITHDRAWAL OF ARTERIAL BLOOD: CPT

## 2022-01-25 PROCEDURE — 70551 MRI BRAIN STEM W/O DYE: CPT

## 2022-01-25 PROCEDURE — 86704 HEP B CORE ANTIBODY TOTAL: CPT

## 2022-01-25 PROCEDURE — 93306 TTE W/DOPPLER COMPLETE: CPT

## 2022-01-25 PROCEDURE — 99261: CPT

## 2022-01-25 PROCEDURE — 83036 HEMOGLOBIN GLYCOSYLATED A1C: CPT

## 2022-01-25 PROCEDURE — 84300 ASSAY OF URINE SODIUM: CPT

## 2022-01-25 PROCEDURE — 83880 ASSAY OF NATRIURETIC PEPTIDE: CPT

## 2022-01-25 PROCEDURE — 82570 ASSAY OF URINE CREATININE: CPT

## 2022-01-25 PROCEDURE — 82962 GLUCOSE BLOOD TEST: CPT

## 2022-01-25 PROCEDURE — 76705 ECHO EXAM OF ABDOMEN: CPT

## 2022-01-25 PROCEDURE — 36415 COLL VENOUS BLD VENIPUNCTURE: CPT

## 2022-01-25 PROCEDURE — 70496 CT ANGIOGRAPHY HEAD: CPT

## 2022-01-25 PROCEDURE — 96365 THER/PROPH/DIAG IV INF INIT: CPT

## 2022-01-25 PROCEDURE — 83540 ASSAY OF IRON: CPT

## 2022-01-25 PROCEDURE — 96376 TX/PRO/DX INJ SAME DRUG ADON: CPT

## 2022-01-25 PROCEDURE — 93005 ELECTROCARDIOGRAM TRACING: CPT

## 2022-01-25 PROCEDURE — 84132 ASSAY OF SERUM POTASSIUM: CPT

## 2022-01-25 PROCEDURE — P9047: CPT

## 2022-01-25 PROCEDURE — 80048 BASIC METABOLIC PNL TOTAL CA: CPT

## 2022-01-25 PROCEDURE — 90715 TDAP VACCINE 7 YRS/> IM: CPT

## 2022-01-25 PROCEDURE — 80162 ASSAY OF DIGOXIN TOTAL: CPT

## 2022-01-25 PROCEDURE — 76380 CAT SCAN FOLLOW-UP STUDY: CPT

## 2022-01-25 PROCEDURE — 94002 VENT MGMT INPAT INIT DAY: CPT

## 2022-01-25 PROCEDURE — 94760 N-INVAS EAR/PLS OXIMETRY 1: CPT

## 2022-01-25 PROCEDURE — 85730 THROMBOPLASTIN TIME PARTIAL: CPT

## 2022-01-25 PROCEDURE — 97116 GAIT TRAINING THERAPY: CPT

## 2022-01-25 PROCEDURE — 99291 CRITICAL CARE FIRST HOUR: CPT | Mod: 25

## 2022-01-25 PROCEDURE — 81001 URINALYSIS AUTO W/SCOPE: CPT

## 2022-01-25 PROCEDURE — 70450 CT HEAD/BRAIN W/O DYE: CPT

## 2022-01-25 PROCEDURE — 82746 ASSAY OF FOLIC ACID SERUM: CPT

## 2022-01-25 PROCEDURE — 80053 COMPREHEN METABOLIC PANEL: CPT

## 2022-01-25 PROCEDURE — 85045 AUTOMATED RETICULOCYTE COUNT: CPT

## 2022-01-25 PROCEDURE — 76942 ECHO GUIDE FOR BIOPSY: CPT

## 2022-01-25 PROCEDURE — 85610 PROTHROMBIN TIME: CPT

## 2022-01-25 PROCEDURE — 87340 HEPATITIS B SURFACE AG IA: CPT

## 2022-01-25 PROCEDURE — C1757: CPT

## 2022-01-25 PROCEDURE — C1894: CPT

## 2022-01-25 PROCEDURE — 86705 HEP B CORE ANTIBODY IGM: CPT

## 2022-01-25 PROCEDURE — U0003: CPT

## 2022-01-25 PROCEDURE — 82947 ASSAY GLUCOSE BLOOD QUANT: CPT

## 2022-01-25 PROCEDURE — 82550 ASSAY OF CK (CPK): CPT

## 2022-01-25 PROCEDURE — 82435 ASSAY OF BLOOD CHLORIDE: CPT

## 2022-01-25 PROCEDURE — C1750: CPT

## 2022-01-25 PROCEDURE — 85014 HEMATOCRIT: CPT

## 2022-01-25 PROCEDURE — 71045 X-RAY EXAM CHEST 1 VIEW: CPT

## 2022-01-25 PROCEDURE — 83605 ASSAY OF LACTIC ACID: CPT

## 2022-01-25 PROCEDURE — 83935 ASSAY OF URINE OSMOLALITY: CPT

## 2022-01-25 PROCEDURE — 0225U NFCT DS DNA&RNA 21 SARSCOV2: CPT

## 2022-01-25 PROCEDURE — 61645 PERQ ART M-THROMBECT &/NFS: CPT

## 2022-01-25 PROCEDURE — 94640 AIRWAY INHALATION TREATMENT: CPT

## 2022-01-25 PROCEDURE — 84466 ASSAY OF TRANSFERRIN: CPT

## 2022-01-25 PROCEDURE — 84484 ASSAY OF TROPONIN QUANT: CPT

## 2022-01-25 PROCEDURE — 84100 ASSAY OF PHOSPHORUS: CPT

## 2022-01-25 PROCEDURE — 83735 ASSAY OF MAGNESIUM: CPT

## 2022-01-25 PROCEDURE — 85025 COMPLETE CBC W/AUTO DIFF WBC: CPT

## 2022-01-25 PROCEDURE — 83550 IRON BINDING TEST: CPT

## 2022-01-25 PROCEDURE — U0005: CPT

## 2022-01-25 PROCEDURE — 84443 ASSAY THYROID STIM HORMONE: CPT

## 2022-01-25 RX ORDER — OMEPRAZOLE 10 MG/1
1 CAPSULE, DELAYED RELEASE ORAL
Qty: 0 | Refills: 0 | DISCHARGE

## 2022-01-25 RX ORDER — IPRATROPIUM/ALBUTEROL SULFATE 18-103MCG
3 AEROSOL WITH ADAPTER (GRAM) INHALATION
Qty: 0 | Refills: 0 | DISCHARGE
Start: 2022-01-25

## 2022-01-25 RX ORDER — PANTOPRAZOLE SODIUM 20 MG/1
1 TABLET, DELAYED RELEASE ORAL
Qty: 0 | Refills: 0 | DISCHARGE
Start: 2022-01-25

## 2022-01-25 RX ORDER — PRAZOSIN HCL 2 MG
1 CAPSULE ORAL
Qty: 0 | Refills: 0 | DISCHARGE

## 2022-01-25 RX ORDER — METOPROLOL TARTRATE 50 MG
1 TABLET ORAL
Qty: 0 | Refills: 0 | DISCHARGE

## 2022-01-25 RX ORDER — FOLIC ACID 0.8 MG
1 TABLET ORAL
Qty: 0 | Refills: 0 | DISCHARGE
Start: 2022-01-25

## 2022-01-25 RX ORDER — METOPROLOL TARTRATE 50 MG
1 TABLET ORAL
Qty: 0 | Refills: 0 | DISCHARGE
Start: 2022-01-25

## 2022-01-25 RX ORDER — POTASSIUM CHLORIDE 20 MEQ
1 PACKET (EA) ORAL
Qty: 0 | Refills: 0 | DISCHARGE

## 2022-01-25 RX ORDER — APIXABAN 2.5 MG/1
1 TABLET, FILM COATED ORAL
Qty: 0 | Refills: 0 | DISCHARGE
Start: 2022-01-25

## 2022-01-25 RX ORDER — FERROUS SULFATE 325(65) MG
1 TABLET ORAL
Qty: 0 | Refills: 0 | DISCHARGE
Start: 2022-01-25

## 2022-01-25 RX ADMIN — Medication 25 MILLIGRAM(S): at 05:53

## 2022-01-25 RX ADMIN — Medication 325 MILLIGRAM(S): at 11:34

## 2022-01-25 RX ADMIN — Medication 25 MILLIGRAM(S): at 14:15

## 2022-01-25 RX ADMIN — Medication 1 MILLIGRAM(S): at 11:34

## 2022-01-25 RX ADMIN — APIXABAN 2.5 MILLIGRAM(S): 2.5 TABLET, FILM COATED ORAL at 05:53

## 2022-01-25 RX ADMIN — APIXABAN 2.5 MILLIGRAM(S): 2.5 TABLET, FILM COATED ORAL at 16:13

## 2022-01-25 RX ADMIN — PANTOPRAZOLE SODIUM 40 MILLIGRAM(S): 20 TABLET, DELAYED RELEASE ORAL at 11:34

## 2022-01-25 NOTE — DISCHARGE NOTE PROVIDER - NSDCMRMEDTOKEN_GEN_ALL_CORE_FT
apixaban 2.5 mg oral tablet: 1 tab(s) orally 2 times a day  ferrous sulfate 325 mg (65 mg elemental iron) oral tablet: 1 tab(s) orally once a day  folic acid 1 mg oral tablet: 1 tab(s) orally once a day  Incruse Ellipta 62.5 mcg/inh inhalation powder: 1 puff(s) inhaled every 24 hours  ipratropium-albuterol 0.5 mg-2.5 mg/3 mL inhalation solution: 3 milliliter(s) inhaled every 6 hours, As needed, Shortness of Breath and/or Wheezing  metoprolol tartrate 25 mg oral tablet: 1 tab(s) orally every 8 hours  pantoprazole 40 mg oral delayed release tablet: 1 tab(s) orally once a day  Singulair 10 mg oral tablet: 1 tab(s) orally once a day

## 2022-01-25 NOTE — PROGRESS NOTE ADULT - SUBJECTIVE AND OBJECTIVE BOX
NEPHROLOGY INTERVAL HPI/OVERNIGHT EVENTS:    Examined  Frail elderly  No dyspnea laying flat  No distress noted  Son visiting    MEDICATIONS  (STANDING):  apixaban 2.5 milliGRAM(s) Oral two times a day  epoetin clara-epbx (RETACRIT) Injectable 40032 Unit(s) SubCutaneous every 7 days  ferrous    sulfate 325 milliGRAM(s) Oral daily  folic acid 1 milliGRAM(s) Oral daily  metoprolol tartrate 25 milliGRAM(s) Oral every 8 hours  pantoprazole    Tablet 40 milliGRAM(s) Oral daily    MEDICATIONS  (PRN):  albuterol/ipratropium for Nebulization 3 milliLiter(s) Nebulizer every 6 hours PRN Shortness of Breath and/or Wheezing  ondansetron Injectable 4 milliGRAM(s) IV Push every 8 hours PRN Nausea and/or Vomiting      Allergies    No Known Allergies    Intolerances        Vital Signs Last 24 Hrs  T(C): 36.8 (25 Jan 2022 11:36), Max: 36.9 (24 Jan 2022 17:21)  T(F): 98.2 (25 Jan 2022 11:36), Max: 98.4 (24 Jan 2022 17:21)  HR: 86 (25 Jan 2022 14:02) (78 - 113)  BP: 120/76 (25 Jan 2022 14:02) (112/62 - 144/75)  BP(mean): --  RR: 18 (25 Jan 2022 11:36) (18 - 19)  SpO2: 96% (25 Jan 2022 11:36) (92% - 99%)  Daily     Daily     PHYSICAL EXAM:  GENERAL: NAD  EYES: EOMI  NECK: Supple, No JVD/Bruit  NERVOUS SYSTEM:  Arousable confused  CHEST:  No rales, No rhonchi  HEART:  RRR, No murmur  ABDOMEN: Soft, NT/ND BS+  EXTREMITIES:  Trace Edema    LABS:                        9.5    6.99  )-----------( 158      ( 24 Jan 2022 08:21 )             32.6     01-25    143  |  102  |  37.6<H>  ----------------------------<  91  4.3   |  34.0<H>  |  1.66<H>    Ca    8.2<L>      25 Jan 2022 08:01  Mg     2.3     01-25          Magnesium, Serum: 2.3 mg/dL (01-25 @ 08:01)          RADIOLOGY & ADDITIONAL TESTS:

## 2022-01-25 NOTE — PROGRESS NOTE ADULT - ASSESSMENT
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

## 2022-01-25 NOTE — DISCHARGE NOTE PROVIDER - ATTENDING DISCHARGE PHYSICAL EXAMINATION:
Vital Signs Last 24 Hrs  T(C): 36.8 (25 Jan 2022 04:30), Max: 36.9 (24 Jan 2022 17:21)  T(F): 98.2 (25 Jan 2022 04:30), Max: 98.4 (24 Jan 2022 17:21)  HR: 113 (25 Jan 2022 04:30) (98 - 113)  BP: 112/62 (25 Jan 2022 04:30) (106/69 - 144/75)  BP(mean): --  RR: 19 (25 Jan 2022 04:30) (18 - 19)  SpO2: 99% (25 Jan 2022 04:30) (92% - 99%)    GENERAL: NAD  HEENT: PERRL  NECK: soft, supple  CHEST/LUNG: Clear to auscultation bilaterally  HEART: S1S2+, irregularly irregular   ABDOMEN: Soft, Nontender, Nondistended  SKIN: warm, dry  NEURO: Awake, alert; aphasic   PSYCH: calm, cooperative

## 2022-01-25 NOTE — CHART NOTE - NSCHARTNOTEFT_GEN_A_CORE
Source: Patient [ ]  Family [ ]   other [x ] EMR, staff and ID rounds     Current Diet: Soft & Bite Sized, mod thick; Ensure Pudding TID     Patient reports [ ] nausea  [ ] vomiting [ ] diarrhea [ ] constipation  [ ]chewing problems [ ] swallowing issues  [ ] other:     PO intake:  < 50% [ ]   50-75%  [x ]   %  [ ]  other :    Source for PO intake [ ] Patient [ ] family [ ] chart [x ] staff [ ] other    Current Weight:   (1/25)  134lbs bedscale weight  (1/12)  157.6 lbs  (1/11)  175.2 lbs  (1/10)  162.2 lbs  (1/7)   165.7 lbs     % Weight Change: Unclear accuracy of weight 2/2 inconsistency, will continue to monitor     Pertinent Medications: MEDICATIONS  (STANDING):  apixaban 2.5 milliGRAM(s) Oral two times a day  epoetin clara-epbx (RETACRIT) Injectable 72280 Unit(s) SubCutaneous every 7 days  ferrous    sulfate 325 milliGRAM(s) Oral daily  folic acid 1 milliGRAM(s) Oral daily  metoprolol tartrate 25 milliGRAM(s) Oral every 8 hours  pantoprazole    Tablet 40 milliGRAM(s) Oral daily    MEDICATIONS  (PRN):  albuterol/ipratropium for Nebulization 3 milliLiter(s) Nebulizer every 6 hours PRN Shortness of Breath and/or Wheezing  ondansetron Injectable 4 milliGRAM(s) IV Push every 8 hours PRN Nausea and/or Vomiting    Pertinent Labs: CBC Full  -  ( 24 Jan 2022 08:21 )  WBC Count : 6.99 K/uL  RBC Count : 3.82 M/uL  Hemoglobin : 9.5 g/dL  Hematocrit : 32.6 %  Platelet Count - Automated : 158 K/uL  Mean Cell Volume : 85.3 fl  Mean Cell Hemoglobin : 24.9 pg  Mean Cell Hemoglobin Concentration : 29.1 gm/dL  01-25 Na143 mmol/L Glu 91 mg/dL K+ 4.3 mmol/L Cr  1.66 mg/dL<H> BUN 37.6 mg/dL<H> Phos n/a   Alb n/a   PAB n/a       Skin: suspected DTI R heel, B/L buttocks IAD; RUE skin tear, L forearm skin tear    Nutrition focused physical exam previously conducted - found signs of malnutrition [ ]absent [x ]present    Subcutaneous fat loss: Mod [x ] Orbital fat pads region, [x ]Buccal fat region, [ ]Triceps region,  [ ]Ribs region    Muscle wasting: Mod [ x]Temples region, [x ]Clavicle region, [x ]Shoulder region, [ ]Scapula region, [ ]Interosseous region,  [ ]thigh region, [ ]Calf region    Estimated Needs:   [x ] no change since previous assessment  [ ] recalculated:     Current Nutrition Diagnosis: Pt remains at high nutrition risk secondary to moderate, (acute on chronic) protein calorie malnutrition related to inability to meet sufficient protein-energy needs in setting of COPD, CHF, skin breakdown, renal failure requiring HD, RLL PNA, LUE cellulitis, left hemisphere stroke as evidenced by physicals signs of moderate muscle/fat loss and suspect meeting </75% nutrient needs >/1 month. Pt with poor PO intake, per aide family is bringing food from home. Renal function stable. Pt hypernatremic likely 2/2 poor PO intake. Aware plan for d/c to [facility].     Recommendations:   1) Add Nepro TID   2) Rx Nephro-gustavo, vit C 500mg and folic acid daily   3) Check weight daily to monitor trends   4) Monitor weights daily for trend/accuracy   5) Consider appetite stimulant     Monitoring and Evaluation:   [ x] PO intake [x ] Tolerance to diet prescription [X] Weights  [X] Follow up per protocol [X] Labs:

## 2022-01-25 NOTE — CHART NOTE - NSCHARTNOTESELECT_GEN_ALL_CORE
Event Note
Groin Check/Event Note
Nutrition Services
Nutrition Services
Event Note
Neuro Interventional Surgery/Event Note
Neurointerventional Sign Off Note/Event Note
Neurointerventional Surgery Post Procedure Note/Event Note
Nutrition Services
Transfer Note
ambulatory

## 2022-01-25 NOTE — DISCHARGE NOTE PROVIDER - CARE PROVIDERS DIRECT ADDRESSES
,DirectAddress_Unknown,DirectAddress_Unknown,[email or web address]

## 2022-01-25 NOTE — DISCHARGE NOTE PROVIDER - NSDCCPCAREPLAN_GEN_ALL_CORE_FT
PRINCIPAL DISCHARGE DIAGNOSIS  Diagnosis: Acute on chronic systolic congestive heart failure  Assessment and Plan of Treatment:       SECONDARY DISCHARGE DIAGNOSES  Diagnosis: EDILBERTO (acute kidney injury)  Assessment and Plan of Treatment:     Diagnosis: Cellulitis  Assessment and Plan of Treatment:     Diagnosis: Cerebrovascular accident (CVA)  Assessment and Plan of Treatment:

## 2022-01-25 NOTE — PROGRESS NOTE ADULT - PROVIDER SPECIALTY LIST ADULT
Hospitalist
Internal Medicine
Internal Medicine
Intervent Radiology
Nephrology
Neurology
Neuroradiology
Cardiology
Hospitalist
Nephrology
Neurology
Neurology
Neurosurgery
Palliative Care
Rehab Medicine
Cardiology
Hospitalist
Hospitalist
Intervent Radiology
Intervent Radiology
NSICU
Nephrology
Neurology
Rehab Medicine
Cardiology
Cardiology
Hospitalist
NSICU
Nephrology
Neurology
Neuroradiology
Hospitalist
Internal Medicine
NSICU
Nephrology
Cardiology
NSICU
Internal Medicine

## 2022-01-25 NOTE — DISCHARGE NOTE PROVIDER - CARE PROVIDER_API CALL
VINCENT,   Phone: (   )    -  Fax: (   )    -  Follow Up Time:     Fausto Justin (DO)  Internal Medicine; Nephrology  [address] [place] [address]  Phone: [phone]  Fax: [phone]  Follow Up Time:     Estefani Samaniego)  Cardiology; Internal Medicine  [address] [facility] [address]  [place], [place] [id number]  Phone: [phone]  Fax: [phone]  Follow Up Time:

## 2022-01-25 NOTE — DISCHARGE NOTE PROVIDER - PROVIDER TOKENS
FREE:[LAST:[PMD],PHONE:[(   )    -],FAX:[(   )    -]],PROVIDER:[TOKEN:[[id number]]],PROVIDER:[TOKEN:[[id number]]]

## 2022-01-25 NOTE — PROGRESS NOTE ADULT - REASON FOR ADMISSION
decompensated CHF  New onset Afib  RLL PNA
CVA
decompensated CHF  New onset Afib  RLL PNA

## 2022-01-25 NOTE — DISCHARGE NOTE PROVIDER - HOSPITAL COURSE
86y/oF PMH HTN, COPD (not on home o2), CKD, admitted with new onset afib with RVR, acute on chronic diastolic HF decompensation, acute on chronic rneal failure requiring HD. s/p abx course for pna. hospital course complicated by acute CVA now s/p TICI 2b revascularization of L M1 occlusion and thrombectomy (1/6). Pt s/p HD with some improvement in EDILBERTO. likely now at new baseline. lacie, d/c'ed. nephro consult appreciated. hypernatremia improved with ivf, encouraged PO intake. neuro and cardio recs appreciated. pt stable for dc to [facility] 86y/oF PMH HTN, COPD (not on home o2), CKD, admitted with new onset afib with RVR, acute on chronic diastolic HF decompensation, acute on chronic rneal failure requiring HD. s/p abx course for pna. hospital course complicated by acute CVA now s/p TICI 2b revascularization of L M1 occlusion and thrombectomy (1/6). Pt s/p HD with some improvement in EDILBERTO. likely now at new baseline. lacie, d/c'ed. nephro consult appreciated. hypernatremia improved with ivf, encouraged PO intake. neuro and cardio recs appreciated. pt also with urinary retention, tafoya placed. pt stable for dc to [facility]

## 2022-01-26 PROBLEM — N28.9 DISORDER OF KIDNEY AND URETER, UNSPECIFIED: Chronic | Status: ACTIVE | Noted: 2017-01-31

## 2022-01-26 PROBLEM — Z87.09 PERSONAL HISTORY OF OTHER DISEASES OF THE RESPIRATORY SYSTEM: Chronic | Status: ACTIVE | Noted: 2022-01-02

## 2022-02-08 ENCOUNTER — APPOINTMENT (OUTPATIENT)
Dept: CARDIOLOGY | Facility: CLINIC | Age: 87
End: 2022-02-08

## 2023-01-24 NOTE — ASU PATIENT PROFILE, ADULT - VISION (WITH CORRECTIVE LENSES IF THE PATIENT USUALLY WEARS THEM):
Appt scheduled for 01/26. Addressed in previous message.    wears glasses/Partially impaired: cannot see medication labels or newsprint, but can see obstacles in path, and the surrounding layout; can count fingers at arm's length
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
## 2023-05-17 NOTE — PROGRESS NOTE ADULT - NUTRITIONAL ASSESSMENT
Patient called, states that she received results of her negative urine culture. She states that she was was told to take the Doxycycline for a presumed infection. Wants to know if she should still take the Doxycycline? This patient has been assessed with a concern for Malnutrition and has been determined to have a diagnosis/diagnoses of Moderate protein-calorie malnutrition.    This patient is being managed with:   Diet Soft and Bite Sized-  Moderately Thick Liquids (MODTHICKLIQS)  Entered: James 10 2022  1:51PM

## 2023-08-02 NOTE — ED STATDOCS - NS_ATTENDINGSCRIBE_ED_ALL_ED
Fair
I personally performed the service described in the documentation recorded by the scribe in my presence, and it accurately and completely records my words and actions.

## 2023-10-24 NOTE — ASU PREOP CHECKLIST - PATIENT'S PERSONAL PROPERTY GIVEN TO
Jackeline Rivera for dental cleaning. Continue antiplatelet therapy.
Note was faxed to 's office at [phone]
Patient is to be scheduled for a dental cleaning,  is asking if patient should wait 6months after NSTEMI in July, please advise
family member

## 2024-02-09 NOTE — PROGRESS NOTE ADULT - ASSESSMENT
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

## 2024-04-15 NOTE — H&P PST ADULT - NSANTHAGERD_ENT_A_CORE
"Spoke with pt daughter. Daughter states, " I think she is looking to switch to a primary care closer home as [place] " daughter will contact [facility] in [place] to schedule her an appointment.   Verbalized understanding.   " Yes

## 2025-02-20 NOTE — BRIEF OPERATIVE NOTE - ASSISTANT(S)
Pt calls stating seen here 2/18 and discussed with Dr Sierra if no improvement by end of week that zpak and prednisone can be called in. This was reflected in his dictation and pt request [facility]. This was sent asin as per Dr Sierra's note.   none